# Patient Record
Sex: FEMALE
[De-identification: names, ages, dates, MRNs, and addresses within clinical notes are randomized per-mention and may not be internally consistent; named-entity substitution may affect disease eponyms.]

---

## 2017-11-20 ENCOUNTER — LAB SERVICES (OUTPATIENT)
Dept: OTHER | Age: 37
End: 2017-11-20

## 2017-11-20 ENCOUNTER — CHARTING TRANS (OUTPATIENT)
Dept: OTHER | Age: 37
End: 2017-11-20

## 2017-11-20 LAB
APPEARANCE: YELLOW
BILIRUBIN: NORMAL
GLUCOSE U: 4
KETONES: NORMAL
LEUKOCYTES: 2
NITRITE: NORMAL
OCCULT BLOOD: NORMAL
PH: 6
PROTEIN: NORMAL
URINE SPEC GRAVITY: 1.02
UROBILINOGEN: 0.21

## 2018-11-02 VITALS
TEMPERATURE: 99.2 F | DIASTOLIC BLOOD PRESSURE: 90 MMHG | HEIGHT: 71 IN | SYSTOLIC BLOOD PRESSURE: 140 MMHG | HEART RATE: 72 BPM | WEIGHT: 248 LBS | RESPIRATION RATE: 18 BRPM | BODY MASS INDEX: 34.72 KG/M2

## 2019-02-04 ENCOUNTER — HOSPITAL ENCOUNTER (OUTPATIENT)
Age: 39
Discharge: HOME | End: 2019-02-04
Attending: FAMILY MEDICINE
Payer: COMMERCIAL

## 2019-02-04 VITALS
BODY MASS INDEX: 33.93 KG/M2 | HEIGHT: 71 IN | DIASTOLIC BLOOD PRESSURE: 81 MMHG | TEMPERATURE: 97 F | WEIGHT: 242.38 LBS | SYSTOLIC BLOOD PRESSURE: 112 MMHG | OXYGEN SATURATION: 97 % | HEART RATE: 95 BPM | RESPIRATION RATE: 20 BRPM

## 2019-02-04 DIAGNOSIS — N30.00 ACUTE CYSTITIS WITHOUT HEMATURIA: Primary | ICD-10-CM

## 2019-02-04 DIAGNOSIS — B37.3 VAGINAL CANDIDIASIS: ICD-10-CM

## 2019-02-04 LAB
POCT BILIRUBIN URINE: NEGATIVE
POCT GLUCOSE URINE: >=1000 MG/DL
POCT KETONE URINE: NEGATIVE MG/DL
POCT NITRITE URINE: NEGATIVE
POCT PH URINE: 5.5 (ref 5–8)
POCT PROTEIN URINE: NEGATIVE MG/DL
POCT SPECIFIC GRAVITY URINE: 1.02
POCT URINE COLOR: YELLOW
POCT UROBILINOGEN URINE: 0.2 MG/DL

## 2019-02-04 PROCEDURE — 87186 SC STD MICRODIL/AGAR DIL: CPT | Performed by: FAMILY MEDICINE

## 2019-02-04 PROCEDURE — 87086 URINE CULTURE/COLONY COUNT: CPT | Performed by: FAMILY MEDICINE

## 2019-02-04 PROCEDURE — 99204 OFFICE O/P NEW MOD 45 MIN: CPT

## 2019-02-04 PROCEDURE — 87077 CULTURE AEROBIC IDENTIFY: CPT | Performed by: FAMILY MEDICINE

## 2019-02-04 PROCEDURE — 81002 URINALYSIS NONAUTO W/O SCOPE: CPT | Performed by: FAMILY MEDICINE

## 2019-02-04 RX ORDER — NITROFURANTOIN 25; 75 MG/1; MG/1
100 CAPSULE ORAL 2 TIMES DAILY
Qty: 14 CAPSULE | Refills: 0 | Status: SHIPPED | OUTPATIENT
Start: 2019-02-04 | End: 2019-02-04

## 2019-02-04 RX ORDER — FLUCONAZOLE 150 MG/1
TABLET ORAL
Qty: 3 TABLET | Refills: 0 | Status: SHIPPED | OUTPATIENT
Start: 2019-02-04 | End: 2019-02-04

## 2019-02-04 RX ORDER — CIPROFLOXACIN 500 MG/1
500 TABLET, FILM COATED ORAL 2 TIMES DAILY
Qty: 14 TABLET | Refills: 0 | Status: SHIPPED | OUTPATIENT
Start: 2019-02-04 | End: 2019-02-11

## 2019-02-04 NOTE — ED PROVIDER NOTES
Patient Seen in: [id number] [facility]    History   Patient presents with:  Emery (gynecologic)    Stated Complaint: vaginal discomfort    HPI    **17-year-old female who is a type II diabetic presents to the immediate care with chief co Fundi benign. Ears: normal TM's and external ear canals both ears  Nose: Nares normal. Septum midline. Mucosa normal. No drainage or sinus tenderness.   Throat: lips, mucosa, and tongue normal; teeth and gums normal  Neck: no adenopathy and supple, symmetr

## 2019-07-10 ENCOUNTER — HOSPITAL ENCOUNTER (EMERGENCY)
Facility: HOSPITAL | Age: 39
Discharge: HOME | End: 2019-07-11
Attending: STUDENT IN AN ORGANIZED HEALTH CARE EDUCATION/TRAINING PROGRAM
Payer: COMMERCIAL

## 2019-07-10 ENCOUNTER — APPOINTMENT (OUTPATIENT)
Dept: CT IMAGING | Facility: HOSPITAL | Age: 39
End: 2019-07-10
Attending: STUDENT IN AN ORGANIZED HEALTH CARE EDUCATION/TRAINING PROGRAM
Payer: COMMERCIAL

## 2019-07-10 ENCOUNTER — APPOINTMENT (OUTPATIENT)
Dept: ULTRASOUND IMAGING | Facility: HOSPITAL | Age: 39
End: 2019-07-10
Attending: STUDENT IN AN ORGANIZED HEALTH CARE EDUCATION/TRAINING PROGRAM
Payer: COMMERCIAL

## 2019-07-10 VITALS
HEIGHT: 65 IN | SYSTOLIC BLOOD PRESSURE: 142 MMHG | HEART RATE: 90 BPM | TEMPERATURE: 99 F | WEIGHT: 235 LBS | BODY MASS INDEX: 39.15 KG/M2 | DIASTOLIC BLOOD PRESSURE: 92 MMHG | OXYGEN SATURATION: 100 % | RESPIRATION RATE: 18 BRPM

## 2019-07-10 DIAGNOSIS — R20.2 PARESTHESIAS: ICD-10-CM

## 2019-07-10 DIAGNOSIS — N83.202 CYSTS OF BOTH OVARIES: ICD-10-CM

## 2019-07-10 DIAGNOSIS — E11.9 DM2 (DIABETES MELLITUS, TYPE 2) (HCC): ICD-10-CM

## 2019-07-10 DIAGNOSIS — M25.552 PAIN IN JOINT INVOLVING LEFT PELVIC REGION AND THIGH: ICD-10-CM

## 2019-07-10 DIAGNOSIS — R10.9 ABDOMINAL PAIN, ACUTE: ICD-10-CM

## 2019-07-10 DIAGNOSIS — N39.0 URINARY TRACT INFECTION WITHOUT HEMATURIA, SITE UNSPECIFIED: Primary | ICD-10-CM

## 2019-07-10 DIAGNOSIS — R10.9 ABDOMINAL PAIN, UNSPECIFIED ABDOMINAL LOCATION: ICD-10-CM

## 2019-07-10 DIAGNOSIS — N83.201 CYSTS OF BOTH OVARIES: ICD-10-CM

## 2019-07-10 LAB
ALBUMIN SERPL-MCNC: 2.8 G/DL (ref 3.4–5)
ALBUMIN/GLOB SERPL: 0.6 {RATIO} (ref 1–2)
ALP LIVER SERPL-CCNC: 102 U/L (ref 37–98)
ALT SERPL-CCNC: 16 U/L (ref 13–56)
ANION GAP SERPL CALC-SCNC: 8 MMOL/L (ref 0–18)
AST SERPL-CCNC: 7 U/L (ref 15–37)
BASOPHILS # BLD AUTO: 0.07 X10(3) UL (ref 0–0.2)
BASOPHILS NFR BLD AUTO: 0.7 %
BILIRUB SERPL-MCNC: 0.3 MG/DL (ref 0.1–2)
BILIRUB UR QL STRIP.AUTO: NEGATIVE
BUN BLD-MCNC: 7 MG/DL (ref 7–18)
BUN/CREAT SERPL: 10.3 (ref 10–20)
CALCIUM BLD-MCNC: 8.9 MG/DL (ref 8.5–10.1)
CHLORIDE SERPL-SCNC: 103 MMOL/L (ref 98–112)
CO2 SERPL-SCNC: 23 MMOL/L (ref 21–32)
COLOR UR AUTO: YELLOW
CREAT BLD-MCNC: 0.68 MG/DL (ref 0.55–1.02)
DEPRECATED RDW RBC AUTO: 40.2 FL (ref 35.1–46.3)
EOSINOPHIL # BLD AUTO: 0.29 X10(3) UL (ref 0–0.7)
EOSINOPHIL NFR BLD AUTO: 2.9 %
ERYTHROCYTE [DISTWIDTH] IN BLOOD BY AUTOMATED COUNT: 13 % (ref 11–15)
GLOBULIN PLAS-MCNC: 5 G/DL (ref 2.8–4.4)
GLUCOSE BLD-MCNC: 288 MG/DL (ref 70–99)
GLUCOSE UR STRIP.AUTO-MCNC: >=500 MG/DL
HAV IGM SER QL: 1.6 MG/DL (ref 1.6–2.6)
HCT VFR BLD AUTO: 40.4 % (ref 35–48)
HGB BLD-MCNC: 13 G/DL (ref 12–16)
IMM GRANULOCYTES # BLD AUTO: 0.03 X10(3) UL (ref 0–1)
IMM GRANULOCYTES NFR BLD: 0.3 %
KETONES UR STRIP.AUTO-MCNC: NEGATIVE MG/DL
LIPASE SERPL-CCNC: 125 U/L (ref 73–393)
LYMPHOCYTES # BLD AUTO: 3.17 X10(3) UL (ref 1–4)
LYMPHOCYTES NFR BLD AUTO: 31.6 %
M PROTEIN MFR SERPL ELPH: 7.8 G/DL (ref 6.4–8.2)
MCH RBC QN AUTO: 27.2 PG (ref 26–34)
MCHC RBC AUTO-ENTMCNC: 32.2 G/DL (ref 31–37)
MCV RBC AUTO: 84.5 FL (ref 80–100)
MONOCYTES # BLD AUTO: 0.92 X10(3) UL (ref 0.1–1)
MONOCYTES NFR BLD AUTO: 9.2 %
NEUTROPHILS # BLD AUTO: 5.56 X10 (3) UL (ref 1.5–7.7)
NEUTROPHILS # BLD AUTO: 5.56 X10(3) UL (ref 1.5–7.7)
NEUTROPHILS NFR BLD AUTO: 55.3 %
NITRITE UR QL STRIP.AUTO: NEGATIVE
OSMOLALITY SERPL CALC.SUM OF ELEC: 287 MOSM/KG (ref 275–295)
PH UR STRIP.AUTO: 7 [PH] (ref 4.5–8)
PLATELET # BLD AUTO: 503 10(3)UL (ref 150–450)
POCT LOT NUMBER: NORMAL
POCT URINE PREGNANCY: NEGATIVE
POTASSIUM SERPL-SCNC: 4.2 MMOL/L (ref 3.5–5.1)
PROT UR STRIP.AUTO-MCNC: NEGATIVE MG/DL
RBC # BLD AUTO: 4.78 X10(6)UL (ref 3.8–5.3)
SODIUM SERPL-SCNC: 134 MMOL/L (ref 136–145)
SP GR UR STRIP.AUTO: 1.02 (ref 1–1.03)
UROBILINOGEN UR STRIP.AUTO-MCNC: <2 MG/DL
WBC # BLD AUTO: 10 X10(3) UL (ref 4–11)
WBC #/AREA URNS AUTO: >50 /HPF

## 2019-07-10 PROCEDURE — 83690 ASSAY OF LIPASE: CPT | Performed by: STUDENT IN AN ORGANIZED HEALTH CARE EDUCATION/TRAINING PROGRAM

## 2019-07-10 PROCEDURE — 93971 EXTREMITY STUDY: CPT | Performed by: STUDENT IN AN ORGANIZED HEALTH CARE EDUCATION/TRAINING PROGRAM

## 2019-07-10 PROCEDURE — 99284 EMERGENCY DEPT VISIT MOD MDM: CPT

## 2019-07-10 PROCEDURE — 81025 URINE PREGNANCY TEST: CPT

## 2019-07-10 PROCEDURE — 85025 COMPLETE CBC W/AUTO DIFF WBC: CPT | Performed by: STUDENT IN AN ORGANIZED HEALTH CARE EDUCATION/TRAINING PROGRAM

## 2019-07-10 PROCEDURE — 70450 CT HEAD/BRAIN W/O DYE: CPT | Performed by: STUDENT IN AN ORGANIZED HEALTH CARE EDUCATION/TRAINING PROGRAM

## 2019-07-10 PROCEDURE — 96361 HYDRATE IV INFUSION ADD-ON: CPT

## 2019-07-10 PROCEDURE — 83735 ASSAY OF MAGNESIUM: CPT | Performed by: STUDENT IN AN ORGANIZED HEALTH CARE EDUCATION/TRAINING PROGRAM

## 2019-07-10 PROCEDURE — 80053 COMPREHEN METABOLIC PANEL: CPT | Performed by: STUDENT IN AN ORGANIZED HEALTH CARE EDUCATION/TRAINING PROGRAM

## 2019-07-10 PROCEDURE — 74177 CT ABD & PELVIS W/CONTRAST: CPT | Performed by: STUDENT IN AN ORGANIZED HEALTH CARE EDUCATION/TRAINING PROGRAM

## 2019-07-10 PROCEDURE — 96375 TX/PRO/DX INJ NEW DRUG ADDON: CPT

## 2019-07-10 PROCEDURE — 81001 URINALYSIS AUTO W/SCOPE: CPT | Performed by: STUDENT IN AN ORGANIZED HEALTH CARE EDUCATION/TRAINING PROGRAM

## 2019-07-10 PROCEDURE — 87086 URINE CULTURE/COLONY COUNT: CPT | Performed by: STUDENT IN AN ORGANIZED HEALTH CARE EDUCATION/TRAINING PROGRAM

## 2019-07-10 PROCEDURE — 96365 THER/PROPH/DIAG IV INF INIT: CPT

## 2019-07-10 PROCEDURE — 84702 CHORIONIC GONADOTROPIN TEST: CPT

## 2019-07-10 RX ORDER — IBUPROFEN 600 MG/1
600 TABLET ORAL EVERY 8 HOURS PRN
Qty: 30 TABLET | Refills: 0 | Status: SHIPPED | OUTPATIENT
Start: 2019-07-10 | End: 2019-07-15 | Stop reason: ALTCHOICE

## 2019-07-10 RX ORDER — MORPHINE SULFATE 4 MG/ML
4 INJECTION, SOLUTION INTRAMUSCULAR; INTRAVENOUS EVERY 30 MIN PRN
Status: DISCONTINUED | OUTPATIENT
Start: 2019-07-10 | End: 2019-07-11

## 2019-07-10 RX ORDER — KETOROLAC TROMETHAMINE 30 MG/ML
15 INJECTION, SOLUTION INTRAMUSCULAR; INTRAVENOUS ONCE
Status: COMPLETED | OUTPATIENT
Start: 2019-07-10 | End: 2019-07-10

## 2019-07-10 RX ORDER — ONDANSETRON 2 MG/ML
4 INJECTION INTRAMUSCULAR; INTRAVENOUS ONCE
Status: COMPLETED | OUTPATIENT
Start: 2019-07-10 | End: 2019-07-10

## 2019-07-10 RX ORDER — HYDROCODONE BITARTRATE AND ACETAMINOPHEN 5; 325 MG/1; MG/1
1-2 TABLET ORAL EVERY 6 HOURS PRN
Qty: 10 TABLET | Refills: 0 | Status: SHIPPED | OUTPATIENT
Start: 2019-07-10 | End: 2019-07-17

## 2019-07-10 RX ORDER — CEPHALEXIN 500 MG/1
500 CAPSULE ORAL 4 TIMES DAILY
Qty: 28 CAPSULE | Refills: 0 | Status: SHIPPED | OUTPATIENT
Start: 2019-07-10 | End: 2019-07-17

## 2019-07-11 ENCOUNTER — TELEPHONE (OUTPATIENT)
Dept: NEUROLOGY | Facility: CLINIC | Age: 39
End: 2019-07-11

## 2019-07-11 LAB — B-HCG SERPL-ACNC: <1 MIU/ML

## 2019-07-11 NOTE — ED PROVIDER NOTES
Patient Seen in: [facility] Emergency Department    History   Patient presents with:  Abdomen/Flank Pain (GI/)  Lower Extremity Injury (musculoskeletal)    Stated Complaint: abd pain/left leg pain    HPI    Patient is a 20-year-old female presents O2 Device None (Room air)       Current:BP (!) 142/92   Pulse 90   Temp 98.8 °F (37.1 °C) (Temporal)   Resp 18   Ht 165.1 cm (5' 5\")   Wt 106.6 kg   LMP 06/21/2019   SpO2 100%   BMI 39.11 kg/m²         Physical Exam    Constitutional: The patient is jaden 6-10 (*)     Bacteria Urine 3+ (*)     Squamous Epi.  Cells Large (*)     Mucous Urine 2+ (*)     All other components within normal limits   CBC W/ DIFFERENTIAL - Abnormal; Notable for the following components:    .0 (*)     All other components wit worsen. She demonstrated understanding, she is comfortable with the plan and will follow-up as directed.           Disposition and Plan     Clinical Impression:  Urinary tract infection without hematuria, site unspecified  (primary encounter diagnosis)  Cy

## 2019-07-11 NOTE — TELEPHONE ENCOUNTER
Spoke with pt. She was seen in ED on 07/10/2019 for left leg numbness and tingling. She states she is doing \"ok\" but is still experiencing pain numbness and tingling in left leg. Pt states \"whole leg is completely numb. \" Pt also reports that she is [place]

## 2019-07-11 NOTE — ED INITIAL ASSESSMENT (HPI)
Pt c/o left sided abdominal pain x1 week. Pt states she feels \"something moving through her abdomen\". Pt also c/o left thigh and knee pain x1 week.  Pt c/o numbness in the left leg    Pt denies any nausea, vomiting, pt states she has irregular bowel movem

## 2019-07-15 ENCOUNTER — OFFICE VISIT (OUTPATIENT)
Dept: FAMILY MEDICINE CLINIC | Facility: CLINIC | Age: 39
End: 2019-07-15

## 2019-07-15 VITALS
SYSTOLIC BLOOD PRESSURE: 122 MMHG | BODY MASS INDEX: 39.82 KG/M2 | HEART RATE: 90 BPM | WEIGHT: 239 LBS | OXYGEN SATURATION: 98 % | RESPIRATION RATE: 18 BRPM | TEMPERATURE: 98 F | DIASTOLIC BLOOD PRESSURE: 84 MMHG | HEIGHT: 65 IN

## 2019-07-15 DIAGNOSIS — R22.1 NECK FULLNESS: ICD-10-CM

## 2019-07-15 DIAGNOSIS — M25.562 CHRONIC PAIN OF LEFT KNEE: ICD-10-CM

## 2019-07-15 DIAGNOSIS — Z91.19 MEDICALLY NONCOMPLIANT: ICD-10-CM

## 2019-07-15 DIAGNOSIS — G89.29 CHRONIC PAIN OF LEFT KNEE: ICD-10-CM

## 2019-07-15 DIAGNOSIS — K59.09 CHRONIC CONSTIPATION: ICD-10-CM

## 2019-07-15 DIAGNOSIS — E11.65 TYPE 2 DIABETES MELLITUS WITH HYPERGLYCEMIA, WITHOUT LONG-TERM CURRENT USE OF INSULIN (HCC): ICD-10-CM

## 2019-07-15 DIAGNOSIS — E11.65 UNCONTROLLED TYPE 2 DIABETES MELLITUS WITH HYPERGLYCEMIA (HCC): Primary | ICD-10-CM

## 2019-07-15 DIAGNOSIS — R10.32 LLQ ABDOMINAL PAIN: ICD-10-CM

## 2019-07-15 DIAGNOSIS — R20.2 PARESTHESIAS: ICD-10-CM

## 2019-07-15 DIAGNOSIS — E01.0 THYROMEGALY: ICD-10-CM

## 2019-07-15 DIAGNOSIS — E66.9 OBESITY (BMI 30-39.9): ICD-10-CM

## 2019-07-15 PROCEDURE — 99204 OFFICE O/P NEW MOD 45 MIN: CPT | Performed by: FAMILY MEDICINE

## 2019-07-15 RX ORDER — MELOXICAM 15 MG/1
15 TABLET ORAL DAILY PRN
Qty: 30 TABLET | Refills: 1 | Status: SHIPPED | OUTPATIENT
Start: 2019-07-15 | End: 2019-07-19 | Stop reason: ALTCHOICE

## 2019-07-15 NOTE — PROGRESS NOTES
HPI:    Patient ID: El Resendiz is a 37year old female. HPI   35yr old obese, AAF presents with  as a new patient for ER f/u and to establish care.   Seen in ED on 7/10 for L sided abdominal pain, L thigh pain and L knee pain for the past week Ryan Muniz in [facility]    Review of Systems   Constitutional: Negative for fatigue. Respiratory: Negative for cough and shortness of breath. Cardiovascular: Negative for chest pain and palpitations. Gastrointestinal: Positive for abdominal pain.    Viv insulin glargine (LANTUS SOLOSTAR) 100 UNIT/ML Subcutaneous Solution Pen-injector Inject 50 Units into the skin nightly.  (Patient taking differently: Inject 40 Units into the skin nightly.  ) Disp: 5 pen Rfl: 0   Insulin Pen Needle (BD PEN NEEDLE SHORT U fullness  5. Thyromegaly  - reviewed possible etiologies thyromegaly  - will check TFTs  - check thyroid US  - TSH W REFLEX TO FREE T4; Future  - US THYROID (JAL=[id number]); Future    6. Chronic constipation  7.  LLQ abdominal pain  - instructed to increase fib

## 2019-07-15 NOTE — PATIENT INSTRUCTIONS
Treating Constipation    Constipation is a common and often uncomfortable problem. Constipation means you have bowel movements fewer than 3 times per week, or strain to pass hard, dry stool. It can last a short time.  Or it can be a problem that never see © 4080-7499 [employer]. [address] [facility], [facility] [place]. All rights reserved. This information is not intended as a substitute for professional medical care. Always follow your healthcare professional's instructions.         Ciera Caldwell · Monounsaturated fats are mostly found in vegetable oils, such as olive, canola, and peanut oils. They are also found in avocados and some nuts. Monounsaturated fats are healthy for your heart. That’s because they lower LDL (unhealthy) cholesterol.   · Evgeny

## 2019-07-16 ENCOUNTER — HOSPITAL ENCOUNTER (OUTPATIENT)
Dept: GENERAL RADIOLOGY | Facility: HOSPITAL | Age: 39
Discharge: HOME | End: 2019-07-16
Attending: FAMILY MEDICINE
Payer: COMMERCIAL

## 2019-07-16 ENCOUNTER — HOSPITAL ENCOUNTER (OUTPATIENT)
Dept: ULTRASOUND IMAGING | Facility: HOSPITAL | Age: 39
Discharge: HOME | End: 2019-07-16
Attending: OBSTETRICS & GYNECOLOGY
Payer: COMMERCIAL

## 2019-07-16 DIAGNOSIS — G89.29 CHRONIC PAIN OF LEFT KNEE: ICD-10-CM

## 2019-07-16 DIAGNOSIS — R10.9 ABDOMINAL PAIN: ICD-10-CM

## 2019-07-16 DIAGNOSIS — M25.562 CHRONIC PAIN OF LEFT KNEE: ICD-10-CM

## 2019-07-16 PROCEDURE — 93975 VASCULAR STUDY: CPT | Performed by: OBSTETRICS & GYNECOLOGY

## 2019-07-16 PROCEDURE — 73560 X-RAY EXAM OF KNEE 1 OR 2: CPT | Performed by: FAMILY MEDICINE

## 2019-07-16 PROCEDURE — 76830 TRANSVAGINAL US NON-OB: CPT | Performed by: OBSTETRICS & GYNECOLOGY

## 2019-07-16 PROCEDURE — 76856 US EXAM PELVIC COMPLETE: CPT | Performed by: OBSTETRICS & GYNECOLOGY

## 2019-07-17 ENCOUNTER — APPOINTMENT (OUTPATIENT)
Dept: LAB | Age: 39
End: 2019-07-17
Attending: FAMILY MEDICINE
Payer: COMMERCIAL

## 2019-07-17 DIAGNOSIS — E11.65 TYPE 2 DIABETES MELLITUS WITH HYPERGLYCEMIA, WITHOUT LONG-TERM CURRENT USE OF INSULIN (HCC): ICD-10-CM

## 2019-07-17 DIAGNOSIS — E01.0 THYROMEGALY: ICD-10-CM

## 2019-07-17 DIAGNOSIS — E11.65 UNCONTROLLED TYPE 2 DIABETES MELLITUS WITH HYPERGLYCEMIA (HCC): ICD-10-CM

## 2019-07-17 DIAGNOSIS — R22.1 NECK FULLNESS: ICD-10-CM

## 2019-07-17 LAB
ALBUMIN SERPL-MCNC: 3.1 G/DL (ref 3.4–5)
ALBUMIN/GLOB SERPL: 0.6 {RATIO} (ref 1–2)
ALP LIVER SERPL-CCNC: 104 U/L (ref 37–98)
ALT SERPL-CCNC: 19 U/L (ref 13–56)
ANION GAP SERPL CALC-SCNC: 7 MMOL/L (ref 0–18)
AST SERPL-CCNC: 12 U/L (ref 15–37)
BILIRUB SERPL-MCNC: 0.5 MG/DL (ref 0.1–2)
BUN BLD-MCNC: 8 MG/DL (ref 7–18)
BUN/CREAT SERPL: 11.8 (ref 10–20)
CALCIUM BLD-MCNC: 9.3 MG/DL (ref 8.5–10.1)
CHLORIDE SERPL-SCNC: 102 MMOL/L (ref 98–112)
CHOLEST SMN-MCNC: 149 MG/DL (ref ?–200)
CO2 SERPL-SCNC: 27 MMOL/L (ref 21–32)
CREAT BLD-MCNC: 0.68 MG/DL (ref 0.55–1.02)
CREAT UR-SCNC: 114 MG/DL
EST. AVERAGE GLUCOSE BLD GHB EST-MCNC: 315 MG/DL (ref 68–126)
GLOBULIN PLAS-MCNC: 5.2 G/DL (ref 2.8–4.4)
GLUCOSE BLD-MCNC: 250 MG/DL (ref 70–99)
HBA1C MFR BLD HPLC: 12.6 % (ref ?–5.7)
HDLC SERPL-MCNC: 38 MG/DL (ref 40–59)
LDLC SERPL CALC-MCNC: 89 MG/DL (ref ?–100)
M PROTEIN MFR SERPL ELPH: 8.3 G/DL (ref 6.4–8.2)
MICROALBUMIN UR-MCNC: 2.01 MG/DL
MICROALBUMIN/CREAT 24H UR-RTO: 17.6 UG/MG (ref ?–30)
NONHDLC SERPL-MCNC: 111 MG/DL (ref ?–130)
OSMOLALITY SERPL CALC.SUM OF ELEC: 289 MOSM/KG (ref 275–295)
POTASSIUM SERPL-SCNC: 4.8 MMOL/L (ref 3.5–5.1)
SODIUM SERPL-SCNC: 136 MMOL/L (ref 136–145)
TRIGL SERPL-MCNC: 110 MG/DL (ref 30–149)
TSI SER-ACNC: 0.87 MIU/ML (ref 0.36–3.74)
VLDLC SERPL CALC-MCNC: 22 MG/DL (ref 0–30)

## 2019-07-17 PROCEDURE — 80053 COMPREHEN METABOLIC PANEL: CPT

## 2019-07-17 PROCEDURE — 84443 ASSAY THYROID STIM HORMONE: CPT

## 2019-07-17 PROCEDURE — 80061 LIPID PANEL: CPT

## 2019-07-17 PROCEDURE — 83036 HEMOGLOBIN GLYCOSYLATED A1C: CPT

## 2019-07-17 PROCEDURE — 82570 ASSAY OF URINE CREATININE: CPT

## 2019-07-17 PROCEDURE — 82043 UR ALBUMIN QUANTITATIVE: CPT

## 2019-07-19 ENCOUNTER — TELEPHONE (OUTPATIENT)
Dept: FAMILY MEDICINE CLINIC | Facility: CLINIC | Age: 39
End: 2019-07-19

## 2019-07-19 ENCOUNTER — OFFICE VISIT (OUTPATIENT)
Dept: FAMILY MEDICINE CLINIC | Facility: CLINIC | Age: 39
End: 2019-07-19

## 2019-07-19 VITALS
WEIGHT: 239 LBS | BODY MASS INDEX: 39.82 KG/M2 | HEART RATE: 94 BPM | SYSTOLIC BLOOD PRESSURE: 110 MMHG | TEMPERATURE: 98 F | RESPIRATION RATE: 18 BRPM | DIASTOLIC BLOOD PRESSURE: 82 MMHG | HEIGHT: 65 IN | OXYGEN SATURATION: 98 %

## 2019-07-19 DIAGNOSIS — G89.29 CHRONIC PAIN OF LEFT KNEE: ICD-10-CM

## 2019-07-19 DIAGNOSIS — E66.9 OBESITY (BMI 30-39.9): ICD-10-CM

## 2019-07-19 DIAGNOSIS — E11.65 UNCONTROLLED TYPE 2 DIABETES MELLITUS WITH HYPERGLYCEMIA (HCC): Primary | ICD-10-CM

## 2019-07-19 DIAGNOSIS — F17.200 TOBACCO USE DISORDER: ICD-10-CM

## 2019-07-19 DIAGNOSIS — R93.6 ABNORMAL X-RAY OF KNEE: ICD-10-CM

## 2019-07-19 DIAGNOSIS — E11.42 TYPE 2 DIABETES MELLITUS WITH DIABETIC POLYNEUROPATHY, WITHOUT LONG-TERM CURRENT USE OF INSULIN (HCC): ICD-10-CM

## 2019-07-19 DIAGNOSIS — M25.562 CHRONIC PAIN OF LEFT KNEE: ICD-10-CM

## 2019-07-19 LAB
GLUCOSE BLOOD: 251
TEST STRIP EXPIRATION DATE: NORMAL DATE

## 2019-07-19 PROCEDURE — 82962 GLUCOSE BLOOD TEST: CPT | Performed by: FAMILY MEDICINE

## 2019-07-19 PROCEDURE — 99214 OFFICE O/P EST MOD 30 MIN: CPT | Performed by: FAMILY MEDICINE

## 2019-07-19 RX ORDER — BLOOD-GLUCOSE METER
1 EACH MISCELLANEOUS 2 TIMES DAILY
Qty: 1 KIT | Refills: 0 | Status: SHIPPED | OUTPATIENT
Start: 2019-07-19 | End: 2020-07-18

## 2019-07-19 RX ORDER — GABAPENTIN 100 MG/1
100 CAPSULE ORAL NIGHTLY
Qty: 30 CAPSULE | Refills: 1 | Status: SHIPPED | OUTPATIENT
Start: 2019-07-19 | End: 2019-07-30

## 2019-07-19 NOTE — TELEPHONE ENCOUNTER
Pt called - [facility] that was given to her at today's appointment.

## 2019-07-19 NOTE — TELEPHONE ENCOUNTER
Patient got Lantus vials. She would like the pens and needles. Says this is what she had before. Pended.

## 2019-07-19 NOTE — PROGRESS NOTES
HPI:   Dl Dean is a 37year old female who presents for f/u on her recent labs and imaging. DM2, has been uncontrolled. Pt has discontinued all her meds and has not been checking her glucose levels.  Has had numbness/tingling in her feet for some max Lab Results   Component Value Date    AST 12 (L) 07/17/2019    AST 7 (L) 07/10/2019    AST 15 09/30/2013     Lab Results   Component Value Date    ALT 19 07/17/2019    ALT 16 07/10/2019    ALT 20 09/30/2013     @[facility](    )  Past Medical History: abused: Not on file        Forced sexual activity: Not on file    Other Topics      Concerns:        Caffeine Concern: No        Exercise: No        Seat Belt: Yes        Special Diet: No        Stress Concern: No        Weight Concern: No    Social Histor SEs  - refer to diabetes clinic for more structured tx  - refer to ophthalmology, Dr. Sierra Garber for diabetic eye exam  - check feet daily  - check blood glucose levels daily and keep log to bring at next ov.   - GLUCOSE BLOOD TEST  - OPHTHALMOLOGY - INTERN

## 2019-07-23 ENCOUNTER — TELEPHONE (OUTPATIENT)
Dept: FAMILY MEDICINE CLINIC | Facility: CLINIC | Age: 39
End: 2019-07-23

## 2019-07-23 ENCOUNTER — OFFICE VISIT (OUTPATIENT)
Dept: ENDOCRINOLOGY CLINIC | Facility: CLINIC | Age: 39
End: 2019-07-23

## 2019-07-23 VITALS
HEART RATE: 94 BPM | HEIGHT: 71 IN | WEIGHT: 238 LBS | DIASTOLIC BLOOD PRESSURE: 70 MMHG | SYSTOLIC BLOOD PRESSURE: 108 MMHG | BODY MASS INDEX: 33.32 KG/M2 | RESPIRATION RATE: 20 BRPM

## 2019-07-23 DIAGNOSIS — E11.65 TYPE 2 DIABETES MELLITUS WITH HYPERGLYCEMIA, WITH LONG-TERM CURRENT USE OF INSULIN (HCC): ICD-10-CM

## 2019-07-23 DIAGNOSIS — R20.2 PARESTHESIAS: Primary | ICD-10-CM

## 2019-07-23 DIAGNOSIS — Z79.4 TYPE 2 DIABETES MELLITUS WITH HYPERGLYCEMIA, WITH LONG-TERM CURRENT USE OF INSULIN (HCC): ICD-10-CM

## 2019-07-23 DIAGNOSIS — F32.A DEPRESSION, UNSPECIFIED DEPRESSION TYPE: Primary | ICD-10-CM

## 2019-07-23 PROBLEM — F41.9 ANXIETY: Status: ACTIVE | Noted: 2019-07-23

## 2019-07-23 PROCEDURE — 95249 CONT GLUC MNTR PT PROV EQP: CPT | Performed by: NURSE PRACTITIONER

## 2019-07-23 PROCEDURE — 99215 OFFICE O/P EST HI 40 MIN: CPT | Performed by: NURSE PRACTITIONER

## 2019-07-23 RX ORDER — GLIMEPIRIDE 2 MG/1
2 TABLET ORAL
Qty: 90 TABLET | Refills: 0 | Status: SHIPPED | OUTPATIENT
Start: 2019-07-23 | End: 2019-09-12 | Stop reason: ALTCHOICE

## 2019-07-23 RX ORDER — BLOOD-GLUCOSE SENSOR
1 EACH MISCELLANEOUS
Qty: 3 EACH | Refills: 12 | Status: SHIPPED | OUTPATIENT
Start: 2019-07-23 | End: 2020-07-21 | Stop reason: ALTCHOICE

## 2019-07-23 RX ORDER — BLOOD-GLUCOSE,RECEIVER,CONT
1 EACH MISCELLANEOUS ONCE
Qty: 1 DEVICE | Refills: 0 | Status: SHIPPED | OUTPATIENT
Start: 2019-07-23 | End: 2019-07-23

## 2019-07-23 RX ORDER — BLOOD-GLUCOSE TRANSMITTER
EACH MISCELLANEOUS
Qty: 1 EACH | Refills: 3 | Status: SHIPPED | OUTPATIENT
Start: 2019-07-23 | End: 2019-07-30

## 2019-07-23 RX ORDER — METFORMIN HYDROCHLORIDE 500 MG/1
TABLET, EXTENDED RELEASE ORAL
Qty: 360 TABLET | Refills: 1 | Status: SHIPPED | OUTPATIENT
Start: 2019-07-23 | End: 2019-09-12

## 2019-07-23 NOTE — PATIENT INSTRUCTIONS
We are here to support you with Diabetes but please remember that you still need your primary care doctor for your routine health maintenance.    Your A1C: 12.6%  Your trends are too high and we will work together on improving diabetes control to help your or  under water for longer than 30 minutes. If the sensor falls off, please keep the sensor and bring it back to your appointment since we may still have enough information in the sensor to review your trends.    We will see if your insurance covers t eye and not visible by you. Please let me know if you need provider list for eye doctor.    Thank you   Jacy Moss  [phone]

## 2019-07-23 NOTE — PROGRESS NOTES
Diabetes Education:    Provided education on Personal Freestyle Giulia CGM and gave her sample  / sensor.   Lot [id number]      [id number]      Exp  8/31/2019     target range set at 100-180 mg/dl  Reviewed the following: Sensor pack, applica

## 2019-07-23 NOTE — ASSESSMENT & PLAN NOTE
A1C :   Lab Results   Component Value Date     (H) 07/17/2019    A1C 12.6 (H) 07/17/2019     Weight: 238 lb   Diabetes control is poor   Had lengthy discussion regarding poor glycemic control.  Patient was reminded their health is being jeopardized w Diet, goals for physical activity and weight loss discussed. The patient is asked to return in 2 weeks  but recommended to contact DM clinic sooner if questions or concerns.       The patient indicates understanding of these issues and agrees to the plan

## 2019-07-23 NOTE — TELEPHONE ENCOUNTER
Patient states she is a patient of Dr Fouzia Collins and has an upcoming appointment that needs a referral.      Transferred to triage  for further details.     Future Appointments   Date Time Provider Lissett Monteiro   7/24/2019  1:40 PM Anali Osborn, DO

## 2019-07-23 NOTE — PROGRESS NOTES
Eduin Gonzalez is a 37year old female who presents today to establish for diabetes management.    Primary care physician: Roxane Carmona MD   Most recent A1C was 12.6% which reflects poorly controlled DM over past 3m   Admits she went off DM meds for pas Lowest: 108 mg/dl   Highest: 249 mg/dl     DM associated symptoms:   Polyuria, polyphagia, polydipsia: yes  Paresthesias: yes  Blurred vision: yes  Hypoglycemia: no but reports did have frequent hypoglycemia when she was taking lantus 5522     DM Complic Monitoring Suppl (ONETOUCH ULTRA 2) w/Device Does not apply Kit 1 Device by Other route 2 (two) times daily. Use as directed. Disp: 1 kit Rfl: 0   Glucose Blood (ONETOUCH ULTRA BLUE) In Vitro Strip Use as directed.  Disp: 100 strip Rfl: [address] rhythm. Edema not present. Pulmonary/Chest: Effort normal and breath sounds normal.   Musculoskeletal:   Diabetes foot exam:   Good foot hygiene.    Bilateral barefoot skin diabetic exam: normal.  Visualized feet and the appearance : normal.  Bilateral lifestyle modifications (dietary modifications, exercise and weight loss)   Educated patient on the benefits of Metformin therapy (and discussed risks as well)   Recommended for  patient to follow up in Diabetes center to review carb goals, food label read 1      DM Quality Indicators:  A1C as reported above  Retinopathy Screen: needed   Nephropathy Screen: NEG 7-2019 no ACEi  Depression Screen:  PHQ-2 score 1 - referred to [facility] navigator   Feet exam: Reviewed feet precautions 7-   BP: as above  Lipids:

## 2019-07-24 ENCOUNTER — TELEPHONE (OUTPATIENT)
Dept: FAMILY MEDICINE CLINIC | Facility: CLINIC | Age: 39
End: 2019-07-24

## 2019-07-24 NOTE — TELEPHONE ENCOUNTER
Patient states she had FMLA forms to be completed by Dr Katelynn Casillas. She wants to know if they have been completed? Please advise.

## 2019-07-24 NOTE — TELEPHONE ENCOUNTER
Dr. Camilo Mack,  Please advise    I do not see any mention of FMLA or scans of forms in the chart. Do you have these?

## 2019-07-24 NOTE — H&P (VIEW-ONLY)
[facility] - History and Physical                                                                                         Patient presents with:  Establish Care  Abdominal Pain  Constipation      HISTORY OF PRESENT ILLNESS:   Teresita PAUL Continuous Blood Gluc Transmit (DEXCOM G6 TRANSMITTER) Does not apply Misc Use as directed with Dexcom G 6 sensor Disp: 1 each Rfl: 3   glimepiride 2 MG Oral Tab Take 1 tablet (2 mg total) by mouth every morning before breakfast. Disp: 90 tablet Rfl: 0 07/17/2019, not currently breastfeeding. Constitutional: Obese body habitus, well groomed  Ears, Nose, Mouth, Throat: Normal appearance of nose and ears.  Normal appearance of lips, teeth, gums and oral mucosa  Neck: Normal neck inspection, normal thyroi

## 2019-07-24 NOTE — TELEPHONE ENCOUNTER
Patient notified referral order has been placed for Dr. Tawny Booker. States she has already made an appointment for tomorrow.

## 2019-07-25 RX ORDER — ACETAMINOPHEN 500 MG
1000 TABLET ORAL ONCE
Status: CANCELLED | OUTPATIENT
Start: 2019-07-25 | End: 2019-07-25

## 2019-07-25 RX ORDER — POLYETHYLENE GLYCOL 3350 17 G/17G
17 POWDER, FOR SOLUTION ORAL 2 TIMES DAILY
COMMUNITY
End: 2019-09-05 | Stop reason: ALTCHOICE

## 2019-07-25 RX ORDER — MELOXICAM 7.5 MG/1
7.5 TABLET ORAL AS NEEDED
COMMUNITY
End: 2019-08-06 | Stop reason: ALTCHOICE

## 2019-07-25 RX ORDER — HYDROCODONE BITARTRATE AND ACETAMINOPHEN 5; 325 MG/1; MG/1
1 TABLET ORAL AS NEEDED
COMMUNITY
End: 2019-08-06

## 2019-07-25 RX ORDER — IBUPROFEN 200 MG
600 TABLET ORAL AS NEEDED
COMMUNITY
End: 2019-08-06

## 2019-07-26 ENCOUNTER — HOSPITAL ENCOUNTER (OUTPATIENT)
Dept: ULTRASOUND IMAGING | Age: 39
Discharge: HOME | End: 2019-07-26
Attending: FAMILY MEDICINE
Payer: COMMERCIAL

## 2019-07-26 DIAGNOSIS — E01.0 THYROMEGALY: ICD-10-CM

## 2019-07-26 DIAGNOSIS — R22.1 NECK FULLNESS: ICD-10-CM

## 2019-07-26 PROCEDURE — 76536 US EXAM OF HEAD AND NECK: CPT | Performed by: FAMILY MEDICINE

## 2019-07-29 ENCOUNTER — TELEPHONE (OUTPATIENT)
Dept: FAMILY MEDICINE CLINIC | Facility: CLINIC | Age: 39
End: 2019-07-29

## 2019-07-30 ENCOUNTER — OFFICE VISIT (OUTPATIENT)
Dept: NEUROLOGY | Facility: CLINIC | Age: 39
End: 2019-07-30

## 2019-07-30 VITALS
WEIGHT: 244 LBS | BODY MASS INDEX: 34.16 KG/M2 | RESPIRATION RATE: 15 BRPM | SYSTOLIC BLOOD PRESSURE: 122 MMHG | HEIGHT: 71 IN | HEART RATE: 78 BPM | DIASTOLIC BLOOD PRESSURE: 78 MMHG

## 2019-07-30 DIAGNOSIS — G57.12 MERALGIA PARESTHETICA OF LEFT SIDE: ICD-10-CM

## 2019-07-30 DIAGNOSIS — E11.65 UNCONTROLLED TYPE 2 DIABETES MELLITUS WITH HYPERGLYCEMIA (HCC): ICD-10-CM

## 2019-07-30 DIAGNOSIS — E11.42 TYPE 2 DIABETES MELLITUS WITH DIABETIC POLYNEUROPATHY, WITHOUT LONG-TERM CURRENT USE OF INSULIN (HCC): ICD-10-CM

## 2019-07-30 DIAGNOSIS — G62.9 NEUROPATHY: Primary | ICD-10-CM

## 2019-07-30 PROCEDURE — 99204 OFFICE O/P NEW MOD 45 MIN: CPT | Performed by: OTHER

## 2019-07-30 RX ORDER — GABAPENTIN 100 MG/1
300 CAPSULE ORAL NIGHTLY
Qty: 90 CAPSULE | Refills: 1 | Status: SHIPPED | OUTPATIENT
Start: 2019-07-30 | End: 2019-10-14

## 2019-07-30 NOTE — H&P
Neurology H&P    Melanie Coronado Patient Status:  No patient class for patient encounter    1980 MRN [id number]   Location [address] [place] [address], [address] [place] [address] Drive, [address] [facility] Attending No att. providers found   [facility] Day # 0 PCP Charmaine Hess DO     Subj mg by mouth 2 (two) times daily with meals. 4 tabs daily in AM ) Disp: 360 tablet Rfl: 1   Blood Glucose Monitoring Suppl (ONETOUCH ULTRA 2) w/Device Does not apply Kit 1 Device by Other route 2 (two) times daily. Use as directed.  Disp: 1 kit Rfl: 0   Gluc HC  SECTION LEVEL I      x1   • KIDNEY SURGERY      KIDNEY STONE REMOVAL X2       SocHx:  Social History    Tobacco Use      Smoking status: Current Every Day Smoker        Packs/day: 0.25        Types: Cigarettes        Start date:       Smoke LEs except mild pain limiting weakness in the L HF    SENSORY EXAMINATION:  UE: intact to light touch, pinprick intact  LE: intact to light touch, pinprick reduced in stocking distribution top the upper shin    COORDINATION:  No dysmetria, or intention haider

## 2019-07-30 NOTE — PROGRESS NOTES
Patient states her leg was inflamed in the ER and she had pain in left calf and left thigh. Patient states pain is now predominately in left thigh. She states she has constant pain that she reports is painful to the touch.  Patient states this began a few w

## 2019-08-06 ENCOUNTER — DIABETIC EDUCATION (OUTPATIENT)
Dept: ENDOCRINOLOGY CLINIC | Facility: CLINIC | Age: 39
End: 2019-08-06

## 2019-08-06 ENCOUNTER — MED REC SCAN ONLY (OUTPATIENT)
Dept: INTERNAL MEDICINE CLINIC | Facility: CLINIC | Age: 39
End: 2019-08-06

## 2019-08-06 ENCOUNTER — OFFICE VISIT (OUTPATIENT)
Dept: FAMILY MEDICINE CLINIC | Facility: CLINIC | Age: 39
End: 2019-08-06

## 2019-08-06 ENCOUNTER — OFFICE VISIT (OUTPATIENT)
Dept: ENDOCRINOLOGY CLINIC | Facility: CLINIC | Age: 39
End: 2019-08-06

## 2019-08-06 VITALS — BODY MASS INDEX: 34.02 KG/M2 | WEIGHT: 243 LBS | HEIGHT: 71 IN

## 2019-08-06 VITALS
HEIGHT: 71 IN | DIASTOLIC BLOOD PRESSURE: 60 MMHG | HEART RATE: 103 BPM | SYSTOLIC BLOOD PRESSURE: 98 MMHG | BODY MASS INDEX: 34.02 KG/M2 | WEIGHT: 243 LBS | RESPIRATION RATE: 20 BRPM

## 2019-08-06 VITALS
OXYGEN SATURATION: 98 % | BODY MASS INDEX: 33.88 KG/M2 | RESPIRATION RATE: 18 BRPM | TEMPERATURE: 98 F | HEART RATE: 78 BPM | HEIGHT: 71 IN | WEIGHT: 242 LBS | SYSTOLIC BLOOD PRESSURE: 124 MMHG | DIASTOLIC BLOOD PRESSURE: 78 MMHG

## 2019-08-06 DIAGNOSIS — G57.12 MERALGIA PARESTHETICA OF LEFT SIDE: ICD-10-CM

## 2019-08-06 DIAGNOSIS — E04.2 MULTIPLE THYROID NODULES: Primary | ICD-10-CM

## 2019-08-06 DIAGNOSIS — E11.65 UNCONTROLLED TYPE 2 DIABETES MELLITUS WITH HYPERGLYCEMIA (HCC): Primary | ICD-10-CM

## 2019-08-06 DIAGNOSIS — E11.65 UNCONTROLLED TYPE 2 DIABETES MELLITUS WITH HYPERGLYCEMIA (HCC): ICD-10-CM

## 2019-08-06 DIAGNOSIS — F17.200 TOBACCO USE DISORDER: ICD-10-CM

## 2019-08-06 DIAGNOSIS — F41.8 DEPRESSION WITH ANXIETY: ICD-10-CM

## 2019-08-06 DIAGNOSIS — G89.29 CHRONIC PAIN OF LEFT KNEE: ICD-10-CM

## 2019-08-06 DIAGNOSIS — Z02.89 ENCOUNTER FOR COMPLETION OF FORM WITH PATIENT: ICD-10-CM

## 2019-08-06 DIAGNOSIS — M25.562 CHRONIC PAIN OF LEFT KNEE: ICD-10-CM

## 2019-08-06 DIAGNOSIS — E11.42 TYPE 2 DIABETES MELLITUS WITH DIABETIC POLYNEUROPATHY, WITHOUT LONG-TERM CURRENT USE OF INSULIN (HCC): ICD-10-CM

## 2019-08-06 PROCEDURE — 99214 OFFICE O/P EST MOD 30 MIN: CPT | Performed by: FAMILY MEDICINE

## 2019-08-06 PROCEDURE — 95251 CONT GLUC MNTR ANALYSIS I&R: CPT | Performed by: NURSE PRACTITIONER

## 2019-08-06 PROCEDURE — 97802 MEDICAL NUTRITION INDIV IN: CPT | Performed by: DIETITIAN, REGISTERED

## 2019-08-06 NOTE — PROGRESS NOTES
HPI:   Zuly Rdz is a 37year old female who presents for f/u on recent thyroid US results, recheck of her diabetes, depression and anxiety, LLE pain/numbness. Thyromegaly noted on last ov, had thyroid US done and here to f/u on results.   DM2, follow times daily. Disp: 1 Box Rfl: 5   insulin glargine (LANTUS SOLOSTAR) 100 UNIT/ML Subcutaneous Solution Pen-injector Inject 50 Units into the skin nightly.  (Patient taking differently: Inject 40 Units into the skin nightly.  ) Disp: 5 pen Rfl: 0   Insulin P Laterality Date   • DILATION/CURETTAGE,DIAGNOSTIC      x2   • HC  SECTION LEVEL I      x1   • KIDNEY SURGERY      KIDNEY STONE REMOVAL X2     Family History   Problem Relation Age of Onset   • Diabetes Mother    • Diabetes Maternal Grandmother    • Not on file      Smoking status: Current Every Day Smoker 0.25 Packs/day   Types: Cigarettes   Start date: 2009   Smokeless tobacco: Never Used       Alcohol use No       REVIEW OF SYSTEMS:   GENERAL HEALTH: feels well otherwise  SKIN: denies any unusual s Depression with anxiety  - denies SI/HI  - appt with therapist scheduled for 8/20    4. Meralgia paresthetica of left side  5. Chronic pain of left knee  - cont gabapentin  - cont PT and HEP  - f/u with neurology as scheduled  - work forms completed    6.

## 2019-08-06 NOTE — PROGRESS NOTES
Nelia Patel is a 37year old female who presents today to establish for diabetes management. Primary care physician: Barney Car DO   Most recent A1C was 12.6% on 7-   In the past 2 weeks her BG trends have been improved.    Transferring appt

## 2019-08-06 NOTE — PATIENT INSTRUCTIONS
You are doing really well but having early morning lows so we can decrease the Lantus to 32 units once daily   Continue Metformin rx and Glimepiride     On the day of the prep for colonoscopy:   Decrease Lantus to 25 units   Take 1/2 tablet of the [employer]

## 2019-08-06 NOTE — PATIENT INSTRUCTIONS
Common Thyroid Problems    The thyroid is a gland in the neck. It makes thyroid hormone. A hormone is a chemical messenger for the body. If there is a problem with the thyroid, the level of thyroid hormone may change. This can lead to symptoms.   Hypothyr Nodules are lumps of tissue in the thyroid gland. Most often, the cause of nodules isn’t known. But they may be more common in people who’ve had medical radiation to the head or neck. Sometimes nodules can be felt on the outside of the neck.  Most of the ti

## 2019-08-06 NOTE — PROGRESS NOTES
Radha Moses  [birth year] was seen for Diabetic Medical Nutrition Therapy:    Date: 8/6/2019  Referring Provider: KATALINA Soto  Start time: 2:00 End time: 2:45    Assessment: Ht 71\"   Wt 243 lb   LMP 07/17/2019   BMI 33.89 kg/m²   HGBA1C (%)   Date Value   10

## 2019-08-07 ENCOUNTER — OFFICE VISIT (OUTPATIENT)
Dept: PHYSICAL THERAPY | Age: 39
End: 2019-08-07
Attending: FAMILY MEDICINE
Payer: COMMERCIAL

## 2019-08-07 DIAGNOSIS — R93.6 ABNORMAL X-RAY OF KNEE: ICD-10-CM

## 2019-08-07 PROCEDURE — 97162 PT EVAL MOD COMPLEX 30 MIN: CPT

## 2019-08-07 PROCEDURE — 97110 THERAPEUTIC EXERCISES: CPT

## 2019-08-07 NOTE — PROGRESS NOTES
LOWER EXTREMITY EVALUATION:   Referring Physician: Dr. Mukund Omer  Diagnosis: Abnormal x-ray of knee (R93.6)   Meralgia Paresthetica of left side Date of Service: 8/7/2019     PATIENT SUMMARY   Miladys Lenz is a 37year old female who presents to therapy t started Insulin again and everything is good. LE neuropathy    X-Ray FINDINGS per chart review: \"BONES:  Slight irregularity involves the periphery of the lateral femoral condyle without overlying soft tissue swelling.   Correlate clinically with site of 4/5  ER: R 4-/5; L 3+/5  IR: R 4-/5; L 3+/5 Flexion: R 4+/5; L 4/5  Extension: R 4+/5; L 4/5    DF: R 5/5; L 4/5  PF: R 5/5; L 4/5  Great toe ext: R 5/5; L 5/5     Special tests:   Varus Stress Test: R Neg, L neg  Valgus Stress Test: L Neg  Lachman Test: L achieved in PT     Frequency / Duration: Patient will be seen for 2 x/week or a total of 12 visits over a 90 day period.  Treatment will include: Gait training, Manual Therapy, Neuromuscular Re-education, Therapeutic Activities, Therapeutic Exercise, Home E

## 2019-08-12 ENCOUNTER — OFFICE VISIT (OUTPATIENT)
Dept: PHYSICAL THERAPY | Age: 39
End: 2019-08-12
Attending: FAMILY MEDICINE
Payer: COMMERCIAL

## 2019-08-12 DIAGNOSIS — R93.6 ABNORMAL X-RAY OF KNEE: ICD-10-CM

## 2019-08-12 PROCEDURE — 97110 THERAPEUTIC EXERCISES: CPT

## 2019-08-12 NOTE — PROGRESS NOTES
Dx: Abnormal x-ray of knee (R93.6)   Meralgia Paresthetica of left side         Insurance (Authorized # of Visits):  Yefri Noel 8 auth through 10/17/19        Authorizing Physician: Dr. Kim El  Next MD visit: none scheduled    Fall Risk: standard         Pre SLS to 10s to improve safety with gait on uneven surfaces such as grass and gravel  · Pt will be independent and compliant with comprehensive HEP to maintain progress achieved in PT     Plan: Continue physical therapy 2x/week to progress strength and funct

## 2019-08-14 ENCOUNTER — OFFICE VISIT (OUTPATIENT)
Dept: PHYSICAL THERAPY | Age: 39
End: 2019-08-14
Attending: FAMILY MEDICINE
Payer: COMMERCIAL

## 2019-08-14 DIAGNOSIS — R93.6 ABNORMAL X-RAY OF KNEE: ICD-10-CM

## 2019-08-14 NOTE — PROGRESS NOTES
Dx: Abnormal x-ray of knee (R93.6)   Meralgia Paresthetica of left side         Insurance (Authorized # of Visits):  [place] 8 auth through 10/17/19        Authorizing Physician: Dr. Luis Manuel Myers  Next MD visit: none scheduled    Fall Risk: standard         Pre Plan: Continue physical therapy 2x/week to progress strength and function. Sit to stand? Continue balance assessment, NuStep?     Current HEP: quad sets, heel slides for ROM, may add SLR as tolerated    Charges: No Charge Total Timed Treatment: --  Cb Mckeon

## 2019-08-16 ENCOUNTER — ANESTHESIA (OUTPATIENT)
Dept: ENDOSCOPY | Facility: HOSPITAL | Age: 39
End: 2019-08-16
Payer: COMMERCIAL

## 2019-08-16 ENCOUNTER — ANESTHESIA EVENT (OUTPATIENT)
Dept: ENDOSCOPY | Facility: HOSPITAL | Age: 39
End: 2019-08-16
Payer: COMMERCIAL

## 2019-08-16 ENCOUNTER — HOSPITAL ENCOUNTER (OUTPATIENT)
Facility: HOSPITAL | Age: 39
Setting detail: HOSPITAL OUTPATIENT SURGERY
Discharge: HOME | End: 2019-08-16
Attending: INTERNAL MEDICINE | Admitting: INTERNAL MEDICINE
Payer: COMMERCIAL

## 2019-08-16 VITALS
OXYGEN SATURATION: 100 % | HEIGHT: 71 IN | DIASTOLIC BLOOD PRESSURE: 85 MMHG | SYSTOLIC BLOOD PRESSURE: 118 MMHG | TEMPERATURE: 99 F | BODY MASS INDEX: 33.88 KG/M2 | HEART RATE: 89 BPM | WEIGHT: 242 LBS | RESPIRATION RATE: 20 BRPM

## 2019-08-16 DIAGNOSIS — K59.09 CHRONIC CONSTIPATION: ICD-10-CM

## 2019-08-16 DIAGNOSIS — R10.9 LEFT SIDED ABDOMINAL PAIN: ICD-10-CM

## 2019-08-16 LAB
GLUCOSE BLD-MCNC: 78 MG/DL (ref 70–99)
POCT LOT NUMBER: NORMAL
POCT URINE PREGNANCY: NEGATIVE

## 2019-08-16 PROCEDURE — 0DJD8ZZ INSPECTION OF LOWER INTESTINAL TRACT, VIA NATURAL OR ARTIFICIAL OPENING ENDOSCOPIC: ICD-10-PCS | Performed by: INTERNAL MEDICINE

## 2019-08-16 PROCEDURE — 88342 IMHCHEM/IMCYTCHM 1ST ANTB: CPT | Performed by: INTERNAL MEDICINE

## 2019-08-16 PROCEDURE — 81025 URINE PREGNANCY TEST: CPT | Performed by: INTERNAL MEDICINE

## 2019-08-16 PROCEDURE — 88305 TISSUE EXAM BY PATHOLOGIST: CPT | Performed by: INTERNAL MEDICINE

## 2019-08-16 PROCEDURE — 82962 GLUCOSE BLOOD TEST: CPT

## 2019-08-16 PROCEDURE — 0DD68ZX EXTRACTION OF STOMACH, VIA NATURAL OR ARTIFICIAL OPENING ENDOSCOPIC, DIAGNOSTIC: ICD-10-PCS | Performed by: INTERNAL MEDICINE

## 2019-08-16 PROCEDURE — 0DB98ZX EXCISION OF DUODENUM, VIA NATURAL OR ARTIFICIAL OPENING ENDOSCOPIC, DIAGNOSTIC: ICD-10-PCS | Performed by: INTERNAL MEDICINE

## 2019-08-16 RX ORDER — ONDANSETRON 2 MG/ML
INJECTION INTRAMUSCULAR; INTRAVENOUS
Status: COMPLETED
Start: 2019-08-16 | End: 2019-08-16

## 2019-08-16 RX ORDER — ONDANSETRON 2 MG/ML
4 INJECTION INTRAMUSCULAR; INTRAVENOUS EVERY 6 HOURS PRN
Status: DISCONTINUED | OUTPATIENT
Start: 2019-08-16 | End: 2019-08-16

## 2019-08-16 RX ORDER — DEXTROSE MONOHYDRATE 25 G/50ML
50 INJECTION, SOLUTION INTRAVENOUS
Status: DISCONTINUED | OUTPATIENT
Start: 2019-08-16 | End: 2019-08-16

## 2019-08-16 RX ORDER — NALOXONE HYDROCHLORIDE 0.4 MG/ML
80 INJECTION, SOLUTION INTRAMUSCULAR; INTRAVENOUS; SUBCUTANEOUS AS NEEDED
Status: DISCONTINUED | OUTPATIENT
Start: 2019-08-16 | End: 2019-08-16

## 2019-08-16 RX ORDER — SODIUM CHLORIDE, SODIUM LACTATE, POTASSIUM CHLORIDE, CALCIUM CHLORIDE 600; 310; 30; 20 MG/100ML; MG/100ML; MG/100ML; MG/100ML
INJECTION, SOLUTION INTRAVENOUS CONTINUOUS
Status: DISCONTINUED | OUTPATIENT
Start: 2019-08-16 | End: 2019-08-16

## 2019-08-16 NOTE — INTERVAL H&P NOTE
Pre-op Diagnosis: Left sided abdominal pain [R10.9]  Chronic constipation [K59.09]    The above referenced H&P was reviewed by Valdemar Lama DO on 8/16/2019, the patient was examined and no significant changes have occurred in the patient's condition si

## 2019-08-16 NOTE — OPERATIVE REPORT
Carmencita Palma Patient Status:  Hospital Outpatient Surgery    1980 MRN [id number]   [facility] ENDOSCOPY Attending 3 Byron Ragland,    Hosp Day # 0 PCP Claudia Galicia DO       PREOPERATIVE DIAGNOSIS/INDICATION: Left Sided Abd colon: The mucosa and vascular pattern were normal.  Sigmoid colon: The mucosa and vascular pattern were normal.  Rectum: The mucosa and vascular pattern were normal. Retroflexion revealed small internal hemorrhoids. IMPRESSION:   1.  Internal hemorrhoi

## 2019-08-16 NOTE — ANESTHESIA POSTPROCEDURE EVALUATION
[address] [facility] Patient Status:  Hospital Outpatient Surgery   Age/Gender 37year old female MRN [id number]   [facility] ENDOSCOPY Attending 3 Saravanan Aragon, [address] Day # 0 PCP Yogi Lugo DO       Anesthesia Post-op No

## 2019-08-16 NOTE — ANESTHESIA PREPROCEDURE EVALUATION
PRE-OP EVALUATION    Patient Name: Allyson Dasilva    Pre-op Diagnosis: Left sided abdominal pain [R10.9]  Chronic constipation [K59.09]    Procedure(s):  ESOPHAGOGASTRODUODENOSCOPY (EGD)/colonoscopy      Surgeon(s) and Role:     * Silas Amos, DO - Yen Allergies: Patient has no known allergies. Anesthesia Evaluation    Patient summary reviewed.     Anesthetic Complications           GI/Hepatic/Renal                                 Cardiovascular                (+) obesity

## 2019-08-16 NOTE — OPERATIVE REPORT
Sepideh Rose Patient Status:  Hospital Outpatient Surgery    1980 MRN [id number]   [facility] ENDOSCOPY Attending Jj Rojo DO   Hosp Day # 0 PCP Juan Dunaway DO       PREOPERATIVE DIAGNOSIS/INDICATION: Left Sided A 3 Saravanan Aragon  [facility]

## 2019-08-19 ENCOUNTER — OFFICE VISIT (OUTPATIENT)
Dept: PHYSICAL THERAPY | Age: 39
End: 2019-08-19
Attending: FAMILY MEDICINE
Payer: COMMERCIAL

## 2019-08-19 DIAGNOSIS — R93.6 ABNORMAL X-RAY OF KNEE: ICD-10-CM

## 2019-08-19 PROCEDURE — 97112 NEUROMUSCULAR REEDUCATION: CPT

## 2019-08-19 PROCEDURE — 97110 THERAPEUTIC EXERCISES: CPT

## 2019-08-19 NOTE — PROGRESS NOTES
Dx: Abnormal x-ray of knee (R93.6)   Meralgia Paresthetica of left side         Insurance (Authorized # of Visits):  [place] 8 auth through 10/17/19        Authorizing Physician: Dr. Nessa Bello  Next MD visit: none scheduled    Fall Risk: standard         Pre comprehensive HEP to maintain progress achieved in PT     Plan: Continue physical therapy 2x/week to progress strength and function. Sit to stand?      Current HEP: quad sets, heel slides for ROM, may add SLR as tolerated    Charges: 2 TherEx 1 Neuro Total

## 2019-08-21 ENCOUNTER — OFFICE VISIT (OUTPATIENT)
Dept: PHYSICAL THERAPY | Age: 39
End: 2019-08-21
Attending: FAMILY MEDICINE
Payer: COMMERCIAL

## 2019-08-21 DIAGNOSIS — R93.6 ABNORMAL X-RAY OF KNEE: ICD-10-CM

## 2019-08-21 PROCEDURE — 97110 THERAPEUTIC EXERCISES: CPT

## 2019-08-21 PROCEDURE — 97112 NEUROMUSCULAR REEDUCATION: CPT

## 2019-08-21 NOTE — PROGRESS NOTES
Dx: Abnormal x-ray of knee (R93.6)   Meralgia Paresthetica of left side         Insurance (Authorized # of Visits):  Alric Mask 8 auth through 10/17/19        Authorizing Physician: Dr. Stacie gAuirre  Next MD visit: none scheduled    Fall Risk: standard         Pre 2x/week to progress strength and function. Sit to stand?      Current HEP: SLR, sit to stand, tandem balance    Charges: 2 TherEx 1 Neuro Total Timed Treatment: 40  Total Treatment Time: 40  Date: 8/12/2019  Tx#: 2 Date: 8/19/2019  Tx#: 3 Date: 8/21/2019  T

## 2019-08-22 ENCOUNTER — OFFICE VISIT (OUTPATIENT)
Dept: ENDOCRINOLOGY CLINIC | Facility: CLINIC | Age: 39
End: 2019-08-22

## 2019-08-22 VITALS — HEART RATE: 72 BPM | HEIGHT: 71 IN | BODY MASS INDEX: 33.88 KG/M2 | WEIGHT: 242 LBS | RESPIRATION RATE: 16 BRPM

## 2019-08-22 DIAGNOSIS — E11.65 TYPE 2 DIABETES MELLITUS WITH HYPERGLYCEMIA, WITH LONG-TERM CURRENT USE OF INSULIN (HCC): Primary | ICD-10-CM

## 2019-08-22 DIAGNOSIS — Z79.4 TYPE 2 DIABETES MELLITUS WITH HYPERGLYCEMIA, WITH LONG-TERM CURRENT USE OF INSULIN (HCC): Primary | ICD-10-CM

## 2019-08-22 PROCEDURE — 95251 CONT GLUC MNTR ANALYSIS I&R: CPT | Performed by: NURSE PRACTITIONER

## 2019-08-22 PROCEDURE — 99214 OFFICE O/P EST MOD 30 MIN: CPT | Performed by: NURSE PRACTITIONER

## 2019-08-22 RX ORDER — FLASH GLUCOSE SENSOR
KIT MISCELLANEOUS
Qty: 2 EACH | Refills: 1 | Status: SHIPPED | OUTPATIENT
Start: 2019-08-22 | End: 2020-07-21 | Stop reason: ALTCHOICE

## 2019-08-22 NOTE — PROGRESS NOTES
Delicia Vázquez is a 37year old female who presents today to establish for diabetes management.    Primary care physician: Dedra Romero,      Most recent A1C was 12.6% (7-)   In the past 2 weeks her BG trends have continued to improve: but having h CREATSERUM 0.68 07/17/2019    GFRNAA 109 07/17/2019    GFRAA 126 07/17/2019         SMBG patterns: Ultra one touch meter   Testing BG x  1 daily  Meter or log book today: yes  Downloaded meter today (results scanned in EPIC)  Average: 184mg/dl   Lowest: 10 0.25        Types: Cigarettes        Start date: 2009      Smokeless tobacco: Never Used    Alcohol use: No    Drug use: No      Comment: CBD oil on occasion    Family History   Problem Relation Age of Onset   • Diabetes Mother    • Diabetes Maternal Riley Pen Needle (BD PEN NEEDLE SHORT U/F) 31G X 8 MM Does not apply Misc Inject 1 pen into the skin nightly. Disp: 100 each Rfl: 1     Review of Systems   Constitutional: Positive for fatigue. Negative for unexpected weight change.    HENT: Negative for dental p (vouchers provided)   Continue   Glimepiride 2mg once daily in AM   Metformin XR 500m tabs daily in AM   To consider other agents but will defer to next visit due to her stress level and GI sxs.    In past, not a candidate for GLP due to GI issues or SG discussed with the patient today. questions were also answered to the best of my knowledge. 35 min spent with patient and >50% time spent counseling and coordinating care related to their office visit.

## 2019-08-22 NOTE — PATIENT INSTRUCTIONS
Your trends are really improved but your having lows   MEDICATIONS:     Decrease Lantus solostar pen 20  units once daily  Finish up Lantus then you will start Tresiba(another branded long acting insulin)   Glimepiride 2mg once daily  Metformin 1000mg twic

## 2019-08-26 ENCOUNTER — OFFICE VISIT (OUTPATIENT)
Dept: PHYSICAL THERAPY | Age: 39
End: 2019-08-26
Attending: FAMILY MEDICINE
Payer: COMMERCIAL

## 2019-08-26 DIAGNOSIS — R93.6 ABNORMAL X-RAY OF KNEE: ICD-10-CM

## 2019-08-26 PROCEDURE — 97110 THERAPEUTIC EXERCISES: CPT

## 2019-08-26 PROCEDURE — 97112 NEUROMUSCULAR REEDUCATION: CPT

## 2019-08-26 NOTE — PROGRESS NOTES
Dx: Abnormal x-ray of knee (R93.6)   Meralgia Paresthetica of left side         Insurance (Authorized # of Visits):  [employer] 8 auth through 10/17/19        Authorizing Physician: Dr. Laura Estes  Next MD visit: none scheduled    Fall Risk: standard         Pre safety with gait on uneven surfaces such as grass and gravel  · Pt will be independent and compliant with comprehensive HEP to maintain progress achieved in PT     Plan: Continue physical therapy 2x/week to progress strength and function    Current HEP: SL

## 2019-08-28 ENCOUNTER — OFFICE VISIT (OUTPATIENT)
Dept: PHYSICAL THERAPY | Age: 39
End: 2019-08-28
Attending: FAMILY MEDICINE
Payer: COMMERCIAL

## 2019-08-28 DIAGNOSIS — R93.6 ABNORMAL X-RAY OF KNEE: ICD-10-CM

## 2019-08-28 PROCEDURE — 97530 THERAPEUTIC ACTIVITIES: CPT

## 2019-08-28 PROCEDURE — 97112 NEUROMUSCULAR REEDUCATION: CPT

## 2019-08-28 PROCEDURE — 97110 THERAPEUTIC EXERCISES: CPT

## 2019-08-28 NOTE — PROGRESS NOTES
Dx: Abnormal x-ray of knee (R93.6)   Meralgia Paresthetica of left side         Insurance (Authorized # of Visits):  Luz Robles 8 auth through 10/17/19        Authorizing Physician: Dr. Daniel Gastelum  Next MD visit: none scheduled    Fall Risk: standard         Pre will report ability to walk through tunnel at work to get to the other side in 10 minutes or less  · Pt will improve SLS to 10s to improve safety with gait on uneven surfaces such as grass and gravel  · Pt will be independent and compliant with comprehensi

## 2019-09-04 ENCOUNTER — OFFICE VISIT (OUTPATIENT)
Dept: PHYSICAL THERAPY | Age: 39
End: 2019-09-04
Attending: FAMILY MEDICINE
Payer: COMMERCIAL

## 2019-09-04 DIAGNOSIS — R93.6 ABNORMAL X-RAY OF KNEE: ICD-10-CM

## 2019-09-04 PROCEDURE — 97530 THERAPEUTIC ACTIVITIES: CPT

## 2019-09-04 PROCEDURE — 97110 THERAPEUTIC EXERCISES: CPT

## 2019-09-04 NOTE — PROGRESS NOTES
Dx: Abnormal x-ray of knee (R93.6)   Meralgia Paresthetica of left side         Insurance (Authorized # of Visits):  Jada Winkler 8 auth through 10/17/19        Authorizing Physician: Dr. Rene Chris  Next MD visit: none scheduled    Fall Risk: standard         Pre and pain free ROM of her knee and has been making significant improvements in her functional strength. She is able to walk community distances and perform multiple flights of stairs before requiring a rest break and without increase in pain.  Her FOTO score referral. If you have any questions, please contact me at Dept: [phone].     Sincerely,  Electronically signed by therapist: Amanda Palmer PT     Physician's certification required:  Yes  Please co-sign or sign and return this letter via fax as soon a

## 2019-09-05 ENCOUNTER — OFFICE VISIT (OUTPATIENT)
Dept: FAMILY MEDICINE CLINIC | Facility: CLINIC | Age: 39
End: 2019-09-05

## 2019-09-05 VITALS
HEART RATE: 78 BPM | BODY MASS INDEX: 34.02 KG/M2 | SYSTOLIC BLOOD PRESSURE: 108 MMHG | RESPIRATION RATE: 18 BRPM | DIASTOLIC BLOOD PRESSURE: 80 MMHG | HEIGHT: 71 IN | WEIGHT: 243 LBS | OXYGEN SATURATION: 98 % | TEMPERATURE: 98 F

## 2019-09-05 DIAGNOSIS — E11.42 TYPE 2 DIABETES MELLITUS WITH DIABETIC POLYNEUROPATHY, WITHOUT LONG-TERM CURRENT USE OF INSULIN (HCC): ICD-10-CM

## 2019-09-05 DIAGNOSIS — F17.200 TOBACCO USE DISORDER: ICD-10-CM

## 2019-09-05 DIAGNOSIS — E04.2 MULTIPLE THYROID NODULES: ICD-10-CM

## 2019-09-05 DIAGNOSIS — G89.29 CHRONIC PAIN OF LEFT KNEE: Primary | ICD-10-CM

## 2019-09-05 DIAGNOSIS — E11.65 UNCONTROLLED TYPE 2 DIABETES MELLITUS WITH HYPERGLYCEMIA (HCC): ICD-10-CM

## 2019-09-05 DIAGNOSIS — G57.12 MERALGIA PARESTHETICA OF LEFT SIDE: ICD-10-CM

## 2019-09-05 DIAGNOSIS — K59.09 CHRONIC CONSTIPATION: ICD-10-CM

## 2019-09-05 DIAGNOSIS — M25.562 CHRONIC PAIN OF LEFT KNEE: Primary | ICD-10-CM

## 2019-09-05 DIAGNOSIS — F41.8 DEPRESSION WITH ANXIETY: ICD-10-CM

## 2019-09-05 DIAGNOSIS — G56.01 CARPAL TUNNEL SYNDROME OF RIGHT WRIST: ICD-10-CM

## 2019-09-05 PROCEDURE — 99214 OFFICE O/P EST MOD 30 MIN: CPT | Performed by: FAMILY MEDICINE

## 2019-09-05 NOTE — PROGRESS NOTES
HPI:    Patient ID: Yamileth Francois is a 37year old female. HPI  35yr old female presents with  for f/u on her chronic conditions and release for work. L knee and L sided meralgia paresthetica has improved.  Undergoing physical therapy, which has (LINZESS) 145 MCG Oral Cap Take 145 mcg by mouth daily. Disp: 90 capsule Rfl: 3   gabapentin 100 MG Oral Cap Take 3 capsules (300 mg total) by mouth nightly.  Disp: 90 capsule Rfl: 1   Continuous Blood Gluc Sensor (DEXCOM G6 SENSOR) Does not apply Misc 1 De There is no rebound and no guarding. Musculoskeletal:        Left knee: Normal. She exhibits normal range of motion. No tenderness found. Left upper leg: She exhibits no tenderness and no swelling.    Neurological: She is alert and oriented to pers encounter       Imaging & Referrals:  None       OD#[id number]

## 2019-09-11 ENCOUNTER — OFFICE VISIT (OUTPATIENT)
Dept: PHYSICAL THERAPY | Age: 39
End: 2019-09-11
Attending: FAMILY MEDICINE
Payer: COMMERCIAL

## 2019-09-11 PROCEDURE — 97110 THERAPEUTIC EXERCISES: CPT

## 2019-09-11 NOTE — PROGRESS NOTES
Dx: Abnormal x-ray of knee (R93.6)   Meralgia Paresthetica of left side         Insurance (Authorized # of Visits):  Baldemar Parra 8 auth through 10/17/19, requested additional visits        Authorizing Physician: Dr. Ronal Ayoub Next MD visit: none scheduled    Fall Time: 40  Date: 8/28/2019  Tx#: 6 Date: 9/4/2019  Tx#: 7 Date: 9/11/2019  Tx#: 8   Ther-Ex  -NuStep L4 LEs x 10 min    -Sit to stand 3 sets x 10/to fatigue    -Reviewed step up - add to HEP    - Lateral stepping x 40 ft    -Hip 3 way with yellow tband x 10

## 2019-09-12 ENCOUNTER — TELEPHONE (OUTPATIENT)
Dept: INTERNAL MEDICINE CLINIC | Facility: CLINIC | Age: 39
End: 2019-09-12

## 2019-09-12 ENCOUNTER — LAB ENCOUNTER (OUTPATIENT)
Dept: LAB | Age: 39
End: 2019-09-12
Attending: INTERNAL MEDICINE
Payer: COMMERCIAL

## 2019-09-12 ENCOUNTER — OFFICE VISIT (OUTPATIENT)
Dept: ENDOCRINOLOGY CLINIC | Facility: CLINIC | Age: 39
End: 2019-09-12

## 2019-09-12 ENCOUNTER — PROCEDURE VISIT (OUTPATIENT)
Dept: NEUROLOGY | Facility: CLINIC | Age: 39
End: 2019-09-12

## 2019-09-12 VITALS
HEIGHT: 71 IN | DIASTOLIC BLOOD PRESSURE: 80 MMHG | SYSTOLIC BLOOD PRESSURE: 100 MMHG | WEIGHT: 245 LBS | BODY MASS INDEX: 34.3 KG/M2 | HEART RATE: 96 BPM | RESPIRATION RATE: 16 BRPM

## 2019-09-12 DIAGNOSIS — G62.9 NEUROPATHY: ICD-10-CM

## 2019-09-12 DIAGNOSIS — E11.65 TYPE 2 DIABETES MELLITUS WITH HYPERGLYCEMIA, WITH LONG-TERM CURRENT USE OF INSULIN (HCC): Primary | ICD-10-CM

## 2019-09-12 DIAGNOSIS — Z79.4 TYPE 2 DIABETES MELLITUS WITH HYPERGLYCEMIA, WITH LONG-TERM CURRENT USE OF INSULIN (HCC): Primary | ICD-10-CM

## 2019-09-12 DIAGNOSIS — K90.0 CELIAC DISEASE: ICD-10-CM

## 2019-09-12 LAB
CARTRIDGE EXPIRATION DATE: 2021 DATE
CARTRIDGE LOT#: 541 NUMERIC
HEMOGLOBIN A1C: 8.1 % (ref 4.3–5.6)

## 2019-09-12 PROCEDURE — 83516 IMMUNOASSAY NONANTIBODY: CPT

## 2019-09-12 PROCEDURE — 95886 MUSC TEST DONE W/N TEST COMP: CPT | Performed by: OTHER

## 2019-09-12 PROCEDURE — 83036 HEMOGLOBIN GLYCOSYLATED A1C: CPT | Performed by: NURSE PRACTITIONER

## 2019-09-12 PROCEDURE — 95909 NRV CNDJ TST 5-6 STUDIES: CPT | Performed by: OTHER

## 2019-09-12 PROCEDURE — 82784 ASSAY IGA/IGD/IGG/IGM EACH: CPT

## 2019-09-12 PROCEDURE — 86256 FLUORESCENT ANTIBODY TITER: CPT

## 2019-09-12 PROCEDURE — 95251 CONT GLUC MNTR ANALYSIS I&R: CPT | Performed by: NURSE PRACTITIONER

## 2019-09-12 PROCEDURE — 99214 OFFICE O/P EST MOD 30 MIN: CPT | Performed by: NURSE PRACTITIONER

## 2019-09-12 RX ORDER — METFORMIN HYDROCHLORIDE 500 MG/1
TABLET, EXTENDED RELEASE ORAL
Qty: 360 TABLET | Refills: 1 | COMMUNITY
Start: 2019-09-12 | End: 2019-11-18 | Stop reason: DRUGHIGH

## 2019-09-12 NOTE — TELEPHONE ENCOUNTER
Having issues obtaining Dexcom   Was told Martín lockett was out of network for her despite a prior call that told her it was covered.

## 2019-09-12 NOTE — PATIENT INSTRUCTIONS
A1C 8.1%   This is down from 12.6%     Your average blood sugars: 136 mg/dl which is very good!    Your next a1c in a month will be lower as well   We will continue pursuing Dexcom ~     Since your trends are better and you are eating to avoiding blood suga

## 2019-09-12 NOTE — TELEPHONE ENCOUNTER
Emailed Tiffany Herrera (confidentially) at [employer] asking for assistance. Also sent her demographic sheet, copy of insurance card and last office visit note.

## 2019-09-12 NOTE — TELEPHONE ENCOUNTER
From Corinne at [facility]:    I took a look at the account here with us right now it looks like the patient was referred to [facility]. But it looks like they also claimed they were out of network.  The only option now would be [facility] but

## 2019-09-12 NOTE — PROGRESS NOTES
Yamileth Francois is a 37year old female who presents today to establish for diabetes management.    Primary care physician: Alicia Zaidi DO     In the past 2 m her DM control has improved to 8.1% (down from 12.6% 7-)   Reports having to eat every to 109 07/17/2019    GFRAA 126 07/17/2019         SMBG patterns: Ultra one touch meter   Using personal deanna     DM associated symptoms:   Polyuria, polyphagia, polydipsia: yes  Paresthesias: yes  Blurred vision: yes  Hypoglycemia: no but reports did have fr Problem Relation Age of Onset   • Diabetes Mother    • Diabetes Maternal Grandmother    • Diabetes Maternal Grandfather        Current Outpatient Medications:  metFORMIN HCl  MG Oral Tablet 24 Hr 3tabs daily Disp: 360 tablet Rfl: 1   Insulin Deglud visual disturbance. Respiratory: Negative for cough and shortness of breath. Cardiovascular: Negative for chest pain, palpitations and leg swelling. Gastrointestinal: Positive for constipation. Negative for nausea, abdominal pain and diarrhea.    End Lab Results   Component Value Date    CREATSERUM 0.68 07/17/2019    GFRAA 126 07/17/2019   Jardiance 10mg: #14  samples dispensed and rx was sent to pharmacy ( has copay card)   Patient was instructed to drink at least 6 eight ounce glasses of water abida and >50% time spent counseling and coordinating care related to their office visit.

## 2019-09-13 LAB — IGA SERPL-MCNC: 598 MG/DL (ref 70–312)

## 2019-09-16 LAB
GLIAD (DEAMIDATED) AB, IGA: NEGATIVE
GLIAD (DEAMIDATED) AB, IGG: NEGATIVE
TISSUE TRANSGLUTAMINASE AB,IGA: 11.7 U/ML (ref ?–15)
TISSUE TRANSGLUTAMINASE IGA QUALITATIVE: NEGATIVE

## 2019-09-17 ENCOUNTER — TELEPHONE (OUTPATIENT)
Dept: FAMILY MEDICINE CLINIC | Facility: CLINIC | Age: 39
End: 2019-09-17

## 2019-09-17 DIAGNOSIS — R93.6 ABNORMAL FINDINGS ON DIAGNOSTIC IMAGING OF LIMBS: Primary | ICD-10-CM

## 2019-09-17 DIAGNOSIS — G89.29 CHRONIC PAIN OF LEFT KNEE: ICD-10-CM

## 2019-09-17 DIAGNOSIS — M25.562 CHRONIC PAIN OF LEFT KNEE: ICD-10-CM

## 2019-09-17 NOTE — TELEPHONE ENCOUNTER
Christine CLAY  [facility] Clinical Staff   Cc: Tavo Conteh [place] Referral [place]   Phone Number: [phone]             .Reason for the order/referral:Physical therapy   PCP: Dr. Antonieta Son   Refer to Provider [facility]   Specialty:P

## 2019-09-18 ENCOUNTER — OFFICE VISIT (OUTPATIENT)
Dept: PHYSICAL THERAPY | Age: 39
End: 2019-09-18
Attending: FAMILY MEDICINE
Payer: COMMERCIAL

## 2019-09-18 PROCEDURE — 97110 THERAPEUTIC EXERCISES: CPT

## 2019-09-18 NOTE — PROGRESS NOTES
Dx: Abnormal x-ray of knee (R93.6)   Meralgia Paresthetica of left side         Insurance (Authorized # of Visits):  Jada Winkler 8 auth through 10/17/19, additional visits pending        Authorizing Physician: Dr. Jorge Hannah Next MD visit: none scheduled    Fall R with comprehensive HEP to maintain progress achieved in PT PROGRESSING    Plan: Continue skilled Physical Therapy 1 x/week or a total of 2 more visits to progress strength, stairs    Current HEP: SLR, sit to stand, tandem balance, stepping    Charges: 3 Th

## 2019-09-19 NOTE — PROCEDURES
[facility]  [address]  Phone- [phone]  Nerve Conduction & Electromyography Report            Patient: Yamileth Francois Age: 37 Years 3 Months  Patient ID: [id number] Referring M.D.: Sherie Desouza L. EXT CUM[place] LONG N None None None None N N N N       Summary:  Bilateral sural SNAPs were absent. Bilateral peroneal motor responses were prolonged in distal latencies, low in amplitudes and slowed in conduction velocities.  The bilateral tibial motor re

## 2019-09-23 ENCOUNTER — TELEPHONE (OUTPATIENT)
Dept: FAMILY MEDICINE CLINIC | Facility: CLINIC | Age: 39
End: 2019-09-23

## 2019-09-23 NOTE — TELEPHONE ENCOUNTER
Received call from [address]. Requests verification of which thyroid nodules need to be biopsied. Per US report of 7/26/19:  CONCLUSION:  1.1 x 0.8 x 1.0 cm thyroid nodule.   0.8 x 0.6 x 0.6 cm smaller nodule with slightly irregular margins and microcal

## 2019-09-25 ENCOUNTER — APPOINTMENT (OUTPATIENT)
Dept: PHYSICAL THERAPY | Age: 39
End: 2019-09-25
Payer: COMMERCIAL

## 2019-09-26 ENCOUNTER — TELEPHONE (OUTPATIENT)
Dept: ENDOCRINOLOGY CLINIC | Facility: CLINIC | Age: 39
End: 2019-09-26

## 2019-09-26 ENCOUNTER — APPOINTMENT (OUTPATIENT)
Dept: LAB | Age: 39
End: 2019-09-26
Attending: NURSE PRACTITIONER
Payer: COMMERCIAL

## 2019-09-26 DIAGNOSIS — R74.8 ELEVATED ALKALINE PHOSPHATASE LEVEL: ICD-10-CM

## 2019-09-26 PROCEDURE — 84075 ASSAY ALKALINE PHOSPHATASE: CPT

## 2019-09-26 PROCEDURE — 84080 ASSAY ALKALINE PHOSPHATASES: CPT

## 2019-09-26 NOTE — TELEPHONE ENCOUNTER
Detailed written order completed and signed by CAB. Faxed to [phone] with confirmation. Sending to scan with cover sheet.

## 2019-09-30 ENCOUNTER — HOSPITAL ENCOUNTER (OUTPATIENT)
Dept: ULTRASOUND IMAGING | Facility: HOSPITAL | Age: 39
Discharge: HOME | End: 2019-09-30
Attending: FAMILY MEDICINE
Payer: COMMERCIAL

## 2019-09-30 DIAGNOSIS — E04.2 MULTIPLE THYROID NODULES: ICD-10-CM

## 2019-09-30 PROCEDURE — 88173 CYTOPATH EVAL FNA REPORT: CPT | Performed by: FAMILY MEDICINE

## 2019-09-30 PROCEDURE — 10005 FNA BX W/US GDN 1ST LES: CPT | Performed by: FAMILY MEDICINE

## 2019-09-30 PROCEDURE — 10006 FNA BX W/US GDN EA ADDL: CPT | Performed by: FAMILY MEDICINE

## 2019-09-30 NOTE — PROCEDURES
[facility]  Procedure Note    Runell Cancel Patient Status:  Outpatient    1980 MRN [id number]   Location [address] [place] [address] Attending Jose Craig DO   Hosp Day # 0 PCP Emmanuel Goldsmith DO     Procedure: US thyroid biopsy x 2

## 2019-10-02 ENCOUNTER — TELEPHONE (OUTPATIENT)
Dept: ENDOCRINOLOGY CLINIC | Facility: CLINIC | Age: 39
End: 2019-10-02

## 2019-10-02 ENCOUNTER — OFFICE VISIT (OUTPATIENT)
Dept: PHYSICAL THERAPY | Age: 39
End: 2019-10-02
Attending: FAMILY MEDICINE
Payer: COMMERCIAL

## 2019-10-02 DIAGNOSIS — E10.9 TYPE 1 DIABETES MELLITUS WITHOUT COMPLICATION (HCC): Primary | ICD-10-CM

## 2019-10-02 PROCEDURE — 97110 THERAPEUTIC EXERCISES: CPT

## 2019-10-02 PROCEDURE — 97112 NEUROMUSCULAR REEDUCATION: CPT

## 2019-10-02 NOTE — PROGRESS NOTES
Dx: Abnormal x-ray of knee (R93.6)   Meralgia Paresthetica of left side         Insurance (Authorized # of Visits):  [facility] [address] Physician: Dr. Moulton Has Next MD visit: December 2019  Fall Risk: standard         Precautions: n/a PROGRESSING    Plan: Continue skilled Physical Therapy 1 x/week or a total of 1 more visits to progress strength, stairs    Current HEP: SLR, sit to stand, tandem balance, stepping    Charges:2 TherEx 1 Neuro Total Timed Treatment: 40  Total Treatment Time

## 2019-10-02 NOTE — TELEPHONE ENCOUNTER
Initiated a referral for Dexcom G6 Sensor 3 pack (), Dexcom G6 Transmitter () and Dexcom  (). Pended order for ALINA Garcia referral request paperwork saved in accordion file

## 2019-10-03 ENCOUNTER — TELEPHONE (OUTPATIENT)
Dept: INTERNAL MEDICINE CLINIC | Facility: CLINIC | Age: 39
End: 2019-10-03

## 2019-10-03 NOTE — TELEPHONE ENCOUNTER
James Hanna called to check status on a prior auth faxed on 9/13 - nothing found.  I asked him to refax to my attention - will place in folder once received

## 2019-10-05 ENCOUNTER — TELEPHONE (OUTPATIENT)
Dept: ENDOCRINOLOGY CLINIC | Facility: CLINIC | Age: 39
End: 2019-10-05

## 2019-10-05 DIAGNOSIS — E10.9 TYPE 1 DIABETES MELLITUS WITHOUT COMPLICATION (HCC): Primary | ICD-10-CM

## 2019-10-09 ENCOUNTER — OFFICE VISIT (OUTPATIENT)
Dept: PHYSICAL THERAPY | Age: 39
End: 2019-10-09
Attending: FAMILY MEDICINE
Payer: COMMERCIAL

## 2019-10-09 PROCEDURE — 97110 THERAPEUTIC EXERCISES: CPT

## 2019-10-09 PROCEDURE — 97112 NEUROMUSCULAR REEDUCATION: CPT

## 2019-10-09 NOTE — PROGRESS NOTES
Dx: Abnormal x-ray of knee (R93.6)   Meralgia Paresthetica of left side         Insurance (Authorized # of Visits):  Patrick Gauthier [facility]: Dr. Emmanuel Salazar MD visit: December 2019  Fall Risk: standard         Precautions: n/a Balance:   10/9/2019 9/4/19 8/19/2019   SLS: R LE: 9 sec; L LE: 6  SLS: R LE: 5 sec; L LE: 6  Tandem stance: R lead 7 sec; L lead: 13 sec  Romberg EC: > 30 sec  Static Balance:   Narrow stance EC: > 30 sec with increased R LE WB  Tandem Stance- modifie total of 5 visits over a 90 day period. Treatment will include: Manual therapy Therapeutic exercise, Therapeutic Activity, Neuromuscular re-education, Pt.  Education, Modalities as needed (including heat/cold and e-stim for pain relief), HEP instruction and

## 2019-10-14 ENCOUNTER — OFFICE VISIT (OUTPATIENT)
Dept: FAMILY MEDICINE CLINIC | Facility: CLINIC | Age: 39
End: 2019-10-14

## 2019-10-14 VITALS
HEIGHT: 71 IN | OXYGEN SATURATION: 98 % | RESPIRATION RATE: 16 BRPM | DIASTOLIC BLOOD PRESSURE: 60 MMHG | SYSTOLIC BLOOD PRESSURE: 102 MMHG | BODY MASS INDEX: 34.61 KG/M2 | HEART RATE: 100 BPM | TEMPERATURE: 98 F | WEIGHT: 247.25 LBS

## 2019-10-14 DIAGNOSIS — L85.3 DRY SKIN: ICD-10-CM

## 2019-10-14 DIAGNOSIS — E11.42 TYPE 2 DIABETES MELLITUS WITH DIABETIC POLYNEUROPATHY, WITHOUT LONG-TERM CURRENT USE OF INSULIN (HCC): ICD-10-CM

## 2019-10-14 DIAGNOSIS — Z23 NEED FOR VACCINATION: ICD-10-CM

## 2019-10-14 DIAGNOSIS — M89.8X8 ILIAC BONE PAIN: ICD-10-CM

## 2019-10-14 DIAGNOSIS — R53.82 CHRONIC FATIGUE: ICD-10-CM

## 2019-10-14 DIAGNOSIS — M25.551 RIGHT HIP PAIN: ICD-10-CM

## 2019-10-14 DIAGNOSIS — E11.65 UNCONTROLLED TYPE 2 DIABETES MELLITUS WITH HYPERGLYCEMIA (HCC): Primary | ICD-10-CM

## 2019-10-14 PROCEDURE — 90686 IIV4 VACC NO PRSV 0.5 ML IM: CPT | Performed by: FAMILY MEDICINE

## 2019-10-14 PROCEDURE — 90471 IMMUNIZATION ADMIN: CPT | Performed by: FAMILY MEDICINE

## 2019-10-14 PROCEDURE — 99214 OFFICE O/P EST MOD 30 MIN: CPT | Performed by: FAMILY MEDICINE

## 2019-10-14 RX ORDER — GABAPENTIN 100 MG/1
300 CAPSULE ORAL NIGHTLY
Qty: 90 CAPSULE | Refills: 2 | Status: SHIPPED | OUTPATIENT
Start: 2019-10-14 | End: 2019-12-12

## 2019-10-14 RX ORDER — GABAPENTIN 100 MG/1
300 CAPSULE ORAL NIGHTLY
Qty: 90 CAPSULE | Refills: 0 | Status: SHIPPED | OUTPATIENT
Start: 2019-10-14 | End: 2019-10-14

## 2019-10-14 NOTE — PROGRESS NOTES
HPI:   Zuly Rdz is a 37year old female who presents for recheck of her diabetes, c/o chronic fatigue and joint pain. DM2, has been following with diabetes clinic. Taking Tresiba 16u qam, metformin 1000mg po bid and Jardiance 10mg po daily.   Patient’ Pen-injector, Inject 50 Units into the skin nightly.  (Patient taking differently: Inject 20 Units into the skin nightly.  ), Disp: 5 pen, Rfl: 0  Insulin Pen Needle (BD PEN NEEDLE SHORT U/F) 31G X 8 MM Does not apply Misc, Inject 1 pen into the skin nightl not stated as uncontrolled     on insulin currently/recently   • Weight loss      Past Surgical History:   Procedure Laterality Date   • COLONOSCOPY N/A 8/16/2019    Performed by Therese Kumari DO at [facility] ENDOSCOPY   • DILATION/CURETTAGE,DIAGNOSTIC      x Not on file        Physically abused: Not on file        Forced sexual activity: Not on file    Other Topics      Concerns:        Caffeine Concern: No        Exercise: No        Seat Belt: Yes        Special Diet: No        Stress Concern: No        Weigh pain  5. Right hip pain  - advised symptomatic tx; RICE, tylenol/nsaids prn and HEP  - will check XR of hip/pelvis  - refer to ortho, Dr. Fatimah Mendes     6. Dry skin  - discussed skin hygiene  - refer to derm, Dr. Cher Mtz    7.  Need for vacci

## 2019-10-15 NOTE — TELEPHONE ENCOUNTER
Spoke with Shmuel Mcarthur from [facility] and gave information for authorized referral... also faxed copy of authorization to Myrtle at [phone].

## 2019-10-22 NOTE — TELEPHONE ENCOUNTER
Called Jose to check on the status of Dexcom CGM system order. They stated that CGM system was delivered to pt 4-5 days ago. Called pt and LVM to verify if she received Dexcom and to call for appt for Dexcom education and initiation.   Paperwork sent

## 2019-10-24 ENCOUNTER — ANCILLARY ORDERS (OUTPATIENT)
Dept: FAMILY MEDICINE CLINIC | Facility: CLINIC | Age: 39
End: 2019-10-24

## 2019-10-24 ENCOUNTER — HOSPITAL ENCOUNTER (OUTPATIENT)
Dept: GENERAL RADIOLOGY | Age: 39
Discharge: HOME | End: 2019-10-24
Attending: FAMILY MEDICINE
Payer: COMMERCIAL

## 2019-10-24 ENCOUNTER — APPOINTMENT (OUTPATIENT)
Dept: LAB | Age: 39
End: 2019-10-24
Attending: FAMILY MEDICINE
Payer: COMMERCIAL

## 2019-10-24 DIAGNOSIS — E11.65 UNCONTROLLED TYPE 2 DIABETES MELLITUS WITH HYPERGLYCEMIA (HCC): ICD-10-CM

## 2019-10-24 DIAGNOSIS — R53.82 CHRONIC FATIGUE: ICD-10-CM

## 2019-10-24 DIAGNOSIS — M89.8X8 ILIAC BONE PAIN: ICD-10-CM

## 2019-10-24 DIAGNOSIS — M25.551 RIGHT HIP PAIN: ICD-10-CM

## 2019-10-24 PROCEDURE — 82607 VITAMIN B-12: CPT

## 2019-10-24 PROCEDURE — 82306 VITAMIN D 25 HYDROXY: CPT

## 2019-10-24 PROCEDURE — 80061 LIPID PANEL: CPT

## 2019-10-24 PROCEDURE — 80053 COMPREHEN METABOLIC PANEL: CPT

## 2019-10-24 PROCEDURE — 73523 X-RAY EXAM HIPS BI 5/> VIEWS: CPT | Performed by: FAMILY MEDICINE

## 2019-10-24 PROCEDURE — 83036 HEMOGLOBIN GLYCOSYLATED A1C: CPT

## 2019-11-04 RX ORDER — ERGOCALCIFEROL 1.25 MG/1
50000 CAPSULE ORAL WEEKLY
Qty: 12 CAPSULE | Refills: 0 | Status: SHIPPED | OUTPATIENT
Start: 2019-11-04 | End: 2019-12-04

## 2019-11-13 ENCOUNTER — TELEPHONE (OUTPATIENT)
Dept: ENDOCRINOLOGY CLINIC | Facility: CLINIC | Age: 39
End: 2019-11-13

## 2019-11-14 NOTE — TELEPHONE ENCOUNTER
Patient has Dexcom and started herself on it. Says she does not like it and preferred the [place]. Plans on switching back in 3 months. I offered to schedule training, patient refused stating she did her research. She was using phone and .  Is now usi

## 2019-11-14 NOTE — TELEPHONE ENCOUNTER
Called patient and LM on VM to see if she has the Dexcom. Waiting for call back. If she has Dexcom but has not been trained she needs appt with an educator and we need to move appt with LICO Stock to two weeks after starting Dexcom.

## 2019-11-18 ENCOUNTER — TELEPHONE (OUTPATIENT)
Dept: ORTHOPEDICS CLINIC | Facility: CLINIC | Age: 39
End: 2019-11-18

## 2019-11-18 ENCOUNTER — OFFICE VISIT (OUTPATIENT)
Dept: ENDOCRINOLOGY CLINIC | Facility: CLINIC | Age: 39
End: 2019-11-18

## 2019-11-18 VITALS
HEART RATE: 70 BPM | WEIGHT: 245 LBS | HEIGHT: 71 IN | DIASTOLIC BLOOD PRESSURE: 70 MMHG | SYSTOLIC BLOOD PRESSURE: 110 MMHG | BODY MASS INDEX: 34.3 KG/M2

## 2019-11-18 DIAGNOSIS — E53.8 B12 DEFICIENCY: ICD-10-CM

## 2019-11-18 DIAGNOSIS — E11.65 TYPE 2 DIABETES MELLITUS WITH HYPERGLYCEMIA, WITH LONG-TERM CURRENT USE OF INSULIN (HCC): Primary | ICD-10-CM

## 2019-11-18 DIAGNOSIS — E55.9 VITAMIN D DEFICIENCY: ICD-10-CM

## 2019-11-18 DIAGNOSIS — Z79.4 TYPE 2 DIABETES MELLITUS WITH HYPERGLYCEMIA, WITH LONG-TERM CURRENT USE OF INSULIN (HCC): Primary | ICD-10-CM

## 2019-11-18 PROCEDURE — 99214 OFFICE O/P EST MOD 30 MIN: CPT | Performed by: NURSE PRACTITIONER

## 2019-11-18 PROCEDURE — 95251 CONT GLUC MNTR ANALYSIS I&R: CPT | Performed by: NURSE PRACTITIONER

## 2019-11-18 RX ORDER — METFORMIN HYDROCHLORIDE 500 MG/1
1000 TABLET, EXTENDED RELEASE ORAL
Qty: 180 TABLET | Refills: 0 | OUTPATIENT
Start: 2019-11-18 | End: 2020-07-21

## 2019-11-18 NOTE — TELEPHONE ENCOUNTER
Patient is being referred by Dr. Dara Cramer for Iliac bone pain and right hip pain. Last hip images were 10.24.19.  Please advise if additional images are needed prior to scheduling

## 2019-11-18 NOTE — PATIENT INSTRUCTIONS
Your trends look good.    Great job on improving your health     Continue   Metformin 1000mg daily   jardiance 10mg once daily      Decrease tresiba 10 units once daily     After a week: if  your blood sugars stay under 160 : ok to stop [place]     You shou

## 2019-11-18 NOTE — PROGRESS NOTES
Octavio Narayanan is a 37year old female who presents today for diabetes management.    Primary care physician: Santa Ponce DO     In the past 3m her DM control has improved to 7.6%  ( last A1C 8.1%)    At last visit, basal insulin was decreased and glimepi 10/24/2019    TRIG 105 10/24/2019    HDL 35 (L) 10/24/2019    LDL 89 10/24/2019    MICROALBCREA 17.6 07/17/2019    CREATSERUM 0.66 10/24/2019    GFRNAA 110 10/24/2019    GFRAA 127 10/24/2019         SMBG patterns: Ultra one touch meter   Using personal rissa use: No    Drug use: No      Comment: CBD oil on occasion    Family History   Problem Relation Age of Onset   • Diabetes Mother    • Diabetes Maternal Grandmother    • Diabetes Maternal Grandfather      Current Outpatient Medications   Medication Sig Dispe problem. Eyes: Negative for visual disturbance. Respiratory: Negative for cough and shortness of breath. Cardiovascular: Negative for chest pain, palpitations and leg swelling. Gastrointestinal: Positive for constipation.  Negative for nausea, abd Dexcom G6- but prefers Milus Radha for CGM   Did change alerts : lows 70 mg/dl and highs for 225 mg/dl     Reviewed hypoglycemia signs/symptoms, treatment using the Rule of 15' and when to call DM center .   Reminded pt on A1C/blood sugar targets (Fasting < 130 a

## 2019-12-12 ENCOUNTER — OFFICE VISIT (OUTPATIENT)
Dept: NEUROLOGY | Facility: CLINIC | Age: 39
End: 2019-12-12

## 2019-12-12 VITALS
HEART RATE: 80 BPM | TEMPERATURE: 99 F | DIASTOLIC BLOOD PRESSURE: 60 MMHG | WEIGHT: 246 LBS | SYSTOLIC BLOOD PRESSURE: 126 MMHG | BODY MASS INDEX: 34 KG/M2 | RESPIRATION RATE: 18 BRPM

## 2019-12-12 DIAGNOSIS — E11.65 UNCONTROLLED TYPE 2 DIABETES MELLITUS WITH HYPERGLYCEMIA (HCC): ICD-10-CM

## 2019-12-12 DIAGNOSIS — G62.9 NEUROPATHY: Primary | ICD-10-CM

## 2019-12-12 DIAGNOSIS — E11.42 TYPE 2 DIABETES MELLITUS WITH DIABETIC POLYNEUROPATHY, WITHOUT LONG-TERM CURRENT USE OF INSULIN (HCC): ICD-10-CM

## 2019-12-12 PROCEDURE — 99213 OFFICE O/P EST LOW 20 MIN: CPT | Performed by: OTHER

## 2019-12-12 RX ORDER — GABAPENTIN 100 MG/1
300 CAPSULE ORAL NIGHTLY
Qty: 270 CAPSULE | Refills: 1 | Status: SHIPPED | OUTPATIENT
Start: 2019-12-12 | End: 2020-10-17

## 2019-12-12 NOTE — PROGRESS NOTES
Neurology H&P    Alexus Latham Patient Status:  No patient class for patient encounter    1980 MRN [id number]   Location [address] [place] [address], [address] [place] [address] Drive, [address] [facility] Attending No att. providers found   [facility] Day # 0 PCP Perla Kong DO     Subj daily with breakfast. 180 tablet 0   • gabapentin 100 MG Oral Cap Take 3 capsules (300 mg total) by mouth nightly.  90 capsule 2   • Continuous Blood Gluc Sensor ([employer]YLE SANDRINE 14 DAY SENSOR) Does not apply Misc Apply to skin every 14 days for CGM 2 each History:   Procedure Laterality Date   • COLONOSCOPY N/A 8/16/2019    Performed by Susan Patton DO at [facility] ENDOSCOPY   • DILATION/CURETTAGE,DIAGNOSTIC      x2   • ESOPHAGOGASTRODUODENOSCOPY (EGD) N/A 8/16/2019    Performed by Susan Patton DO at [facility] symmetric, no dysarthria, tongue midline,  no tongue fasciculations or atrophy, strong shoulder shrug.     MOTOR EXAMINATION: normal tone, no fasciculations, normal strength throughout in UEs and LEs     SENSORY EXAMINATION:  UE: intact to light touch, pinp

## 2020-01-02 ENCOUNTER — PATIENT OUTREACH (OUTPATIENT)
Dept: FAMILY MEDICINE CLINIC | Facility: CLINIC | Age: 40
End: 2020-01-02

## 2020-01-06 ENCOUNTER — PATIENT MESSAGE (OUTPATIENT)
Dept: FAMILY MEDICINE CLINIC | Facility: CLINIC | Age: 40
End: 2020-01-06

## 2020-01-21 RX ORDER — EMPAGLIFLOZIN 10 MG/1
TABLET, FILM COATED ORAL
Qty: 90 TABLET | Refills: 0 | Status: SHIPPED | OUTPATIENT
Start: 2020-01-21 | End: 2020-07-21

## 2020-02-17 ENCOUNTER — TELEPHONE (OUTPATIENT)
Dept: INTERNAL MEDICINE CLINIC | Facility: CLINIC | Age: 40
End: 2020-02-17

## 2020-03-12 PROBLEM — IMO0002 UNCONTROLLED TYPE 2 DIABETES MELLITUS: Status: ACTIVE | Noted: 2020-03-12

## 2020-03-12 PROBLEM — E11.65 UNCONTROLLED TYPE 2 DIABETES MELLITUS (HCC): Status: ACTIVE | Noted: 2020-03-12

## 2020-03-12 PROBLEM — E11.65 HYPERGLYCEMIA DUE TO TYPE 2 DIABETES MELLITUS (HCC): Status: ACTIVE | Noted: 2020-03-12

## 2020-03-12 PROBLEM — E11.40 DIABETIC NEUROPATHY (HCC): Status: ACTIVE | Noted: 2020-03-12

## 2020-03-13 ENCOUNTER — HOSPITAL ENCOUNTER (OUTPATIENT)
Age: 40
Discharge: HOME | End: 2020-03-13
Attending: FAMILY MEDICINE
Payer: COMMERCIAL

## 2020-03-13 VITALS
RESPIRATION RATE: 16 BRPM | DIASTOLIC BLOOD PRESSURE: 87 MMHG | HEART RATE: 63 BPM | TEMPERATURE: 98 F | SYSTOLIC BLOOD PRESSURE: 129 MMHG

## 2020-03-13 DIAGNOSIS — S31.819A OPEN WOUND OF RIGHT BUTTOCK, INITIAL ENCOUNTER: Primary | ICD-10-CM

## 2020-03-13 PROCEDURE — 99213 OFFICE O/P EST LOW 20 MIN: CPT

## 2020-03-13 PROCEDURE — 99214 OFFICE O/P EST MOD 30 MIN: CPT

## 2020-03-13 RX ORDER — CEPHALEXIN 500 MG/1
500 CAPSULE ORAL 3 TIMES DAILY
Qty: 30 CAPSULE | Refills: 0 | Status: SHIPPED | OUTPATIENT
Start: 2020-03-13 | End: 2020-03-23

## 2020-03-13 NOTE — ED PROVIDER NOTES
Patient Seen in: [address] [facility]      History   Patient presents with:  Abscess    Stated Complaint: abscess on back    HPI    *58-year-old female presents to the immediate care today with chief complaints of \"abscess on my buttock\".   Lea Mitchell patient/caregiver and is not pertinent to presenting problem.     Social History    Tobacco Use      Smoking status: Current Every Day Smoker        Packs/day: 0.25        Types: Cigarettes        Start date: 2009      Smokeless tobacco: Never Used    Once [employer] days.  Wound care instructions given. Apply Telfa dressing. Monitor wound closely. Recheck with PCP in the next 2 days. Wound culture was not obtained.               Disposition and Plan     Clinical Impression:  Open wound of right buttock, initial enc

## 2020-03-13 NOTE — ED INITIAL ASSESSMENT (HPI)
Pt has had abscess on right buttocks for 3 weeks. States it opened up after 3 days and has been open for a little over 2 weeks. Pt denies drainage. Pt keeping bandage on it with abx ointment. Hx of dm.

## 2020-03-16 ENCOUNTER — TELEPHONE (OUTPATIENT)
Dept: ENDOCRINOLOGY CLINIC | Facility: CLINIC | Age: 40
End: 2020-03-16

## 2020-03-23 ENCOUNTER — TELEPHONE (OUTPATIENT)
Dept: NEUROLOGY | Facility: CLINIC | Age: 40
End: 2020-03-23

## 2020-03-23 NOTE — TELEPHONE ENCOUNTER
LMTCB. Provider would like to convert patient's OV visit on 4/22/2020 to a Telephone/Virtual visit. Please see provider phone schedule to schedule patient.

## 2020-04-22 ENCOUNTER — TELEPHONE (OUTPATIENT)
Dept: FAMILY MEDICINE CLINIC | Facility: CLINIC | Age: 40
End: 2020-04-22

## 2020-04-22 NOTE — TELEPHONE ENCOUNTER
LOV 10/14/2019    Per that visit was due - The patient is asked to return in 3mo, sooner if needed. Please call to and offer to schedule follow up visit, med review and condition management video visit.

## 2020-05-13 NOTE — TELEPHONE ENCOUNTER
Spoke with pt. She changed insurance and isn't sure Bela Anderson is covered. Told her if her pcp is covered then it's likely that CAB is covered as well. Pt stated she has no issues right now and everything is stable with her diabetes.  Explained to her she hasn'

## 2020-06-05 NOTE — TELEPHONE ENCOUNTER
Numerous attempts have been made to sched pt for either in-person, video or phone f/u with CAB. She last saw her in November and f/u plan was 3 months. If we do not hear from her by 6/24, diabetes care will be returned to pcp.

## 2020-06-24 NOTE — TELEPHONE ENCOUNTER
Numerous attempts have been made. Pt has not responded to letter to sched follow-up appt. Diabetes care returned to pcp.  Bernard Alonso removed from care team.

## 2020-07-21 ENCOUNTER — TELEPHONE (OUTPATIENT)
Dept: FAMILY MEDICINE CLINIC | Facility: CLINIC | Age: 40
End: 2020-07-21

## 2020-07-21 ENCOUNTER — OFFICE VISIT (OUTPATIENT)
Dept: FAMILY MEDICINE CLINIC | Facility: CLINIC | Age: 40
End: 2020-07-21
Payer: COMMERCIAL

## 2020-07-21 VITALS
SYSTOLIC BLOOD PRESSURE: 124 MMHG | OXYGEN SATURATION: 99 % | BODY MASS INDEX: 35.21 KG/M2 | HEIGHT: 71 IN | DIASTOLIC BLOOD PRESSURE: 80 MMHG | WEIGHT: 251.5 LBS | HEART RATE: 69 BPM | RESPIRATION RATE: 16 BRPM | TEMPERATURE: 97 F

## 2020-07-21 DIAGNOSIS — E11.42 TYPE 2 DIABETES MELLITUS WITH DIABETIC POLYNEUROPATHY, WITHOUT LONG-TERM CURRENT USE OF INSULIN (HCC): Primary | ICD-10-CM

## 2020-07-21 DIAGNOSIS — E53.8 B12 DEFICIENCY: ICD-10-CM

## 2020-07-21 DIAGNOSIS — Z00.00 LABORATORY EXAM ORDERED AS PART OF ROUTINE GENERAL MEDICAL EXAMINATION: ICD-10-CM

## 2020-07-21 DIAGNOSIS — M25.511 CHRONIC RIGHT SHOULDER PAIN: ICD-10-CM

## 2020-07-21 DIAGNOSIS — E66.9 OBESITY (BMI 30-39.9): ICD-10-CM

## 2020-07-21 DIAGNOSIS — G89.29 CHRONIC RIGHT SHOULDER PAIN: ICD-10-CM

## 2020-07-21 DIAGNOSIS — E55.9 VITAMIN D DEFICIENCY: ICD-10-CM

## 2020-07-21 PROCEDURE — 3079F DIAST BP 80-89 MM HG: CPT | Performed by: FAMILY MEDICINE

## 2020-07-21 PROCEDURE — 3008F BODY MASS INDEX DOCD: CPT | Performed by: FAMILY MEDICINE

## 2020-07-21 PROCEDURE — 3074F SYST BP LT 130 MM HG: CPT | Performed by: FAMILY MEDICINE

## 2020-07-21 PROCEDURE — 99214 OFFICE O/P EST MOD 30 MIN: CPT | Performed by: FAMILY MEDICINE

## 2020-07-21 RX ORDER — METFORMIN HYDROCHLORIDE 500 MG/1
500 TABLET, EXTENDED RELEASE ORAL 2 TIMES DAILY WITH MEALS
Qty: 180 TABLET | Refills: 0 | Status: SHIPPED | OUTPATIENT
Start: 2020-07-21 | End: 2020-10-16

## 2020-07-21 NOTE — PATIENT INSTRUCTIONS
RICE    RICE stands for rest, ice, compression, and elevation. Doing these things helps limit pain and swelling after an injury. RICE also helps injuries heal faster. Use RICE for sprains, strains, and severe bruises or bumps.  Follow the tips on this roberts Frozen shoulder is a condition where the shoulder becomes painful and hard to move. The condition is sometimes called adhesive capsulitis. The shoulder is a joint that is made up of many parts. These parts allow you to raise, rotate, and swing your arm. Most cases of frozen shoulder get better, even with no treatment. Treatment is done to help speed healing and help regain as much joint movement as possible. The best course of treatment will depend on your needs.  It may include one or more of the followin

## 2020-07-21 NOTE — PROGRESS NOTES
HPI:   Yelitza Arguello is a 41year old R handed, female who presents for recheck of her chronic conditions and c/o R shoulder pain. DM2, has been taking Jardiance 10mg po daily and metformin 500mg ER po bid. Has not been checking her blood glucose levels. Results   Component Value Date    LDL 89 10/24/2019    LDL 89 07/17/2019     Lab Results   Component Value Date    TRIG 105 10/24/2019    TRIG 110 07/17/2019     Lab Results   Component Value Date    AST 18 10/24/2019    AST 12 (L) 07/17/2019    AST 7 (L) Financial resource strain: Not on file      Food insecurity:        Worry: Not on file        Inability: Not on file      Transportation needs:        Medical: Not on file        Non-medical: Not on file    Tobacco Use      Smoking status: Current Every D kg)   LMP 07/01/2020   SpO2 99%   BMI 35.08 kg/m²   GENERAL: well developed, well nourished,in no apparent distress, obese  SKIN: no rashes,no suspicious lesions  NECK: supple   LUNGS: clear to auscultation, no w/r/r  CARDIO: RRR without murmur  EXTREMITIE

## 2020-08-11 ENCOUNTER — TELEPHONE (OUTPATIENT)
Dept: FAMILY MEDICINE CLINIC | Facility: CLINIC | Age: 40
End: 2020-08-11

## 2020-10-14 NOTE — TELEPHONE ENCOUNTER
LOV 7/21/2020    LAST LAB 10-24-19    LAST RX both meds were refilled 7-21-20 3 months no refills    Next OV No future appointments.     PROTOCOL    Name from pharmacy: Starleen Fleischer 10 [address] [facility] [place]          Will file in chart as: JARDIANCE 10 MG Oral Tab    Sig:

## 2020-10-16 RX ORDER — EMPAGLIFLOZIN 10 MG/1
1 TABLET, FILM COATED ORAL DAILY
Qty: 30 TABLET | Refills: 0 | Status: SHIPPED | OUTPATIENT
Start: 2020-10-16 | End: 2021-01-21

## 2020-10-16 RX ORDER — METFORMIN HYDROCHLORIDE 500 MG/1
500 TABLET, EXTENDED RELEASE ORAL 2 TIMES DAILY WITH MEALS
Qty: 60 TABLET | Refills: 0 | Status: SHIPPED | OUTPATIENT
Start: 2020-10-16 | End: 2021-01-21

## 2020-10-17 ENCOUNTER — APPOINTMENT (OUTPATIENT)
Dept: ULTRASOUND IMAGING | Age: 40
End: 2020-10-17
Attending: NURSE PRACTITIONER
Payer: COMMERCIAL

## 2020-10-17 ENCOUNTER — HOSPITAL ENCOUNTER (OUTPATIENT)
Age: 40
Discharge: HOME | End: 2020-10-17
Payer: COMMERCIAL

## 2020-10-17 VITALS
RESPIRATION RATE: 16 BRPM | HEART RATE: 90 BPM | TEMPERATURE: 97 F | OXYGEN SATURATION: 100 % | SYSTOLIC BLOOD PRESSURE: 111 MMHG | DIASTOLIC BLOOD PRESSURE: 76 MMHG

## 2020-10-17 DIAGNOSIS — M79.89 LEFT LEG SWELLING: Primary | ICD-10-CM

## 2020-10-17 PROCEDURE — 93971 EXTREMITY STUDY: CPT | Performed by: NURSE PRACTITIONER

## 2020-10-17 PROCEDURE — 99213 OFFICE O/P EST LOW 20 MIN: CPT | Performed by: NURSE PRACTITIONER

## 2020-10-17 NOTE — ED NOTES
Patient 25-year-old female presents immediate care with a history of left ankle and calf swelling pain with walking edema has a history of being a smoker denies history of blood clots will be sent to the emergency room for ultrasound at this time.   Patient

## 2020-10-17 NOTE — ED PROVIDER NOTES
Patient Seen in: [facility] [place]      History   Patient presents with:  Swelling Edema    Stated Complaint: Swelling L leg, arm pain     42-year-old female presents to the immediate care with complaints of left calf swelling and pain.   Also complai Performed by Onesimo Liang DO at [facility] ENDOSCOPY   • DILATION/CURETTAGE,DIAGNOSTIC      x2   • ESOPHAGOGASTRODUODENOSCOPY (EGD) N/A 8/16/2019    Performed by Onesimo Liang DO at [facility] ENDOSCOPY   • [address]      x1   • [facility] Pupils: Pupils are equal, round, and reactive to light. Neck:      Musculoskeletal: Normal range of motion and neck supple. Cardiovascular:      Rate and Rhythm: Normal rate and regular rhythm. Pulses: Normal pulses.       Heart sounds: Normal h Was notified of the ultrasound results via telephone patient was advised to wear compression stockings, elevate the extremity and does have close follow-up with her primary care physician               JOEL     I have discussed with the patient and/or famil

## 2020-10-17 NOTE — ED INITIAL ASSESSMENT (HPI)
X1 wk Pt c/o left calf/ankle nonpitting edema, +pain with walking, decreased pedal pulses,  +smokes, denies:birth control/hx of clots/CP/SOB  Left calf 16  Right 15.5    Left ankle 11  Right ankle 10    +neuropathy

## 2020-10-21 ENCOUNTER — TELEPHONE (OUTPATIENT)
Dept: FAMILY MEDICINE CLINIC | Facility: CLINIC | Age: 40
End: 2020-10-21

## 2020-11-24 ENCOUNTER — OFFICE VISIT (OUTPATIENT)
Dept: FAMILY MEDICINE CLINIC | Facility: CLINIC | Age: 40
End: 2020-11-24
Payer: COMMERCIAL

## 2020-11-24 VITALS
TEMPERATURE: 97 F | HEART RATE: 88 BPM | WEIGHT: 256.5 LBS | BODY MASS INDEX: 35.91 KG/M2 | RESPIRATION RATE: 16 BRPM | DIASTOLIC BLOOD PRESSURE: 82 MMHG | HEIGHT: 71 IN | SYSTOLIC BLOOD PRESSURE: 128 MMHG

## 2020-11-24 DIAGNOSIS — E11.42 TYPE 2 DIABETES MELLITUS WITH DIABETIC POLYNEUROPATHY, WITHOUT LONG-TERM CURRENT USE OF INSULIN (HCC): ICD-10-CM

## 2020-11-24 DIAGNOSIS — E53.8 B12 DEFICIENCY: ICD-10-CM

## 2020-11-24 DIAGNOSIS — E66.9 OBESITY (BMI 30-39.9): ICD-10-CM

## 2020-11-24 DIAGNOSIS — R60.0 EDEMA OF LEFT LOWER EXTREMITY: Primary | ICD-10-CM

## 2020-11-24 DIAGNOSIS — E55.9 VITAMIN D DEFICIENCY: ICD-10-CM

## 2020-11-24 PROCEDURE — 3079F DIAST BP 80-89 MM HG: CPT | Performed by: FAMILY MEDICINE

## 2020-11-24 PROCEDURE — 99214 OFFICE O/P EST MOD 30 MIN: CPT | Performed by: FAMILY MEDICINE

## 2020-11-24 PROCEDURE — 3008F BODY MASS INDEX DOCD: CPT | Performed by: FAMILY MEDICINE

## 2020-11-24 PROCEDURE — 3074F SYST BP LT 130 MM HG: CPT | Performed by: FAMILY MEDICINE

## 2020-11-24 NOTE — PROGRESS NOTES
HPI:    Patient ID: Delicia Vázquez is a 41year old female. HPI   40yr old obese, AAF presents for f/u on L LLE edema and chronic conditions. States her L lower leg and foot began swelling > 1mo ago. Had been tight and tender to the touch.  She went to t dorsum of foot, neg calf tenderness, neg walker's sign bilat, no rash   Pulmonary/Chest: Effort normal and breath sounds normal. No respiratory distress. She has no wheezes. She has no rales.    Neurological: She is alert and oriented to person, place, and t

## 2020-11-27 ENCOUNTER — HOSPITAL ENCOUNTER (OUTPATIENT)
Dept: GENERAL RADIOLOGY | Age: 40
Discharge: HOME | End: 2020-11-27
Attending: FAMILY MEDICINE
Payer: COMMERCIAL

## 2020-11-27 DIAGNOSIS — G89.29 CHRONIC RIGHT SHOULDER PAIN: ICD-10-CM

## 2020-11-27 DIAGNOSIS — M25.511 CHRONIC RIGHT SHOULDER PAIN: ICD-10-CM

## 2020-11-27 PROCEDURE — 73030 X-RAY EXAM OF SHOULDER: CPT | Performed by: FAMILY MEDICINE

## 2020-12-14 ENCOUNTER — LAB ENCOUNTER (OUTPATIENT)
Dept: LAB | Age: 40
End: 2020-12-14
Attending: FAMILY MEDICINE
Payer: COMMERCIAL

## 2020-12-14 DIAGNOSIS — E53.8 B12 DEFICIENCY: ICD-10-CM

## 2020-12-14 DIAGNOSIS — E55.9 VITAMIN D DEFICIENCY: ICD-10-CM

## 2020-12-14 DIAGNOSIS — Z00.00 LABORATORY EXAM ORDERED AS PART OF ROUTINE GENERAL MEDICAL EXAMINATION: ICD-10-CM

## 2020-12-14 DIAGNOSIS — E11.42 TYPE 2 DIABETES MELLITUS WITH DIABETIC POLYNEUROPATHY, WITHOUT LONG-TERM CURRENT USE OF INSULIN (HCC): ICD-10-CM

## 2020-12-14 DIAGNOSIS — E66.9 OBESITY (BMI 30-39.9): ICD-10-CM

## 2020-12-14 PROCEDURE — 83036 HEMOGLOBIN GLYCOSYLATED A1C: CPT

## 2020-12-14 PROCEDURE — 85025 COMPLETE CBC W/AUTO DIFF WBC: CPT

## 2020-12-14 PROCEDURE — 82570 ASSAY OF URINE CREATININE: CPT

## 2020-12-14 PROCEDURE — 82043 UR ALBUMIN QUANTITATIVE: CPT

## 2020-12-14 PROCEDURE — 82306 VITAMIN D 25 HYDROXY: CPT

## 2020-12-14 PROCEDURE — 36415 COLL VENOUS BLD VENIPUNCTURE: CPT

## 2020-12-14 PROCEDURE — 82607 VITAMIN B-12: CPT

## 2020-12-14 PROCEDURE — 80053 COMPREHEN METABOLIC PANEL: CPT

## 2020-12-14 PROCEDURE — 84443 ASSAY THYROID STIM HORMONE: CPT

## 2020-12-14 PROCEDURE — 80061 LIPID PANEL: CPT

## 2021-01-21 ENCOUNTER — TELEPHONE (OUTPATIENT)
Dept: FAMILY MEDICINE CLINIC | Facility: CLINIC | Age: 41
End: 2021-01-21

## 2021-01-21 RX ORDER — METFORMIN HYDROCHLORIDE 500 MG/1
500 TABLET, EXTENDED RELEASE ORAL 2 TIMES DAILY WITH MEALS
Qty: 60 TABLET | Refills: 0 | Status: SHIPPED | OUTPATIENT
Start: 2021-01-21 | End: 2021-01-27

## 2021-01-21 RX ORDER — EMPAGLIFLOZIN 10 MG/1
1 TABLET, FILM COATED ORAL DAILY
Qty: 30 TABLET | Refills: 0 | Status: SHIPPED | OUTPATIENT
Start: 2021-01-21 | End: 2021-01-27

## 2021-01-21 NOTE — TELEPHONE ENCOUNTER
Called patient to schedule her physical.  She stated she will call to schedule as soon as she receives her schedule for work. She asked in the meantime would Dr. Haji Asper her metformin and jardiance?

## 2021-01-21 NOTE — TELEPHONE ENCOUNTER
Diabetic Medication Protocol [id number]/2021 01:10 PM   Last HgBA1C < 7.5 Protocol Details    HgBA1C procedure resulted in past 6 months     Microalbumin procedure in past 12 months or taking ACE/ARB     Appointment in past 6 or next 3 months      LOV   1

## 2021-01-27 RX ORDER — EMPAGLIFLOZIN 10 MG/1
1 TABLET, FILM COATED ORAL DAILY
Qty: 90 TABLET | Refills: 0 | Status: SHIPPED | OUTPATIENT
Start: 2021-01-27 | End: 2021-06-04

## 2021-01-27 RX ORDER — METFORMIN HYDROCHLORIDE 500 MG/1
500 TABLET, EXTENDED RELEASE ORAL 2 TIMES DAILY WITH MEALS
Qty: 180 TABLET | Refills: 0 | Status: SHIPPED | OUTPATIENT
Start: 2021-01-27 | End: 2021-04-26

## 2021-01-27 NOTE — TELEPHONE ENCOUNTER
Dr. Callie Torres,  Please advise     2D Echo not scheduled    Last Labs 12/14/2020  Katherine Rodrigez, DO   12/17/2020  8:46 PM      Pt needs to f/u on labs     No future appointments.     Patient requests 90 day rx due to cost (see note below)

## 2021-01-27 NOTE — TELEPHONE ENCOUNTER
Jardiance and Metformin re-ordered as #90 as requested to [facility] pharmacy listed. Front- please call pt to schedule f/u visit. Thank you.

## 2021-01-27 NOTE — TELEPHONE ENCOUNTER
Pt called stating she was told by the pharmacy the Dr's offcie will not fill the medication until she has an appointment. Pt said she was last seen 11/24/20. She had to cancel the 1-4-21 mona due to a conflict with another appointment.     She would no

## 2021-02-15 ENCOUNTER — OFFICE VISIT (OUTPATIENT)
Dept: FAMILY MEDICINE CLINIC | Facility: CLINIC | Age: 41
End: 2021-02-15
Payer: COMMERCIAL

## 2021-02-15 VITALS
OXYGEN SATURATION: 97 % | SYSTOLIC BLOOD PRESSURE: 110 MMHG | HEART RATE: 120 BPM | HEIGHT: 71 IN | RESPIRATION RATE: 16 BRPM | BODY MASS INDEX: 35.03 KG/M2 | DIASTOLIC BLOOD PRESSURE: 62 MMHG | TEMPERATURE: 98 F | WEIGHT: 250.25 LBS

## 2021-02-15 DIAGNOSIS — E66.9 OBESITY (BMI 30-39.9): ICD-10-CM

## 2021-02-15 DIAGNOSIS — E11.42 TYPE 2 DIABETES MELLITUS WITH DIABETIC POLYNEUROPATHY, WITHOUT LONG-TERM CURRENT USE OF INSULIN (HCC): Primary | ICD-10-CM

## 2021-02-15 DIAGNOSIS — E55.9 VITAMIN D DEFICIENCY: ICD-10-CM

## 2021-02-15 DIAGNOSIS — E11.65 UNCONTROLLED TYPE 2 DIABETES MELLITUS WITH HYPERGLYCEMIA (HCC): ICD-10-CM

## 2021-02-15 DIAGNOSIS — E53.8 B12 DEFICIENCY: ICD-10-CM

## 2021-02-15 PROCEDURE — 3078F DIAST BP <80 MM HG: CPT | Performed by: FAMILY MEDICINE

## 2021-02-15 PROCEDURE — 3008F BODY MASS INDEX DOCD: CPT | Performed by: FAMILY MEDICINE

## 2021-02-15 PROCEDURE — 99214 OFFICE O/P EST MOD 30 MIN: CPT | Performed by: FAMILY MEDICINE

## 2021-02-15 PROCEDURE — 3074F SYST BP LT 130 MM HG: CPT | Performed by: FAMILY MEDICINE

## 2021-02-15 NOTE — PATIENT INSTRUCTIONS
Diabetes: Understanding Carbohydrates, Fats, and Protein  Food is a source of fuel and nourishment for your body. It’s also a source of pleasure. Having diabetes doesn’t mean you have to eat special foods or give up desserts.  Instead, your dietitian can Polyunsaturated fats. These are mostly found in vegetable oils, such as corn, safflower, and soybean oils. They are found in some seeds, nuts, and fish. Polyunsaturated fats lower LDL (unhealthy) cholesterol.  So, choosing them instead of saturated fats is h

## 2021-02-15 NOTE — PROGRESS NOTES
HPI:   Melody Agarwal is a 41year old female who presents for recheck of her chronic conditions. DM2, taking Jardiance 10mg po daily along with metformin 1000mg po daily. Does not check her blood glucose levels.  Due for diabetic eye exam. Paresthesia same • Arthritis    • B12 deficiency 11/18/2019   • Blurred vision    • Calculus of kidney    • Constipation    • Constipation    • Decorative tattoo    • Depression     no meds- will see therapist   • [facility]    • Frequent use of laxatives occasion      Sexual activity: Not on file    Lifestyle      Physical activity        Days per week: Not on file        Minutes per session: Not on file      Stress: Not on file    Relationships      Social connections        Talks on phone: Not on file use of insulin ([facility]  2.  Uncontrolled type 2 diabetes mellitus with hyperglycemia (HCC)  - previous a1c 9.8  - will have pt increase metformin 1000mg po bid and cont jardiance 10mg po daily  - low carb diet, regular exercise and wt loss recommended  - [facility]

## 2021-02-22 ENCOUNTER — HOSPITAL ENCOUNTER (OUTPATIENT)
Age: 41
Discharge: HOME | End: 2021-02-22
Payer: COMMERCIAL

## 2021-02-22 VITALS
SYSTOLIC BLOOD PRESSURE: 128 MMHG | HEART RATE: 94 BPM | RESPIRATION RATE: 16 BRPM | TEMPERATURE: 98 F | OXYGEN SATURATION: 98 % | DIASTOLIC BLOOD PRESSURE: 68 MMHG

## 2021-02-22 DIAGNOSIS — L02.213 CHEST WALL ABSCESS: Primary | ICD-10-CM

## 2021-02-22 LAB — GLUCOSE BLD-MCNC: 329 MG/DL (ref 70–99)

## 2021-02-22 PROCEDURE — 82962 GLUCOSE BLOOD TEST: CPT | Performed by: PHYSICIAN ASSISTANT

## 2021-02-22 PROCEDURE — 10060 I&D ABSCESS SIMPLE/SINGLE: CPT | Performed by: PHYSICIAN ASSISTANT

## 2021-02-22 RX ORDER — CEFADROXIL 500 MG/1
500 CAPSULE ORAL 2 TIMES DAILY
Qty: 20 CAPSULE | Refills: 0 | Status: SHIPPED | OUTPATIENT
Start: 2021-02-22 | End: 2021-03-04

## 2021-02-22 RX ORDER — HYDROCODONE BITARTRATE AND ACETAMINOPHEN 5; 325 MG/1; MG/1
2 TABLET ORAL ONCE
Status: COMPLETED | OUTPATIENT
Start: 2021-02-22 | End: 2021-02-22

## 2021-02-22 NOTE — ED PROVIDER NOTES
Patient Seen in: Immediate [address] [place] [address]      History   Patient presents with:  Abscess    Stated Complaint: BOIL UNDER LEFT BREAST    HPI/Subjective:   HPI    CHIEF COMPLAINT: Abscess under the left breast     HISTORY OF PRESENT ILLNESS: Patient is a plea • Irregular bowel habits    • Itch of skin    • Leg swelling    • Loss of appetite    • Nausea    • Neuropathy    • Night sweats    • Obesity    • Osteoarthritis     knees   • Sleep disturbance    • Type II or unspecified type diabetes mellitus without men Neurological:  A&Ox3,  Gait normal.  Psychiatric: calm and cooperative  Respiratory: CTAB  Cardiovascular: RRR, S1/S2, no m/c/r  Musculoskeletal: Extremities are symmetrical, full range of motion  Skin:  warm and dry, no rashes.      The abscess was anesthe Medications Prescribed:  Discharge Medication List as of 2/22/2021  2:28 PM    START taking these medications    Cefadroxil 500 MG Oral Cap  Take 1 capsule (500 mg total) by mouth 2 (two) times daily for 10 days. , Normal, Disp-20 capsule, R-0

## 2021-02-23 ENCOUNTER — HOSPITAL ENCOUNTER (OUTPATIENT)
Age: 41
Discharge: HOME | End: 2021-02-23
Payer: COMMERCIAL

## 2021-02-23 VITALS — OXYGEN SATURATION: 99 % | HEART RATE: 98 BPM | RESPIRATION RATE: 16 BRPM | TEMPERATURE: 98 F

## 2021-02-23 DIAGNOSIS — Z51.89 WOUND CHECK, ABSCESS: Primary | ICD-10-CM

## 2021-02-23 PROCEDURE — 99024 POSTOP FOLLOW-UP VISIT: CPT | Performed by: NURSE PRACTITIONER

## 2021-02-23 NOTE — ED PROVIDER NOTES
Patient Seen in: Immediate [address] [place] [address]      History   Patient presents with:  Rash Skin Problem    Stated Complaint: F/U abscess    HPI/Subjective:   64-year-old female presents to the [address] [place] [address] for abscess wound recheck and repacking of a abscess that was drai Tobacco Use      Smoking status: Current Every Day Smoker        Packs/day: 0.25        Types: Cigarettes        Start date: 2009      Smokeless tobacco: Never Used    Alcohol use: No    Drug use: No      Comment: CBD oil on occasion             Review o I have discussed with the patient and/or family the results of test, differential diagnosis, treatment plan, warning signs and symptoms which should prompt immediate return.   The patient and/or family expressed clear understanding of these instructions and

## 2021-02-23 NOTE — ED INITIAL ASSESSMENT (HPI)
Here for wound check following IandD yesterday with packing. States it feels better. Continues to have redness and drainage.

## 2021-02-25 ENCOUNTER — HOSPITAL ENCOUNTER (OUTPATIENT)
Age: 41
Discharge: HOME | End: 2021-02-25
Payer: COMMERCIAL

## 2021-02-25 VITALS
RESPIRATION RATE: 18 BRPM | SYSTOLIC BLOOD PRESSURE: 114 MMHG | DIASTOLIC BLOOD PRESSURE: 79 MMHG | TEMPERATURE: 99 F | HEART RATE: 95 BPM | OXYGEN SATURATION: 99 %

## 2021-02-25 DIAGNOSIS — Z51.89 WOUND CHECK, ABSCESS: Primary | ICD-10-CM

## 2021-02-25 PROCEDURE — 99024 POSTOP FOLLOW-UP VISIT: CPT | Performed by: NURSE PRACTITIONER

## 2021-02-25 NOTE — ED PROVIDER NOTES
Patient Seen in: Immediate [address] [place] [address]      History   Patient presents with:  Wound Care    Stated Complaint: wound check    HPI/Subjective:   40-year-old female presents today with need of wound check and packing removal from abscess underneath the left Healing abscess noted under the left breast with packing in place. Mild erythema surrounding the area no gross swelling. Neurological:      Mental Status: She is alert.                ED Course   Labs Reviewed - No data to display                MDM     P

## 2021-03-03 ENCOUNTER — OFFICE VISIT (OUTPATIENT)
Dept: SURGERY | Facility: CLINIC | Age: 41
End: 2021-03-03
Payer: COMMERCIAL

## 2021-03-03 VITALS — TEMPERATURE: 98 F | HEART RATE: 102 BPM | SYSTOLIC BLOOD PRESSURE: 123 MMHG | DIASTOLIC BLOOD PRESSURE: 80 MMHG

## 2021-03-03 DIAGNOSIS — N61.1 BREAST ABSCESS: Primary | ICD-10-CM

## 2021-03-03 PROCEDURE — 99243 OFF/OP CNSLTJ NEW/EST LOW 30: CPT | Performed by: SURGERY

## 2021-03-03 PROCEDURE — 3079F DIAST BP 80-89 MM HG: CPT | Performed by: SURGERY

## 2021-03-03 PROCEDURE — 3074F SYST BP LT 130 MM HG: CPT | Performed by: SURGERY

## 2021-03-03 NOTE — H&P
New Patient Visit Note       Active Problems      No diagnosis found. Chief Complaint   Patient presents with:  Abscess: NP LT BREAST ABSCESS -- I+D last monday and UC was doing dressing changes for her. States little pockets around the incision.  States Medical History:   Diagnosis Date   • Abdominal pain    • Anxiety state    • Arthritis    • B12 deficiency 11/18/2019   • Blurred vision    • Calculus of kidney    • Constipation    • Constipation    • Decorative tattoo    • Depression     no meds- will se medications on file prior to visit. Review of Systems  The Review of Systems has been reviewed by me during today. Review of Systems   Constitutional: Negative for chills, diaphoresis, fatigue, fever and unexpected weight change.    HENT: Negative left medial inframammary fold. Mild induration mild erythema       Assessment   No diagnosis found. Resolving inframammary skin left breast abscess secondary to skin cyst  Plan   Continue resolution of cyst with repeat examination in 2 months time.   If a

## 2021-04-24 NOTE — TELEPHONE ENCOUNTER
LOV 2/15/2021    LAST LAB 12-14-20    LAST RX 1-27-21 180*0     Next OV   Future Appointments   Date Time Provider Lissett Monteiro   5/5/2021  3:30 PM Humza Christy DO [facility]         PROTOCOL   Name from pharmacy: [facility]

## 2021-04-26 RX ORDER — METFORMIN HYDROCHLORIDE 500 MG/1
TABLET, EXTENDED RELEASE ORAL
Qty: 180 TABLET | Refills: 0 | Status: SHIPPED | OUTPATIENT
Start: 2021-04-26 | End: 2021-07-27

## 2021-06-02 NOTE — TELEPHONE ENCOUNTER
Diabetic Medication Protocol Ntvxfy3906/02/2021 09:38 AM   Last HgBA1C < 7.5 Protocol Details    HgBA1C procedure resulted in past 6 months     Microalbumin procedure in past 12 months or taking ACE/ARB     Appointment in past 6 or next 3 months      LOV 2/1

## 2021-07-10 ENCOUNTER — TELEPHONE (OUTPATIENT)
Dept: FAMILY MEDICINE CLINIC | Facility: CLINIC | Age: 41
End: 2021-07-10

## 2021-07-10 NOTE — TELEPHONE ENCOUNTER
Diabetic Medication Protocol Irmfco53/10/2021 09:53 AM   HgBA1C procedure resulted in past 6 months Protocol Details    Last HgBA1C < 7.5     Microalbumin procedure in past 12 months or taking ACE/ARB     Appointment in past 6 or next 3 months      LOV 2/1

## 2021-07-12 NOTE — TELEPHONE ENCOUNTER
Spoke with Pt informed her Dr stated. She said she will get the blood work done but going further she wants 90 day refills because it's much cheaper for her with the insurance.

## 2021-07-26 NOTE — TELEPHONE ENCOUNTER
Diabetic Medication Protocol Pfiyho5907/25/2021 07:36 AM   HgBA1C procedure resulted in past 6 months Protocol Details    Last HgBA1C < 7.5     Microalbumin procedure in past 12 months or taking ACE/ARB     Appointment in past 6 or next 3 months      LOV 2/1

## 2021-07-27 RX ORDER — METFORMIN HYDROCHLORIDE 500 MG/1
500 TABLET, EXTENDED RELEASE ORAL 2 TIMES DAILY WITH MEALS
Qty: 60 TABLET | Refills: 0 | Status: SHIPPED | OUTPATIENT
Start: 2021-07-27

## 2021-08-25 ENCOUNTER — TELEPHONE (OUTPATIENT)
Dept: FAMILY MEDICINE CLINIC | Facility: CLINIC | Age: 41
End: 2021-08-25

## 2021-09-23 ENCOUNTER — TELEPHONE (OUTPATIENT)
Dept: FAMILY MEDICINE CLINIC | Facility: CLINIC | Age: 41
End: 2021-09-23

## 2021-09-27 ENCOUNTER — HOSPITAL ENCOUNTER (OUTPATIENT)
Age: 41
Discharge: HOME | End: 2021-09-27
Payer: COMMERCIAL

## 2021-09-27 VITALS
WEIGHT: 235 LBS | HEART RATE: 103 BPM | RESPIRATION RATE: 16 BRPM | OXYGEN SATURATION: 100 % | DIASTOLIC BLOOD PRESSURE: 96 MMHG | BODY MASS INDEX: 32.9 KG/M2 | HEIGHT: 71 IN | SYSTOLIC BLOOD PRESSURE: 160 MMHG

## 2021-09-27 DIAGNOSIS — M62.838 SPASM OF MUSCLE: ICD-10-CM

## 2021-09-27 DIAGNOSIS — T14.8XXA MUSCLE STRAIN: Primary | ICD-10-CM

## 2021-09-27 PROCEDURE — 99213 OFFICE O/P EST LOW 20 MIN: CPT | Performed by: NURSE PRACTITIONER

## 2021-09-27 RX ORDER — METHYLPREDNISOLONE 4 MG/1
TABLET ORAL
Qty: 1 EACH | Refills: 0 | Status: SHIPPED | OUTPATIENT
Start: 2021-09-27 | End: 2021-10-29

## 2021-09-27 RX ORDER — CYCLOBENZAPRINE HCL 10 MG
10 TABLET ORAL 3 TIMES DAILY PRN
Qty: 20 TABLET | Refills: 0 | Status: SHIPPED | OUTPATIENT
Start: 2021-09-27 | End: 2021-10-04

## 2021-09-28 NOTE — ED INITIAL ASSESSMENT (HPI)
Pt with left shoulder, neck upper arm since last Monday. Pt taking tylenol, thought it was from heavy lifting. Pt in severe pain, tried a muscle relaxer. States the pain eases when she is active but as soon as she sits still the pain worsens.  Hx of arthrit
No significant past surgical history

## 2021-09-28 NOTE — ED PROVIDER NOTES
Patient Seen in: Immediate [address] [place] [address]      History   Patient presents with:  Shoulder Pain    Stated Complaint: neck, shoulder, and arm pain    Subjective:   43-year-old female presents to complaints of pain to the left trapezius area radiating down int COLONOSCOPY N/A 2019    Procedure: COLONOSCOPY;  Surgeon: Carl Martínez DO;  Location:  ENDOSCOPY   • DILATION/CURETTAGE,DIAGNOSTIC      x2   • HC  SECTION LEVEL I      x1   • KIDNEY SURGERY      KIDNEY STONE REMOVAL X2                So pain to the left trapezius. Pain with palpation over the area does appear to be swollen and tight. Findings consistent with muscle spasm of the muscle. Patient had no neuro deficits. Will give prescription for Medrol Dosepak as well as Flexeril.   Encou

## 2021-10-29 ENCOUNTER — HOSPITAL ENCOUNTER (OUTPATIENT)
Age: 41
Discharge: HOME | End: 2021-10-29
Payer: COMMERCIAL

## 2021-10-29 VITALS
OXYGEN SATURATION: 99 % | TEMPERATURE: 98 F | HEART RATE: 98 BPM | SYSTOLIC BLOOD PRESSURE: 149 MMHG | RESPIRATION RATE: 16 BRPM | DIASTOLIC BLOOD PRESSURE: 80 MMHG

## 2021-10-29 DIAGNOSIS — L02.811 ABSCESS OF HEAD: Primary | ICD-10-CM

## 2021-10-29 PROCEDURE — 10060 I&D ABSCESS SIMPLE/SINGLE: CPT | Performed by: PHYSICIAN ASSISTANT

## 2021-10-29 PROCEDURE — 99213 OFFICE O/P EST LOW 20 MIN: CPT | Performed by: PHYSICIAN ASSISTANT

## 2021-10-29 RX ORDER — HYDROCODONE BITARTRATE AND ACETAMINOPHEN 5; 325 MG/1; MG/1
1-2 TABLET ORAL EVERY 6 HOURS PRN
Qty: 2 TABLET | Refills: 0 | Status: SHIPPED | OUTPATIENT
Start: 2021-10-29 | End: 2021-10-29

## 2021-10-29 RX ORDER — CLINDAMYCIN HYDROCHLORIDE 300 MG/1
300 CAPSULE ORAL 3 TIMES DAILY
Qty: 30 CAPSULE | Refills: 0 | Status: SHIPPED | OUTPATIENT
Start: 2021-10-29 | End: 2021-11-08

## 2021-10-29 RX ORDER — HYDROCODONE BITARTRATE AND ACETAMINOPHEN 5; 325 MG/1; MG/1
1-2 TABLET ORAL EVERY 6 HOURS PRN
Qty: 2 TABLET | Refills: 0 | Status: SHIPPED | OUTPATIENT
Start: 2021-10-29 | End: 2021-10-30

## 2021-10-29 RX ORDER — CLINDAMYCIN HYDROCHLORIDE 300 MG/1
300 CAPSULE ORAL 3 TIMES DAILY
Qty: 30 CAPSULE | Refills: 0 | Status: SHIPPED | OUTPATIENT
Start: 2021-10-29 | End: 2021-10-29

## 2021-10-29 RX ORDER — HYDROCODONE BITARTRATE AND ACETAMINOPHEN 5; 325 MG/1; MG/1
1 TABLET ORAL ONCE
Status: COMPLETED | OUTPATIENT
Start: 2021-10-29 | End: 2021-10-29

## 2021-10-29 NOTE — ED INITIAL ASSESSMENT (HPI)
Pt states used a new shampoo on Saturday. Onset Monday of abscesses to scalp. Today worsening pain and swelling.  States has had multiple abscess in the past but these are very painful

## 2021-10-29 NOTE — ED PROVIDER NOTES
Patient Seen in: Immediate [address] [facility]      History   Patient presents with:  Abscess    Stated Complaint: 2 painful cysts on scalp    Subjective:   HPI    52-year-old female presents immediate care for 2 abscesses on her head.   Patient states Saturday s oil on occasion             Review of Systems   Constitutional: Negative for chills and fever. HENT: Negative for sore throat. Eyes: Negative for discharge. Respiratory: Negative for shortness of breath. Cardiovascular: Negative for chest pain. Psychiatric:         Mood and Affect: Mood normal.             ED Course   Labs Reviewed - No data to display                MDM        25-year-old female presents immediate care for 2 abscesses on her head that started on Monday.   Patient has history of mouth every 6 (six) hours as needed for Pain. Qty: 2 tablet Refills: 0    clindamycin 300 MG Oral Cap  Take 1 capsule (300 mg total) by mouth 3 (three) times daily for 10 days.   Qty: 30 capsule Refills: 0

## 2021-10-31 ENCOUNTER — HOSPITAL ENCOUNTER (OUTPATIENT)
Age: 41
Discharge: HOME | End: 2021-10-31
Payer: COMMERCIAL

## 2021-10-31 VITALS
DIASTOLIC BLOOD PRESSURE: 69 MMHG | HEART RATE: 104 BPM | TEMPERATURE: 97 F | WEIGHT: 235 LBS | OXYGEN SATURATION: 100 % | HEIGHT: 71 IN | RESPIRATION RATE: 16 BRPM | BODY MASS INDEX: 32.9 KG/M2 | SYSTOLIC BLOOD PRESSURE: 116 MMHG

## 2021-10-31 DIAGNOSIS — Z51.89 WOUND CHECK, ABSCESS: Primary | ICD-10-CM

## 2021-10-31 PROCEDURE — 99024 POSTOP FOLLOW-UP VISIT: CPT | Performed by: NURSE PRACTITIONER

## 2021-10-31 NOTE — ED INITIAL ASSESSMENT (HPI)
Pt here to have packing removed from 2 abscesses to patient's posterior scalp. Patient has been taking abx. Rates pain 6/10 now. Pt states area is still very tender.

## 2021-10-31 NOTE — ED PROVIDER NOTES
Patient Seen in: Immediate [facility]      History   Patient presents with:  Abscess  Follow - Up    Stated Complaint: PACKING REMOVAL/FOLLOW UP    Subjective:   43-year-old female presents today with need of wound check.   Had I&D done 2 to 3 days ago t Smoker        Packs/day: 0.25        Types: Cigarettes        Start date: 2009      Smokeless tobacco: Never Used    Vaping Use      Vaping Use: Never used    Alcohol use: No    Drug use: No      Comment: CBD oil on occasion             Review of Systems Patrick Lovett 135  [phone]    In 1 week  for recheck          Medications Prescribed:  Current Discharge Medication List

## 2021-12-06 ENCOUNTER — TELEPHONE (OUTPATIENT)
Dept: FAMILY MEDICINE CLINIC | Facility: CLINIC | Age: 41
End: 2021-12-06

## 2022-04-08 ENCOUNTER — APPOINTMENT (OUTPATIENT)
Dept: GENERAL RADIOLOGY | Age: 42
End: 2022-04-08
Attending: EMERGENCY MEDICINE
Payer: COMMERCIAL

## 2022-04-08 ENCOUNTER — APPOINTMENT (OUTPATIENT)
Dept: GENERAL RADIOLOGY | Age: 42
DRG: 629 | End: 2022-04-08
Attending: EMERGENCY MEDICINE
Payer: COMMERCIAL

## 2022-04-08 ENCOUNTER — HOSPITAL ENCOUNTER (OUTPATIENT)
Age: 42
Discharge: ANOTHER HOSPITAL | End: 2022-04-08
Payer: COMMERCIAL

## 2022-04-08 ENCOUNTER — HOSPITAL ENCOUNTER (INPATIENT)
Facility: HOSPITAL | Age: 42
LOS: 5 days | Discharge: HOME | DRG: 629 | End: 2022-04-13
Attending: EMERGENCY MEDICINE | Admitting: HOSPITALIST
Payer: COMMERCIAL

## 2022-04-08 ENCOUNTER — APPOINTMENT (OUTPATIENT)
Dept: ULTRASOUND IMAGING | Age: 42
End: 2022-04-08
Attending: EMERGENCY MEDICINE
Payer: COMMERCIAL

## 2022-04-08 ENCOUNTER — APPOINTMENT (OUTPATIENT)
Dept: ULTRASOUND IMAGING | Age: 42
DRG: 629 | End: 2022-04-08
Attending: EMERGENCY MEDICINE
Payer: COMMERCIAL

## 2022-04-08 ENCOUNTER — HOSPITAL ENCOUNTER (INPATIENT)
Facility: HOSPITAL | Age: 42
LOS: 5 days | Discharge: HOME | End: 2022-04-13
Attending: EMERGENCY MEDICINE | Admitting: HOSPITALIST
Payer: COMMERCIAL

## 2022-04-08 VITALS
HEART RATE: 95 BPM | RESPIRATION RATE: 14 BRPM | SYSTOLIC BLOOD PRESSURE: 118 MMHG | TEMPERATURE: 98 F | OXYGEN SATURATION: 100 % | DIASTOLIC BLOOD PRESSURE: 77 MMHG

## 2022-04-08 DIAGNOSIS — L02.213 CHEST WALL ABSCESS: Primary | ICD-10-CM

## 2022-04-08 DIAGNOSIS — R73.9 HYPERGLYCEMIA: ICD-10-CM

## 2022-04-08 DIAGNOSIS — Z79.4 TYPE 2 DIABETES MELLITUS WITH HYPERGLYCEMIA, WITH LONG-TERM CURRENT USE OF INSULIN (HCC): ICD-10-CM

## 2022-04-08 DIAGNOSIS — L97.511 ULCER OF RIGHT FOOT, LIMITED TO BREAKDOWN OF SKIN (HCC): ICD-10-CM

## 2022-04-08 DIAGNOSIS — Z91.14 NONCOMPLIANCE WITH MEDICATION REGIMEN: ICD-10-CM

## 2022-04-08 DIAGNOSIS — N61.0 CELLULITIS OF LEFT BREAST: ICD-10-CM

## 2022-04-08 DIAGNOSIS — N61.1 ABSCESS OF LEFT BREAST: ICD-10-CM

## 2022-04-08 DIAGNOSIS — E11.65 TYPE 2 DIABETES MELLITUS WITH HYPERGLYCEMIA, WITH LONG-TERM CURRENT USE OF INSULIN (HCC): ICD-10-CM

## 2022-04-08 PROBLEM — Z91.148 NONCOMPLIANCE WITH MEDICATION REGIMEN: Status: ACTIVE | Noted: 2022-04-08

## 2022-04-08 LAB
ALBUMIN SERPL-MCNC: 2.8 G/DL (ref 3.4–5)
ALBUMIN/GLOB SERPL: 0.5 {RATIO} (ref 1–2)
ALP LIVER SERPL-CCNC: 115 U/L
ALT SERPL-CCNC: 19 U/L
ANION GAP SERPL CALC-SCNC: 7 MMOL/L (ref 0–18)
AST SERPL-CCNC: 8 U/L (ref 15–37)
BASOPHILS # BLD AUTO: 0.06 X10(3) UL (ref 0–0.2)
BASOPHILS NFR BLD AUTO: 0.5 %
BILIRUB SERPL-MCNC: 0.4 MG/DL (ref 0.1–2)
BUN BLD-MCNC: 8 MG/DL (ref 7–18)
CALCIUM BLD-MCNC: 9.9 MG/DL (ref 8.5–10.1)
CHLORIDE SERPL-SCNC: 98 MMOL/L (ref 98–112)
CO2 SERPL-SCNC: 27 MMOL/L (ref 21–32)
CREAT BLD-MCNC: 0.66 MG/DL
EOSINOPHIL # BLD AUTO: 0.4 X10(3) UL (ref 0–0.7)
EOSINOPHIL NFR BLD AUTO: 3.4 %
ERYTHROCYTE [DISTWIDTH] IN BLOOD BY AUTOMATED COUNT: 14.5 %
EST. AVERAGE GLUCOSE BLD GHB EST-MCNC: 338 MG/DL (ref 68–126)
GLOBULIN PLAS-MCNC: 5.6 G/DL (ref 2.8–4.4)
GLUCOSE BLD-MCNC: 287 MG/DL (ref 70–99)
GLUCOSE BLD-MCNC: 305 MG/DL (ref 70–99)
GLUCOSE BLD-MCNC: 314 MG/DL (ref 70–99)
GLUCOSE BLD-MCNC: 354 MG/DL (ref 70–99)
GLUCOSE BLD-MCNC: 369 MG/DL (ref 70–99)
HBA1C MFR BLD: 13.4 % (ref ?–5.7)
HCT VFR BLD AUTO: 40.8 %
HGB BLD-MCNC: 13 G/DL
IMM GRANULOCYTES # BLD AUTO: 0.06 X10(3) UL (ref 0–1)
IMM GRANULOCYTES NFR BLD: 0.5 %
LYMPHOCYTES # BLD AUTO: 2.68 X10(3) UL (ref 1–4)
LYMPHOCYTES NFR BLD AUTO: 23 %
MCH RBC QN AUTO: 26.5 PG (ref 26–34)
MCHC RBC AUTO-ENTMCNC: 31.9 G/DL (ref 31–37)
MCV RBC AUTO: 83.1 FL
MONOCYTES # BLD AUTO: 1.2 X10(3) UL (ref 0.1–1)
MONOCYTES NFR BLD AUTO: 10.3 %
NEUTROPHILS # BLD AUTO: 7.23 X10 (3) UL (ref 1.5–7.7)
NEUTROPHILS # BLD AUTO: 7.23 X10(3) UL (ref 1.5–7.7)
NEUTROPHILS NFR BLD AUTO: 62.3 %
OSMOLALITY SERPL CALC.SUM OF ELEC: 284 MOSM/KG (ref 275–295)
PLATELET # BLD AUTO: 515 10(3)UL (ref 150–450)
POTASSIUM SERPL-SCNC: 4.5 MMOL/L (ref 3.5–5.1)
PROT SERPL-MCNC: 8.4 G/DL (ref 6.4–8.2)
RBC # BLD AUTO: 4.91 X10(6)UL
SARS-COV-2 RNA RESP QL NAA+PROBE: NOT DETECTED
SODIUM SERPL-SCNC: 132 MMOL/L (ref 136–145)
WBC # BLD AUTO: 11.6 X10(3) UL (ref 4–11)

## 2022-04-08 PROCEDURE — 99205 OFFICE O/P NEW HI 60 MIN: CPT | Performed by: PHYSICIAN ASSISTANT

## 2022-04-08 PROCEDURE — 82962 GLUCOSE BLOOD TEST: CPT | Performed by: PHYSICIAN ASSISTANT

## 2022-04-08 PROCEDURE — 99223 1ST HOSP IP/OBS HIGH 75: CPT | Performed by: HOSPITALIST

## 2022-04-08 PROCEDURE — 73630 X-RAY EXAM OF FOOT: CPT | Performed by: EMERGENCY MEDICINE

## 2022-04-08 PROCEDURE — 0H9U3ZZ DRAINAGE OF LEFT BREAST, PERCUTANEOUS APPROACH: ICD-10-PCS | Performed by: EMERGENCY MEDICINE

## 2022-04-08 PROCEDURE — 71045 X-RAY EXAM CHEST 1 VIEW: CPT | Performed by: EMERGENCY MEDICINE

## 2022-04-08 PROCEDURE — 0J963ZZ DRAINAGE OF CHEST SUBCUTANEOUS TISSUE AND FASCIA, PERCUTANEOUS APPROACH: ICD-10-PCS | Performed by: EMERGENCY MEDICINE

## 2022-04-08 RX ORDER — ENOXAPARIN SODIUM 100 MG/ML
40 INJECTION SUBCUTANEOUS DAILY
Status: DISCONTINUED | OUTPATIENT
Start: 2022-04-08 | End: 2022-04-13

## 2022-04-08 RX ORDER — SODIUM CHLORIDE 9 MG/ML
INJECTION, SOLUTION INTRAVENOUS CONTINUOUS
Status: ACTIVE | OUTPATIENT
Start: 2022-04-08 | End: 2022-04-09

## 2022-04-08 RX ORDER — INSULIN ASPART 100 [IU]/ML
0.1 INJECTION, SOLUTION INTRAVENOUS; SUBCUTANEOUS ONCE
Status: COMPLETED | OUTPATIENT
Start: 2022-04-08 | End: 2022-04-08

## 2022-04-08 RX ORDER — CLINDAMYCIN PHOSPHATE 600 MG/50ML
600 INJECTION INTRAVENOUS ONCE
Status: COMPLETED | OUTPATIENT
Start: 2022-04-08 | End: 2022-04-08

## 2022-04-08 RX ORDER — HYDROMORPHONE HYDROCHLORIDE 1 MG/ML
0.5 INJECTION, SOLUTION INTRAMUSCULAR; INTRAVENOUS; SUBCUTANEOUS EVERY 30 MIN PRN
Status: DISCONTINUED | OUTPATIENT
Start: 2022-04-08 | End: 2022-04-08

## 2022-04-08 RX ORDER — HYDROMORPHONE HYDROCHLORIDE 1 MG/ML
0.2 INJECTION, SOLUTION INTRAMUSCULAR; INTRAVENOUS; SUBCUTANEOUS EVERY 2 HOUR PRN
Status: DISCONTINUED | OUTPATIENT
Start: 2022-04-08 | End: 2022-04-13

## 2022-04-08 RX ORDER — ONDANSETRON 2 MG/ML
4 INJECTION INTRAMUSCULAR; INTRAVENOUS EVERY 6 HOURS PRN
Status: DISCONTINUED | OUTPATIENT
Start: 2022-04-08 | End: 2022-04-13

## 2022-04-08 RX ORDER — DEXTROSE MONOHYDRATE 25 G/50ML
50 INJECTION, SOLUTION INTRAVENOUS
Status: DISCONTINUED | OUTPATIENT
Start: 2022-04-08 | End: 2022-04-13

## 2022-04-08 RX ORDER — NICOTINE POLACRILEX 4 MG
15 LOZENGE BUCCAL
Status: DISCONTINUED | OUTPATIENT
Start: 2022-04-08 | End: 2022-04-13

## 2022-04-08 RX ORDER — HYDROMORPHONE HYDROCHLORIDE 1 MG/ML
0.8 INJECTION, SOLUTION INTRAMUSCULAR; INTRAVENOUS; SUBCUTANEOUS EVERY 2 HOUR PRN
Status: DISCONTINUED | OUTPATIENT
Start: 2022-04-08 | End: 2022-04-13

## 2022-04-08 RX ORDER — BISACODYL 10 MG
10 SUPPOSITORY, RECTAL RECTAL
Status: DISCONTINUED | OUTPATIENT
Start: 2022-04-08 | End: 2022-04-13

## 2022-04-08 RX ORDER — MELATONIN
3 NIGHTLY PRN
Status: DISCONTINUED | OUTPATIENT
Start: 2022-04-08 | End: 2022-04-13

## 2022-04-08 RX ORDER — POLYETHYLENE GLYCOL 3350 17 G/17G
17 POWDER, FOR SOLUTION ORAL DAILY PRN
Status: DISCONTINUED | OUTPATIENT
Start: 2022-04-08 | End: 2022-04-13

## 2022-04-08 RX ORDER — ACETAMINOPHEN 325 MG/1
650 TABLET ORAL EVERY 6 HOURS PRN
Status: DISCONTINUED | OUTPATIENT
Start: 2022-04-08 | End: 2022-04-13

## 2022-04-08 RX ORDER — HYDROMORPHONE HYDROCHLORIDE 1 MG/ML
0.4 INJECTION, SOLUTION INTRAMUSCULAR; INTRAVENOUS; SUBCUTANEOUS EVERY 2 HOUR PRN
Status: DISCONTINUED | OUTPATIENT
Start: 2022-04-08 | End: 2022-04-13

## 2022-04-08 RX ORDER — CLINDAMYCIN PHOSPHATE 600 MG/50ML
600 INJECTION INTRAVENOUS EVERY 8 HOURS
Status: DISCONTINUED | OUTPATIENT
Start: 2022-04-08 | End: 2022-04-10

## 2022-04-08 RX ORDER — SENNOSIDES 8.6 MG
17.2 TABLET ORAL NIGHTLY PRN
Status: DISCONTINUED | OUTPATIENT
Start: 2022-04-08 | End: 2022-04-13

## 2022-04-08 RX ORDER — PROCHLORPERAZINE EDISYLATE 5 MG/ML
5 INJECTION INTRAMUSCULAR; INTRAVENOUS EVERY 8 HOURS PRN
Status: DISCONTINUED | OUTPATIENT
Start: 2022-04-08 | End: 2022-04-13

## 2022-04-08 RX ORDER — SODIUM PHOSPHATE, DIBASIC AND SODIUM PHOSPHATE, MONOBASIC 7; 19 G/133ML; G/133ML
1 ENEMA RECTAL ONCE AS NEEDED
Status: DISCONTINUED | OUTPATIENT
Start: 2022-04-08 | End: 2022-04-13

## 2022-04-08 RX ORDER — NICOTINE POLACRILEX 4 MG
30 LOZENGE BUCCAL
Status: DISCONTINUED | OUTPATIENT
Start: 2022-04-08 | End: 2022-04-13

## 2022-04-08 NOTE — ED INITIAL ASSESSMENT (HPI)
Pt sts cyst to chest/under left breast began 5 days ago. No relief with heat, Neosporin. Noted drng this morning. Denies fever.

## 2022-04-08 NOTE — ED QUICK NOTES
Orders for admission, patient is aware of plan and ready to go upstairs. Any questions, please call ED MIGUEL ANGEL Dial  at extension [phone]. Vaccinated? yes  Type of COVID test sent:rapid  COVID Suspicion level: Low      Titratable drug(s) infusing:none  Rate:    LOC at time of transport:a/o x 4    Other pertinent information:abscess to midsternal chest, drained and packed.  Diabetic uncontrolled    CIWA score=0  NIH score=0

## 2022-04-08 NOTE — ED INITIAL ASSESSMENT (HPI)
Pt was sent here from [facility] w abscess to middle of chest oozing. No fevers. Bilateral feet pain.

## 2022-04-08 NOTE — ED QUICK NOTES
Pt states that she is a uncontrolled diabetic, has been out of her metformin since February. Pt states that she also has a open wound on the posterior right foot big toe that has not been healing. Pt denies seeing a wound care doctor or podiatrist for her foot.  PA aware

## 2022-04-08 NOTE — ED NOTES
I reviewed that chart and discussed the case. Patient is a 42-year-old female presents to the immediate care with a history of a chest wall abscess/infection which is now spread into the breast tissue ongoing for the last 5 days has a history of poorly controlled diabetes will be sent to the emergency room for further treatment at this time as she will need incision and drainage mainly cultures as well as ultrasound. I agree with the following clinical impression(s):  Chest wall abscess. Acute breast abscess   I agree with the plan as noted.

## 2022-04-09 ENCOUNTER — APPOINTMENT (OUTPATIENT)
Dept: GENERAL RADIOLOGY | Facility: HOSPITAL | Age: 42
DRG: 629 | End: 2022-04-09
Attending: HOSPITALIST
Payer: COMMERCIAL

## 2022-04-09 ENCOUNTER — APPOINTMENT (OUTPATIENT)
Dept: GENERAL RADIOLOGY | Facility: HOSPITAL | Age: 42
End: 2022-04-09
Attending: HOSPITALIST
Payer: COMMERCIAL

## 2022-04-09 ENCOUNTER — APPOINTMENT (OUTPATIENT)
Dept: ULTRASOUND IMAGING | Facility: HOSPITAL | Age: 42
DRG: 629 | End: 2022-04-09
Attending: EMERGENCY MEDICINE
Payer: COMMERCIAL

## 2022-04-09 ENCOUNTER — APPOINTMENT (OUTPATIENT)
Dept: ULTRASOUND IMAGING | Facility: HOSPITAL | Age: 42
End: 2022-04-09
Attending: EMERGENCY MEDICINE
Payer: COMMERCIAL

## 2022-04-09 LAB
GLUCOSE BLD-MCNC: 215 MG/DL (ref 70–99)
GLUCOSE BLD-MCNC: 247 MG/DL (ref 70–99)
GLUCOSE BLD-MCNC: 260 MG/DL (ref 70–99)
GLUCOSE BLD-MCNC: 267 MG/DL (ref 70–99)

## 2022-04-09 PROCEDURE — 73630 X-RAY EXAM OF FOOT: CPT | Performed by: HOSPITALIST

## 2022-04-09 PROCEDURE — 76604 US EXAM CHEST: CPT | Performed by: EMERGENCY MEDICINE

## 2022-04-09 PROCEDURE — 99232 SBSQ HOSP IP/OBS MODERATE 35: CPT | Performed by: HOSPITALIST

## 2022-04-09 PROCEDURE — 99222 1ST HOSP IP/OBS MODERATE 55: CPT | Performed by: SURGERY

## 2022-04-09 NOTE — PLAN OF CARE
Pt received A&Ox4. Afebrile. VSS. C/o 5/10 pain under left breast. PRN dilaudid given per [facility] w/ relief. Dressing changed, c/d/i w/ moderate amount of serosanguineous drainage. US chest completed. Gen surg consulted. Pt to be NPO @ midnight per MD. BLE diabetic foot ulcers. Podiatry consulted. XR foot completed. IV clindamycin. IVF running @ 100ml/hr. Call light in reach.

## 2022-04-09 NOTE — PLAN OF CARE
NURSING ADMISS  Oriented to room. Safety precautions initiated. Bed in low position. Call light in reach. Iv fluids initiated , dressing intact to left chest wall. Small abrasion  On right great toe. Iv dilaudid x 2 for pain.  accucheck qid    Dressing changed this am to left chest wall, moderate bloody drainage

## 2022-04-10 ENCOUNTER — ANESTHESIA EVENT (OUTPATIENT)
Dept: SURGERY | Facility: HOSPITAL | Age: 42
End: 2022-04-10
Payer: COMMERCIAL

## 2022-04-10 ENCOUNTER — ANESTHESIA (OUTPATIENT)
Dept: SURGERY | Facility: HOSPITAL | Age: 42
End: 2022-04-10
Payer: COMMERCIAL

## 2022-04-10 LAB
GLUCOSE BLD-MCNC: 200 MG/DL (ref 70–99)
GLUCOSE BLD-MCNC: 271 MG/DL (ref 70–99)
GLUCOSE BLD-MCNC: 272 MG/DL (ref 70–99)
GLUCOSE BLD-MCNC: 283 MG/DL (ref 70–99)
GLUCOSE BLD-MCNC: 286 MG/DL (ref 70–99)
HCG UR QL: NEGATIVE

## 2022-04-10 PROCEDURE — 0JBQ3ZZ EXCISION OF RIGHT FOOT SUBCUTANEOUS TISSUE AND FASCIA, PERCUTANEOUS APPROACH: ICD-10-PCS | Performed by: STUDENT IN AN ORGANIZED HEALTH CARE EDUCATION/TRAINING PROGRAM

## 2022-04-10 PROCEDURE — 99233 SBSQ HOSP IP/OBS HIGH 50: CPT | Performed by: HOSPITALIST

## 2022-04-10 PROCEDURE — 0H9U0ZZ DRAINAGE OF LEFT BREAST, OPEN APPROACH: ICD-10-PCS | Performed by: SURGERY

## 2022-04-10 PROCEDURE — 3078F DIAST BP <80 MM HG: CPT | Performed by: HOSPITALIST

## 2022-04-10 PROCEDURE — 99221 1ST HOSP IP/OBS SF/LOW 40: CPT | Performed by: STUDENT IN AN ORGANIZED HEALTH CARE EDUCATION/TRAINING PROGRAM

## 2022-04-10 PROCEDURE — 3074F SYST BP LT 130 MM HG: CPT | Performed by: HOSPITALIST

## 2022-04-10 PROCEDURE — 3008F BODY MASS INDEX DOCD: CPT | Performed by: HOSPITALIST

## 2022-04-10 RX ORDER — ONDANSETRON 2 MG/ML
INJECTION INTRAMUSCULAR; INTRAVENOUS AS NEEDED
Status: DISCONTINUED | OUTPATIENT
Start: 2022-04-10 | End: 2022-04-10 | Stop reason: SURG

## 2022-04-10 RX ORDER — INSULIN ASPART 100 [IU]/ML
INJECTION, SOLUTION INTRAVENOUS; SUBCUTANEOUS
Status: COMPLETED
Start: 2022-04-10 | End: 2022-04-10

## 2022-04-10 RX ORDER — NALOXONE HYDROCHLORIDE 0.4 MG/ML
80 INJECTION, SOLUTION INTRAMUSCULAR; INTRAVENOUS; SUBCUTANEOUS AS NEEDED
Status: DISCONTINUED | OUTPATIENT
Start: 2022-04-10 | End: 2022-04-10 | Stop reason: HOSPADM

## 2022-04-10 RX ORDER — MEPERIDINE HYDROCHLORIDE 25 MG/ML
12.5 INJECTION INTRAMUSCULAR; INTRAVENOUS; SUBCUTANEOUS AS NEEDED
Status: DISCONTINUED | OUTPATIENT
Start: 2022-04-10 | End: 2022-04-10 | Stop reason: HOSPADM

## 2022-04-10 RX ORDER — VANCOMYCIN 2 GRAM/500 ML IN 0.9 % SODIUM CHLORIDE INTRAVENOUS
25 ONCE
Status: COMPLETED | OUTPATIENT
Start: 2022-04-10 | End: 2022-04-10

## 2022-04-10 RX ORDER — ONDANSETRON 2 MG/ML
4 INJECTION INTRAMUSCULAR; INTRAVENOUS AS NEEDED
Status: DISCONTINUED | OUTPATIENT
Start: 2022-04-10 | End: 2022-04-10 | Stop reason: HOSPADM

## 2022-04-10 RX ORDER — LIDOCAINE HYDROCHLORIDE 10 MG/ML
INJECTION, SOLUTION EPIDURAL; INFILTRATION; INTRACAUDAL; PERINEURAL AS NEEDED
Status: DISCONTINUED | OUTPATIENT
Start: 2022-04-10 | End: 2022-04-10 | Stop reason: SURG

## 2022-04-10 RX ORDER — MIDAZOLAM HYDROCHLORIDE 1 MG/ML
1 INJECTION INTRAMUSCULAR; INTRAVENOUS EVERY 5 MIN PRN
Status: DISCONTINUED | OUTPATIENT
Start: 2022-04-10 | End: 2022-04-10 | Stop reason: HOSPADM

## 2022-04-10 RX ORDER — METOCLOPRAMIDE HYDROCHLORIDE 5 MG/ML
INJECTION INTRAMUSCULAR; INTRAVENOUS AS NEEDED
Status: DISCONTINUED | OUTPATIENT
Start: 2022-04-10 | End: 2022-04-10 | Stop reason: SURG

## 2022-04-10 RX ORDER — DIPHENHYDRAMINE HYDROCHLORIDE 50 MG/ML
12.5 INJECTION INTRAMUSCULAR; INTRAVENOUS AS NEEDED
Status: DISCONTINUED | OUTPATIENT
Start: 2022-04-10 | End: 2022-04-10 | Stop reason: HOSPADM

## 2022-04-10 RX ORDER — VANCOMYCIN HYDROCHLORIDE
15 EVERY 12 HOURS
Status: DISCONTINUED | OUTPATIENT
Start: 2022-04-10 | End: 2022-04-12

## 2022-04-10 RX ORDER — INSULIN ASPART 100 [IU]/ML
INJECTION, SOLUTION INTRAVENOUS; SUBCUTANEOUS ONCE
Status: COMPLETED | OUTPATIENT
Start: 2022-04-10 | End: 2022-04-10

## 2022-04-10 RX ORDER — HYDROMORPHONE HYDROCHLORIDE 1 MG/ML
INJECTION, SOLUTION INTRAMUSCULAR; INTRAVENOUS; SUBCUTANEOUS
Status: COMPLETED
Start: 2022-04-10 | End: 2022-04-10

## 2022-04-10 RX ORDER — SODIUM CHLORIDE 9 MG/ML
INJECTION, SOLUTION INTRAVENOUS CONTINUOUS
Status: DISCONTINUED | OUTPATIENT
Start: 2022-04-10 | End: 2022-04-10 | Stop reason: HOSPADM

## 2022-04-10 RX ORDER — SODIUM CHLORIDE 9 MG/ML
INJECTION, SOLUTION INTRAVENOUS CONTINUOUS PRN
Status: DISCONTINUED | OUTPATIENT
Start: 2022-04-10 | End: 2022-04-10 | Stop reason: SURG

## 2022-04-10 RX ORDER — BUPIVACAINE HYDROCHLORIDE AND EPINEPHRINE 5; 5 MG/ML; UG/ML
INJECTION, SOLUTION EPIDURAL; INTRACAUDAL; PERINEURAL AS NEEDED
Status: DISCONTINUED | OUTPATIENT
Start: 2022-04-10 | End: 2022-04-10 | Stop reason: HOSPADM

## 2022-04-10 RX ORDER — VANCOMYCIN HYDROCHLORIDE
15 EVERY 12 HOURS
Status: DISCONTINUED | OUTPATIENT
Start: 2022-04-10 | End: 2022-04-10 | Stop reason: SDUPTHER

## 2022-04-10 RX ORDER — HYDROMORPHONE HYDROCHLORIDE 1 MG/ML
0.4 INJECTION, SOLUTION INTRAMUSCULAR; INTRAVENOUS; SUBCUTANEOUS EVERY 5 MIN PRN
Status: DISCONTINUED | OUTPATIENT
Start: 2022-04-10 | End: 2022-04-10 | Stop reason: HOSPADM

## 2022-04-10 RX ADMIN — METOCLOPRAMIDE HYDROCHLORIDE 10 MG: 5 INJECTION INTRAMUSCULAR; INTRAVENOUS at 08:44:00

## 2022-04-10 RX ADMIN — ONDANSETRON 4 MG: 2 INJECTION INTRAMUSCULAR; INTRAVENOUS at 08:32:00

## 2022-04-10 RX ADMIN — VANCOMYCIN 2 GRAM/500 ML IN 0.9 % SODIUM CHLORIDE INTRAVENOUS 2 G: at 08:23:00

## 2022-04-10 RX ADMIN — SODIUM CHLORIDE: 9 INJECTION, SOLUTION INTRAVENOUS at 09:01:00

## 2022-04-10 RX ADMIN — LIDOCAINE HYDROCHLORIDE 50 MG: 10 INJECTION, SOLUTION EPIDURAL; INFILTRATION; INTRACAUDAL; PERINEURAL at 08:19:00

## 2022-04-10 RX ADMIN — SODIUM CHLORIDE: 9 INJECTION, SOLUTION INTRAVENOUS at 08:08:00

## 2022-04-10 NOTE — CONSULTS
[facility] Dosing Service    Initial Pharmacokinetic Consult for Vancomycin AUC Dosing    Rylee May is a 35year old patient who is being treated for cellulitis, left breast abscess Pharmacy has been asked to dose vancomycin by Dr. Mónica Pillai. Weights:  Ideal body weight: 70.8 kg (156 lb 1.4 oz)  Adjusted ideal body weight: 80.9 kg (178 lb 7.2 oz)  Actual weight:  96.2 kg (212 lb)    Labs:      CrCl:  Estimated Creatinine Clearance: 122.8 mL/min (based on SCr of 0.66 mg/dL). Based on the above:    1. This patient has received a loading dose of Vancomycin  2000 mg IVPB (25mg/kg, capped at 2000 mg) x 1 dose. This will be followed by 1500 mg Q 12 hours based upon actual body weight of 96.2 kg and renal function. 2. Vancomycin peak and trough will be obtained at steady state in order to calculate AUC24. Goal AUC24 is 400-600 mg-h/L.    3. Pharmacy will order SCr as clinically indicated while on vancomycin to assess renal function. 4. Pharmacy will follow and monitor renal function, toxicity and efficacy. We appreciate the opportunity to assist in the care of this patient.     Conchis Briones [facility]  4/10/2022  10:45 AM  [address] [place] [address] Extension: [phone]

## 2022-04-10 NOTE — ANESTHESIA PROCEDURE NOTES
Peripheral IV  Date/Time: 4/10/2022 8:18 AM  Inserted by: Kenneth Loredo MD    Placement  Needle size: 20 G  Laterality: right  Location: forearm  Local anesthetic: none  Site prep: alcohol  Technique: anatomical landmarks  Attempts: 2

## 2022-04-10 NOTE — INTERVAL H&P NOTE
Pre-op Diagnosis: Abscess of left breast [N61.1]    The above referenced H&P was reviewed by Magalis Charles MD on 4/10/2022, the patient was examined and no significant changes have occurred in the patient's condition since the H&P was performed. I discussed with the patient and/or legal representative the potential benefits, risks and side effects of this procedure; the likelihood of the patient achieving goals; and potential problems that might occur during recuperation. I discussed reasonable alternatives to the procedure, including risks, benefits and side effects related to the alternatives and risks related to not receiving this procedure. We will proceed with procedure as planned.

## 2022-04-10 NOTE — OPERATIVE REPORT
[facility]  Op Note    Hope Ly Location: OR   CSN [id number] MRN [id number]   Admission Date 4/8/2022 Operation Date 4/10/2022   Attending Physician Vincent Fiore MD Operating Physician Delta Rankin MD   DATE OF OPERATION:  4/10/2022  PREOPERATIVE DIAGNOSIS: Left breast abscess  POSTOPERATIVE DIAGNOSIS: Left breast abscess  PROCEDURE PERFORMED: Incision and drainage of left breast abscess. SURGEON:  Delta Rankin MD  ASSISTANT: Wilma Peterson PA-C  ANESTHESIA: General.   SPECIMEN: Abscess fluid for culture. BLOOD LOSS: 5 mL. COMPLICATIONS: None. INDICATION FOR OPERATION: The patient is a 64-year-old female who presented to the emergency room with uncontrolled diabetes and a left breast abscess. Despite drainage in the operating room, she had persistent induration, erythema, and purulent drainage from the wound. She was offered formal incision and drainage in the operating room. The risks, benefits, and alternatives of this procedure were discussed in detail with the patient. Risks include, but are not limited to, bleeding, persistent wound infection, and poor wound healing. The patient was agreeable to proceed with the operation. OPERATIVE TECHNIQUE: After informed consent was obtained, the patient was taken to the operating room and placed in supine position. General anesthesia was induced. The patient's left breast was prepped and draped in the usual sterile fashion. The previous inferior incision at the medial aspect of the left breast was reentered with a hemostat. Immediately, 10 cc of pus was expressed. This was collected and sent for culture. The wound was probed and found to track up to the superior incision, more on the breast itself. This incision was probed with a hemostat and found to communicate with the inferior incision. The tract between the 2 incisions was then opened using cautery. This revealed an abscess cavity that measured 4 cm x 3 cm.   The overlying skin was removed using cautery. Cautery was used to obtain hemostasis. The wound is irrigated with normal saline. 0.5% Marcaine with epinephrine was infiltrated around the incision. A moistened 4 x 4 gauze was placed into the wound and covered with sterile gauze. The patient tolerated suture well. She was extubated in the OR and transferred to the PACU in good condition.   FINDINGS: Abscess cavity measuring 4 cm x 3 cm  Antonia Varela MD

## 2022-04-10 NOTE — PLAN OF CARE
Pt received A&Ox4. Afebrile. VSS. PRN dilaudid given for left breast pain. Verbalizing relief. I&D completed this AM per gen surg. Dressing c/d/i. Diet resumed. Dr. Juan Carlos Melissa at bedside this afternoon. Duplex ordered, post-op shoe placed on right foot. IV vanco per MAR. Fall precautions in place. Call light in reach.

## 2022-04-10 NOTE — ANESTHESIA PROCEDURE NOTES
Airway  Date/Time: 4/10/2022 8:21 AM  Urgency: elective      General Information and Staff    Patient location during procedure: OR  Anesthesiologist: Ever Mason MD  Performed: anesthesiologist     Indications and Patient Condition  Indications for airway management: anesthesia  Sedation level: deep  Preoxygenated: yes  Patient position: sniffing  Mask difficulty assessment: 0 - not attempted    Final Airway Details  Final airway type: supraglottic airway      Successful airway: classic  Size 3      Number of attempts at approach: 1  Ventilation between attempts: none  Number of other approaches attempted: 0

## 2022-04-11 ENCOUNTER — TELEPHONE (OUTPATIENT)
Dept: PODIATRY CLINIC | Facility: CLINIC | Age: 42
End: 2022-04-11

## 2022-04-11 ENCOUNTER — APPOINTMENT (OUTPATIENT)
Dept: ULTRASOUND IMAGING | Facility: HOSPITAL | Age: 42
End: 2022-04-11
Attending: STUDENT IN AN ORGANIZED HEALTH CARE EDUCATION/TRAINING PROGRAM
Payer: COMMERCIAL

## 2022-04-11 ENCOUNTER — APPOINTMENT (OUTPATIENT)
Dept: ULTRASOUND IMAGING | Facility: HOSPITAL | Age: 42
DRG: 629 | End: 2022-04-11
Attending: STUDENT IN AN ORGANIZED HEALTH CARE EDUCATION/TRAINING PROGRAM
Payer: COMMERCIAL

## 2022-04-11 LAB
ANION GAP SERPL CALC-SCNC: 5 MMOL/L (ref 0–18)
BASOPHILS # BLD AUTO: 0.05 X10(3) UL (ref 0–0.2)
BASOPHILS NFR BLD AUTO: 0.4 %
BUN BLD-MCNC: 18 MG/DL (ref 7–18)
CALCIUM BLD-MCNC: 9.2 MG/DL (ref 8.5–10.1)
CHLORIDE SERPL-SCNC: 104 MMOL/L (ref 98–112)
CO2 SERPL-SCNC: 25 MMOL/L (ref 21–32)
CREAT BLD-MCNC: 1.8 MG/DL
EOSINOPHIL # BLD AUTO: 0.5 X10(3) UL (ref 0–0.7)
EOSINOPHIL NFR BLD AUTO: 4.3 %
ERYTHROCYTE [DISTWIDTH] IN BLOOD BY AUTOMATED COUNT: 14.1 %
GLUCOSE BLD-MCNC: 207 MG/DL (ref 70–99)
GLUCOSE BLD-MCNC: 216 MG/DL (ref 70–99)
GLUCOSE BLD-MCNC: 238 MG/DL (ref 70–99)
GLUCOSE BLD-MCNC: 242 MG/DL (ref 70–99)
GLUCOSE BLD-MCNC: 284 MG/DL (ref 70–99)
HCT VFR BLD AUTO: 36.6 %
HGB BLD-MCNC: 11.5 G/DL
IMM GRANULOCYTES # BLD AUTO: 0.06 X10(3) UL (ref 0–1)
IMM GRANULOCYTES NFR BLD: 0.5 %
LYMPHOCYTES # BLD AUTO: 1.56 X10(3) UL (ref 1–4)
LYMPHOCYTES NFR BLD AUTO: 13.5 %
MAGNESIUM SERPL-MCNC: 1.8 MG/DL (ref 1.6–2.6)
MCH RBC QN AUTO: 26.7 PG (ref 26–34)
MCHC RBC AUTO-ENTMCNC: 31.4 G/DL (ref 31–37)
MCV RBC AUTO: 84.9 FL
MONOCYTES # BLD AUTO: 1.35 X10(3) UL (ref 0.1–1)
MONOCYTES NFR BLD AUTO: 11.7 %
NEUTROPHILS # BLD AUTO: 8.04 X10 (3) UL (ref 1.5–7.7)
NEUTROPHILS # BLD AUTO: 8.04 X10(3) UL (ref 1.5–7.7)
NEUTROPHILS NFR BLD AUTO: 69.6 %
OSMOLALITY SERPL CALC.SUM OF ELEC: 286 MOSM/KG (ref 275–295)
PLATELET # BLD AUTO: 475 10(3)UL (ref 150–450)
POTASSIUM SERPL-SCNC: 4.3 MMOL/L (ref 3.5–5.1)
RBC # BLD AUTO: 4.31 X10(6)UL
SODIUM SERPL-SCNC: 134 MMOL/L (ref 136–145)
VANCOMYCIN PEAK SERPL-MCNC: 43.9 UG/ML (ref 30–50)
WBC # BLD AUTO: 11.6 X10(3) UL (ref 4–11)

## 2022-04-11 PROCEDURE — 99233 SBSQ HOSP IP/OBS HIGH 50: CPT | Performed by: HOSPITALIST

## 2022-04-11 PROCEDURE — 99231 SBSQ HOSP IP/OBS SF/LOW 25: CPT | Performed by: STUDENT IN AN ORGANIZED HEALTH CARE EDUCATION/TRAINING PROGRAM

## 2022-04-11 PROCEDURE — 93925 LOWER EXTREMITY STUDY: CPT | Performed by: STUDENT IN AN ORGANIZED HEALTH CARE EDUCATION/TRAINING PROGRAM

## 2022-04-11 RX ORDER — AMOXICILLIN AND CLAVULANATE POTASSIUM 875; 125 MG/1; MG/1
1 TABLET, FILM COATED ORAL 2 TIMES DAILY
Qty: 20 TABLET | Refills: 0 | Status: SHIPPED | OUTPATIENT
Start: 2022-04-11 | End: 2022-05-03

## 2022-04-11 RX ORDER — HYDROCODONE BITARTRATE AND ACETAMINOPHEN 5; 325 MG/1; MG/1
1 TABLET ORAL EVERY 4 HOURS PRN
Qty: 15 TABLET | Refills: 0 | Status: SHIPPED | OUTPATIENT
Start: 2022-04-11 | End: 2022-10-11

## 2022-04-11 NOTE — PROGRESS NOTES
Had I/D of left chest wound in OR and debridement of right great toe at bedside. On vanco IV. Afebrile. Blood sugars in 200's. Taking dilaudid iv for pain.

## 2022-04-11 NOTE — PLAN OF CARE
Pt received alert and oriented x4. VSS, afebrile. C/o pain to R toe, prn dilaudid with relief. IV vanco and zosyn per MAR. Decreased appetite. Dressing changes completed by podiatry and surgery. Ambulating to bathroom with assist. Possible DC tomorrow pending culture results. Fall precautions in place. Call light within reach.      Problem: Diabetes/Glucose Control  Goal: Glucose maintained within prescribed range  Description: INTERVENTIONS:  - Monitor Blood Glucose as ordered  - Assess for signs and symptoms of hyperglycemia and hypoglycemia  - Administer ordered medications to maintain glucose within target range  - Assess barriers to adequate nutritional intake and initiate nutrition consult as needed  - Instruct patient on self management of diabetes  Outcome: Progressing     Problem: PAIN - ADULT  Goal: Verbalizes/displays adequate comfort level or patient's stated pain goal  Description: INTERVENTIONS:  - Encourage pt to monitor pain and request assistance  - Assess pain using appropriate pain scale  - Administer analgesics based on type and severity of pain and evaluate response  - Implement non-pharmacological measures as appropriate and evaluate response  - Consider cultural and social influences on pain and pain management  - Manage/alleviate anxiety  - Utilize distraction and/or relaxation techniques  - Monitor for opioid side effects  - Notify MD/LIP if interventions unsuccessful or patient reports new pain  - Anticipate increased pain with activity and pre-medicate as appropriate  Outcome: Progressing     Problem: RISK FOR INFECTION - ADULT  Goal: Absence of fever/infection during anticipated neutropenic period  Description: INTERVENTIONS  - Monitor WBC  - Administer growth factors as ordered  - Implement neutropenic guidelines  Outcome: Progressing     Problem: MUSCULOSKELETAL - ADULT  Goal: Return mobility to safest level of function  Description: INTERVENTIONS:  - Assess patient stability and activity tolerance for standing, transferring and ambulating w/ or w/o assistive devices  - Assist with transfers and ambulation using safe patient handling equipment as needed  - Ensure adequate protection for wounds/incisions during mobilization  - Obtain PT/OT consults as needed  - Advance activity as appropriate  - Communicate ordered activity level and limitations with patient/family  Outcome: Progressing

## 2022-04-12 LAB
ANION GAP SERPL CALC-SCNC: 6 MMOL/L (ref 0–18)
BILIRUB UR QL STRIP.AUTO: NEGATIVE
BUN BLD-MCNC: 20 MG/DL (ref 7–18)
CALCIUM BLD-MCNC: 9.3 MG/DL (ref 8.5–10.1)
CHLORIDE SERPL-SCNC: 106 MMOL/L (ref 98–112)
CO2 SERPL-SCNC: 22 MMOL/L (ref 21–32)
CREAT BLD-MCNC: 2.09 MG/DL
CREAT UR-SCNC: 46.4 MG/DL
GLUCOSE BLD-MCNC: 182 MG/DL (ref 70–99)
GLUCOSE BLD-MCNC: 183 MG/DL (ref 70–99)
GLUCOSE BLD-MCNC: 197 MG/DL (ref 70–99)
GLUCOSE BLD-MCNC: 228 MG/DL (ref 70–99)
GLUCOSE BLD-MCNC: 246 MG/DL (ref 70–99)
GLUCOSE UR STRIP.AUTO-MCNC: NEGATIVE MG/DL
KETONES UR STRIP.AUTO-MCNC: NEGATIVE MG/DL
MICROALBUMIN UR-MCNC: 5.62 MG/DL
MICROALBUMIN/CREAT 24H UR-RTO: 121.1 UG/MG (ref ?–30)
NITRITE UR QL STRIP.AUTO: NEGATIVE
OSMOLALITY SERPL CALC.SUM OF ELEC: 286 MOSM/KG (ref 275–295)
PH UR STRIP.AUTO: 6 [PH] (ref 5–8)
POTASSIUM SERPL-SCNC: 4.3 MMOL/L (ref 3.5–5.1)
PROT UR STRIP.AUTO-MCNC: 30 MG/DL
RBC #/AREA URNS AUTO: >10 /HPF
SODIUM SERPL-SCNC: 134 MMOL/L (ref 136–145)
SODIUM SERPL-SCNC: 59 MMOL/L
SP GR UR STRIP.AUTO: 1.01 (ref 1–1.03)
UROBILINOGEN UR STRIP.AUTO-MCNC: <2 MG/DL
VANCOMYCIN TROUGH SERPL-MCNC: 28.8 UG/ML (ref 10–20)
WBC #/AREA URNS AUTO: >50 /HPF

## 2022-04-12 PROCEDURE — 99232 SBSQ HOSP IP/OBS MODERATE 35: CPT | Performed by: HOSPITALIST

## 2022-04-12 PROCEDURE — 99254 IP/OBS CNSLTJ NEW/EST MOD 60: CPT | Performed by: INTERNAL MEDICINE

## 2022-04-12 RX ORDER — MULTIPLE VITAMINS W/ MINERALS TAB 9MG-400MCG
1 TAB ORAL DAILY
Status: DISCONTINUED | OUTPATIENT
Start: 2022-04-12 | End: 2022-04-13

## 2022-04-12 RX ORDER — BLOOD-GLUCOSE METER
EACH MISCELLANEOUS
Qty: 1 KIT | Refills: 0 | Status: SHIPPED | OUTPATIENT
Start: 2022-04-12 | End: 2022-04-14

## 2022-04-12 RX ORDER — VANCOMYCIN HYDROCHLORIDE
15
Status: DISCONTINUED | OUTPATIENT
Start: 2022-04-13 | End: 2022-04-12

## 2022-04-12 RX ORDER — VANCOMYCIN HYDROCHLORIDE
15
Status: DISCONTINUED | OUTPATIENT
Start: 2022-04-12 | End: 2022-04-12

## 2022-04-12 RX ORDER — VANCOMYCIN HYDROCHLORIDE
15
Status: DISCONTINUED | OUTPATIENT
Start: 2022-04-12 | End: 2022-04-13

## 2022-04-12 RX ORDER — SODIUM CHLORIDE 9 MG/ML
INJECTION, SOLUTION INTRAVENOUS CONTINUOUS
Status: DISCONTINUED | OUTPATIENT
Start: 2022-04-12 | End: 2022-04-13

## 2022-04-12 RX ORDER — GLIMEPIRIDE 2 MG/1
2 TABLET ORAL
Status: DISCONTINUED | OUTPATIENT
Start: 2022-04-13 | End: 2022-04-13

## 2022-04-12 RX ORDER — BLOOD SUGAR DIAGNOSTIC
STRIP MISCELLANEOUS
Qty: 90 STRIP | Refills: 6 | Status: SHIPPED | OUTPATIENT
Start: 2022-04-12 | End: 2022-04-14

## 2022-04-12 NOTE — PLAN OF CARE
Resting in bed. Had a shower,chest wound dressing changed. Premedicated w/ 0.4mg dilaudid IV prior to dressing change. R hallux wound dressing changed as well. Patient ambulates as tolerated to the [facility] w/ surgical shoe. Voiding w/ no difficulty,bladder scanned & with 0 PVR. IVF maintained at 125cc/her per renal. Needs to send urine for U/A & studies. Patient was provided w/ diabetic education, able to demonstrate how to do accucheck & self administration of insulin. Diabetic booklet provided & instructional video provided as well for diabetes. All needs attended.

## 2022-04-12 NOTE — PROGRESS NOTES
Patient seen and examined. FLORENCIA; needs renal consult; pharmacy to adjust vanc. Monitor PVRs. Gentle fluids.

## 2022-04-13 VITALS
TEMPERATURE: 98 F | RESPIRATION RATE: 21 BRPM | OXYGEN SATURATION: 97 % | SYSTOLIC BLOOD PRESSURE: 130 MMHG | HEART RATE: 78 BPM | DIASTOLIC BLOOD PRESSURE: 75 MMHG | WEIGHT: 212 LBS | HEIGHT: 71 IN | BODY MASS INDEX: 29.68 KG/M2

## 2022-04-13 LAB
ANION GAP SERPL CALC-SCNC: 2 MMOL/L (ref 0–18)
BUN BLD-MCNC: 18 MG/DL (ref 7–18)
CALCIUM BLD-MCNC: 9.3 MG/DL (ref 8.5–10.1)
CHLORIDE SERPL-SCNC: 108 MMOL/L (ref 98–112)
CO2 SERPL-SCNC: 27 MMOL/L (ref 21–32)
CREAT BLD-MCNC: 2.05 MG/DL
GLUCOSE BLD-MCNC: 124 MG/DL (ref 70–99)
GLUCOSE BLD-MCNC: 155 MG/DL (ref 70–99)
GLUCOSE BLD-MCNC: 162 MG/DL (ref 70–99)
OSMOLALITY SERPL CALC.SUM OF ELEC: 289 MOSM/KG (ref 275–295)
POTASSIUM SERPL-SCNC: 4.3 MMOL/L (ref 3.5–5.1)
SODIUM SERPL-SCNC: 137 MMOL/L (ref 136–145)
VANCOMYCIN SERPL-MCNC: 17 UG/ML (ref ?–40)

## 2022-04-13 PROCEDURE — 99238 HOSP IP/OBS DSCHRG MGMT 30/<: CPT | Performed by: HOSPITALIST

## 2022-04-13 NOTE — PLAN OF CARE
Pt alert and oriented x4. VSS. Room air. Afebrile. Complained of headache, relieved with tylenol given per MAR. Dressings changed and are clean, dry and intact. Pt given discharge instructions, explained and provided diabetes education. Pt given education on wound care for home. Pt had prescriptions sent to [facility], verbalized understanding of medication regimen. Pt provided to return to work note by Dr. Aarti Oates. Discharge ordered. Pt transported via wheelchair to lobby with all belongings and son arrived to take patient home via private vehicle.

## 2022-04-13 NOTE — PLAN OF CARE
Patient alert and oriented X4. Vital signs stable. Afebrile. Room air. No SOB. Complained of chest wound pain, PRN pain medication given with good relief. Dressings clean, dry and intact. Maintained on IV antibiotics tolerated well. Blood glucose monitored, Insulin given per MAR. IVF continuous as ordered. Up to the bathroom with assist and walker. Fall precautions in place. Call light in reach.          Problem: Diabetes/Glucose Control  Goal: Glucose maintained within prescribed range  Description: INTERVENTIONS:  - Monitor Blood Glucose as ordered  - Assess for signs and symptoms of hyperglycemia and hypoglycemia  - Administer ordered medications to maintain glucose within target range  - Assess barriers to adequate nutritional intake and initiate nutrition consult as needed  - Instruct patient on self management of diabetes  Outcome: Progressing     Problem: PAIN - ADULT  Goal: Verbalizes/displays adequate comfort level or patient's stated pain goal  Description: INTERVENTIONS:  - Encourage pt to monitor pain and request assistance  - Assess pain using appropriate pain scale  - Administer analgesics based on type and severity of pain and evaluate response  - Implement non-pharmacological measures as appropriate and evaluate response  - Consider cultural and social influences on pain and pain management  - Manage/alleviate anxiety  - Utilize distraction and/or relaxation techniques  - Monitor for opioid side effects  - Notify MD/LIP if interventions unsuccessful or patient reports new pain  - Anticipate increased pain with activity and pre-medicate as appropriate  Outcome: Progressing     Problem: RISK FOR INFECTION - ADULT  Goal: Absence of fever/infection during anticipated neutropenic period  Description: INTERVENTIONS  - Monitor WBC  - Administer growth factors as ordered  - Implement neutropenic guidelines  Outcome: Progressing

## 2022-04-13 NOTE — PROGRESS NOTES
[facility] [address]  [place], [address]  ?  04/13/22  ? Re: Zay Velasco  ? To Whom It May Concern:    Zay Velasco was admitted to [facility] from 4/8/2022 to 04/13/22. Please excuse Zay Velasco from attending work for these days. The patient may return to work on 4/20/2022 with the following restrictions: no heavy lifting >20 lbs. Patient will follow up with their primary care physician upon discharge. Further restrictions and work excuse will be based on primary care physician's evaluation. ?   Thank you,    Sherri Luevano MD, MD  [facility]

## 2022-04-14 ENCOUNTER — PATIENT OUTREACH (OUTPATIENT)
Dept: CASE MANAGEMENT | Age: 42
End: 2022-04-14

## 2022-04-14 NOTE — TELEPHONE ENCOUNTER
Pt calling stating she was just in ED and they sent over her prescription for refills of her insulin medications. However pharmacy said they need a print out of prescriptions. Since no print out, they need the PCP to send over request electronically. Pt also wanting an appt for diabetes follow up. Requesting Monday.      Please advise.         -insulin detemir 100 UNIT/ML Subcutaneous Solution Pen-injector  -OneTouch Delica Lancets Fine Does not apply Misc  -Glucose Blood (ONETOUCH VERIO) In Vitro Strip  -Blood Glucose Monitoring Suppl (ONETOUCH VERIO FLEX SYSTEM) w/Device Does not apply Kit      Send to:  [facility] #[id number] - Kathy Aponte AT [facility]  [address], [phone], [phone]

## 2022-04-15 ENCOUNTER — TELEPHONE (OUTPATIENT)
Dept: FAMILY MEDICINE CLINIC | Facility: CLINIC | Age: 42
End: 2022-04-15

## 2022-04-15 RX ORDER — LANCETS 33 GAUGE
1 EACH MISCELLANEOUS
Qty: 90 EACH | Refills: 6 | Status: SHIPPED | OUTPATIENT
Start: 2022-04-15 | End: 2023-04-15

## 2022-04-15 RX ORDER — BLOOD SUGAR DIAGNOSTIC
STRIP MISCELLANEOUS
Qty: 90 STRIP | Refills: 6 | Status: SHIPPED | OUTPATIENT
Start: 2022-04-15

## 2022-04-15 RX ORDER — BLOOD-GLUCOSE METER
EACH MISCELLANEOUS
Qty: 1 KIT | Refills: 0 | Status: SHIPPED | OUTPATIENT
Start: 2022-04-15

## 2022-04-15 NOTE — TELEPHONE ENCOUNTER
Dr. Deana Singh,  Please advise. You have no openings. Do you want patient scheduled with another provider? You have one 20 min on 4/27/22 open.

## 2022-04-15 NOTE — TELEPHONE ENCOUNTER
Spoke with patient for TCM today. Patient was not provided the paper prescriptions for her insulin, pen needles, and DM testing supplies at discharge. Patient has not had insulin since discharge. Patient requesting these be sent to the [facility] on file. Also, patient does report nausea. Has been taking antibiotic on an empty stomach. Encourage taking antibiotic with food. Please advise if any additional orders for patient. Lastly, patient states that office was trying to assist in scheduling HFU appointment. TCM/HFU appt recommended by 4/27/22 as pt is a moderate risk for readmission. Please advise. BOOK BY DATE (last date for TCM): 4/27/22    TRIAGE:  Please f/u with pt and try to get them to schedule as pt would greatly benefit from TCM/HFU appt. Thank you!

## 2022-04-19 ENCOUNTER — OFFICE VISIT (OUTPATIENT)
Dept: SURGERY | Facility: CLINIC | Age: 42
End: 2022-04-19

## 2022-04-19 ENCOUNTER — OFFICE VISIT (OUTPATIENT)
Dept: HEMATOLOGY/ONCOLOGY | Facility: HOSPITAL | Age: 42
End: 2022-04-19
Attending: SURGERY
Payer: COMMERCIAL

## 2022-04-19 VITALS
HEIGHT: 70.98 IN | RESPIRATION RATE: 18 BRPM | BODY MASS INDEX: 34.58 KG/M2 | DIASTOLIC BLOOD PRESSURE: 82 MMHG | HEART RATE: 86 BPM | TEMPERATURE: 98 F | SYSTOLIC BLOOD PRESSURE: 139 MMHG | WEIGHT: 247 LBS

## 2022-04-19 DIAGNOSIS — L02.213 CHEST WALL ABSCESS: Primary | ICD-10-CM

## 2022-04-19 LAB
ALBUMIN SERPL ELPH-MCNC: 2.9 G/DL (ref 3.75–5.21)
ALBUMIN/GLOB SERPL: 0.68 {RATIO} (ref 1–2)
ALPHA1 GLOB SERPL ELPH-MCNC: 0.48 G/DL (ref 0.19–0.46)
ALPHA2 GLOB SERPL ELPH-MCNC: 0.94 G/DL (ref 0.48–1.05)
B-GLOBULIN SERPL ELPH-MCNC: 1.2 G/DL (ref 0.68–1.23)
GAMMA GLOB SERPL ELPH-MCNC: 1.68 G/DL (ref 0.62–1.7)
KAPPA LC FREE SER-MCNC: 8.71 MG/DL (ref 0.33–1.94)
KAPPA LC FREE/LAMBDA FREE SER NEPH: 1.2 {RATIO} (ref 0.26–1.65)
LAMBDA LC FREE SERPL-MCNC: 7.24 MG/DL (ref 0.57–2.63)
PROT SERPL-MCNC: 7.2 G/DL (ref 6.4–8.2)

## 2022-04-19 PROCEDURE — 99024 POSTOP FOLLOW-UP VISIT: CPT | Performed by: SURGERY

## 2022-04-19 NOTE — TELEPHONE ENCOUNTER
Daughter came in office to f/u on msg below, said no one called her back, notified nurse, pt informed we will call her to schedule when we hear back from provider

## 2022-04-19 NOTE — PROGRESS NOTES
Patient was seen in the emergency room on 4/8/22 with complaints of a left chest wall abscess. Incision and drainage of left breast abscess x 2 performed by Dr. Jose Alfredo Russell. Here for follow up visit very tearful in office. Patient states she is just having a \"sad day\". Son Ayah Adams brought patient to appointment today. Chest pressure has improved since having abscess drained. Changing dressing as instructed but states incision hole is still large and uncomfortable. Augmentin prescribed x 10 days. Patient reported she has only taken two Norco for pain but the pain medication keeps her up at night. She states she is having trouble sleeping. Neuropathy in both feet. Medication and allergies reviewed and updated.

## 2022-04-22 ENCOUNTER — OFFICE VISIT (OUTPATIENT)
Dept: FAMILY MEDICINE CLINIC | Facility: CLINIC | Age: 42
End: 2022-04-22
Payer: COMMERCIAL

## 2022-04-22 ENCOUNTER — OFFICE VISIT (OUTPATIENT)
Dept: PODIATRY CLINIC | Facility: CLINIC | Age: 42
End: 2022-04-22
Payer: COMMERCIAL

## 2022-04-22 ENCOUNTER — LAB ENCOUNTER (OUTPATIENT)
Dept: LAB | Age: 42
End: 2022-04-22
Attending: FAMILY MEDICINE
Payer: COMMERCIAL

## 2022-04-22 VITALS
HEIGHT: 70.98 IN | DIASTOLIC BLOOD PRESSURE: 82 MMHG | BODY MASS INDEX: 34.58 KG/M2 | RESPIRATION RATE: 16 BRPM | OXYGEN SATURATION: 98 % | WEIGHT: 247 LBS | SYSTOLIC BLOOD PRESSURE: 120 MMHG | TEMPERATURE: 98 F | HEART RATE: 98 BPM

## 2022-04-22 DIAGNOSIS — R77.1 ELEVATED SERUM GLOBULIN LEVEL: ICD-10-CM

## 2022-04-22 DIAGNOSIS — L02.213 CHEST WALL ABSCESS: ICD-10-CM

## 2022-04-22 DIAGNOSIS — Z51.81 ENCOUNTER FOR MEDICATION MONITORING: ICD-10-CM

## 2022-04-22 DIAGNOSIS — N17.9 AKI (ACUTE KIDNEY INJURY) (HCC): ICD-10-CM

## 2022-04-22 DIAGNOSIS — M20.5X1 HALLUX LIMITUS, RIGHT: ICD-10-CM

## 2022-04-22 DIAGNOSIS — F41.9 ANXIETY: ICD-10-CM

## 2022-04-22 DIAGNOSIS — E11.42 DIABETIC POLYNEUROPATHY ASSOCIATED WITH TYPE 2 DIABETES MELLITUS (HCC): ICD-10-CM

## 2022-04-22 DIAGNOSIS — L97.511 ULCER OF RIGHT FOOT, LIMITED TO BREAKDOWN OF SKIN (HCC): ICD-10-CM

## 2022-04-22 DIAGNOSIS — L97.512 FOOT ULCER, RIGHT, WITH FAT LAYER EXPOSED (HCC): Primary | ICD-10-CM

## 2022-04-22 DIAGNOSIS — E11.42 TYPE 2 DIABETES MELLITUS WITH DIABETIC POLYNEUROPATHY, WITHOUT LONG-TERM CURRENT USE OF INSULIN (HCC): ICD-10-CM

## 2022-04-22 DIAGNOSIS — R22.1 NECK FULLNESS: ICD-10-CM

## 2022-04-22 DIAGNOSIS — Z79.4 TYPE 2 DIABETES MELLITUS WITH HYPERGLYCEMIA, WITH LONG-TERM CURRENT USE OF INSULIN (HCC): ICD-10-CM

## 2022-04-22 DIAGNOSIS — E11.65 TYPE 2 DIABETES MELLITUS WITH HYPERGLYCEMIA, WITH LONG-TERM CURRENT USE OF INSULIN (HCC): ICD-10-CM

## 2022-04-22 DIAGNOSIS — L40.9 PSORIASIS: ICD-10-CM

## 2022-04-22 DIAGNOSIS — G47.09 OTHER INSOMNIA: ICD-10-CM

## 2022-04-22 DIAGNOSIS — L02.213 CHEST WALL ABSCESS: Primary | ICD-10-CM

## 2022-04-22 DIAGNOSIS — D75.839 THROMBOCYTOSIS: ICD-10-CM

## 2022-04-22 LAB
ALBUMIN SERPL-MCNC: 3.1 G/DL (ref 3.4–5)
ALBUMIN/GLOB SERPL: 0.6 {RATIO} (ref 1–2)
ALP LIVER SERPL-CCNC: 120 U/L
ALT SERPL-CCNC: 18 U/L
ANION GAP SERPL CALC-SCNC: 4 MMOL/L (ref 0–18)
AST SERPL-CCNC: 8 U/L (ref 15–37)
BASOPHILS # BLD AUTO: 0.13 X10(3) UL (ref 0–0.2)
BASOPHILS NFR BLD AUTO: 1 %
BILIRUB SERPL-MCNC: 0.4 MG/DL (ref 0.1–2)
BUN BLD-MCNC: 17 MG/DL (ref 7–18)
CALCIUM BLD-MCNC: 9.9 MG/DL (ref 8.5–10.1)
CHLORIDE SERPL-SCNC: 103 MMOL/L (ref 98–112)
CHOLEST SERPL-MCNC: 147 MG/DL (ref ?–200)
CO2 SERPL-SCNC: 30 MMOL/L (ref 21–32)
CREAT BLD-MCNC: 1.53 MG/DL
EOSINOPHIL # BLD AUTO: 0.5 X10(3) UL (ref 0–0.7)
EOSINOPHIL NFR BLD AUTO: 4 %
ERYTHROCYTE [DISTWIDTH] IN BLOOD BY AUTOMATED COUNT: 13.7 %
FASTING PATIENT LIPID ANSWER: YES
FASTING STATUS PATIENT QL REPORTED: YES
GLOBULIN PLAS-MCNC: 5.4 G/DL (ref 2.8–4.4)
GLUCOSE BLD-MCNC: 206 MG/DL (ref 70–99)
HCT VFR BLD AUTO: 40.6 %
HDLC SERPL-MCNC: 32 MG/DL (ref 40–59)
HGB BLD-MCNC: 12.5 G/DL
IMM GRANULOCYTES # BLD AUTO: 0.1 X10(3) UL (ref 0–1)
IMM GRANULOCYTES NFR BLD: 0.8 %
LDLC SERPL CALC-MCNC: 94 MG/DL (ref ?–100)
LYMPHOCYTES # BLD AUTO: 2.73 X10(3) UL (ref 1–4)
LYMPHOCYTES NFR BLD AUTO: 21.7 %
MCH RBC QN AUTO: 26.6 PG (ref 26–34)
MCHC RBC AUTO-ENTMCNC: 30.8 G/DL (ref 31–37)
MCV RBC AUTO: 86.4 FL
MONOCYTES # BLD AUTO: 1.17 X10(3) UL (ref 0.1–1)
MONOCYTES NFR BLD AUTO: 9.3 %
NEUTROPHILS # BLD AUTO: 7.94 X10 (3) UL (ref 1.5–7.7)
NEUTROPHILS # BLD AUTO: 7.94 X10(3) UL (ref 1.5–7.7)
NEUTROPHILS NFR BLD AUTO: 63.2 %
NONHDLC SERPL-MCNC: 115 MG/DL (ref ?–130)
OSMOLALITY SERPL CALC.SUM OF ELEC: 292 MOSM/KG (ref 275–295)
PLATELET # BLD AUTO: 622 10(3)UL (ref 150–450)
POTASSIUM SERPL-SCNC: 5.3 MMOL/L (ref 3.5–5.1)
PROT SERPL-MCNC: 8.5 G/DL (ref 6.4–8.2)
RBC # BLD AUTO: 4.7 X10(6)UL
SODIUM SERPL-SCNC: 137 MMOL/L (ref 136–145)
TRIGL SERPL-MCNC: 112 MG/DL (ref 30–149)
TSI SER-ACNC: 0.66 MIU/ML (ref 0.36–3.74)
VLDLC SERPL CALC-MCNC: 18 MG/DL (ref 0–30)
WBC # BLD AUTO: 12.6 X10(3) UL (ref 4–11)

## 2022-04-22 PROCEDURE — 85025 COMPLETE CBC W/AUTO DIFF WBC: CPT

## 2022-04-22 PROCEDURE — 99213 OFFICE O/P EST LOW 20 MIN: CPT | Performed by: STUDENT IN AN ORGANIZED HEALTH CARE EDUCATION/TRAINING PROGRAM

## 2022-04-22 PROCEDURE — 80061 LIPID PANEL: CPT

## 2022-04-22 PROCEDURE — 84443 ASSAY THYROID STIM HORMONE: CPT

## 2022-04-22 PROCEDURE — 80053 COMPREHEN METABOLIC PANEL: CPT

## 2022-04-22 PROCEDURE — 36415 COLL VENOUS BLD VENIPUNCTURE: CPT

## 2022-04-22 RX ORDER — DIAPER,BRIEF,INFANT-TODD,DISP
EACH MISCELLANEOUS
Qty: 20 G | Refills: 0 | Status: SHIPPED | OUTPATIENT
Start: 2022-04-22

## 2022-04-22 RX ORDER — DULOXETIN HYDROCHLORIDE 30 MG/1
30 CAPSULE, DELAYED RELEASE ORAL DAILY
Qty: 30 CAPSULE | Refills: 0 | Status: SHIPPED | OUTPATIENT
Start: 2022-04-22 | End: 2022-05-03

## 2022-04-22 NOTE — PATIENT INSTRUCTIONS
Increase Levemir to 22 units daily. Schedule appointment with diabetes clinic.     Melatonin 5 mg at bedtime    Start Cymbalta 30 mg daily    Follow-up with kidney specialist

## 2022-04-25 NOTE — PATIENT INSTRUCTIONS
- Perform dressing changes once daily with Betadine and dry dressing to right hallux ulcer. Continue ambulating with surgical shoe. Follow-up in 2 weeks or sooner if acute signs of infection or other concerns arise.  -Use hydrocortisone cream as prescribed to psoriatic patches.

## 2022-04-27 ENCOUNTER — TELEPHONE (OUTPATIENT)
Dept: SURGERY | Facility: CLINIC | Age: 42
End: 2022-04-27

## 2022-05-03 ENCOUNTER — OFFICE VISIT (OUTPATIENT)
Dept: HEMATOLOGY/ONCOLOGY | Facility: HOSPITAL | Age: 42
End: 2022-05-03
Attending: SURGERY
Payer: COMMERCIAL

## 2022-05-03 ENCOUNTER — OFFICE VISIT (OUTPATIENT)
Dept: SURGERY | Facility: CLINIC | Age: 42
End: 2022-05-03

## 2022-05-03 VITALS
OXYGEN SATURATION: 100 % | RESPIRATION RATE: 16 BRPM | WEIGHT: 247 LBS | SYSTOLIC BLOOD PRESSURE: 129 MMHG | HEIGHT: 71.01 IN | TEMPERATURE: 98 F | DIASTOLIC BLOOD PRESSURE: 84 MMHG | BODY MASS INDEX: 34.58 KG/M2 | HEART RATE: 91 BPM

## 2022-05-03 DIAGNOSIS — L02.213 CHEST WALL ABSCESS: Primary | ICD-10-CM

## 2022-05-03 NOTE — PROGRESS NOTES
Dx: Chest wall abscess     Patient presents to clinic for breast follow up. Incision and drainage of left breast abscess performed on 4/10/22. Patient reports she is still doing the wet to dry dressing twice a day. Per patient incision open wound getting smaller in size. Left breast is still a little pink and some blood from abscess. Denies any discharge and the area is not hot the touch. Patient reports she has noticed more itching but has just started to wearing bras again and believes its from sweat. Medication and allergies reviewed and updated.

## 2022-05-06 ENCOUNTER — OFFICE VISIT (OUTPATIENT)
Dept: PODIATRY CLINIC | Facility: CLINIC | Age: 42
End: 2022-05-06
Payer: COMMERCIAL

## 2022-05-06 DIAGNOSIS — L40.9 PSORIASIS: Primary | ICD-10-CM

## 2022-05-06 DIAGNOSIS — L97.512 FOOT ULCER, RIGHT, WITH FAT LAYER EXPOSED (HCC): ICD-10-CM

## 2022-05-06 DIAGNOSIS — M20.5X1 HALLUX LIMITUS, RIGHT: ICD-10-CM

## 2022-05-06 DIAGNOSIS — E11.42 DIABETIC POLYNEUROPATHY ASSOCIATED WITH TYPE 2 DIABETES MELLITUS (HCC): ICD-10-CM

## 2022-05-06 PROCEDURE — 99213 OFFICE O/P EST LOW 20 MIN: CPT | Performed by: STUDENT IN AN ORGANIZED HEALTH CARE EDUCATION/TRAINING PROGRAM

## 2022-05-10 NOTE — PATIENT INSTRUCTIONS
- Dress ulcer site with Betadine and dry dressing daily.  -Prefer to continue ambulating with surgical shoe to right foot.  -Continue using hydrocortisone cream to areas of psoriasis until sites are resolved. -Follow-up in 2 to 3 weeks or sooner if other concerns arise.  -Continue medical attention if any acute signs of infection or other concerns arise.

## 2022-05-24 ENCOUNTER — APPOINTMENT (OUTPATIENT)
Dept: HEMATOLOGY/ONCOLOGY | Facility: HOSPITAL | Age: 42
End: 2022-05-24
Attending: SURGERY
Payer: COMMERCIAL

## 2022-06-01 ENCOUNTER — OFFICE VISIT (OUTPATIENT)
Dept: FAMILY MEDICINE CLINIC | Facility: CLINIC | Age: 42
End: 2022-06-01
Payer: COMMERCIAL

## 2022-06-01 ENCOUNTER — LAB ENCOUNTER (OUTPATIENT)
Dept: LAB | Age: 42
End: 2022-06-01
Attending: FAMILY MEDICINE
Payer: COMMERCIAL

## 2022-06-01 VITALS
HEIGHT: 71.01 IN | DIASTOLIC BLOOD PRESSURE: 80 MMHG | RESPIRATION RATE: 16 BRPM | SYSTOLIC BLOOD PRESSURE: 136 MMHG | TEMPERATURE: 98 F | BODY MASS INDEX: 34.93 KG/M2 | HEART RATE: 84 BPM | WEIGHT: 249.5 LBS | OXYGEN SATURATION: 99 %

## 2022-06-01 DIAGNOSIS — L02.213 CHEST WALL ABSCESS: ICD-10-CM

## 2022-06-01 DIAGNOSIS — D75.839 THROMBOCYTOSIS: ICD-10-CM

## 2022-06-01 DIAGNOSIS — E11.42 TYPE 2 DIABETES MELLITUS WITH DIABETIC POLYNEUROPATHY, WITHOUT LONG-TERM CURRENT USE OF INSULIN (HCC): ICD-10-CM

## 2022-06-01 DIAGNOSIS — L97.511 ULCER OF RIGHT FOOT, LIMITED TO BREAKDOWN OF SKIN (HCC): ICD-10-CM

## 2022-06-01 DIAGNOSIS — Z79.4 TYPE 2 DIABETES MELLITUS WITH HYPERGLYCEMIA, WITH LONG-TERM CURRENT USE OF INSULIN (HCC): Primary | ICD-10-CM

## 2022-06-01 DIAGNOSIS — E87.5 HYPERKALEMIA: ICD-10-CM

## 2022-06-01 DIAGNOSIS — E11.65 TYPE 2 DIABETES MELLITUS WITH HYPERGLYCEMIA, WITH LONG-TERM CURRENT USE OF INSULIN (HCC): Primary | ICD-10-CM

## 2022-06-01 LAB
ALBUMIN SERPL-MCNC: 2.9 G/DL (ref 3.4–5)
ALBUMIN/GLOB SERPL: 0.6 {RATIO} (ref 1–2)
ALP LIVER SERPL-CCNC: 110 U/L
ALT SERPL-CCNC: 18 U/L
ANION GAP SERPL CALC-SCNC: 5 MMOL/L (ref 0–18)
AST SERPL-CCNC: 11 U/L (ref 15–37)
BASOPHILS # BLD AUTO: 0.07 X10(3) UL (ref 0–0.2)
BASOPHILS NFR BLD AUTO: 0.7 %
BILIRUB SERPL-MCNC: 0.5 MG/DL (ref 0.1–2)
BUN BLD-MCNC: 7 MG/DL (ref 7–18)
CALCIUM BLD-MCNC: 9.3 MG/DL (ref 8.5–10.1)
CHLORIDE SERPL-SCNC: 107 MMOL/L (ref 98–112)
CO2 SERPL-SCNC: 26 MMOL/L (ref 21–32)
CREAT BLD-MCNC: 0.81 MG/DL
EOSINOPHIL # BLD AUTO: 0.48 X10(3) UL (ref 0–0.7)
EOSINOPHIL NFR BLD AUTO: 4.6 %
ERYTHROCYTE [DISTWIDTH] IN BLOOD BY AUTOMATED COUNT: 15.3 %
FASTING STATUS PATIENT QL REPORTED: YES
GLOBULIN PLAS-MCNC: 4.8 G/DL (ref 2.8–4.4)
GLUCOSE BLD-MCNC: 108 MG/DL (ref 70–99)
HCT VFR BLD AUTO: 36.4 %
HGB BLD-MCNC: 11 G/DL
IMM GRANULOCYTES # BLD AUTO: 0.05 X10(3) UL (ref 0–1)
IMM GRANULOCYTES NFR BLD: 0.5 %
LYMPHOCYTES # BLD AUTO: 2.74 X10(3) UL (ref 1–4)
LYMPHOCYTES NFR BLD AUTO: 26.3 %
MCH RBC QN AUTO: 26.8 PG (ref 26–34)
MCHC RBC AUTO-ENTMCNC: 30.2 G/DL (ref 31–37)
MCV RBC AUTO: 88.6 FL
MONOCYTES # BLD AUTO: 0.88 X10(3) UL (ref 0.1–1)
MONOCYTES NFR BLD AUTO: 8.4 %
NEUTROPHILS # BLD AUTO: 6.2 X10 (3) UL (ref 1.5–7.7)
NEUTROPHILS # BLD AUTO: 6.2 X10(3) UL (ref 1.5–7.7)
NEUTROPHILS NFR BLD AUTO: 59.5 %
OSMOLALITY SERPL CALC.SUM OF ELEC: 285 MOSM/KG (ref 275–295)
PLATELET # BLD AUTO: 527 10(3)UL (ref 150–450)
POTASSIUM SERPL-SCNC: 4.6 MMOL/L (ref 3.5–5.1)
PROT SERPL-MCNC: 7.7 G/DL (ref 6.4–8.2)
RBC # BLD AUTO: 4.11 X10(6)UL
SODIUM SERPL-SCNC: 138 MMOL/L (ref 136–145)
WBC # BLD AUTO: 10.4 X10(3) UL (ref 4–11)

## 2022-06-01 PROCEDURE — 3075F SYST BP GE 130 - 139MM HG: CPT | Performed by: FAMILY MEDICINE

## 2022-06-01 PROCEDURE — 3079F DIAST BP 80-89 MM HG: CPT | Performed by: FAMILY MEDICINE

## 2022-06-01 PROCEDURE — 99214 OFFICE O/P EST MOD 30 MIN: CPT | Performed by: FAMILY MEDICINE

## 2022-06-01 PROCEDURE — 80053 COMPREHEN METABOLIC PANEL: CPT

## 2022-06-01 PROCEDURE — 3008F BODY MASS INDEX DOCD: CPT | Performed by: FAMILY MEDICINE

## 2022-06-01 PROCEDURE — 85025 COMPLETE CBC W/AUTO DIFF WBC: CPT

## 2022-06-01 PROCEDURE — 36415 COLL VENOUS BLD VENIPUNCTURE: CPT

## 2022-06-15 ENCOUNTER — OFFICE VISIT (OUTPATIENT)
Dept: PODIATRY CLINIC | Facility: CLINIC | Age: 42
End: 2022-06-15
Payer: COMMERCIAL

## 2022-06-15 DIAGNOSIS — L97.512 FOOT ULCER, RIGHT, WITH FAT LAYER EXPOSED (HCC): Primary | ICD-10-CM

## 2022-06-15 DIAGNOSIS — M14.672 CHARCOT'S JOINT OF FOOT, LEFT: ICD-10-CM

## 2022-06-15 DIAGNOSIS — M20.5X1 HALLUX LIMITUS, RIGHT: ICD-10-CM

## 2022-06-15 PROCEDURE — 11042 DBRDMT SUBQ TIS 1ST 20SQCM/<: CPT | Performed by: STUDENT IN AN ORGANIZED HEALTH CARE EDUCATION/TRAINING PROGRAM

## 2022-06-15 NOTE — PATIENT INSTRUCTIONS
- Dress site with Betadine and dry dressing daily.    -Continue working on tobacco cessation.  -Acquire diabetic shoes and inserts as prescribed. -Follow-up in wound clinic in 1 week.

## 2022-06-20 ENCOUNTER — OFFICE VISIT (OUTPATIENT)
Dept: WOUND CARE | Facility: HOSPITAL | Age: 42
End: 2022-06-20
Attending: STUDENT IN AN ORGANIZED HEALTH CARE EDUCATION/TRAINING PROGRAM
Payer: COMMERCIAL

## 2022-06-20 VITALS
SYSTOLIC BLOOD PRESSURE: 113 MMHG | HEART RATE: 86 BPM | DIASTOLIC BLOOD PRESSURE: 85 MMHG | RESPIRATION RATE: 18 BRPM | WEIGHT: 249 LBS | BODY MASS INDEX: 34.86 KG/M2 | TEMPERATURE: 99 F | HEIGHT: 71 IN

## 2022-06-20 DIAGNOSIS — E11.42 DIABETIC POLYNEUROPATHY ASSOCIATED WITH TYPE 2 DIABETES MELLITUS (HCC): ICD-10-CM

## 2022-06-20 DIAGNOSIS — M20.5X1 HALLUX LIMITUS OF RIGHT FOOT: ICD-10-CM

## 2022-06-20 DIAGNOSIS — L97.512 FOOT ULCER, RIGHT, WITH FAT LAYER EXPOSED (HCC): Primary | ICD-10-CM

## 2022-06-20 LAB — GLUCOSE BLD-MCNC: 253 MG/DL (ref 70–99)

## 2022-06-20 PROCEDURE — 11042 DBRDMT SUBQ TIS 1ST 20SQCM/<: CPT | Performed by: STUDENT IN AN ORGANIZED HEALTH CARE EDUCATION/TRAINING PROGRAM

## 2022-06-20 NOTE — PROGRESS NOTES
Patient ID: Edwin Rascon is a 38year old female. Debridement   Wound 06/20/22 #1 right great toe Diabetic Ulcer Toe (Comment which one) Right    Consent obtained? verbal  Consent given by: patient  Risks discussed?  procedural risks discussed  Time out called at 6/20/2022 4:17 PM  Immediately prior to the procedure a time out was called  Performed by: provider  Debridement type: surgical  Level of debridement: subcutaneous tissue  Pain control: lidocaine 4%  Pre-debridement measurements  Length (cm): 1.1  Width (cm): 0.6  Depth (cm): 0.4  Surface Area (cm^2): 0.66  Volume (cm^3): 0.26    Post-debridement measurements  Length (cm): 1.1  Width (cm): 0.6  Depth (cm): 0.4  Percent debrided: 100%  Surface Area (cm^2): 0.66  Area debrided (cm^2): 0.66  Volume (cm^3): 0.26  Tissue and other material debrided: subcutaneous tissue  Devitalized tissue debrided: callus, exudate and fibrin  Instrument(s) utilized: blade  Bleeding: small  Hemostasis obtained with: not applicable  Procedural pain (0-10): 0  Post-procedural pain: 0   Response to treatment: procedure was tolerated well

## 2022-06-20 NOTE — PROGRESS NOTES
Weekly Wound Education Note    Teaching Provided To: Patient  Training Topics: Off-loading; Compression;Cleasing and general instructions; Discharge instructions  Training Method: Explain/Verbal;Written  Training Response: Patient responds and understands        Notes: (S) cleanse with saline or wound cleanser, apply billy to wound bed cover with hydrofera ready (writing side away from wound) change every other day Wear Darco offoading shoe when walking

## 2022-06-21 NOTE — PATIENT INSTRUCTIONS
Wound Care Patient Instructions/Details      Wound number: All wounds  Wound description: Right plantar hallux    Wound Cleaning and Dressings:  May shower with protection. Protect wound and dressing. 1. Wash your hands with soap and water. Remove old dressing, discard and place into trash. Do not use tissues or cotton balls. There are no questions and answers to display. Compression Therapy ordered: No    Off-Loading: There are no questions and answers to display. Additional wounds: No    Miscellaneous Instructions:  1. Supplement with a daily multivitamin or other supplement as directed by your provider    2. Increase activity as tolerated or as directed by your provider    3. As directed by your providers perform dressing changes to site with [facility] every other day. Ambulate with Darco offloading shoe. Follow-up in 1 week for reevaluation or sooner if other concerns arise. 4.   Please call the clinic if you notice increased redness, warmth or pus like drainage or         start running a fever greater than 100.4. If it is after hours please call your primary        physician or go to the nearest emergency room. 5.  The treatment plan has been discussed at length with you and your provider. Follow all       instructions carefully, it is very important. If you do not follow all instructions, you are at        risk of your wound not healing, infection, possible loss of limb and even end of life.

## 2022-06-21 NOTE — TELEPHONE ENCOUNTER
Pt called to f/u on this message. She said she cannot afford to get only a 30 day supply. Has to be 90 days.

## 2022-06-22 ENCOUNTER — OFFICE VISIT (OUTPATIENT)
Facility: LOCATION | Age: 42
End: 2022-06-22
Payer: COMMERCIAL

## 2022-06-22 VITALS
HEART RATE: 98 BPM | BODY MASS INDEX: 34.86 KG/M2 | WEIGHT: 249 LBS | SYSTOLIC BLOOD PRESSURE: 133 MMHG | TEMPERATURE: 97 F | HEIGHT: 71 IN | DIASTOLIC BLOOD PRESSURE: 84 MMHG

## 2022-06-22 DIAGNOSIS — L02.213 CHEST WALL ABSCESS: Primary | ICD-10-CM

## 2022-06-22 DIAGNOSIS — Z12.31 SCREENING MAMMOGRAM, ENCOUNTER FOR: ICD-10-CM

## 2022-06-22 PROCEDURE — 3075F SYST BP GE 130 - 139MM HG: CPT | Performed by: SURGERY

## 2022-06-22 PROCEDURE — 99212 OFFICE O/P EST SF 10 MIN: CPT | Performed by: SURGERY

## 2022-06-22 PROCEDURE — 3079F DIAST BP 80-89 MM HG: CPT | Performed by: SURGERY

## 2022-06-22 PROCEDURE — 3008F BODY MASS INDEX DOCD: CPT | Performed by: SURGERY

## 2022-06-24 PROBLEM — L02.213 CHEST WALL ABSCESS: Status: RESOLVED | Noted: 2022-04-08 | Resolved: 2022-06-24

## 2022-06-25 ENCOUNTER — TELEPHONE (OUTPATIENT)
Dept: FAMILY MEDICINE CLINIC | Facility: CLINIC | Age: 42
End: 2022-06-25

## 2022-06-25 DIAGNOSIS — E11.65 TYPE 2 DIABETES MELLITUS WITH HYPERGLYCEMIA, WITH LONG-TERM CURRENT USE OF INSULIN (HCC): Primary | ICD-10-CM

## 2022-06-25 DIAGNOSIS — Z79.4 TYPE 2 DIABETES MELLITUS WITH HYPERGLYCEMIA, WITH LONG-TERM CURRENT USE OF INSULIN (HCC): Primary | ICD-10-CM

## 2022-06-25 NOTE — TELEPHONE ENCOUNTER
Called pt to r/s her 7/14/22 appt. R/s to 7/21/22. Pt asking for  to put in her bloodwork to get done prior to appt. Please enter orders. Thanks!     Future Appointments   Date Time Provider Lissett Monteiro   6/27/2022  3:45 PM Aleida Benites ZACHARY [facility] [address] [facility]   7/21/2022  2:40 PM Kaden Morales DO [facility]   9/27/2022  3:30 PM ROSA Brown [facility] AIDEN no

## 2022-06-27 ENCOUNTER — OFFICE VISIT (OUTPATIENT)
Dept: WOUND CARE | Facility: HOSPITAL | Age: 42
End: 2022-06-27
Attending: STUDENT IN AN ORGANIZED HEALTH CARE EDUCATION/TRAINING PROGRAM
Payer: COMMERCIAL

## 2022-06-27 VITALS
SYSTOLIC BLOOD PRESSURE: 142 MMHG | TEMPERATURE: 98 F | RESPIRATION RATE: 18 BRPM | DIASTOLIC BLOOD PRESSURE: 90 MMHG | HEART RATE: 91 BPM

## 2022-06-27 DIAGNOSIS — M20.5X1 HALLUX LIMITUS OF RIGHT FOOT: ICD-10-CM

## 2022-06-27 DIAGNOSIS — L97.512 FOOT ULCER, RIGHT, WITH FAT LAYER EXPOSED (HCC): Primary | ICD-10-CM

## 2022-06-27 DIAGNOSIS — E11.42 DIABETIC POLYNEUROPATHY ASSOCIATED WITH TYPE 2 DIABETES MELLITUS (HCC): ICD-10-CM

## 2022-06-27 LAB — GLUCOSE BLD-MCNC: 284 MG/DL (ref 70–99)

## 2022-06-27 PROCEDURE — 11042 DBRDMT SUBQ TIS 1ST 20SQCM/<: CPT | Performed by: STUDENT IN AN ORGANIZED HEALTH CARE EDUCATION/TRAINING PROGRAM

## 2022-06-27 NOTE — TELEPHONE ENCOUNTER
Dr. Familia Zaldivar,  Please advise if any labs due prior to DM F/U appt    LOV 6/1/22   DM   +5    The patient indicates understanding of these issues and agrees to the plan. The patient is asked to return in July. Last Labs 6/1/22   CMP, CBC    4/8/22  A1c   13.4    Jeanne Ramos, DO   6/7/2022 10:58 PM CDT         Pls inform pt that labs demonstrate:  1. Improved fasting glucose  2. Improving alk phos  3. Continue low albumin and high globulin, would like her to schedule with nephrology  4. Wbc has improved  5.  Platelets still high, will monitor with next set of labs

## 2022-06-27 NOTE — PROGRESS NOTES
Patient ID: Edwin Rascon is a 38year old female. Debridement   Wound 06/20/22 #1 right great toe Diabetic Ulcer Toe (Comment which one) Right    Consent obtained? verbal  Consent given by: patient  Risks discussed?  procedural risks discussed  Time out called at 6/27/2022 4:11 PM  Immediately prior to the procedure a time out was called  Performed by: provider  Debridement type: surgical  Level of debridement: subcutaneous tissue  Pain control: lidocaine 4%  Pre-debridement measurements  Length (cm): 0.1  Width (cm): 0.1  Depth (cm): 0.1  Surface Area (cm^2): 0.01  Volume (cm^3): 0    Post-debridement measurements  Length (cm): 0.4  Width (cm): 0.3  Depth (cm): 0.2  Percent debrided: 100%  Surface Area (cm^2): 0.12  Area debrided (cm^2): 0.12  Volume (cm^3): 0.02  Tissue and other material debrided: subcutaneous tissue  Devitalized tissue debrided: biofilm, callus and fibrin  Instrument(s) utilized: blade  Bleeding: small  Hemostasis obtained with: pressure  Procedural pain (0-10): 0  Post-procedural pain: 0   Response to treatment: procedure was tolerated well

## 2022-06-28 NOTE — PATIENT INSTRUCTIONS
Wound Care Patient Instructions/Details      Wound number: All wounds  Wound description: Right plantar hallux    Wound Cleaning and Dressings:  May shower with protection. Protect wound and dressing. 1. Wash your hands with soap and water. Remove old dressing, discard and place into trash. Do not use tissues or cotton balls. There are no questions and answers to display. Compression Therapy ordered: No    Off-Loading: There are no questions and answers to display. Additional wounds: No    Miscellaneous Instructions:  1. Supplement with a daily multivitamin or other supplement as directed by your provider    2. Increase activity as tolerated or as directed by your provider    3. As directed by your providers perform daily dressing changes with Betadine and dry dressing. Continue ambulate with surgical offloading shoe. Follow-up in 2 weeks for reevaluation or sooner if other concerns arise. 4.   Please call the clinic if you notice increased redness, warmth or pus like drainage or         start running a fever greater than 100.4. If it is after hours please call your primary        physician or go to the nearest emergency room. 5.  The treatment plan has been discussed at length with you and your provider. Follow all       instructions carefully, it is very important. If you do not follow all instructions, you are at        risk of your wound not healing, infection, possible loss of limb and even end of life.

## 2022-07-11 ENCOUNTER — APPOINTMENT (OUTPATIENT)
Dept: WOUND CARE | Facility: HOSPITAL | Age: 42
End: 2022-07-11
Attending: STUDENT IN AN ORGANIZED HEALTH CARE EDUCATION/TRAINING PROGRAM
Payer: COMMERCIAL

## 2022-07-15 ENCOUNTER — TELEPHONE (OUTPATIENT)
Dept: FAMILY MEDICINE CLINIC | Facility: CLINIC | Age: 42
End: 2022-07-15

## 2022-07-18 ENCOUNTER — OFFICE VISIT (OUTPATIENT)
Dept: WOUND CARE | Facility: HOSPITAL | Age: 42
End: 2022-07-18
Attending: STUDENT IN AN ORGANIZED HEALTH CARE EDUCATION/TRAINING PROGRAM
Payer: COMMERCIAL

## 2022-07-18 VITALS
TEMPERATURE: 98 F | DIASTOLIC BLOOD PRESSURE: 86 MMHG | RESPIRATION RATE: 18 BRPM | SYSTOLIC BLOOD PRESSURE: 119 MMHG | HEART RATE: 99 BPM

## 2022-07-18 DIAGNOSIS — M20.5X1 HALLUX LIMITUS OF RIGHT FOOT: ICD-10-CM

## 2022-07-18 DIAGNOSIS — L97.512 FOOT ULCER, RIGHT, WITH FAT LAYER EXPOSED (HCC): Primary | ICD-10-CM

## 2022-07-18 DIAGNOSIS — E11.65 TYPE 2 DIABETES MELLITUS WITH HYPERGLYCEMIA, WITH LONG-TERM CURRENT USE OF INSULIN (HCC): ICD-10-CM

## 2022-07-18 DIAGNOSIS — E11.42 DIABETIC POLYNEUROPATHY ASSOCIATED WITH TYPE 2 DIABETES MELLITUS (HCC): ICD-10-CM

## 2022-07-18 DIAGNOSIS — Z79.4 TYPE 2 DIABETES MELLITUS WITH HYPERGLYCEMIA, WITH LONG-TERM CURRENT USE OF INSULIN (HCC): ICD-10-CM

## 2022-07-18 LAB — GLUCOSE BLD-MCNC: 171 MG/DL (ref 70–99)

## 2022-07-18 PROCEDURE — 99213 OFFICE O/P EST LOW 20 MIN: CPT | Performed by: STUDENT IN AN ORGANIZED HEALTH CARE EDUCATION/TRAINING PROGRAM

## 2022-07-19 NOTE — PROGRESS NOTES
Weekly Wound Education Note    Teaching Provided To: Patient  Training Topics: Discharge instructions;Cleasing and general instructions; Off-loading  Training Method: Explain/Verbal;Written;Demonstration  Training Response: Patient responds and understands        Notes: Continue to offload rt great toe using thick felted foam held in place with tape keep clean and dry. Gently cleanse wound with saline or wound cleanser, apply honey on small piece of thick foam adhesive side down.  Change daily and prn

## 2022-07-20 ENCOUNTER — TELEPHONE (OUTPATIENT)
Dept: WOUND CARE | Facility: HOSPITAL | Age: 42
End: 2022-07-20

## 2022-07-21 ENCOUNTER — TELEPHONE (OUTPATIENT)
Dept: FAMILY MEDICINE CLINIC | Facility: CLINIC | Age: 42
End: 2022-07-21

## 2022-07-21 NOTE — PATIENT INSTRUCTIONS
Wound Care Patient Instructions/Details      Wound number: All wounds  Wound description: Right plantar hallux    Wound Cleaning and Dressings:  May shower with protection. Protect wound and dressing. 1. Wash your hands with soap and water. Remove old dressing, discard and place into trash. Do not use tissues or cotton balls. There are no questions and answers to display. Compression Therapy ordered: No    Off-Loading: There are no questions and answers to display. Additional wounds: No    Miscellaneous Instructions:  1. Supplement with a daily multivitamin or other supplement as directed by your provider    2. Increase activity as tolerated or as directed by your provider    3. As directed by your providers stop smoking, dress site with Medihoney and dry dressing, offload with felt offloading pad. 4.   Please call the clinic if you notice increased redness, warmth or pus like drainage or         start running a fever greater than 100.4. If it is after hours please call your primary        physician or go to the nearest emergency room. 5.  The treatment plan has been discussed at length with you and your provider. Follow all       instructions carefully, it is very important. If you do not follow all instructions, you are at        risk of your wound not healing, infection, possible loss of limb and even end of life.

## 2022-07-21 NOTE — TELEPHONE ENCOUNTER
Tried to call but phone would not connect for me. Sent [employer] message regarding no show and fee.

## 2022-07-25 ENCOUNTER — APPOINTMENT (OUTPATIENT)
Dept: WOUND CARE | Facility: HOSPITAL | Age: 42
End: 2022-07-25
Attending: STUDENT IN AN ORGANIZED HEALTH CARE EDUCATION/TRAINING PROGRAM
Payer: COMMERCIAL

## 2022-08-05 ENCOUNTER — HOSPITAL ENCOUNTER (OUTPATIENT)
Age: 42
Discharge: HOME | End: 2022-08-05
Payer: COMMERCIAL

## 2022-08-05 VITALS
TEMPERATURE: 98 F | HEIGHT: 71 IN | OXYGEN SATURATION: 100 % | WEIGHT: 235 LBS | HEART RATE: 100 BPM | BODY MASS INDEX: 32.9 KG/M2 | DIASTOLIC BLOOD PRESSURE: 70 MMHG | RESPIRATION RATE: 18 BRPM | SYSTOLIC BLOOD PRESSURE: 133 MMHG

## 2022-08-05 DIAGNOSIS — H60.503 ACUTE OTITIS EXTERNA OF BOTH EARS, UNSPECIFIED TYPE: Primary | ICD-10-CM

## 2022-08-05 PROCEDURE — 99203 OFFICE O/P NEW LOW 30 MIN: CPT | Performed by: NURSE PRACTITIONER

## 2022-08-05 RX ORDER — NEOMYCIN SULFATE, POLYMYXIN B SULFATE AND HYDROCORTISONE 10; 3.5; 1 MG/ML; MG/ML; [USP'U]/ML
3 SUSPENSION/ DROPS AURICULAR (OTIC) 3 TIMES DAILY
Qty: 1 EACH | Refills: 0 | Status: SHIPPED | OUTPATIENT
Start: 2022-08-05 | End: 2022-08-12

## 2022-08-10 ENCOUNTER — OFFICE VISIT (OUTPATIENT)
Dept: PODIATRY CLINIC | Facility: CLINIC | Age: 42
End: 2022-08-10
Payer: COMMERCIAL

## 2022-08-10 DIAGNOSIS — L97.512 FOOT ULCER, RIGHT, WITH FAT LAYER EXPOSED (HCC): Primary | ICD-10-CM

## 2022-08-10 DIAGNOSIS — M20.5X1 HALLUX LIMITUS, RIGHT: ICD-10-CM

## 2022-08-10 DIAGNOSIS — E11.42 DIABETIC POLYNEUROPATHY ASSOCIATED WITH TYPE 2 DIABETES MELLITUS (HCC): ICD-10-CM

## 2022-08-10 DIAGNOSIS — R23.4 FISSURE IN SKIN: ICD-10-CM

## 2022-08-10 DIAGNOSIS — M14.672 CHARCOT'S JOINT OF FOOT, LEFT: ICD-10-CM

## 2022-08-10 PROCEDURE — 99213 OFFICE O/P EST LOW 20 MIN: CPT | Performed by: STUDENT IN AN ORGANIZED HEALTH CARE EDUCATION/TRAINING PROGRAM

## 2022-08-16 NOTE — PATIENT INSTRUCTIONS
-Ambulate with supportive diabetic shoes and inserts.  -Dress fissure with bactricin and dry dressing.  -Ambulate with supportive diabetic shoes and inserts.  -Follow up in 2 weeks for re-evaluation or sooner if other concerns arise.

## 2022-08-21 NOTE — DISCHARGE INSTRUCTIONS
Home Care Instructions  Minor Surgery  Dr. Juan J Montana  For post-operative pain control the mediations are usually over the counter preparations such as Advil and Tylenol. For severe pain the patient may overlap Advil with Tylenol. The patient can do this by taking two Tylenol, then three hours later taking two Advil, then three hours later taking Tylenol again. Please ask your surgeon before resuming blood thinners such as aspirin, Plavix or Coumadin. All other home medications may be resumed as scheduled. Generally aspirin is avoided for ten days. DIET  The patient may resume a general diet immediately. There should be no alcohol consumption in the immediate recover time period. If the patient was sedated for the procedure the first meal should be light. WOUND CARE  The dressing may be changed twice a day. To do this, remove the gauze in the chest wall wound. You may shower at this time. Moisten a 4 x 4 gauze with saline and place the gauze into the wound. Cover with a dry gauze and tape. ACTIVITY  The patient may ride in a car but should not drive the car for at least overnight if sedation was used. If no sedation was used the patient may drive immediately. The patient may return to work the next day. Avoid any activity that could lead to the wound getting elbowed or bumped. Avoid bending, pushing, pulling and lifting anything heavier than 25 pounds for two weeks. Patients should seek further activity limits at the time of their appointment. No extreme sports for two weeks. APPOINTMENT  Please call our office today for an appointment within five to ten days of discharge. Any fever greater than 100.5, chills, nausea, vomiting, or severe diarrhea please call our office. If the wound turns red, hot, swollen, becomes increasingly painful, or drains pus call us immediately at [phone]. For life threatening emergencies call 911.   For non-emergent care please Shift Summary:  0730-1930    Vitals with min/max:      Vital Last Value 24 Hour Range   Temperature 97.7 °F (36.5 °C) (08/20/22 1915) Temp  Min: 97.4 °F (36.3 °C)  Max: 98.2 °F (36.8 °C)   Pulse 63 (08/20/22 1920) Pulse  Min: 49  Max: 67   Respiratory 16 (08/20/22 1915) Resp  Min: 16  Max: 20   Non-Invasive  Blood Pressure 132/60 (08/20/22 1915) BP  Min: 111/66  Max: 165/84   Pulse Oximetry 95 % (08/20/22 1920) SpO2  Min: 95 %  Max: 99 %   Arterial   Blood Pressure   No data recorded     Lines:    Available Intravenous Access     PICC Line / CVC Line / PIV Line / Intraosseous Line / Line / UAC Line  Duration           Peripheral IV 08/20/22 Right Forearm 20 <1 day                Neuro status: A&O x  4, forgetful at times    Cardiac: SB to SR - HR 45-60's    Respiratory: Room Air  IS = 2500 ml    : Voiding:  see flow sheet    Hygiene:  Independent;Perineal care (08/20/22 1925)    GI: Bowel Tones:   Normoactive  Last Bowel Movement: 08/20/2022    Incisions & Skin: Right radial cath site slightly bruised.  Old left PIV site red/swollen    Diet: Cardiac diet     Activity:  Up independent - ambulates in the halls    I/O this shift:  In: 480 [P.O.:480]  Out: 400 [Urine:400]    Neurovascular: Denies N/T.  +2 pulses x 4 extremities.      Pain: Denies pain     Current infusions: capped IV    Patient or family concerns: Family at the bedside and updated.      Plan of care/upcoming events:  CABG on Monday 08/22/2022      Imelda Mabry RN   call our office after 8:30 a.m. Monday through Friday. The number listed above is our office number. Our phone automatically switches to out answering service if we are not there. Please do not use the emergency room for non-urgent care. Thank you for entrusting us with your care.   [facility]--General Surgery      To establish care with a primary care physician or specialist, please call the [facility] Physician Referral line at [phone] or visit [email or web address]

## 2022-09-01 ENCOUNTER — OFFICE VISIT (OUTPATIENT)
Dept: PODIATRY CLINIC | Facility: CLINIC | Age: 42
End: 2022-09-01
Payer: COMMERCIAL

## 2022-09-01 DIAGNOSIS — M14.672 CHARCOT'S JOINT OF FOOT, LEFT: ICD-10-CM

## 2022-09-01 DIAGNOSIS — L97.512 FOOT ULCER, RIGHT, WITH FAT LAYER EXPOSED (HCC): Primary | ICD-10-CM

## 2022-09-01 DIAGNOSIS — M20.5X1 HALLUX LIMITUS, RIGHT: ICD-10-CM

## 2022-09-01 DIAGNOSIS — R23.4 FISSURE IN SKIN: ICD-10-CM

## 2022-09-01 DIAGNOSIS — E11.42 DIABETIC POLYNEUROPATHY ASSOCIATED WITH TYPE 2 DIABETES MELLITUS (HCC): ICD-10-CM

## 2022-09-01 PROCEDURE — 99213 OFFICE O/P EST LOW 20 MIN: CPT | Performed by: STUDENT IN AN ORGANIZED HEALTH CARE EDUCATION/TRAINING PROGRAM

## 2022-09-01 NOTE — PATIENT INSTRUCTIONS
- Site is well-healed at this time. Continue ambulating with supportive shoes and avoid walking barefoot. Apply urea cream to callus sites. Follow-up in 1 month for reevaluation or sooner if other concerns arise.

## 2022-09-12 ENCOUNTER — TELEPHONE (OUTPATIENT)
Dept: FAMILY MEDICINE CLINIC | Facility: CLINIC | Age: 42
End: 2022-09-12

## 2022-09-27 ENCOUNTER — OFFICE VISIT (OUTPATIENT)
Dept: ENDOCRINOLOGY CLINIC | Facility: CLINIC | Age: 42
End: 2022-09-27

## 2022-09-27 DIAGNOSIS — Z79.4 TYPE 2 DIABETES MELLITUS WITH HYPERGLYCEMIA, WITH LONG-TERM CURRENT USE OF INSULIN (HCC): Primary | ICD-10-CM

## 2022-09-27 DIAGNOSIS — E11.65 TYPE 2 DIABETES MELLITUS WITH HYPERGLYCEMIA, WITH LONG-TERM CURRENT USE OF INSULIN (HCC): Primary | ICD-10-CM

## 2022-09-27 NOTE — PROGRESS NOTES
Ena Chaudhary is a 38year old did not call or show today for Diabetes follow up .    Primary care physician: Pedro Meyer DO   Last DM appt liz me    No show 2-2022   Last A1C 13.4% 4/2022     Will notify PCP

## 2022-09-30 ENCOUNTER — OFFICE VISIT (OUTPATIENT)
Dept: PODIATRY CLINIC | Facility: CLINIC | Age: 42
End: 2022-09-30
Payer: COMMERCIAL

## 2022-09-30 DIAGNOSIS — E11.42 DIABETIC POLYNEUROPATHY ASSOCIATED WITH TYPE 2 DIABETES MELLITUS (HCC): ICD-10-CM

## 2022-09-30 DIAGNOSIS — M14.672 CHARCOT'S JOINT OF FOOT, LEFT: ICD-10-CM

## 2022-09-30 DIAGNOSIS — L97.512 FOOT ULCER, RIGHT, WITH FAT LAYER EXPOSED (HCC): Primary | ICD-10-CM

## 2022-09-30 DIAGNOSIS — M20.5X1 HALLUX LIMITUS, RIGHT: ICD-10-CM

## 2022-09-30 PROCEDURE — 99213 OFFICE O/P EST LOW 20 MIN: CPT | Performed by: STUDENT IN AN ORGANIZED HEALTH CARE EDUCATION/TRAINING PROGRAM

## 2022-09-30 RX ORDER — CEPHALEXIN 500 MG/1
500 CAPSULE ORAL 4 TIMES DAILY
Qty: 14 CAPSULE | Refills: 0 | Status: SHIPPED | OUTPATIENT
Start: 2022-09-30

## 2022-10-01 NOTE — PATIENT INSTRUCTIONS
- Dress site with Betadine and dry dressing daily. Remain minimally weightbearing right foot with postop shoe. Take Keflex as prescribed. Follow-up in 1 week for reevaluation or sooner if other concerns arise. Seek immediate medical attention for any acute signs of infection or other concerns arise.

## 2022-10-04 ENCOUNTER — OFFICE VISIT (OUTPATIENT)
Dept: ENDOCRINOLOGY CLINIC | Facility: CLINIC | Age: 42
End: 2022-10-04
Payer: COMMERCIAL

## 2022-10-04 VITALS
SYSTOLIC BLOOD PRESSURE: 120 MMHG | HEART RATE: 97 BPM | DIASTOLIC BLOOD PRESSURE: 76 MMHG | BODY MASS INDEX: 35 KG/M2 | HEIGHT: 71 IN | RESPIRATION RATE: 16 BRPM | OXYGEN SATURATION: 97 % | WEIGHT: 250 LBS

## 2022-10-04 DIAGNOSIS — E11.65 TYPE 2 DIABETES MELLITUS WITH HYPERGLYCEMIA, WITH LONG-TERM CURRENT USE OF INSULIN (HCC): Primary | ICD-10-CM

## 2022-10-04 DIAGNOSIS — Z79.4 TYPE 2 DIABETES MELLITUS WITH HYPERGLYCEMIA, WITH LONG-TERM CURRENT USE OF INSULIN (HCC): Primary | ICD-10-CM

## 2022-10-04 LAB
CARTRIDGE LOT#: 505 NUMERIC
GLUCOSE BLOOD: 342
HEMOGLOBIN A1C: 11.4 % (ref 4.3–5.6)
TEST STRIP LOT #: NORMAL NUMERIC

## 2022-10-04 PROCEDURE — 82947 ASSAY GLUCOSE BLOOD QUANT: CPT | Performed by: NURSE PRACTITIONER

## 2022-10-04 PROCEDURE — 3078F DIAST BP <80 MM HG: CPT | Performed by: NURSE PRACTITIONER

## 2022-10-04 PROCEDURE — 99215 OFFICE O/P EST HI 40 MIN: CPT | Performed by: NURSE PRACTITIONER

## 2022-10-04 PROCEDURE — 83036 HEMOGLOBIN GLYCOSYLATED A1C: CPT | Performed by: NURSE PRACTITIONER

## 2022-10-04 PROCEDURE — 3008F BODY MASS INDEX DOCD: CPT | Performed by: NURSE PRACTITIONER

## 2022-10-04 PROCEDURE — 3074F SYST BP LT 130 MM HG: CPT | Performed by: NURSE PRACTITIONER

## 2022-10-05 RX ORDER — BLOOD-GLUCOSE TRANSMITTER
1 EACH MISCELLANEOUS
Qty: 1 EACH | Refills: 3 | Status: SHIPPED | OUTPATIENT
Start: 2022-10-05

## 2022-10-05 RX ORDER — BLOOD-GLUCOSE SENSOR
1 EACH MISCELLANEOUS
Qty: 9 EACH | Refills: 3 | Status: SHIPPED | OUTPATIENT
Start: 2022-10-05

## 2022-10-11 ENCOUNTER — APPOINTMENT (OUTPATIENT)
Dept: CT IMAGING | Facility: HOSPITAL | Age: 42
End: 2022-10-11
Attending: EMERGENCY MEDICINE
Payer: COMMERCIAL

## 2022-10-11 ENCOUNTER — HOSPITAL ENCOUNTER (INPATIENT)
Facility: HOSPITAL | Age: 42
LOS: 2 days | Discharge: HOME | End: 2022-10-13
Attending: EMERGENCY MEDICINE | Admitting: HOSPITALIST
Payer: COMMERCIAL

## 2022-10-11 ENCOUNTER — HOSPITAL ENCOUNTER (OUTPATIENT)
Age: 42
Discharge: ANOTHER HOSPITAL | End: 2022-10-11
Payer: COMMERCIAL

## 2022-10-11 VITALS
SYSTOLIC BLOOD PRESSURE: 121 MMHG | OXYGEN SATURATION: 99 % | HEART RATE: 112 BPM | DIASTOLIC BLOOD PRESSURE: 82 MMHG | RESPIRATION RATE: 22 BRPM | BODY MASS INDEX: 34.3 KG/M2 | TEMPERATURE: 99 F | HEIGHT: 71 IN | WEIGHT: 245 LBS

## 2022-10-11 DIAGNOSIS — N76.4 LABIAL ABSCESS: Primary | ICD-10-CM

## 2022-10-11 DIAGNOSIS — D72.829 LEUKOCYTOSIS, UNSPECIFIED TYPE: ICD-10-CM

## 2022-10-11 DIAGNOSIS — R73.9 HYPERGLYCEMIA: ICD-10-CM

## 2022-10-11 DIAGNOSIS — N76.2 CELLULITIS OF LABIA: ICD-10-CM

## 2022-10-11 LAB
ANION GAP SERPL CALC-SCNC: 6 MMOL/L (ref 0–18)
BASOPHILS # BLD AUTO: 0.09 X10(3) UL (ref 0–0.2)
BASOPHILS NFR BLD AUTO: 0.4 %
BUN BLD-MCNC: 9 MG/DL (ref 7–18)
CALCIUM BLD-MCNC: 9.8 MG/DL (ref 8.5–10.1)
CHLORIDE SERPL-SCNC: 102 MMOL/L (ref 98–112)
CO2 SERPL-SCNC: 25 MMOL/L (ref 21–32)
CREAT BLD-MCNC: 1.13 MG/DL
EOSINOPHIL # BLD AUTO: 0.23 X10(3) UL (ref 0–0.7)
EOSINOPHIL NFR BLD AUTO: 1 %
ERYTHROCYTE [DISTWIDTH] IN BLOOD BY AUTOMATED COUNT: 13.2 %
GFR SERPLBLD BASED ON 1.73 SQ M-ARVRAT: 62 ML/MIN/1.73M2 (ref 60–?)
GLUCOSE BLD-MCNC: 130 MG/DL (ref 70–99)
GLUCOSE BLD-MCNC: 183 MG/DL (ref 70–99)
HCG SERPL QL: NEGATIVE
HCT VFR BLD AUTO: 38.3 %
HGB BLD-MCNC: 12.1 G/DL
IMM GRANULOCYTES # BLD AUTO: 0.21 X10(3) UL (ref 0–1)
IMM GRANULOCYTES NFR BLD: 0.9 %
LACTATE SERPL-SCNC: 1.8 MMOL/L (ref 0.4–2)
LYMPHOCYTES # BLD AUTO: 3.4 X10(3) UL (ref 1–4)
LYMPHOCYTES NFR BLD AUTO: 14.8 %
MCH RBC QN AUTO: 27.1 PG (ref 26–34)
MCHC RBC AUTO-ENTMCNC: 31.6 G/DL (ref 31–37)
MCV RBC AUTO: 85.9 FL
MONOCYTES # BLD AUTO: 1.75 X10(3) UL (ref 0.1–1)
MONOCYTES NFR BLD AUTO: 7.6 %
NEUTROPHILS # BLD AUTO: 17.24 X10 (3) UL (ref 1.5–7.7)
NEUTROPHILS # BLD AUTO: 17.24 X10(3) UL (ref 1.5–7.7)
NEUTROPHILS NFR BLD AUTO: 75.3 %
OSMOLALITY SERPL CALC.SUM OF ELEC: 279 MOSM/KG (ref 275–295)
PLATELET # BLD AUTO: 499 10(3)UL (ref 150–450)
POTASSIUM SERPL-SCNC: 3.9 MMOL/L (ref 3.5–5.1)
RBC # BLD AUTO: 4.46 X10(6)UL
SARS-COV-2 RNA RESP QL NAA+PROBE: NOT DETECTED
SODIUM SERPL-SCNC: 133 MMOL/L (ref 136–145)
WBC # BLD AUTO: 22.9 X10(3) UL (ref 4–11)

## 2022-10-11 PROCEDURE — 99223 1ST HOSP IP/OBS HIGH 75: CPT | Performed by: HOSPITALIST

## 2022-10-11 PROCEDURE — 72193 CT PELVIS W/DYE: CPT | Performed by: EMERGENCY MEDICINE

## 2022-10-11 PROCEDURE — 99205 OFFICE O/P NEW HI 60 MIN: CPT | Performed by: PHYSICIAN ASSISTANT

## 2022-10-11 PROCEDURE — 0U9MXZX DRAINAGE OF VULVA, EXTERNAL APPROACH, DIAGNOSTIC: ICD-10-PCS | Performed by: EMERGENCY MEDICINE

## 2022-10-11 RX ORDER — SODIUM CHLORIDE 9 MG/ML
INJECTION, SOLUTION INTRAVENOUS CONTINUOUS
Status: DISCONTINUED | OUTPATIENT
Start: 2022-10-11 | End: 2022-10-12

## 2022-10-11 RX ORDER — HYDROMORPHONE HYDROCHLORIDE 1 MG/ML
0.5 INJECTION, SOLUTION INTRAMUSCULAR; INTRAVENOUS; SUBCUTANEOUS EVERY 30 MIN PRN
Status: DISCONTINUED | OUTPATIENT
Start: 2022-10-11 | End: 2022-10-11

## 2022-10-11 RX ORDER — ACETAMINOPHEN 325 MG/1
650 TABLET ORAL EVERY 4 HOURS PRN
Status: DISCONTINUED | OUTPATIENT
Start: 2022-10-11 | End: 2022-10-13

## 2022-10-11 RX ORDER — POLYETHYLENE GLYCOL 3350 17 G/17G
17 POWDER, FOR SOLUTION ORAL DAILY PRN
Status: DISCONTINUED | OUTPATIENT
Start: 2022-10-11 | End: 2022-10-13

## 2022-10-11 RX ORDER — HYDROMORPHONE HYDROCHLORIDE 1 MG/ML
0.5 INJECTION, SOLUTION INTRAMUSCULAR; INTRAVENOUS; SUBCUTANEOUS ONCE
Status: COMPLETED | OUTPATIENT
Start: 2022-10-11 | End: 2022-10-11

## 2022-10-11 RX ORDER — ENOXAPARIN SODIUM 100 MG/ML
40 INJECTION SUBCUTANEOUS DAILY
Status: DISCONTINUED | OUTPATIENT
Start: 2022-10-12 | End: 2022-10-13

## 2022-10-11 RX ORDER — SENNOSIDES 8.6 MG
17.2 TABLET ORAL NIGHTLY PRN
Status: DISCONTINUED | OUTPATIENT
Start: 2022-10-11 | End: 2022-10-13

## 2022-10-11 RX ORDER — IOHEXOL 350 MG/ML
100 INJECTION, SOLUTION INTRAVENOUS
Status: COMPLETED | OUTPATIENT
Start: 2022-10-11 | End: 2022-10-11

## 2022-10-11 RX ORDER — ONDANSETRON 2 MG/ML
4 INJECTION INTRAMUSCULAR; INTRAVENOUS EVERY 4 HOURS PRN
Status: DISCONTINUED | OUTPATIENT
Start: 2022-10-11 | End: 2022-10-11

## 2022-10-11 RX ORDER — SODIUM PHOSPHATE, DIBASIC AND SODIUM PHOSPHATE, MONOBASIC 7; 19 G/133ML; G/133ML
1 ENEMA RECTAL ONCE AS NEEDED
Status: DISCONTINUED | OUTPATIENT
Start: 2022-10-11 | End: 2022-10-13

## 2022-10-11 RX ORDER — NICOTINE POLACRILEX 4 MG
15 LOZENGE BUCCAL
Status: DISCONTINUED | OUTPATIENT
Start: 2022-10-11 | End: 2022-10-13

## 2022-10-11 RX ORDER — VANCOMYCIN 2 GRAM/500 ML IN 0.9 % SODIUM CHLORIDE INTRAVENOUS
25 ONCE
Status: COMPLETED | OUTPATIENT
Start: 2022-10-11 | End: 2022-10-12

## 2022-10-11 RX ORDER — CEFAZOLIN SODIUM/WATER 2 G/20 ML
2 SYRINGE (ML) INTRAVENOUS ONCE
Status: COMPLETED | OUTPATIENT
Start: 2022-10-11 | End: 2022-10-11

## 2022-10-11 RX ORDER — DEXTROSE MONOHYDRATE 25 G/50ML
50 INJECTION, SOLUTION INTRAVENOUS
Status: DISCONTINUED | OUTPATIENT
Start: 2022-10-11 | End: 2022-10-13

## 2022-10-11 RX ORDER — BISACODYL 10 MG
10 SUPPOSITORY, RECTAL RECTAL
Status: DISCONTINUED | OUTPATIENT
Start: 2022-10-11 | End: 2022-10-13

## 2022-10-11 RX ORDER — MELATONIN
3 NIGHTLY PRN
Status: DISCONTINUED | OUTPATIENT
Start: 2022-10-11 | End: 2022-10-13

## 2022-10-11 RX ORDER — IBUPROFEN 200 MG
200 TABLET ORAL EVERY 4 HOURS PRN
Status: DISCONTINUED | OUTPATIENT
Start: 2022-10-11 | End: 2022-10-13

## 2022-10-11 RX ORDER — ONDANSETRON 2 MG/ML
4 INJECTION INTRAMUSCULAR; INTRAVENOUS EVERY 6 HOURS PRN
Status: DISCONTINUED | OUTPATIENT
Start: 2022-10-11 | End: 2022-10-13

## 2022-10-11 RX ORDER — HYDROMORPHONE HYDROCHLORIDE 1 MG/ML
1 INJECTION, SOLUTION INTRAMUSCULAR; INTRAVENOUS; SUBCUTANEOUS EVERY 30 MIN PRN
Status: DISCONTINUED | OUTPATIENT
Start: 2022-10-11 | End: 2022-10-13

## 2022-10-11 RX ORDER — NICOTINE POLACRILEX 4 MG
30 LOZENGE BUCCAL
Status: DISCONTINUED | OUTPATIENT
Start: 2022-10-11 | End: 2022-10-13

## 2022-10-11 RX ORDER — HYDROCODONE BITARTRATE AND ACETAMINOPHEN 5; 325 MG/1; MG/1
2 TABLET ORAL EVERY 4 HOURS PRN
Status: DISCONTINUED | OUTPATIENT
Start: 2022-10-11 | End: 2022-10-13

## 2022-10-11 RX ORDER — CEFAZOLIN SODIUM/WATER 2 G/20 ML
2 SYRINGE (ML) INTRAVENOUS EVERY 8 HOURS
Status: DISCONTINUED | OUTPATIENT
Start: 2022-10-12 | End: 2022-10-12

## 2022-10-11 RX ORDER — HYDROCODONE BITARTRATE AND ACETAMINOPHEN 5; 325 MG/1; MG/1
1 TABLET ORAL EVERY 4 HOURS PRN
Status: DISCONTINUED | OUTPATIENT
Start: 2022-10-11 | End: 2022-10-13

## 2022-10-11 RX ORDER — PROCHLORPERAZINE EDISYLATE 5 MG/ML
5 INJECTION INTRAMUSCULAR; INTRAVENOUS EVERY 8 HOURS PRN
Status: DISCONTINUED | OUTPATIENT
Start: 2022-10-11 | End: 2022-10-13

## 2022-10-11 RX ORDER — HYDROCODONE BITARTRATE AND ACETAMINOPHEN 5; 325 MG/1; MG/1
2 TABLET ORAL ONCE
Status: COMPLETED | OUTPATIENT
Start: 2022-10-11 | End: 2022-10-11

## 2022-10-11 NOTE — ED INITIAL ASSESSMENT (HPI)
Right labial abscess. First noticed it last Friday, has been trying compresses at home. Seen at immediate care for it. Sent for surgery consult. Given norco at immediate car.

## 2022-10-11 NOTE — ED QUICK NOTES
Orders for admission, patient is aware of plan and ready to go upstairs. Any questions, please call ED RN Laura Herrera at extension [phone]. Patient Covid vaccination status: Unvaccinated     COVID Test Ordered in ED: None    COVID Suspicion at Admission: N/ neg    Running Infusions:  None    Mental Status/LOC at time of transport: a/ox4    Other pertinent information:   CIWA score: N/A   NIH score:  N/A      abscess right labia  NPO   abx given in ER-see MAR  Pain managed w.  Prn DILAUDID- see MAR

## 2022-10-11 NOTE — ED INITIAL ASSESSMENT (HPI)
Patient has a boil on her right labia. She is in tears with the pain. It started on Friday and she tried warm compresses over the weekend, but it will not open up and drain. She has a history of boils.

## 2022-10-12 LAB
ANION GAP SERPL CALC-SCNC: 5 MMOL/L (ref 0–18)
BASOPHILS # BLD AUTO: 0.06 X10(3) UL (ref 0–0.2)
BASOPHILS NFR BLD AUTO: 0.3 %
BUN BLD-MCNC: 10 MG/DL (ref 7–18)
CALCIUM BLD-MCNC: 8.5 MG/DL (ref 8.5–10.1)
CHLORIDE SERPL-SCNC: 107 MMOL/L (ref 98–112)
CO2 SERPL-SCNC: 24 MMOL/L (ref 21–32)
CREAT BLD-MCNC: 0.72 MG/DL
EOSINOPHIL # BLD AUTO: 0.26 X10(3) UL (ref 0–0.7)
EOSINOPHIL NFR BLD AUTO: 1.4 %
ERYTHROCYTE [DISTWIDTH] IN BLOOD BY AUTOMATED COUNT: 13.2 %
GFR SERPLBLD BASED ON 1.73 SQ M-ARVRAT: 106 ML/MIN/1.73M2 (ref 60–?)
GLUCOSE BLD-MCNC: 126 MG/DL (ref 70–99)
GLUCOSE BLD-MCNC: 137 MG/DL (ref 70–99)
GLUCOSE BLD-MCNC: 138 MG/DL (ref 70–99)
GLUCOSE BLD-MCNC: 181 MG/DL (ref 70–99)
GLUCOSE BLD-MCNC: 200 MG/DL (ref 70–99)
HCT VFR BLD AUTO: 33.2 %
HGB BLD-MCNC: 10.2 G/DL
IMM GRANULOCYTES # BLD AUTO: 0.1 X10(3) UL (ref 0–1)
IMM GRANULOCYTES NFR BLD: 0.5 %
LYMPHOCYTES # BLD AUTO: 3.07 X10(3) UL (ref 1–4)
LYMPHOCYTES NFR BLD AUTO: 16.7 %
MCH RBC QN AUTO: 27.1 PG (ref 26–34)
MCHC RBC AUTO-ENTMCNC: 30.7 G/DL (ref 31–37)
MCV RBC AUTO: 88.3 FL
MONOCYTES # BLD AUTO: 1.46 X10(3) UL (ref 0.1–1)
MONOCYTES NFR BLD AUTO: 7.9 %
NEUTROPHILS # BLD AUTO: 13.44 X10 (3) UL (ref 1.5–7.7)
NEUTROPHILS # BLD AUTO: 13.44 X10(3) UL (ref 1.5–7.7)
NEUTROPHILS NFR BLD AUTO: 73.2 %
OSMOLALITY SERPL CALC.SUM OF ELEC: 283 MOSM/KG (ref 275–295)
PLATELET # BLD AUTO: 475 10(3)UL (ref 150–450)
POTASSIUM SERPL-SCNC: 3.9 MMOL/L (ref 3.5–5.1)
RBC # BLD AUTO: 3.76 X10(6)UL
SODIUM SERPL-SCNC: 136 MMOL/L (ref 136–145)
WBC # BLD AUTO: 18.4 X10(3) UL (ref 4–11)

## 2022-10-12 PROCEDURE — 99232 SBSQ HOSP IP/OBS MODERATE 35: CPT | Performed by: HOSPITALIST

## 2022-10-12 RX ORDER — DIPHENHYDRAMINE HCL 25 MG
25 CAPSULE ORAL EVERY 6 HOURS PRN
Status: DISCONTINUED | OUTPATIENT
Start: 2022-10-12 | End: 2022-10-13

## 2022-10-12 RX ORDER — VANCOMYCIN 2 GRAM/500 ML IN 0.9 % SODIUM CHLORIDE INTRAVENOUS
25 ONCE
Status: COMPLETED | OUTPATIENT
Start: 2022-10-12 | End: 2022-10-12

## 2022-10-12 RX ORDER — VANCOMYCIN HYDROCHLORIDE
1500 EVERY 12 HOURS
Status: DISCONTINUED | OUTPATIENT
Start: 2022-10-13 | End: 2022-10-13

## 2022-10-12 NOTE — PLAN OF CARE
Patient arrived from PACU. Patient alert x 4. Room air. Up with standby assist. IVF infusing. Patient medicated for pain before arrival to floor. Right vaginal incision oozing with blood. Dressing changed. Incision packed with iodoform and gauze reinforcing. Safety precautions maintained. Problem: PAIN - ADULT  Goal: Verbalizes/displays adequate comfort level or patient's stated pain goal  Description: INTERVENTIONS:  - Encourage pt to monitor pain and request assistance  - Assess pain using appropriate pain scale  - Administer analgesics based on type and severity of pain and evaluate response  - Implement non-pharmacological measures as appropriate and evaluate response  - Consider cultural and social influences on pain and pain management  - Manage/alleviate anxiety  - Utilize distraction and/or relaxation techniques  - Monitor for opioid side effects  - Notify MD/LIP if interventions unsuccessful or patient reports new pain  - Anticipate increased pain with activity and pre-medicate as appropriate  Outcome: Progressing     Problem: RISK FOR INFECTION - ADULT  Goal: Absence of fever/infection during anticipated neutropenic period  Description: INTERVENTIONS  - Monitor WBC  - Administer growth factors as ordered  - Implement neutropenic guidelines  Outcome: Progressing     Problem: SAFETY ADULT - FALL  Goal: Free from fall injury  Description: INTERVENTIONS:  - Assess pt frequently for physical needs  - Identify cognitive and physical deficits and behaviors that affect risk of falls.   - Winner fall precautions as indicated by assessment.  - Educate pt/family on patient safety including physical limitations  - Instruct pt to call for assistance with activity based on assessment  - Modify environment to reduce risk of injury  - Provide assistive devices as appropriate  - Consider OT/PT consult to assist with strengthening/mobility  - Encourage toileting schedule  Outcome: Progressing     Problem: DISCHARGE PLANNING  Goal: Discharge to home or other facility with appropriate resources  Description: INTERVENTIONS:  - Identify barriers to discharge w/pt and caregiver  - Include patient/family/discharge partner in discharge planning  - Arrange for needed discharge resources and transportation as appropriate  - Identify discharge learning needs (meds, wound care, etc)  - Arrange for interpreters to assist at discharge as needed  - Consider post-discharge preferences of patient/family/discharge partner  - Complete POLST form as appropriate  - Assess patient's ability to be responsible for managing their own health  - Refer to Case Management Department for coordinating discharge planning if the patient needs post-hospital services based on physician/LIP order or complex needs related to functional status, cognitive ability or social support system  Outcome: Progressing

## 2022-10-12 NOTE — CONSULTS
[facility] Dosing Service    Initial Pharmacokinetic Consult for Vancomycin AUC Dosing    Yadira Llanes is a 38year old patient who is being treated for cellulitis. Pharmacy has been asked to dose vancomycin by Dr. Sima Agudelo.    Weights:  Ideal body weight: 70.8 kg (156 lb 1.4 oz)  Adjusted ideal body weight: 87.8 kg (193 lb 10.4 oz)  Actual weight:  113.4 kg (250 lb)    Labs:  Lab Results   Component Value Date    CREATSERUM 0.72 10/12/2022    CREATSERUM 1.13 10/11/2022      CrCl:  Estimated Creatinine Clearance: 111.4 mL/min (based on SCr of 0.72 mg/dL). Based on the above:    1. This patient will receive a loading dose of Vancomycin  2000 mg IVPB (25mg/kg, capped at 2000 mg) x 1 dose. This will be followed by 1500 mg Q 12 hours based upon adjusted body weight of 88 kg and renal function. 2. Vancomycin peak and trough will be obtained at steady state in order to calculate AUC24. Goal AUC24 is 400-600 mg-h/L.    3. Pharmacy will order SCr as clinically indicated while on vancomycin to assess renal function. 4. Pharmacy will follow and monitor renal function, toxicity and efficacy. We appreciate the opportunity to assist in the care of this patient.     Herbie Hannah PharmD  10/12/2022  11:38 AM  [address] [place] [address] Extension: [phone]

## 2022-10-12 NOTE — CONSULTS
[facility]    PATIENT'S NAME: Nhi Toledo   ATTENDING PHYSICIAN: RUBEN Turpin: Yanely Morris M.D. PATIENT ACCOUNT#:   [de-identified]    LOCATION:  [address] [place] [address]  MEDICAL RECORD #:   [id number]       YOB: 1980  ADMISSION DATE:       10/11/2022      CONSULT DATE:  10/11/2022    REPORT OF CONSULTATION    REASON FOR CONSULTATION:  Labial abscess. HISTORY OF PRESENT ILLNESS:  The patient is a 78-year-old, para 1, who has a known history of type 2 diabetes mellitus for the last 15 to 16 years, presented to the emergency room with complaints of right labial pain. She noted this pain started approximately 5 days ago with a small boil and today when she woke up, she saw that it was more painful and was more than a boil. She states she had mild temperatures, but no documented fever or chills. Secondary to the pain, she came to the emergency for further evaluation. She was evaluated by the ER physician and noted to have a labial abscess of 3 x 2.2 x 2.1 cm. She had it drained after a CT scan was performed to make sure that it was not spreading underneath. Per ER physician, once the I and D was performed, there noted to be moderate amount of drainage which was sent for culture and also for MRSA infection. The patient does admit to history of MRSA infection in the past and had a breast abscess that was recently operated on. GYNECOLOGIC HISTORY:  Cycles irregular. She has not seen a gynecologist in quite some time. Declines any abnormal Pap smears in the past.    OBSTETRIC HISTORY:  She had 1 . PAST MEDICAL HISTORY:  History of diabetes mellitus, arthritis, B12 deficiency, kidney stones in the past, obesity and osteoarthritis. PAST SURGICAL HISTORY:  History of  x1, kidney stone removal x2, incision and drainage of left breast abscess x2, D and C x2, colonoscopy. MEDICATIONS:  Per MAR.     ALLERGIES:  No known drug allergies. SOCIAL HISTORY:  She admits to smoking cigarettes for approximately 13 years. Denies any recreational drug use. FAMILY HISTORY:  History of diabetes in the mother and maternal grandmother and father. REVIEW OF SYSTEMS:  Negative except as in the HPI. PHYSICAL EXAMINATION:    VITAL SIGNS:  Stable. Blood pressure 117/79, pulse 99, temperature 99.9. HEENT:  Grossly, normal.  NECK:  Supple. LUNGS:  Clear to auscultation bilateral.  HEART:  S1, S2 heard. PELVIC:  On external pelvic exam, right labia adenitis and indurated with redness. I and D site noted with a small amount of blood-stained discharge on the gauze. Internal pelvic exam was deferred. ASSESSMENT:  A 38year-old, para 1, with:     1. Right labial abscess. 2.   Type 2 diabetes mellitus. PLAN:    1. Patient is status post incision and drainage at the bedside. The patient is feeling better. We will await the culture results from the I and D.  2.   Continue the empiric antibiotics per the ER and the hospitalist.  3.   Type 2 diabetes mellitus: The hospitalist managing. We will follow the patient.      Dictated By Arcadio Johnson M.D.  d: 10/11/2022 23:06:57  t: 10/12/2022 01:08:19  Job [id number]  /

## 2022-10-13 VITALS
BODY MASS INDEX: 35 KG/M2 | HEART RATE: 83 BPM | WEIGHT: 250 LBS | TEMPERATURE: 99 F | DIASTOLIC BLOOD PRESSURE: 86 MMHG | OXYGEN SATURATION: 96 % | RESPIRATION RATE: 18 BRPM | SYSTOLIC BLOOD PRESSURE: 109 MMHG

## 2022-10-13 LAB
BASOPHILS # BLD AUTO: 0.1 X10(3) UL (ref 0–0.2)
BASOPHILS NFR BLD AUTO: 0.8 %
EOSINOPHIL # BLD AUTO: 0.56 X10(3) UL (ref 0–0.7)
EOSINOPHIL NFR BLD AUTO: 4.7 %
ERYTHROCYTE [DISTWIDTH] IN BLOOD BY AUTOMATED COUNT: 13.2 %
GLUCOSE BLD-MCNC: 164 MG/DL (ref 70–99)
GLUCOSE BLD-MCNC: 200 MG/DL (ref 70–99)
HCT VFR BLD AUTO: 31.1 %
HGB BLD-MCNC: 9.6 G/DL
IMM GRANULOCYTES # BLD AUTO: 0.09 X10(3) UL (ref 0–1)
IMM GRANULOCYTES NFR BLD: 0.8 %
LYMPHOCYTES # BLD AUTO: 2.57 X10(3) UL (ref 1–4)
LYMPHOCYTES NFR BLD AUTO: 21.7 %
MCH RBC QN AUTO: 26.9 PG (ref 26–34)
MCHC RBC AUTO-ENTMCNC: 30.9 G/DL (ref 31–37)
MCV RBC AUTO: 87.1 FL
MONOCYTES # BLD AUTO: 1.24 X10(3) UL (ref 0.1–1)
MONOCYTES NFR BLD AUTO: 10.5 %
NEUTROPHILS # BLD AUTO: 7.26 X10 (3) UL (ref 1.5–7.7)
NEUTROPHILS # BLD AUTO: 7.26 X10(3) UL (ref 1.5–7.7)
NEUTROPHILS NFR BLD AUTO: 61.5 %
PLATELET # BLD AUTO: 451 10(3)UL (ref 150–450)
RBC # BLD AUTO: 3.57 X10(6)UL
WBC # BLD AUTO: 11.8 X10(3) UL (ref 4–11)

## 2022-10-13 PROCEDURE — 99239 HOSP IP/OBS DSCHRG MGMT >30: CPT | Performed by: HOSPITALIST

## 2022-10-13 RX ORDER — AMOXICILLIN AND CLAVULANATE POTASSIUM 875; 125 MG/1; MG/1
1 TABLET, FILM COATED ORAL 2 TIMES DAILY
Qty: 20 TABLET | Refills: 0 | Status: SHIPPED | OUTPATIENT
Start: 2022-10-13 | End: 2022-10-23

## 2022-10-13 RX ORDER — HYDROCODONE BITARTRATE AND ACETAMINOPHEN 5; 325 MG/1; MG/1
1 TABLET ORAL EVERY 4 HOURS PRN
Qty: 30 TABLET | Refills: 0 | Status: SHIPPED | OUTPATIENT
Start: 2022-10-13

## 2022-10-13 NOTE — PLAN OF CARE
A&Ox4. VSS. RA. . Telemetry: NSR/ST  Resp: Clear bilaterally  GI: Abdomen soft, nondistended. Active bowel sounds, passing gas. : Voids. Pain controlled with PRN pain medications  Up with standby assist.  Incisions: Right labia incision with iodaform packing and gauze on top. Small amount of serous/serosanguinous drainage. Diet: 1800 ADA QID  Denies nausea. IVF running per order. All appropriate safety measures in place. All questions and concerns addressed. Will continue to monitor     Problem: PAIN - ADULT  Goal: Verbalizes/displays adequate comfort level or patient's stated pain goal  Description: INTERVENTIONS:  - Encourage pt to monitor pain and request assistance  - Assess pain using appropriate pain scale  - Administer analgesics based on type and severity of pain and evaluate response  - Implement non-pharmacological measures as appropriate and evaluate response  - Consider cultural and social influences on pain and pain management  - Manage/alleviate anxiety  - Utilize distraction and/or relaxation techniques  - Monitor for opioid side effects  - Notify MD/LIP if interventions unsuccessful or patient reports new pain  - Anticipate increased pain with activity and pre-medicate as appropriate  Outcome: Progressing     Problem: RISK FOR INFECTION - ADULT  Goal: Absence of fever/infection during anticipated neutropenic period  Description: INTERVENTIONS  - Monitor WBC  - Administer growth factors as ordered  - Implement neutropenic guidelines  Outcome: Progressing     Problem: SAFETY ADULT - FALL  Goal: Free from fall injury  Description: INTERVENTIONS:  - Assess pt frequently for physical needs  - Identify cognitive and physical deficits and behaviors that affect risk of falls.   - [place] fall precautions as indicated by assessment.  - Educate pt/family on patient safety including physical limitations  - Instruct pt to call for assistance with activity based on assessment  - Modify environment to reduce risk of injury  - Provide assistive devices as appropriate  - Consider OT/PT consult to assist with strengthening/mobility  - Encourage toileting schedule  Outcome: Progressing     Problem: DISCHARGE PLANNING  Goal: Discharge to home or other facility with appropriate resources  Description: INTERVENTIONS:  - Identify barriers to discharge w/pt and caregiver  - Include patient/family/discharge partner in discharge planning  - Arrange for needed discharge resources and transportation as appropriate  - Identify discharge learning needs (meds, wound care, etc)  - Arrange for interpreters to assist at discharge as needed  - Consider post-discharge preferences of patient/family/discharge partner  - Complete POLST form as appropriate  - Assess patient's ability to be responsible for managing their own health  - Refer to Case Management Department for coordinating discharge planning if the patient needs post-hospital services based on physician/LIP order or complex needs related to functional status, cognitive ability or social support system  Outcome: Progressing

## 2022-10-13 NOTE — PLAN OF CARE
NURSING DISCHARGE NOTE  Discharge instructions given. All questions answered to pt and family satisfaction. IV removed and intact. Prescription of abx given to pt. Discharged Home via Wheelchair. Accompanied by Family member and Support staff  Belongings Taken by patient/family.

## 2022-10-13 NOTE — PLAN OF CARE
Problem: PAIN - ADULT  Goal: Verbalizes/displays adequate comfort level or patient's stated pain goal  Description: INTERVENTIONS:  - Encourage pt to monitor pain and request assistance  - Assess pain using appropriate pain scale  - Administer analgesics based on type and severity of pain and evaluate response  - Implement non-pharmacological measures as appropriate and evaluate response  - Consider cultural and social influences on pain and pain management  - Manage/alleviate anxiety  - Utilize distraction and/or relaxation techniques  - Monitor for opioid side effects  - Notify MD/LIP if interventions unsuccessful or patient reports new pain  - Anticipate increased pain with activity and pre-medicate as appropriate  Outcome: Progressing     Problem: RISK FOR INFECTION - ADULT  Goal: Absence of fever/infection during anticipated neutropenic period  Description: INTERVENTIONS  - Monitor WBC  - Administer growth factors as ordered  - Implement neutropenic guidelines  Outcome: Progressing     Problem: SAFETY ADULT - FALL  Goal: Free from fall injury  Description: INTERVENTIONS:  - Assess pt frequently for physical needs  - Identify cognitive and physical deficits and behaviors that affect risk of falls.   - [place] fall precautions as indicated by assessment.  - Educate pt/family on patient safety including physical limitations  - Instruct pt to call for assistance with activity based on assessment  - Modify environment to reduce risk of injury  - Provide assistive devices as appropriate  - Consider OT/PT consult to assist with strengthening/mobility  - Encourage toileting schedule  Outcome: Progressing     Problem: DISCHARGE PLANNING  Goal: Discharge to home or other facility with appropriate resources  Description: INTERVENTIONS:  - Identify barriers to discharge w/pt and caregiver  - Include patient/family/discharge partner in discharge planning  - Arrange for needed discharge resources and transportation as appropriate  - Identify discharge learning needs (meds, wound care, etc)  - Arrange for interpreters to assist at discharge as needed  - Consider post-discharge preferences of patient/family/discharge partner  - Complete POLST form as appropriate  - Assess patient's ability to be responsible for managing their own health  - Refer to Case Management Department for coordinating discharge planning if the patient needs post-hospital services based on physician/LIP order or complex needs related to functional status, cognitive ability or social support system  Outcome: Progressing

## 2022-10-13 NOTE — PLAN OF CARE
Assumed care of pt @ 7630. Pt is A/Ox 4. On RA, VSS, SR/ST on tele. IVF infusing, IV abx infusing as ordered. Tolerating diet. No need for insulin coverage  Pt states pain is mild to moderate. Hurts most with movement and voiding. Up as tolerated w/ SBA  Intake/outputs WNL. Plan awaiting final cx    Updated POC with patient and family. Will continue to monitor. Problem: PAIN - ADULT  Goal: Verbalizes/displays adequate comfort level or patient's stated pain goal  Description: INTERVENTIONS:  - Encourage pt to monitor pain and request assistance  - Assess pain using appropriate pain scale  - Administer analgesics based on type and severity of pain and evaluate response  - Implement non-pharmacological measures as appropriate and evaluate response  - Consider cultural and social influences on pain and pain management  - Manage/alleviate anxiety  - Utilize distraction and/or relaxation techniques  - Monitor for opioid side effects  - Notify MD/LIP if interventions unsuccessful or patient reports new pain  - Anticipate increased pain with activity and pre-medicate as appropriate  Outcome: Progressing     Problem: RISK FOR INFECTION - ADULT  Goal: Absence of fever/infection during anticipated neutropenic period  Description: INTERVENTIONS  - Monitor WBC  - Administer growth factors as ordered  - Implement neutropenic guidelines  Outcome: Progressing     Problem: SAFETY ADULT - FALL  Goal: Free from fall injury  Description: INTERVENTIONS:  - Assess pt frequently for physical needs  - Identify cognitive and physical deficits and behaviors that affect risk of falls.   - [place] fall precautions as indicated by assessment.  - Educate pt/family on patient safety including physical limitations  - Instruct pt to call for assistance with activity based on assessment  - Modify environment to reduce risk of injury  - Provide assistive devices as appropriate  - Consider OT/PT consult to assist with strengthening/mobility  - Encourage toileting schedule  Outcome: Progressing     Problem: DISCHARGE PLANNING  Goal: Discharge to home or other facility with appropriate resources  Description: INTERVENTIONS:  - Identify barriers to discharge w/pt and caregiver  - Include patient/family/discharge partner in discharge planning  - Arrange for needed discharge resources and transportation as appropriate  - Identify discharge learning needs (meds, wound care, etc)  - Arrange for interpreters to assist at discharge as needed  - Consider post-discharge preferences of patient/family/discharge partner  - Complete POLST form as appropriate  - Assess patient's ability to be responsible for managing their own health  - Refer to Case Management Department for coordinating discharge planning if the patient needs post-hospital services based on physician/LIP order or complex needs related to functional status, cognitive ability or social support system  Outcome: Progressing

## 2022-10-14 ENCOUNTER — TELEPHONE (OUTPATIENT)
Dept: FAMILY MEDICINE CLINIC | Facility: CLINIC | Age: 42
End: 2022-10-14

## 2022-10-14 ENCOUNTER — PATIENT OUTREACH (OUTPATIENT)
Dept: CASE MANAGEMENT | Age: 42
End: 2022-10-14

## 2022-10-14 DIAGNOSIS — Z02.9 ENCOUNTERS FOR ADMINISTRATIVE PURPOSE: ICD-10-CM

## 2022-10-14 NOTE — TELEPHONE ENCOUNTER
S/w patient for TCM. She was dc'd 10/13/22 (Labial abscess). She states that she has been having nausea and this morning she vomited 3 times after taking her antibiotic. She continues to have nausea and is asking for anti nausea medication. She also states that she has not had a BM since 10/9. She states that she is passing gas and denies having any abdominal pain. She denies having any fever. She is making sure to stay hydrated. Her blood sugar during phone call was 183. Please advise    Pt also needs a TCM HFU appt by 10/20/22 as she is high risk for readmission. No available appts. Please discuss with PCP and follow up with patient.  Thank you    TCM BB Date: 10/27/22

## 2022-10-14 NOTE — PROGRESS NOTES
IRAJ s/w patient. She requested a call back stating that she just vomited after taking her antibiotic. She states that she has been having nausea since yesterday and it is finally coming out. IRAJ discussed TCC but she declined appt stating that she would rather see her PCP. IRAJ will call patient back later today.

## 2022-10-17 NOTE — TELEPHONE ENCOUNTER
Dr. Marcela Johnson,  You have no openings for a [facility],  Is it OK to schedule with Dr. Miladys Mack? Discharged 10/13/22.

## 2022-11-08 ENCOUNTER — OFFICE VISIT (OUTPATIENT)
Dept: ENDOCRINOLOGY CLINIC | Facility: CLINIC | Age: 42
End: 2022-11-08
Payer: COMMERCIAL

## 2022-11-08 VITALS
SYSTOLIC BLOOD PRESSURE: 120 MMHG | HEART RATE: 95 BPM | RESPIRATION RATE: 18 BRPM | OXYGEN SATURATION: 99 % | BODY MASS INDEX: 35 KG/M2 | WEIGHT: 249.38 LBS | DIASTOLIC BLOOD PRESSURE: 78 MMHG

## 2022-11-08 DIAGNOSIS — Z79.4 TYPE 2 DIABETES MELLITUS WITH HYPERGLYCEMIA, WITH LONG-TERM CURRENT USE OF INSULIN (HCC): Primary | ICD-10-CM

## 2022-11-08 DIAGNOSIS — E11.65 TYPE 2 DIABETES MELLITUS WITH HYPERGLYCEMIA, WITH LONG-TERM CURRENT USE OF INSULIN (HCC): Primary | ICD-10-CM

## 2022-11-08 LAB
GLUCOSE BLOOD: 238
TEST STRIP LOT #: NORMAL NUMERIC

## 2022-11-08 PROCEDURE — 95251 CONT GLUC MNTR ANALYSIS I&R: CPT | Performed by: NURSE PRACTITIONER

## 2022-11-08 PROCEDURE — 99214 OFFICE O/P EST MOD 30 MIN: CPT | Performed by: NURSE PRACTITIONER

## 2022-11-08 PROCEDURE — 3074F SYST BP LT 130 MM HG: CPT | Performed by: NURSE PRACTITIONER

## 2022-11-08 PROCEDURE — 3078F DIAST BP <80 MM HG: CPT | Performed by: NURSE PRACTITIONER

## 2022-11-08 PROCEDURE — 82947 ASSAY GLUCOSE BLOOD QUANT: CPT | Performed by: NURSE PRACTITIONER

## 2022-11-08 RX ORDER — SEMAGLUTIDE 1.34 MG/ML
0.25 INJECTION, SOLUTION SUBCUTANEOUS
Qty: 1.5 ML | Refills: 0 | COMMUNITY
Start: 2022-11-08 | End: 2022-12-06

## 2022-11-22 ENCOUNTER — PATIENT MESSAGE (OUTPATIENT)
Dept: ENDOCRINOLOGY CLINIC | Facility: CLINIC | Age: 42
End: 2022-11-22

## 2022-11-29 ENCOUNTER — OFFICE VISIT (OUTPATIENT)
Dept: PODIATRY CLINIC | Facility: CLINIC | Age: 42
End: 2022-11-29
Payer: COMMERCIAL

## 2022-11-29 DIAGNOSIS — L84 PRE-ULCERATIVE CALLUSES: Primary | ICD-10-CM

## 2022-11-29 DIAGNOSIS — E11.42 DIABETIC POLYNEUROPATHY ASSOCIATED WITH TYPE 2 DIABETES MELLITUS (HCC): ICD-10-CM

## 2022-11-29 DIAGNOSIS — M20.5X1 HALLUX LIMITUS, RIGHT: ICD-10-CM

## 2022-11-29 DIAGNOSIS — M14.672 CHARCOT'S JOINT OF FOOT, LEFT: ICD-10-CM

## 2022-11-29 PROCEDURE — 99213 OFFICE O/P EST LOW 20 MIN: CPT | Performed by: STUDENT IN AN ORGANIZED HEALTH CARE EDUCATION/TRAINING PROGRAM

## 2022-12-03 NOTE — PATIENT INSTRUCTIONS
-Ambulate with supportive diabetic shoes and inserts and avoid walking barefoot. Use urea cream to callus site between visits. Follow-up if any skin breakdown or other concerns arise. Follow-up in 2 months for reevaluation or sooner if other concerns arise.

## 2022-12-07 ENCOUNTER — OFFICE VISIT (OUTPATIENT)
Dept: ENDOCRINOLOGY CLINIC | Facility: CLINIC | Age: 42
End: 2022-12-07
Payer: COMMERCIAL

## 2022-12-07 VITALS
DIASTOLIC BLOOD PRESSURE: 82 MMHG | BODY MASS INDEX: 35 KG/M2 | WEIGHT: 250 LBS | RESPIRATION RATE: 16 BRPM | SYSTOLIC BLOOD PRESSURE: 122 MMHG | HEIGHT: 71 IN | OXYGEN SATURATION: 98 % | HEART RATE: 96 BPM

## 2022-12-07 DIAGNOSIS — E11.65 TYPE 2 DIABETES MELLITUS WITH HYPERGLYCEMIA, WITH LONG-TERM CURRENT USE OF INSULIN (HCC): Primary | ICD-10-CM

## 2022-12-07 DIAGNOSIS — Z79.4 TYPE 2 DIABETES MELLITUS WITH HYPERGLYCEMIA, WITH LONG-TERM CURRENT USE OF INSULIN (HCC): Primary | ICD-10-CM

## 2022-12-07 PROCEDURE — 3008F BODY MASS INDEX DOCD: CPT | Performed by: NURSE PRACTITIONER

## 2022-12-07 PROCEDURE — 99214 OFFICE O/P EST MOD 30 MIN: CPT | Performed by: NURSE PRACTITIONER

## 2022-12-07 PROCEDURE — 95251 CONT GLUC MNTR ANALYSIS I&R: CPT | Performed by: NURSE PRACTITIONER

## 2022-12-07 PROCEDURE — 3079F DIAST BP 80-89 MM HG: CPT | Performed by: NURSE PRACTITIONER

## 2022-12-07 PROCEDURE — 3074F SYST BP LT 130 MM HG: CPT | Performed by: NURSE PRACTITIONER

## 2022-12-07 RX ORDER — SEMAGLUTIDE 1.34 MG/ML
0.5 INJECTION, SOLUTION SUBCUTANEOUS
Qty: 1.5 ML | Refills: 3 | Status: SHIPPED | OUTPATIENT
Start: 2022-12-07

## 2022-12-07 RX ORDER — BLOOD-GLUCOSE,RECEIVER,CONT
EACH MISCELLANEOUS
COMMUNITY

## 2022-12-23 NOTE — TELEPHONE ENCOUNTER
LOV 7/21/2022    LAST LAB    LAST RX     Next OV   Future Appointments   Date Time Provider Lissett Monteiro   1/10/2023  3:45 PM ROSA Wilburn [facility] [address]   1/24/2023  3:30 PM Keysha Benites DPM [id number]         PROTOCOL NONE . Please approve if appropriate. Thanks.

## 2022-12-28 RX ORDER — INSULIN DETEMIR 100 [IU]/ML
INJECTION, SOLUTION SUBCUTANEOUS
Qty: 24 ML | Refills: 0 | Status: SHIPPED | OUTPATIENT
Start: 2022-12-28

## 2023-01-10 ENCOUNTER — OFFICE VISIT (OUTPATIENT)
Dept: ENDOCRINOLOGY CLINIC | Facility: CLINIC | Age: 43
End: 2023-01-10
Payer: COMMERCIAL

## 2023-01-10 VITALS
WEIGHT: 255.88 LBS | RESPIRATION RATE: 18 BRPM | BODY MASS INDEX: 36 KG/M2 | DIASTOLIC BLOOD PRESSURE: 84 MMHG | HEART RATE: 90 BPM | OXYGEN SATURATION: 97 % | SYSTOLIC BLOOD PRESSURE: 130 MMHG

## 2023-01-10 DIAGNOSIS — Z79.4 TYPE 2 DIABETES MELLITUS WITH HYPERGLYCEMIA, WITH LONG-TERM CURRENT USE OF INSULIN (HCC): Primary | ICD-10-CM

## 2023-01-10 DIAGNOSIS — E11.65 TYPE 2 DIABETES MELLITUS WITH HYPERGLYCEMIA, WITH LONG-TERM CURRENT USE OF INSULIN (HCC): Primary | ICD-10-CM

## 2023-01-10 LAB
CARTRIDGE LOT#: 301 NUMERIC
HEMOGLOBIN A1C: 8.5 % (ref 4.3–5.6)

## 2023-01-10 PROCEDURE — 95251 CONT GLUC MNTR ANALYSIS I&R: CPT | Performed by: NURSE PRACTITIONER

## 2023-01-10 PROCEDURE — 83036 HEMOGLOBIN GLYCOSYLATED A1C: CPT | Performed by: NURSE PRACTITIONER

## 2023-01-10 PROCEDURE — 99214 OFFICE O/P EST MOD 30 MIN: CPT | Performed by: NURSE PRACTITIONER

## 2023-01-10 PROCEDURE — 3075F SYST BP GE 130 - 139MM HG: CPT | Performed by: NURSE PRACTITIONER

## 2023-01-10 PROCEDURE — 3079F DIAST BP 80-89 MM HG: CPT | Performed by: NURSE PRACTITIONER

## 2023-01-10 RX ORDER — INSULIN DETEMIR 100 [IU]/ML
25 INJECTION, SOLUTION SUBCUTANEOUS EVERY MORNING
Qty: 24 ML | Refills: 1 | Status: SHIPPED | OUTPATIENT
Start: 2023-01-10

## 2023-01-10 RX ORDER — SEMAGLUTIDE 1.34 MG/ML
INJECTION, SOLUTION SUBCUTANEOUS
Qty: 1.5 ML | Refills: 0 | COMMUNITY
Start: 2023-01-10 | End: 2023-02-07

## 2023-01-10 RX ORDER — BLOOD SUGAR DIAGNOSTIC
STRIP MISCELLANEOUS
Qty: 100 STRIP | Refills: 3 | Status: SHIPPED | OUTPATIENT
Start: 2023-01-10 | End: 2024-01-10

## 2023-01-27 ENCOUNTER — TELEPHONE (OUTPATIENT)
Dept: INTERNAL MEDICINE CLINIC | Facility: CLINIC | Age: 43
End: 2023-01-27

## 2023-03-06 ENCOUNTER — TELEPHONE (OUTPATIENT)
Dept: FAMILY MEDICINE CLINIC | Facility: CLINIC | Age: 43
End: 2023-03-06

## 2023-04-13 ENCOUNTER — OFFICE VISIT (OUTPATIENT)
Dept: ENDOCRINOLOGY CLINIC | Facility: CLINIC | Age: 43
End: 2023-04-13
Payer: COMMERCIAL

## 2023-04-13 VITALS
RESPIRATION RATE: 18 BRPM | WEIGHT: 258.19 LBS | HEIGHT: 71 IN | SYSTOLIC BLOOD PRESSURE: 124 MMHG | BODY MASS INDEX: 36.15 KG/M2 | DIASTOLIC BLOOD PRESSURE: 76 MMHG | OXYGEN SATURATION: 100 % | HEART RATE: 94 BPM

## 2023-04-13 DIAGNOSIS — Z79.4 TYPE 2 DIABETES MELLITUS WITH HYPERGLYCEMIA, WITH LONG-TERM CURRENT USE OF INSULIN (HCC): Primary | ICD-10-CM

## 2023-04-13 DIAGNOSIS — E11.65 TYPE 2 DIABETES MELLITUS WITH HYPERGLYCEMIA, WITH LONG-TERM CURRENT USE OF INSULIN (HCC): Primary | ICD-10-CM

## 2023-04-13 LAB
CARTRIDGE LOT#: 301 NUMERIC
CREAT UR-SCNC: 90.9 MG/DL
GLUCOSE BLOOD: 223
HEMOGLOBIN A1C: 8.7 % (ref 4.3–5.6)
MICROALBUMIN UR-MCNC: 0.75 MG/DL
MICROALBUMIN/CREAT 24H UR-RTO: 8.3 UG/MG (ref ?–30)
TEST STRIP LOT #: NORMAL NUMERIC

## 2023-04-13 PROCEDURE — 82570 ASSAY OF URINE CREATININE: CPT | Performed by: NURSE PRACTITIONER

## 2023-04-13 PROCEDURE — 83036 HEMOGLOBIN GLYCOSYLATED A1C: CPT | Performed by: NURSE PRACTITIONER

## 2023-04-13 PROCEDURE — 99214 OFFICE O/P EST MOD 30 MIN: CPT | Performed by: NURSE PRACTITIONER

## 2023-04-13 PROCEDURE — 3074F SYST BP LT 130 MM HG: CPT | Performed by: NURSE PRACTITIONER

## 2023-04-13 PROCEDURE — 3008F BODY MASS INDEX DOCD: CPT | Performed by: NURSE PRACTITIONER

## 2023-04-13 PROCEDURE — 3078F DIAST BP <80 MM HG: CPT | Performed by: NURSE PRACTITIONER

## 2023-04-13 PROCEDURE — 82043 UR ALBUMIN QUANTITATIVE: CPT | Performed by: NURSE PRACTITIONER

## 2023-04-13 PROCEDURE — 82947 ASSAY GLUCOSE BLOOD QUANT: CPT | Performed by: NURSE PRACTITIONER

## 2023-04-13 RX ORDER — TIRZEPATIDE 7.5 MG/.5ML
7.5 INJECTION, SOLUTION SUBCUTANEOUS WEEKLY
Qty: 0.5 ML | Refills: 0 | Status: SHIPPED | OUTPATIENT
Start: 2023-04-13 | End: 2023-04-14

## 2023-04-13 RX ORDER — GLUCAGON 3 MG/1
3 POWDER NASAL ONCE AS NEEDED
Qty: 1 EACH | Refills: 1 | Status: SHIPPED | OUTPATIENT
Start: 2023-04-13 | End: 2023-04-13

## 2023-05-04 ENCOUNTER — APPOINTMENT (OUTPATIENT)
Dept: ENDOCRINOLOGY CLINIC | Facility: CLINIC | Age: 43
End: 2023-05-04
Payer: COMMERCIAL

## 2023-05-17 ENCOUNTER — TELEPHONE (OUTPATIENT)
Dept: ENDOCRINOLOGY CLINIC | Facility: CLINIC | Age: 43
End: 2023-05-17

## 2023-05-17 NOTE — TELEPHONE ENCOUNTER
Per my last note since pt is not wearing dexcom she was to have RN visit to place Darke pro prior to visit tomorrow. I do not see an encounter for nurse visit. If she is not using dexcom or on deanna pro that she needs rn visit for pro placement and reschedule with me.

## 2023-05-18 ENCOUNTER — OFFICE VISIT (OUTPATIENT)
Dept: ENDOCRINOLOGY CLINIC | Facility: CLINIC | Age: 43
End: 2023-05-18
Payer: COMMERCIAL

## 2023-05-18 VITALS
WEIGHT: 250 LBS | HEART RATE: 88 BPM | BODY MASS INDEX: 35 KG/M2 | SYSTOLIC BLOOD PRESSURE: 104 MMHG | DIASTOLIC BLOOD PRESSURE: 64 MMHG

## 2023-05-18 DIAGNOSIS — Z79.4 TYPE 2 DIABETES MELLITUS WITH HYPERGLYCEMIA, WITH LONG-TERM CURRENT USE OF INSULIN (HCC): Primary | ICD-10-CM

## 2023-05-18 DIAGNOSIS — E11.65 TYPE 2 DIABETES MELLITUS WITH HYPERGLYCEMIA, WITH LONG-TERM CURRENT USE OF INSULIN (HCC): Primary | ICD-10-CM

## 2023-05-18 PROCEDURE — 95251 CONT GLUC MNTR ANALYSIS I&R: CPT | Performed by: NURSE PRACTITIONER

## 2023-05-18 PROCEDURE — 3078F DIAST BP <80 MM HG: CPT | Performed by: NURSE PRACTITIONER

## 2023-05-18 PROCEDURE — 3074F SYST BP LT 130 MM HG: CPT | Performed by: NURSE PRACTITIONER

## 2023-05-18 PROCEDURE — 99214 OFFICE O/P EST MOD 30 MIN: CPT | Performed by: NURSE PRACTITIONER

## 2023-05-18 RX ORDER — BLOOD-GLUCOSE SENSOR
1 EACH MISCELLANEOUS
Qty: 2 EACH | Refills: 5 | Status: SHIPPED | OUTPATIENT
Start: 2023-05-18

## 2023-05-25 ENCOUNTER — TELEPHONE (OUTPATIENT)
Dept: ENDOCRINOLOGY CLINIC | Facility: CLINIC | Age: 43
End: 2023-05-25

## 2023-05-25 NOTE — TELEPHONE ENCOUNTER
may the free Tushar Grjordan is to much for her to maintain.   she would like the dexcom g7  Would like the 90 day supply

## 2023-05-26 RX ORDER — BLOOD-GLUCOSE SENSOR
1 EACH MISCELLANEOUS
Qty: 9 EACH | Refills: 1 | Status: SHIPPED | OUTPATIENT
Start: 2023-05-26

## 2023-05-26 RX ORDER — BLOOD-GLUCOSE,RECEIVER,CONT
1 EACH MISCELLANEOUS AS DIRECTED
Qty: 1 EACH | Refills: 0 | Status: SHIPPED | OUTPATIENT
Start: 2023-05-26

## 2023-05-26 NOTE — TELEPHONE ENCOUNTER
Call with patient, she was told dexcom is preferred by her insurance and would be cheaper than deanna. Requests to pharmacy. Asked pt to let us know if she can  and we will send her a code to share data with office.

## 2023-07-20 ENCOUNTER — OFFICE VISIT (OUTPATIENT)
Dept: ENDOCRINOLOGY CLINIC | Facility: CLINIC | Age: 43
End: 2023-07-20
Payer: COMMERCIAL

## 2023-07-20 VITALS
SYSTOLIC BLOOD PRESSURE: 123 MMHG | RESPIRATION RATE: 20 BRPM | WEIGHT: 261.38 LBS | DIASTOLIC BLOOD PRESSURE: 78 MMHG | BODY MASS INDEX: 36 KG/M2

## 2023-07-20 DIAGNOSIS — E11.65 TYPE 2 DIABETES MELLITUS WITH HYPERGLYCEMIA, WITH LONG-TERM CURRENT USE OF INSULIN (HCC): Primary | ICD-10-CM

## 2023-07-20 DIAGNOSIS — Z79.4 TYPE 2 DIABETES MELLITUS WITH HYPERGLYCEMIA, WITH LONG-TERM CURRENT USE OF INSULIN (HCC): Primary | ICD-10-CM

## 2023-07-20 LAB
CARTRIDGE LOT#: 587 NUMERIC
HEMOGLOBIN A1C: 8.3 % (ref 4.3–5.6)

## 2023-07-20 PROCEDURE — 3074F SYST BP LT 130 MM HG: CPT | Performed by: NURSE PRACTITIONER

## 2023-07-20 PROCEDURE — 99214 OFFICE O/P EST MOD 30 MIN: CPT | Performed by: NURSE PRACTITIONER

## 2023-07-20 PROCEDURE — 3078F DIAST BP <80 MM HG: CPT | Performed by: NURSE PRACTITIONER

## 2023-07-20 PROCEDURE — 83036 HEMOGLOBIN GLYCOSYLATED A1C: CPT | Performed by: NURSE PRACTITIONER

## 2023-07-20 RX ORDER — SEMAGLUTIDE 2.68 MG/ML
2 INJECTION, SOLUTION SUBCUTANEOUS WEEKLY
Qty: 3 EACH | Refills: 3 | Status: SHIPPED | OUTPATIENT
Start: 2023-07-20

## 2023-11-16 ENCOUNTER — TELEPHONE (OUTPATIENT)
Dept: ENDOCRINOLOGY CLINIC | Facility: CLINIC | Age: 43
End: 2023-11-16

## 2023-11-22 ENCOUNTER — PATIENT MESSAGE (OUTPATIENT)
Dept: ENDOCRINOLOGY CLINIC | Facility: CLINIC | Age: 43
End: 2023-11-22

## 2023-11-27 NOTE — TELEPHONE ENCOUNTER
From: Lisa Roberts  To: Yadira Llanes  Sent: 11/22/2023 8:35 AM CST  Subject: Video visit next week    Hi Yareli Rodriguez ,     You have an upcoming virtual visit with me on 11/29. In order for me to prepare for our visit I will need your glucose log. If you are using a continuous glucose monitor be sure you are streaming with our department. If you are using a  device instead of a smart phone you will need to schedule a nurse visit 1-2 days prior to your visit to have your data uploaded. If you are using a glucometer please send a 2 week log of your glucose readings through [employer] by 11/27 for review. Please have your labs done 2 days prior to our visit. If your labs are not up to date please notify the office as your appointment may need to be rescheduled. Please contact the office [phone] if you have any questions or if you need to reschedule.     Thank you,  Lisa LEE

## 2023-11-28 NOTE — TELEPHONE ENCOUNTER
This is outside my realm of knowledge - pt should likely discuss with neurology. However she is still due for labs and follow up if she still wants to keep her appt.

## 2024-01-09 ENCOUNTER — LAB ENCOUNTER (OUTPATIENT)
Dept: LAB | Age: 44
End: 2024-01-09
Attending: NURSE PRACTITIONER
Payer: COMMERCIAL

## 2024-01-09 ENCOUNTER — OFFICE VISIT (OUTPATIENT)
Dept: ENDOCRINOLOGY CLINIC | Facility: CLINIC | Age: 44
End: 2024-01-09
Payer: COMMERCIAL

## 2024-01-09 VITALS
RESPIRATION RATE: 20 BRPM | BODY MASS INDEX: 36 KG/M2 | WEIGHT: 255.38 LBS | HEART RATE: 107 BPM | SYSTOLIC BLOOD PRESSURE: 126 MMHG | DIASTOLIC BLOOD PRESSURE: 68 MMHG | OXYGEN SATURATION: 100 %

## 2024-01-09 DIAGNOSIS — Z79.4 TYPE 2 DIABETES MELLITUS WITH HYPERGLYCEMIA, WITH LONG-TERM CURRENT USE OF INSULIN (HCC): Primary | ICD-10-CM

## 2024-01-09 DIAGNOSIS — Z79.4 TYPE 2 DIABETES MELLITUS WITH HYPERGLYCEMIA, WITH LONG-TERM CURRENT USE OF INSULIN (HCC): ICD-10-CM

## 2024-01-09 DIAGNOSIS — E11.65 TYPE 2 DIABETES MELLITUS WITH HYPERGLYCEMIA, WITH LONG-TERM CURRENT USE OF INSULIN (HCC): ICD-10-CM

## 2024-01-09 DIAGNOSIS — E11.65 TYPE 2 DIABETES MELLITUS WITH HYPERGLYCEMIA, WITH LONG-TERM CURRENT USE OF INSULIN (HCC): Primary | ICD-10-CM

## 2024-01-09 LAB
ALBUMIN SERPL-MCNC: 3.1 G/DL (ref 3.4–5)
ALBUMIN/GLOB SERPL: 0.6 {RATIO} (ref 1–2)
ALP LIVER SERPL-CCNC: 84 U/L
ALT SERPL-CCNC: 17 U/L
ANION GAP SERPL CALC-SCNC: 1 MMOL/L (ref 0–18)
AST SERPL-CCNC: 7 U/L (ref 15–37)
BILIRUB SERPL-MCNC: 0.8 MG/DL (ref 0.1–2)
BUN BLD-MCNC: 8 MG/DL (ref 9–23)
CALCIUM BLD-MCNC: 9.1 MG/DL (ref 8.5–10.1)
CARTRIDGE LOT#: 114 NUMERIC
CHLORIDE SERPL-SCNC: 107 MMOL/L (ref 98–112)
CHOLEST SERPL-MCNC: 146 MG/DL (ref ?–200)
CO2 SERPL-SCNC: 28 MMOL/L (ref 21–32)
CREAT BLD-MCNC: 0.72 MG/DL
CREAT UR-SCNC: 53.3 MG/DL
EGFRCR SERPLBLD CKD-EPI 2021: 105 ML/MIN/1.73M2 (ref 60–?)
FASTING PATIENT LIPID ANSWER: YES
FASTING STATUS PATIENT QL REPORTED: YES
GLOBULIN PLAS-MCNC: 5 G/DL (ref 2.8–4.4)
GLUCOSE BLD-MCNC: 123 MG/DL (ref 70–99)
GLUCOSE BLOOD: 157
HDLC SERPL-MCNC: 35 MG/DL (ref 40–59)
HEMOGLOBIN A1C: 8.7 % (ref 4.3–5.6)
LDLC SERPL CALC-MCNC: 94 MG/DL (ref ?–100)
MICROALBUMIN UR-MCNC: 0.75 MG/DL
MICROALBUMIN/CREAT 24H UR-RTO: 14.1 UG/MG (ref ?–30)
NONHDLC SERPL-MCNC: 111 MG/DL (ref ?–130)
OSMOLALITY SERPL CALC.SUM OF ELEC: 282 MOSM/KG (ref 275–295)
POTASSIUM SERPL-SCNC: 4.7 MMOL/L (ref 3.5–5.1)
PROT SERPL-MCNC: 8.1 G/DL (ref 6.4–8.2)
SODIUM SERPL-SCNC: 136 MMOL/L (ref 136–145)
TEST STRIP LOT #: 230 NUMERIC
TRIGL SERPL-MCNC: 89 MG/DL (ref 30–149)
TSI SER-ACNC: 0.65 MIU/ML (ref 0.36–3.74)
VLDLC SERPL CALC-MCNC: 15 MG/DL (ref 0–30)

## 2024-01-09 PROCEDURE — 80053 COMPREHEN METABOLIC PANEL: CPT

## 2024-01-09 PROCEDURE — 83036 HEMOGLOBIN GLYCOSYLATED A1C: CPT | Performed by: NURSE PRACTITIONER

## 2024-01-09 PROCEDURE — 80061 LIPID PANEL: CPT

## 2024-01-09 PROCEDURE — 82947 ASSAY GLUCOSE BLOOD QUANT: CPT | Performed by: NURSE PRACTITIONER

## 2024-01-09 PROCEDURE — 3078F DIAST BP <80 MM HG: CPT | Performed by: NURSE PRACTITIONER

## 2024-01-09 PROCEDURE — 82043 UR ALBUMIN QUANTITATIVE: CPT

## 2024-01-09 PROCEDURE — 3074F SYST BP LT 130 MM HG: CPT | Performed by: NURSE PRACTITIONER

## 2024-01-09 PROCEDURE — 36415 COLL VENOUS BLD VENIPUNCTURE: CPT

## 2024-01-09 PROCEDURE — 84443 ASSAY THYROID STIM HORMONE: CPT

## 2024-01-09 PROCEDURE — 99215 OFFICE O/P EST HI 40 MIN: CPT | Performed by: NURSE PRACTITIONER

## 2024-01-09 PROCEDURE — 82570 ASSAY OF URINE CREATININE: CPT

## 2024-01-09 RX ORDER — EMPAGLIFLOZIN 25 MG/1
25 TABLET, FILM COATED ORAL DAILY
Qty: 90 TABLET | Refills: 1 | Status: SHIPPED | OUTPATIENT
Start: 2024-01-09

## 2024-01-09 RX ORDER — ACYCLOVIR 400 MG/1
1 TABLET ORAL
Qty: 9 EACH | Refills: 1 | Status: SHIPPED | OUTPATIENT
Start: 2024-01-09

## 2024-01-09 RX ORDER — SEMAGLUTIDE 2.68 MG/ML
2 INJECTION, SOLUTION SUBCUTANEOUS WEEKLY
Qty: 9 ML | Refills: 1 | Status: SHIPPED | OUTPATIENT
Start: 2024-01-09

## 2024-01-09 NOTE — PATIENT INSTRUCTIONS
Summary from visit:   Last A1c value was 8.7% done 1/9/2024.    Diabetes Medications:   Continue:   Ozempic 2 mg weekly    Change:  Start Jardiance 25 mg daily in am    You have been prescribed a class of medication known as SGLT2 (sodium-glucose cotransporter 2 inhibitor). This medication will remove extra sugar out of blood into your urine. This medication has a positive effect on blood pressure, weight and most importantly blood sugars.     While taking this medication please remember:     It can be dosed anytime of day but morning is probably best time to take it due to increase in urination effect.  2. Please drink at least 4 glasses of water daily to avoid dehydration.   3. It does not cause low blood sugars (hypoglycemia). If you develop any low blood sugars, we will need to adjust other medications.   4. Please call me if you develop any symptoms of urinary tract infection or yeast infection.   5. A potential side effect of euglycemic diabetic ketoacidosis can occur in Type 1 Diabetes patients on this medication or in Type 2 Diabetes patients following a very low carb diet.   When you are eating lower than 45 gm CHO per day, you are placing yourself in ketosis mode; burning fat down for energy. A very-low-carbohydrate or ketogenic diet is not recommended in combination with an SGLT2 inhibitor as this could increase the production of ketones, particularly during times of illness, dehydration, and/or metabolic stress, leading to diabetes ketoacidosis.         In order for me to determine any patterns in your blood sugars, you will need to test your blood sugar 1-2 times daily.   You must complete labs prior to your next visit. Your last labs are from 2022 - over a year overdue.   Return in about 1 month (around 2/9/2024) for follow up.     Remember to bring your glucose meter or blood sugar logbook to every appointment here at the diabetes center. This allows me to safely make adjustments to your diabetes  plan.   It is important to take all of your medications as prescribed. Please call me if you cannot get the prescriptions filled or are having issues with refills.   Also, please call me if you have any issues with medication questions, side effects, dosing questions or problems with your blood sugar trends BEFORE CHANGING OR STOPPING ANY MEDICATIONS.  ---------------------------------------------------------------------------------------------------------------------------------------------------    Reminders:  The A1C:  The A1C test provides us with your average blood sugar for the past 3 months. Keeping an A1C less than 7% helps reduce or delay health problems that are related to diabetes.   The main goal of diabetes treatment is to keep your sugar from going too high to prevent or delay complications from the disease as well as maintain your quality of life.   For most people the target for A1C is less than 7.0% but sometimes we make exceptions based on age, health history and other factors.     Blood sugar targets:  It would be best to change up the times of day that you are testing your sugar.   Recommended times to test: Before breakfast (fasting) and then alternate testing blood sugar 2 hours after your meals.   Before breakfast:   (preferably less than 110)  2 hours After meals: less than 180 (preferably less than 150)   Call for persistent blood sugars less than  75 or more than  200.   Blood sugars greater than 200 are not acceptable to reach your goal of improving diabetes      Hypoglycemia:    Watch for low blood sugars: (less than 70)  Symptoms of low blood sugar:   Shakiness or dizziness  Cold, clammy skin or sweating  Feeling hungry  Headache  Nervousness  A hard, fast heartbeat  Weakness  Confusion or irritability  Blurred eyesight  Having nightmares or waking up confused or sweating  Numbness or tingling in the lips or tongue    Treatment of Low Blood sugar Action Plan  1. Check blood glucose  to be sure that it is low. You can’t always go by symptoms. If in doubt, treat your low blood glucose anyway.  2. Take 15 grams of carbohydrate (carb). Here are some choices:  4 oz. regular fruit juice  3-4 glucose tablets  6 oz. regular soda   7-8 jelly beans  3. Recheck blood glucose after 10-15 minutes. If blood glucose is still low (less than 70 mg/dl) repeat the treatment (step 2).  4. If your next meal is more than one hour away, eat a small snack.  5. If you’re not sure what caused your low blood glucose, call your healthcare provider.  6. Always check your blood glucose before you drive       Diabetes Testin. LABS: It is important to monitor your kidney function (blood and urine protein levels) , liver function tests and cholesterol levels when you have diabetes. If your primary care provider has not ordered these tests, I will order them for you.   2. FOOT exams:  daily foot inspections for foot wounds or skin changes are important for foot care. Any unusual changes should be reported immediately.  3. EYE exams: Checking your eyes for diabetes changes is important and you should have a dilated eye exam done by an eye doctor EVERY year since these changes occur in the BACK of the eye and not visible by you.         Diabetes Center Refill policy:     Allow 2-3 business days for refills  Contact your pharmacy at least 5 days prior to running out of medication and have them send an electronic request or submit request through the “request refill” option in your [employer] account.  Refills are not addressed after hours or on weekends; covering providers  do not authorize routine medications on weekends.  If your prescription is due for a refill, you may be due for a follow up appointment. This may impact the ability for you to get a 90 supply if requested.   To best provide you care, patients receiving routine medications need to be seen at least twice per year however if the A1C is above 8% you will be  need to be seen more frequently.   Yearly blood work may also required for many medications to insure safe prescribing. If you are due for labs, you will have 30 days to complete the  requested labs before future refills are authorized.   In the event that your preferred pharmacy does not have the requested medication in stock (e.g. Backordered), it is your responsibility to find another pharmacy that has the requested medication available.  We will gladly send a new prescription to that pharmacy at your request.       More and more people are living long and healthy lives with diabetes. We are here to help you manage your diabetes. Let’s work together to make a plan that you can balance in your daily life. Please continue with your primary care physician/provider for your routine health care maintenance.   Thank you,   Candy Hanley [facility]
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
## 2024-01-23 ENCOUNTER — OFFICE VISIT (OUTPATIENT)
Dept: NEUROLOGY | Facility: CLINIC | Age: 44
End: 2024-01-23
Payer: COMMERCIAL

## 2024-01-23 ENCOUNTER — TELEPHONE (OUTPATIENT)
Dept: PHYSICAL THERAPY | Facility: HOSPITAL | Age: 44
End: 2024-01-23

## 2024-01-23 VITALS
DIASTOLIC BLOOD PRESSURE: 70 MMHG | BODY MASS INDEX: 35 KG/M2 | RESPIRATION RATE: 16 BRPM | HEART RATE: 99 BPM | SYSTOLIC BLOOD PRESSURE: 132 MMHG | WEIGHT: 252.31 LBS

## 2024-01-23 DIAGNOSIS — G62.9 POLYNEUROPATHY: Primary | ICD-10-CM

## 2024-01-23 PROCEDURE — 99205 OFFICE O/P NEW HI 60 MIN: CPT | Performed by: OTHER

## 2024-01-23 PROCEDURE — 3078F DIAST BP <80 MM HG: CPT | Performed by: OTHER

## 2024-01-23 PROCEDURE — 3075F SYST BP GE 130 - 139MM HG: CPT | Performed by: OTHER

## 2024-01-23 RX ORDER — GABAPENTIN 300 MG/1
CAPSULE ORAL
Qty: 90 CAPSULE | Refills: 11 | Status: CANCELLED | OUTPATIENT
Start: 2024-01-23

## 2024-01-23 NOTE — PROGRESS NOTES
Neurology New History & Physical    CHIEF COMPLAINT / REASON FOR VISIT:    Chief Complaint   Patient presents with    Neuropathy     Pt states having neuropathy in both feet. Pt saw Dr. Stephens in 2019 and states has worsened and not able to drive. Pt accompanied by spouse Emiliano     HISTORY OBTAINED FROM:  Patient and others as above  Data review (see below)    HISTORY OF PRESENT ILLNESS:  Leonie Denney is a 43 year old female who used to see Dr. Stephens here in Neurology for neuropathy (last seen Dec 2019).  She has history of DM-2 and presented to him with bilateral thigh burning.  Also has history of B12 deficiency.  EMG was terminated early but showed severe sensorimotor axonal polyneuropathy.    Main issue is loss of sensation in the feet and inability to drive.  Mostly she just deals with the pain, only takes tylenol rarely.  Strength is not an issue, but endurance and balance are issues.  She may fall forward if she leans to far (last fall was several years).  She is very cautious with her walking, feet, and balance.  Does not use cane or assistive devices.  She cannot close eyes or look forward when standing/walking, has to look at her feet.    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    No cancer or chemo history.  Has not driven in 5 months.    Previous medication trials:  GBP - made her sleepy    DATA REVIEWED:  As documented in the HPI    Jan 2024  CMP, TSH unremarkable  A1c 8.7    July 2023  A1c 8.3    April 2022  SPEP no M spine but had high kappa and lambda with normal ratio (likely due to high creatinine at the time)    Dec 2020  B12 353    NEUROLOGICAL FAMILY HISTORY:  Mother has diabetic neuropathy    PAST MEDICAL HISTORY:  Past Medical History:   Diagnosis Date    Abdominal pain     Anxiety state     Arthritis     B12 deficiency 11/18/2019    Blurred  vision     Calculus of kidney     Constipation     Constipation     Decorative tattoo     Depression     no meds- will see therapist    Diabetes mellitus (HCC)     Frequent use of laxatives     Frequent UTI     Frequent UTI     Irregular bowel habits     Itch of skin     Leg swelling     Loss of appetite     Nausea     Neuropathy     Night sweats     Obesity     Osteoarthritis     knees    Sleep disturbance     Type II or unspecified type diabetes mellitus without mention of complication, not stated as uncontrolled     on insulin currently/recently    Vitamin D deficiency 2019    Weight loss        PAST SURGICAL HISTORY:  Past Surgical History:   Procedure Laterality Date    COLONOSCOPY N/A 2019    Procedure: COLONOSCOPY;  Surgeon: Tyrel Osborn DO;  Location:  ENDOSCOPY    DILATION/CURETTAGE,DIAGNOSTIC      x2    HC  SECTION LEVEL I      x1    KIDNEY SURGERY      KIDNEY STONE REMOVAL X2    OTHER SURGICAL HISTORY  04/10/2022    INCISION AND DRAINAGE LEFT BREAST ABSCESS X 2       SOCIAL HISTORY:  Social History     Socioeconomic History    Marital status:    Tobacco Use    Smoking status: Every Day     Packs/day: .25     Types: Cigarettes     Start date:     Smokeless tobacco: Never    Tobacco comments:     Working towards quitting   Vaping Use    Vaping Use: Never used   Substance and Sexual Activity    Alcohol use: No    Drug use: No     Comment: CBD oil on occasion   Other Topics Concern    Caffeine Concern No    Exercise No    Seat Belt Yes    Special Diet No    Stress Concern No    Weight Concern No       ALLERGIES:  No Known Allergies    CURRENT MEDICATIONS:   Empagliflozin (JARDIANCE) 25 MG Oral Tab Take 25 mg by mouth daily. 90 tablet 1    semaglutide (OZEMPIC, 2 MG/DOSE,) 8 MG/3ML Subcutaneous Solution Pen-injector Inject 2 mg into the skin once a week. 9 mL 1    Continuous Blood Gluc Sensor (DEXCOM G7 SENSOR) Does not apply Misc 1 each Every 10 days. 9 each 1     teraconazole 0.4 % Vaginal Cream Place vaginally every 4 (four) hours. AT BEDTIME         REVIEW OF SYSTEMS:  Patient did not have additional neurological, psychiatric, respiratory, cardiovascular, gastrointestinal, or genitourinary symptoms.    PHYSICAL EXAMINATION:  /70 (BP Location: Right arm, Patient Position: Sitting, Cuff Size: adult)   Pulse 99   Resp 16   Wt 252 lb 4.8 oz (114.4 kg)   BMI 35.19 kg/m²     Gen: WDWN, in NAD  MSE: AAOx3, nl language, nl attn/conc, nl fund of knowledge  CN: II - PERRL, VFF; Ill, IV, VI - EOMI, no nystagmus; V - nl facial sensation bilaterally; VII - nl facial mvmt bilaterally; VIII - nl hearing bilaterally; IX - nl palate elevation bilaterally; X - nl voice; XI - nl shoulder shrug b/I; XII - nl tongue movement  Motor: 5/5 x4 except 4/5 bilateral dorsiflexion; no drift; normal tone; no abnormal movements  Sensory: decreased temp and vibration below right mid shin and left ankle  Coord: nl FTN b/I  Reflex: trace bilaterally in upper and absent bilaterally lower extremities  Gait: normal gait, positive Romberg     ASSESSMENT & PLAN:      ICD-10-CM    1. Polyneuropathy  G62.9 Vitamin B12 with Reflex to MMA     Vitamin B1 (Thiamine), Blood     Vitamin B6     Monoclonal Protein Study     OP REFERRAL TO [facility]     OP REFERRAL TO [facility]        Polyneuropathy likely due to long standing DM, but will check B12, SPEP given past abnormalities.  She is well aware of the guarded prognosis and the need to work on strict glucose control.  Advised she needs to work on DM control as to  improve long term prognosis, of which she is quite aware.  Recommended PT.    I advised that I would not recommend driving with her feet due to numbness at this time.  I advised that we should have OT driving eval after starting PT, and then consider hand controls if she does not pass the driving eval.      Regarding work, I advised that I can recommend  intermittent FMLA for PT and medical appts, but accommodations for duties would need functional assessment through PT, as I do not make those type of recommendations.  She asked for work accommodations for needing medication and food allowed to be kept with her - I advised she would need to d/w PCP, as she is not asking for neuropathy related accommodations.    Return in about 3 months (around 4/23/2024).       To summarize medical decision making:  Problems:  I addressed at least 1 or more chronic problems with exacerbations or side effects  Data:  Moderate (any 1 category).  I reviewed test results, ordered tests, (at least 3 unique of the above), obtained information from independent historian      I spent 59 minutes on the day of the encounter related to this visit, not including separately reported activities or procedures.    This consultation was requested by Cnady Hanley for evaluation of above concerns.  Report is being routed to requesting provider.    Itzel Guthrie MD, FAES  Board-Certified in Neurology, Epilepsy, and Clinical Neurophysiology  [facility]

## 2024-01-23 NOTE — PATIENT INSTRUCTIONS
Refill policies:    Allow 2-3 business days for refills; controlled substances may take longer.  Contact your pharmacy at least 5 days prior to running out of medication and have them send an electronic request or submit request through the “request refill” option in your [employer] account.  Refills are not addressed on weekends; covering physicians do not authorize routine medications on weekends.  No narcotics or controlled substances are refilled after noon on Fridays or by on call physicians.  By law, narcotics must be electronically prescribed.  A 30 day supply with no refills is the maximum allowed.  If your prescription is due for a refill, you may be due for a follow up appointment.  To best provide you care, patients receiving routine medications need to be seen at least once a year.  Patients receiving narcotic/controlled substance medications need to be seen at least once every 3 months.  In the event that your preferred pharmacy does not have the requested medication in stock (e.g. Backordered), it is your responsibility to find another pharmacy that has the requested medication available.  We will gladly send a new prescription to that pharmacy at your request.    Scheduling Tests:    If your physician has ordered radiology tests such as MRI or CT scans, please contact Central Scheduling at [phone] right away to schedule the test.  Once scheduled, the [facility] Centralized Referral Team will work with your insurance carrier to obtain pre-certification or prior authorization.  Depending on your insurance carrier, approval may take 3-10 days.  It is highly recommended patients assure they have received an authorization before having a test performed.  If test is done without insurance authorization, patient may be responsible for the entire amount billed.      Precertification and Prior Authorizations:  If your physician has recommended that you have a procedure or additional testing performed the [facility]  Centralized Referral Team will contact your insurance carrier to obtain pre-certification or prior authorization.    You are strongly encouraged to contact your insurance carrier to verify that your procedure/test has been approved and is a COVERED benefit.  Although the [facility] Centralized Referral Team does its due diligence, the insurance carrier gives the disclaimer that \"Although the procedure is authorized, this does not guarantee payment.\"    Ultimately the patient is responsible for payment.   Thank you for your understanding in this matter.  Paperwork Completion:  If you require FMLA or disability paperwork for your recovery, please make sure to either drop it off or have it faxed to our office at [phone]. Be sure the form has your name and date of birth on it.  The form will be faxed to our Forms Department and they will complete it for you.  There is a 25$ fee for all forms that need to be filled out.  Please be aware there is a 10-14 day turnaround time.  You will need to sign a release of information (KARIN) form if your paperwork does not come with one.  You may call the Forms Department with any questions at [phone].  Their fax number is [phone].

## 2024-01-24 ENCOUNTER — TELEPHONE (OUTPATIENT)
Dept: PHYSICAL THERAPY | Facility: HOSPITAL | Age: 44
End: 2024-01-24

## 2024-01-26 ENCOUNTER — TELEPHONE (OUTPATIENT)
Dept: PHYSICAL THERAPY | Facility: HOSPITAL | Age: 44
End: 2024-01-26

## 2024-01-27 ENCOUNTER — LABORATORY ENCOUNTER (OUTPATIENT)
Dept: LAB | Age: 44
End: 2024-01-27
Attending: Other
Payer: COMMERCIAL

## 2024-01-27 DIAGNOSIS — G62.9 POLYNEUROPATHY: ICD-10-CM

## 2024-01-27 LAB — VIT B12 SERPL-MCNC: 224 PG/ML (ref 193–986)

## 2024-01-27 PROCEDURE — 83521 IG LIGHT CHAINS FREE EACH: CPT

## 2024-01-27 PROCEDURE — 84165 PROTEIN E-PHORESIS SERUM: CPT

## 2024-01-27 PROCEDURE — 83921 ORGANIC ACID SINGLE QUANT: CPT

## 2024-01-27 PROCEDURE — 84207 ASSAY OF VITAMIN B-6: CPT

## 2024-01-27 PROCEDURE — 36415 COLL VENOUS BLD VENIPUNCTURE: CPT

## 2024-01-27 PROCEDURE — 82607 VITAMIN B-12: CPT

## 2024-01-27 PROCEDURE — 84425 ASSAY OF VITAMIN B-1: CPT

## 2024-01-27 PROCEDURE — 86334 IMMUNOFIX E-PHORESIS SERUM: CPT

## 2024-01-29 ENCOUNTER — PATIENT OUTREACH (OUTPATIENT)
Dept: CASE MANAGEMENT | Age: 44
End: 2024-01-29

## 2024-01-29 ENCOUNTER — TELEPHONE (OUTPATIENT)
Dept: FAMILY MEDICINE CLINIC | Facility: CLINIC | Age: 44
End: 2024-01-29

## 2024-01-29 NOTE — PROCEDURES
The office order for PCP removal request is Approved and finalized on January 29, 2024.    Thanks,  [facility] Team

## 2024-01-30 ENCOUNTER — APPOINTMENT (OUTPATIENT)
Dept: PHYSICAL THERAPY | Facility: HOSPITAL | Age: 44
End: 2024-01-30
Attending: Other
Payer: COMMERCIAL

## 2024-01-30 LAB
ALBUMIN SERPL ELPH-MCNC: 3.06 G/DL (ref 3.75–5.21)
ALBUMIN/GLOB SERPL: 0.74 {RATIO} (ref 1–2)
ALPHA1 GLOB SERPL ELPH-MCNC: 0.5 G/DL (ref 0.19–0.46)
ALPHA2 GLOB SERPL ELPH-MCNC: 0.81 G/DL (ref 0.48–1.05)
B-GLOBULIN SERPL ELPH-MCNC: 1.16 G/DL (ref 0.68–1.23)
GAMMA GLOB SERPL ELPH-MCNC: 1.67 G/DL (ref 0.62–1.7)
KAPPA LC FREE SER-MCNC: 5.25 MG/DL (ref 0.33–1.94)
KAPPA LC FREE/LAMBDA FREE SER NEPH: 1.09 {RATIO} (ref 0.26–1.65)
LAMBDA LC FREE SERPL-MCNC: 4.83 MG/DL (ref 0.57–2.63)
PROT SERPL-MCNC: 7.2 G/DL (ref 5.7–8.2)

## 2024-02-02 ENCOUNTER — APPOINTMENT (OUTPATIENT)
Dept: PHYSICAL THERAPY | Facility: HOSPITAL | Age: 44
End: 2024-02-02
Attending: Other
Payer: COMMERCIAL

## 2024-02-03 LAB
METHYLMALONIC ACID: 133 NMOL/L
VITAMIN B6: 9 UG/L

## 2024-02-04 DIAGNOSIS — Z79.4 TYPE 2 DIABETES MELLITUS WITH HYPERGLYCEMIA, WITH LONG-TERM CURRENT USE OF INSULIN (HCC): ICD-10-CM

## 2024-02-04 DIAGNOSIS — E11.65 TYPE 2 DIABETES MELLITUS WITH HYPERGLYCEMIA, WITH LONG-TERM CURRENT USE OF INSULIN (HCC): ICD-10-CM

## 2024-02-05 LAB — VITAMIN B1 WHOLE BLD: 68.1 NMOL/L

## 2024-02-05 RX ORDER — SEMAGLUTIDE 2.68 MG/ML
2 INJECTION, SOLUTION SUBCUTANEOUS WEEKLY
Qty: 3 ML | Refills: 0 | Status: SHIPPED | OUTPATIENT
Start: 2024-02-05

## 2024-02-05 NOTE — TELEPHONE ENCOUNTER
Requested Prescriptions     Pending Prescriptions Disp Refills    OZEMPIC, 2 MG/DOSE, 8 MG/3ML Subcutaneous Solution Pen-injector [Pharmacy Med Name: OZEMPIC 2MG PER DOSE (8MG/3ML) PFP] 3 mL 0     Sig: INJECT 2MG INTO THE SKIN ONCE A WEEK     Your appointments       Date & Time Appointment Department ([place])    Feb 20, 2024 10:00 AM CST Diabetes Pump follow up with Candy Hanley APRN [address] ([facility] [place])        Apr 24, 2024 10:00 AM CDT Follow Up Visit with Itzel Guthrie MD [facility] ([facility])              [address] [place]  [address] [place] [address]  [facility] [id number] [phone] [address]  [facility] [id number] [phone]          Last A1c value was 8.7% done 1/9/2024.    Refill 1/09/24  LOV 1/09/24

## 2024-02-06 ENCOUNTER — APPOINTMENT (OUTPATIENT)
Dept: PHYSICAL THERAPY | Facility: HOSPITAL | Age: 44
End: 2024-02-06
Attending: Other
Payer: COMMERCIAL

## 2024-02-09 ENCOUNTER — APPOINTMENT (OUTPATIENT)
Dept: PHYSICAL THERAPY | Facility: HOSPITAL | Age: 44
End: 2024-02-09
Attending: Other
Payer: COMMERCIAL

## 2024-03-28 ENCOUNTER — TELEPHONE (OUTPATIENT)
Dept: ENDOCRINOLOGY CLINIC | Facility: CLINIC | Age: 44
End: 2024-03-28

## 2024-03-28 NOTE — TELEPHONE ENCOUNTER
Received fax from [employer] with therapeutic alert that pt has diabetes and is not on statin, please advise

## 2024-03-30 ENCOUNTER — APPOINTMENT (OUTPATIENT)
Dept: GENERAL RADIOLOGY | Facility: HOSPITAL | Age: 44
End: 2024-03-30
Attending: EMERGENCY MEDICINE
Payer: COMMERCIAL

## 2024-03-30 ENCOUNTER — HOSPITAL ENCOUNTER (INPATIENT)
Facility: HOSPITAL | Age: 44
LOS: 6 days | Discharge: HOME HEALTH | End: 2024-04-05
Attending: EMERGENCY MEDICINE | Admitting: INTERNAL MEDICINE
Payer: COMMERCIAL

## 2024-03-30 ENCOUNTER — HOSPITAL ENCOUNTER (OUTPATIENT)
Age: 44
Discharge: ANOTHER HOSPITAL | End: 2024-03-30
Payer: COMMERCIAL

## 2024-03-30 VITALS
RESPIRATION RATE: 20 BRPM | TEMPERATURE: 100 F | SYSTOLIC BLOOD PRESSURE: 95 MMHG | HEART RATE: 116 BPM | OXYGEN SATURATION: 100 % | DIASTOLIC BLOOD PRESSURE: 65 MMHG

## 2024-03-30 DIAGNOSIS — L02.619 CELLULITIS AND ABSCESS OF FOOT: ICD-10-CM

## 2024-03-30 DIAGNOSIS — L03.119 CELLULITIS AND ABSCESS OF FOOT: ICD-10-CM

## 2024-03-30 DIAGNOSIS — L03.031 CELLULITIS OF TOE OF RIGHT FOOT: Primary | ICD-10-CM

## 2024-03-30 DIAGNOSIS — A41.9 SEPSIS, DUE TO UNSPECIFIED ORGANISM, UNSPECIFIED WHETHER ACUTE ORGAN DYSFUNCTION PRESENT (HCC): Primary | ICD-10-CM

## 2024-03-30 LAB
ALBUMIN SERPL-MCNC: 2.5 G/DL (ref 3.4–5)
ALBUMIN/GLOB SERPL: 0.4 {RATIO} (ref 1–2)
ALP LIVER SERPL-CCNC: 105 U/L
ALT SERPL-CCNC: 20 U/L
ANION GAP SERPL CALC-SCNC: 4 MMOL/L (ref 0–18)
APTT PPP: 57.6 SECONDS (ref 23.3–35.6)
AST SERPL-CCNC: 7 U/L (ref 15–37)
BASOPHILS # BLD AUTO: 0.06 X10(3) UL (ref 0–0.2)
BASOPHILS NFR BLD AUTO: 0.2 %
BILIRUB SERPL-MCNC: 0.8 MG/DL (ref 0.1–2)
BUN BLD-MCNC: 8 MG/DL (ref 9–23)
CALCIUM BLD-MCNC: 9.5 MG/DL (ref 8.5–10.1)
CHLORIDE SERPL-SCNC: 101 MMOL/L (ref 98–112)
CO2 SERPL-SCNC: 29 MMOL/L (ref 21–32)
CREAT BLD-MCNC: 1.05 MG/DL
EGFRCR SERPLBLD CKD-EPI 2021: 67 ML/MIN/1.73M2 (ref 60–?)
EOSINOPHIL # BLD AUTO: 0.12 X10(3) UL (ref 0–0.7)
EOSINOPHIL NFR BLD AUTO: 0.5 %
ERYTHROCYTE [DISTWIDTH] IN BLOOD BY AUTOMATED COUNT: 14.2 %
GLOBULIN PLAS-MCNC: 6 G/DL (ref 2.8–4.4)
GLUCOSE BLD-MCNC: 182 MG/DL (ref 70–99)
GLUCOSE BLD-MCNC: 216 MG/DL (ref 70–99)
HCT VFR BLD AUTO: 33.5 %
HGB BLD-MCNC: 10.8 G/DL
IMM GRANULOCYTES # BLD AUTO: 0.18 X10(3) UL (ref 0–1)
IMM GRANULOCYTES NFR BLD: 0.7 %
INR BLD: 1.2 (ref 0.8–1.2)
LACTATE SERPL-SCNC: 1.4 MMOL/L (ref 0.4–2)
LYMPHOCYTES # BLD AUTO: 2.48 X10(3) UL (ref 1–4)
LYMPHOCYTES NFR BLD AUTO: 9.6 %
MCH RBC QN AUTO: 26.7 PG (ref 26–34)
MCHC RBC AUTO-ENTMCNC: 32.2 G/DL (ref 31–37)
MCV RBC AUTO: 82.9 FL
MONOCYTES # BLD AUTO: 1.74 X10(3) UL (ref 0.1–1)
MONOCYTES NFR BLD AUTO: 6.7 %
NEUTROPHILS # BLD AUTO: 21.24 X10 (3) UL (ref 1.5–7.7)
NEUTROPHILS # BLD AUTO: 21.24 X10(3) UL (ref 1.5–7.7)
NEUTROPHILS NFR BLD AUTO: 82.3 %
OSMOLALITY SERPL CALC.SUM OF ELEC: 283 MOSM/KG (ref 275–295)
PLATELET # BLD AUTO: 543 10(3)UL (ref 150–450)
POTASSIUM SERPL-SCNC: 3.6 MMOL/L (ref 3.5–5.1)
PROT SERPL-MCNC: 8.5 G/DL (ref 6.4–8.2)
PROTHROMBIN TIME: 15.3 SECONDS (ref 11.6–14.8)
RBC # BLD AUTO: 4.04 X10(6)UL
SODIUM SERPL-SCNC: 134 MMOL/L (ref 136–145)
WBC # BLD AUTO: 25.8 X10(3) UL (ref 4–11)

## 2024-03-30 PROCEDURE — 73630 X-RAY EXAM OF FOOT: CPT | Performed by: EMERGENCY MEDICINE

## 2024-03-30 RX ORDER — MORPHINE SULFATE 2 MG/ML
1 INJECTION, SOLUTION INTRAMUSCULAR; INTRAVENOUS EVERY 2 HOUR PRN
Status: DISCONTINUED | OUTPATIENT
Start: 2024-03-30 | End: 2024-04-05

## 2024-03-30 RX ORDER — CEFAZOLIN SODIUM/WATER 2 G/20 ML
2 SYRINGE (ML) INTRAVENOUS ONCE
Status: COMPLETED | OUTPATIENT
Start: 2024-03-30 | End: 2024-03-30

## 2024-03-30 RX ORDER — VANCOMYCIN 2 GRAM/500 ML IN 0.9 % SODIUM CHLORIDE INTRAVENOUS
2000 ONCE
Status: COMPLETED | OUTPATIENT
Start: 2024-03-30 | End: 2024-03-30

## 2024-03-30 RX ORDER — HEPARIN SODIUM 5000 [USP'U]/ML
5000 INJECTION, SOLUTION INTRAVENOUS; SUBCUTANEOUS EVERY 8 HOURS SCHEDULED
Status: DISCONTINUED | OUTPATIENT
Start: 2024-03-30 | End: 2024-04-05

## 2024-03-30 RX ORDER — ACETAMINOPHEN 500 MG
500 TABLET ORAL EVERY 4 HOURS PRN
Status: DISCONTINUED | OUTPATIENT
Start: 2024-03-30 | End: 2024-04-05

## 2024-03-30 RX ORDER — SODIUM CHLORIDE 9 MG/ML
INJECTION, SOLUTION INTRAVENOUS CONTINUOUS
Status: ACTIVE | OUTPATIENT
Start: 2024-03-30 | End: 2024-03-30

## 2024-03-30 RX ORDER — POTASSIUM CHLORIDE 20 MEQ/1
40 TABLET, EXTENDED RELEASE ORAL EVERY 4 HOURS
Status: COMPLETED | OUTPATIENT
Start: 2024-03-30 | End: 2024-03-31

## 2024-03-30 RX ORDER — ACETAMINOPHEN 325 MG/1
650 TABLET ORAL EVERY 6 HOURS PRN
Status: DISCONTINUED | OUTPATIENT
Start: 2024-03-30 | End: 2024-04-05

## 2024-03-30 RX ORDER — MORPHINE SULFATE 4 MG/ML
4 INJECTION, SOLUTION INTRAMUSCULAR; INTRAVENOUS EVERY 2 HOUR PRN
Status: DISCONTINUED | OUTPATIENT
Start: 2024-03-30 | End: 2024-04-05

## 2024-03-30 RX ORDER — IBUPROFEN 600 MG/1
600 TABLET ORAL ONCE
Status: COMPLETED | OUTPATIENT
Start: 2024-03-30 | End: 2024-03-30

## 2024-03-30 RX ORDER — MORPHINE SULFATE 2 MG/ML
2 INJECTION, SOLUTION INTRAMUSCULAR; INTRAVENOUS EVERY 2 HOUR PRN
Status: DISCONTINUED | OUTPATIENT
Start: 2024-03-30 | End: 2024-04-05

## 2024-03-30 RX ORDER — ACETAMINOPHEN 500 MG
1000 TABLET ORAL ONCE
Status: COMPLETED | OUTPATIENT
Start: 2024-03-30 | End: 2024-03-30

## 2024-03-30 NOTE — ED QUICK NOTES
Orders for admission, patient is aware of plan and ready to go upstairs. Any questions, please call ED RN Zulema at extension [phone].     Patient Covid vaccination status: Unvaccinated     COVID Test Ordered in ED: None    COVID Suspicion at Admission: N/A    Running Infusions:      Mental Status/LOC at time of transport: A&Ox4    Other pertinent information:   CIWA score: N/A   NIH score:  N/A

## 2024-03-30 NOTE — ED PROVIDER NOTES
Patient Seen in: [facility]      History   No chief complaint on file.    Stated Complaint: Infected foot    Subjective:   HPI    43-year-old female with moderate medical history as below.  Poorly controlled diabetic.  History of recurrent poorly healed ulcer to the right great toe.  In the last 72 hours, patient has noted increased swelling and active drainage from the site.  She describes a pain radiating into her right groin.    Objective:   Past Medical History:   Diagnosis Date    Abdominal pain     Anxiety state     Arthritis     B12 deficiency 2019    Blurred vision     Calculus of kidney     Constipation     Constipation     Decorative tattoo     Depression     no meds- will see therapist    Diabetes mellitus (HCC)     Frequent use of laxatives     Frequent UTI     Frequent UTI     Irregular bowel habits     Itch of skin     Leg swelling     Loss of appetite     Nausea     Neuropathy     Night sweats     Obesity     Osteoarthritis     knees    Sleep disturbance     Type II or unspecified type diabetes mellitus without mention of complication, not stated as uncontrolled     on insulin currently/recently    Vitamin D deficiency 2019    Weight loss               Past Surgical History:   Procedure Laterality Date    COLONOSCOPY N/A 2019    Procedure: COLONOSCOPY;  Surgeon: Tyrel Osborn DO;  Location:  ENDOSCOPY    DILATION/CURETTAGE,DIAGNOSTIC      x2    HC  SECTION LEVEL I      x1    KIDNEY SURGERY      KIDNEY STONE REMOVAL X2    OTHER SURGICAL HISTORY  04/10/2022    INCISION AND DRAINAGE LEFT BREAST ABSCESS X 2                Social History     Socioeconomic History    Marital status:    Tobacco Use    Smoking status: Every Day     Packs/day: .25     Types: Cigarettes     Start date:     Smokeless tobacco: Never    Tobacco comments:     Working towards quitting   Vaping Use    Vaping Use: Never used   Substance and Sexual Activity    Alcohol use: No     Drug use: No     Comment: CBD oil on occasion   Other Topics Concern    Caffeine Concern No    Exercise No    Seat Belt Yes    Special Diet No    Stress Concern No    Weight Concern No              Review of Systems    Positive for stated complaint: Infected foot  Other systems are as noted in HPI.  Constitutional and vital signs reviewed.      All other systems reviewed and negative except as noted above.    Physical Exam     ED Triage Vitals [03/30/24 1436]   BP 95/65   Pulse 116   Resp 20   Temp 99.5 °F (37.5 °C)   Temp src Oral   SpO2 100 %   O2 Device None (Room air)       Current:BP 95/65   Pulse 116   Temp 99.5 °F (37.5 °C) (Oral)   Resp 20   SpO2 100%         Physical Exam    Gen: Well appearing, well groomed, alert and aware x 3  Lung: No distress, RR, no retraction  Extremities: Full ROM, no deformity, NVI  Skin: Significant ulcer to the base of the right great toe with surrounding inflammatory changes, possible gas in tissue.  Active drainage.  Ascending lymphangitis into the right groin.  Neuro:  Normal Gait    ED Course   Labs Reviewed - No data to display                   MDM          Significant depth to ulcer.  Significant surrounding cellulitic changes.  Concern for osteomyelitis.  Ascending lymphangitis noted.  Patient is tachycardic.  Low-grade temperature.  We discussed my concern for developing significant infection and risk of sepsis.  She will be driven to the [place] emergency department by family.    Please immediately report to the [place] location.  Failure to receive further care could result in amputation, sepsis and/or death                         Medical Decision Making      Disposition and Plan     Clinical Impression:  1. Cellulitis of toe of right foot         Disposition:  Ic to ed  3/30/2024  2:44 pm    Follow-up:  No follow-up provider specified.        Medications Prescribed:  Current Discharge Medication List

## 2024-03-30 NOTE — DISCHARGE INSTRUCTIONS
Please immediately report to the [place] location.  Failure to receive further care could result in amputation, sepsis and/or death   RUQ/RLQ/palpable fibroids

## 2024-03-30 NOTE — ED INITIAL ASSESSMENT (HPI)
Pt to ER sent from immediate care for admission. Pt with \"Infection to right foot.\"  +Fevers since Wednesday.  Denies SOB, CP, N/V/D.

## 2024-03-31 LAB
ALBUMIN SERPL-MCNC: 2 G/DL (ref 3.4–5)
ALBUMIN/GLOB SERPL: 0.4 {RATIO} (ref 1–2)
ALP LIVER SERPL-CCNC: 77 U/L
ALT SERPL-CCNC: 18 U/L
ANION GAP SERPL CALC-SCNC: 2 MMOL/L (ref 0–18)
AST SERPL-CCNC: 11 U/L (ref 15–37)
BASOPHILS # BLD AUTO: 0.05 X10(3) UL (ref 0–0.2)
BASOPHILS NFR BLD AUTO: 0.4 %
BILIRUB SERPL-MCNC: 0.6 MG/DL (ref 0.1–2)
BUN BLD-MCNC: 9 MG/DL (ref 9–23)
CALCIUM BLD-MCNC: 8.3 MG/DL (ref 8.5–10.1)
CHLORIDE SERPL-SCNC: 112 MMOL/L (ref 98–112)
CO2 SERPL-SCNC: 26 MMOL/L (ref 21–32)
CREAT BLD-MCNC: 0.75 MG/DL
CRP SERPL-MCNC: 16 MG/DL (ref ?–0.3)
EGFRCR SERPLBLD CKD-EPI 2021: 100 ML/MIN/1.73M2 (ref 60–?)
EOSINOPHIL # BLD AUTO: 0.33 X10(3) UL (ref 0–0.7)
EOSINOPHIL NFR BLD AUTO: 2.3 %
ERYTHROCYTE [DISTWIDTH] IN BLOOD BY AUTOMATED COUNT: 14.3 %
ERYTHROCYTE [SEDIMENTATION RATE] IN BLOOD: 109 MM/HR
EST. AVERAGE GLUCOSE BLD GHB EST-MCNC: 212 MG/DL (ref 68–126)
GLOBULIN PLAS-MCNC: 4.6 G/DL (ref 2.8–4.4)
GLUCOSE BLD-MCNC: 142 MG/DL (ref 70–99)
GLUCOSE BLD-MCNC: 145 MG/DL (ref 70–99)
GLUCOSE BLD-MCNC: 158 MG/DL (ref 70–99)
GLUCOSE BLD-MCNC: 161 MG/DL (ref 70–99)
GLUCOSE BLD-MCNC: 168 MG/DL (ref 70–99)
HBA1C MFR BLD: 9 % (ref ?–5.7)
HCT VFR BLD AUTO: 26.4 %
HGB BLD-MCNC: 8.4 G/DL
IMM GRANULOCYTES # BLD AUTO: 0.12 X10(3) UL (ref 0–1)
IMM GRANULOCYTES NFR BLD: 0.8 %
LYMPHOCYTES # BLD AUTO: 1.71 X10(3) UL (ref 1–4)
LYMPHOCYTES NFR BLD AUTO: 12.1 %
MCH RBC QN AUTO: 26.8 PG (ref 26–34)
MCHC RBC AUTO-ENTMCNC: 31.8 G/DL (ref 31–37)
MCV RBC AUTO: 84.1 FL
MONOCYTES # BLD AUTO: 1.53 X10(3) UL (ref 0.1–1)
MONOCYTES NFR BLD AUTO: 10.8 %
NEUTROPHILS # BLD AUTO: 10.4 X10 (3) UL (ref 1.5–7.7)
NEUTROPHILS # BLD AUTO: 10.4 X10(3) UL (ref 1.5–7.7)
NEUTROPHILS NFR BLD AUTO: 73.6 %
OSMOLALITY SERPL CALC.SUM OF ELEC: 292 MOSM/KG (ref 275–295)
PLATELET # BLD AUTO: 417 10(3)UL (ref 150–450)
POTASSIUM SERPL-SCNC: 4.2 MMOL/L (ref 3.5–5.1)
POTASSIUM SERPL-SCNC: 4.2 MMOL/L (ref 3.5–5.1)
PROT SERPL-MCNC: 6.6 G/DL (ref 6.4–8.2)
RBC # BLD AUTO: 3.14 X10(6)UL
SODIUM SERPL-SCNC: 140 MMOL/L (ref 136–145)
WBC # BLD AUTO: 14.1 X10(3) UL (ref 4–11)

## 2024-03-31 PROCEDURE — 99223 1ST HOSP IP/OBS HIGH 75: CPT | Performed by: PODIATRIST

## 2024-03-31 RX ORDER — PANTOPRAZOLE SODIUM 40 MG/1
40 TABLET, DELAYED RELEASE ORAL
Status: DISCONTINUED | OUTPATIENT
Start: 2024-04-01 | End: 2024-04-05

## 2024-03-31 RX ORDER — POLYETHYLENE GLYCOL 3350 17 G/17G
17 POWDER, FOR SOLUTION ORAL DAILY PRN
Status: DISCONTINUED | OUTPATIENT
Start: 2024-03-31 | End: 2024-04-05

## 2024-03-31 RX ORDER — ONDANSETRON 2 MG/ML
4 INJECTION INTRAMUSCULAR; INTRAVENOUS EVERY 6 HOURS PRN
Status: DISCONTINUED | OUTPATIENT
Start: 2024-03-31 | End: 2024-04-05

## 2024-03-31 RX ORDER — METOCLOPRAMIDE HYDROCHLORIDE 5 MG/ML
10 INJECTION INTRAMUSCULAR; INTRAVENOUS EVERY 8 HOURS PRN
Status: DISCONTINUED | OUTPATIENT
Start: 2024-03-31 | End: 2024-04-05

## 2024-03-31 NOTE — PLAN OF CARE
Patient alert and oriented x4. VSS on RA. Pt denies pain at this time. C/o numbness, hx of neuropathy. Right foot wound dressed with betadine, ABD, gauze, and kerlix per podiatry. Refusing SCDs at this time, ankle pumps encouraged. Pt up with standby assist. Tolerating diet, no c/o n/v.     Plan: IV abx, cultures pending, MRI to be done

## 2024-03-31 NOTE — CONSULTS
INFECTIOUS DISEASE CONSULTATION    Leonie Denney Patient Status:  Inpatient    1980 MRN [id number]   Location [facility] 3SW-A Attending Herberth Philip MD   Hosp Day # 1 PCP González Houser MD       Requested by Dr. Hensley    Reason for Consultation:  Right toe infection    History of Present Illness:  Leonie Denney is a a(n) 43 year old female diagnosed with DM, A1C 8.7% iun 2024,  admitted 3/30 due to worsening right toe ulceration swelling, odor, and drainage.       History:  Past Medical History:   Diagnosis Date    Abdominal pain     Anxiety state     Arthritis     B12 deficiency 2019    Blurred vision     Calculus of kidney     Constipation     Constipation     Decorative tattoo     Depression     no meds- will see therapist    Diabetes mellitus (HCC)     Frequent use of laxatives     Frequent UTI     Frequent UTI     Irregular bowel habits     Itch of skin     Leg swelling     Loss of appetite     Nausea     Neuropathy     Night sweats     Obesity     Osteoarthritis     knees    Sleep disturbance     Type II or unspecified type diabetes mellitus without mention of complication, not stated as uncontrolled     on insulin currently/recently    Vitamin D deficiency 2019    Weight loss      Past Surgical History:   Procedure Laterality Date    COLONOSCOPY N/A 2019    Procedure: COLONOSCOPY;  Surgeon: Tyrel Osborn DO;  Location:  ENDOSCOPY    DILATION/CURETTAGE,DIAGNOSTIC      x2    HC  SECTION LEVEL I      x1    KIDNEY SURGERY      KIDNEY STONE REMOVAL X2    OTHER SURGICAL HISTORY  04/10/2022    INCISION AND DRAINAGE LEFT BREAST ABSCESS X 2     Family History   Problem Relation Age of Onset    Diabetes Mother     Diabetes Maternal Grandmother     Diabetes Maternal Grandfather       reports that she has been smoking cigarettes. She started smoking about 15 years ago. She has been smoking an average of 0.3 packs per  day. She has never used smokeless tobacco. She reports that she does not drink alcohol and does not use drugs.      Allergies:  No Known Allergies    Medications:    Current Facility-Administered Medications:     heparin (Porcine) 5000 UNIT/ML injection 5,000 Units, 5,000 Units, Subcutaneous, Q8H JULY    acetaminophen (Tylenol Extra Strength) tab 500 mg, 500 mg, Oral, Q4H PRN    morphINE PF 2 MG/ML injection 1 mg, 1 mg, Intravenous, Q2H PRN **OR** morphINE PF 2 MG/ML injection 2 mg, 2 mg, Intravenous, Q2H PRN **OR** morphINE PF 4 MG/ML injection 4 mg, 4 mg, Intravenous, Q2H PRN    piperacillin-tazobactam (Zosyn) 4.5 g in dextrose 5% 100 mL IVPB-ADDV, 4.5 g, Intravenous, Q8H    insulin aspart (NovoLOG) 100 Units/mL FlexPen 2-10 Units, 2-10 Units, Subcutaneous, TID CC and HS    acetaminophen (Tylenol) tab 650 mg, 650 mg, Oral, Q6H PRN  No current facility-administered medications on file prior to encounter.     Current Outpatient Medications on File Prior to Encounter   Medication Sig Dispense Refill    semaglutide (OZEMPIC, 2 MG/DOSE,) 8 MG/3ML Subcutaneous Solution Pen-injector Inject 2 mg into the skin once a week. 3 mL 0    Empagliflozin (JARDIANCE) 25 MG Oral Tab Take 25 mg by mouth daily. 90 tablet 1    Continuous Blood Gluc Sensor (DEXCOM G7 SENSOR) Does not apply Misc 1 each Every 10 days. 9 each 1    teraconazole 0.4 % Vaginal Cream Place vaginally every 4 (four) hours. AT BEDTIME         Review of Systems:    A comprehensive 10 point review of systems was completed.  Pertinent positives and negatives noted in the the HPI.      Physical Exam:    General: No acute distress. Alert and oriented x 3.  Vital signs: Temp:  [98.2 °F (36.8 °C)-101.3 °F (38.5 °C)] 98.2 °F (36.8 °C)  Pulse:  [] 99  Resp:  [14-29] 14  BP: ()/(58-78) 103/72  SpO2:  [86 %-100 %] 100 %  Body mass index is 37.04 kg/m².  HEENT: Moist mucous membranes. Extraocular muscles are intact.  Neck: No swelling, no masses  Respiratory: Non  labored, symmetric exursion  Cardiovascular: No irregularities in rhythm  Abdomen: Soft, nontender, nondistended.    Musculoskeletal: right toe ulceration, swelling, malodor    Laboratory Data:  Laboratory data reviewed      Recent Labs   Lab 03/31/24  0540   RBC 3.14*   HGB 8.4*   HCT 26.4*   MCV 84.1   MCH 26.8   MCHC 31.8   RDW 14.3   NEPRELIM 10.40*   WBC 14.1*   .0       Recent Labs   Lab 03/30/24  1604 03/31/24  0540   * 158*   BUN 8* 9   CREATSERUM 1.05* 0.75   CA 9.5 8.3*   ALB 2.5* 2.0*   * 140   K 3.6 4.2  4.2    112   CO2 29.0 26.0   ALKPHO 105* 77   AST 7* 11*   ALT 20 18   BILT 0.8 0.6   TP 8.5* 6.6         Lab Results   Component Value Date    PTT 57.6 03/30/2024    INR 1.20 03/30/2024    PTP 15.3 03/30/2024    PGLU 168 03/31/2024         Microbiologic Data:   No results found for this visit on 03/30/24.      Imaging:  Xray gas soft tissue 1st toe  Established Problem list:  Patient Active Problem List   Diagnosis    Type 2 diabetes mellitus with hyperglycemia, with long-term current use of insulin ([facility])    Anxiety    Neuropathy    Meralgia paresthetica of left side    Vitamin D deficiency    B12 deficiency    Diabetic neuropathy ([facility])    Hyperglycemia due to type 2 diabetes mellitus ([facility])    Morbid obesity ([facility])    Other specified fetal and placental problems affecting management of mother, antepartum    Primary hypercoagulable state ([facility])    Uncontrolled type 2 diabetes mellitus    Noncompliance with medication regimen    Ulcer of right foot, limited to breakdown of skin ([facility])    Hyponatremia    Foot ulcer, right, with fat layer exposed ([facility])    Therapeutic drug monitoring    FLORENCIA (acute kidney injury) ([facility])    Labial abscess    Cellulitis of labia    Hyperglycemia    Leukocytosis, unspecified type    Sepsis, due to unspecified organism, unspecified whether acute organ dysfunction present ([facility])    Cellulitis and abscess of foot       ASSESSMENT/PLAN:  1. Right toe infection,  ulceration, malodor ,swelling, xray showing gas soft tissue  -podiatry following, MRI planned  Continue zosyn pending micro updates          Dakota Koenig MD, MD [facility]  [phone]

## 2024-03-31 NOTE — ED PROVIDER NOTES
Patient Seen in: [facility] 3sw-a      History     Chief Complaint   Patient presents with    Cellulitis     Stated Complaint: \"infection\"    Subjective:   HPI    43-year-old female with history of diabetes, presents emergency room for evaluation of swelling and redness to the right foot.  Patient states she has had a \"wound \"to the foot for quite some time, over the last few days it has become more red and swollen, there is been drainage.  Patient been having fevers over the last few days as well.  Denies headache or neck pain denies chest pain or shortness of breath denies abdominal pain.    Objective:   Past Medical History:   Diagnosis Date    Abdominal pain     Anxiety state     Arthritis     B12 deficiency 2019    Blurred vision     Calculus of kidney     Constipation     Constipation     Decorative tattoo     Depression     no meds- will see therapist    Diabetes mellitus (HCC)     Frequent use of laxatives     Frequent UTI     Frequent UTI     Irregular bowel habits     Itch of skin     Leg swelling     Loss of appetite     Nausea     Neuropathy     Night sweats     Obesity     Osteoarthritis     knees    Sleep disturbance     Type II or unspecified type diabetes mellitus without mention of complication, not stated as uncontrolled     on insulin currently/recently    Vitamin D deficiency 2019    Weight loss               Past Surgical History:   Procedure Laterality Date    COLONOSCOPY N/A 2019    Procedure: COLONOSCOPY;  Surgeon: Tyrel Osborn DO;  Location:  ENDOSCOPY    DILATION/CURETTAGE,DIAGNOSTIC      x2    HC  SECTION LEVEL I      x1    KIDNEY SURGERY      KIDNEY STONE REMOVAL X2    OTHER SURGICAL HISTORY  04/10/2022    INCISION AND DRAINAGE LEFT BREAST ABSCESS X 2                Social History     Socioeconomic History    Marital status:    Tobacco Use    Smoking status: Every Day     Packs/day: .25     Types: Cigarettes     Start date:     Smokeless  tobacco: Never    Tobacco comments:     Working towards quitting   Vaping Use    Vaping Use: Never used   Substance and Sexual Activity    Alcohol use: No    Drug use: No     Comment: CBD oil on occasion   Other Topics Concern    Caffeine Concern No    Exercise No    Seat Belt Yes    Special Diet No    Stress Concern No    Weight Concern No     Social Determinants of Health     Food Insecurity: No Food Insecurity (3/30/2024)    Food Insecurity     Food Insecurity: Never true   Transportation Needs: No Transportation Needs (3/30/2024)    Transportation Needs     Lack of Transportation: No   Housing Stability: Low Risk  (3/30/2024)    Housing Stability     Housing Instability: No              Review of Systems    Positive for stated complaint: \"infection\"  Other systems are as noted in HPI.  Constitutional and vital signs reviewed.      All other systems reviewed and negative except as noted above.    Physical Exam     ED Triage Vitals [03/30/24 1556]   /67   Pulse 117   Resp 18   Temp (!) 101.3 °F (38.5 °C)   Temp src Oral   SpO2 95 %   O2 Device None (Room air)       Current:BP 97/66 (BP Location: Left arm)   Pulse 96   Temp 99 °F (37.2 °C) (Oral)   Resp 18   Ht 177.8 cm (5' 10\")   Wt 106.6 kg   LMP  (LMP Unknown)   SpO2 100%   BMI 33.72 kg/m²         Physical Exam    GENERAL: Patient is awake, alert,  HEENT:  no scleral icterus.  Mucous membranes are moderately dry   NECK: Neck is supple, there is no nuchal rigidity.   HEART: Regular rate and rhythm, no murmurs.  LUNGS: Clear to auscultation bilaterally.  No Rales, no rhonchi, no wheezing, no stridor.  ABDOMEN: Soft, nondistended,non tender  EXTREMITIES: There is erythema with swelling and fluctuance to the right great toe with streaking erythema to the dorsum of the foot.  Foul odor is present.  There is drainage.    ED Course     Labs Reviewed   COMP METABOLIC PANEL (14) - Abnormal; Notable for the following components:       Result Value    Glucose  216 (*)     Sodium 134 (*)     BUN 8 (*)     Creatinine 1.05 (*)     AST 7 (*)     Alkaline Phosphatase 105 (*)     Total Protein 8.5 (*)     Albumin 2.5 (*)     Globulin  6.0 (*)     A/G Ratio 0.4 (*)     All other components within normal limits   CBC W/ DIFFERENTIAL - Abnormal; Notable for the following components:    WBC 25.8 (*)     HGB 10.8 (*)     HCT 33.5 (*)     .0 (*)     Neutrophil Absolute Prelim 21.24 (*)     Neutrophil Absolute 21.24 (*)     Monocyte Absolute 1.74 (*)     All other components within normal limits   LACTIC ACID, PLASMA - Normal   CBC WITH DIFFERENTIAL WITH PLATELET    Narrative:     The following orders were created for panel order CBC With Differential With Platelet.  Procedure                               Abnormality         Status                     ---------                               -----------         ------                     CBC W/ DIFFERENTIAL[[id number]]          Abnormal            Final result                 Please view results for these tests on the individual orders.   PROTHROMBIN TIME (PT)   PTT, ACTIVATED   HEMOGLOBIN A1C   RAINBOW DRAW LAVENDER   RAINBOW DRAW LIGHT GREEN   BLOOD CULTURE   BLOOD CULTURE                 MDM        Differential diagnosis before testing includes but not limited to abscess, cellulitis, osteomyelitis, sepsis, DKA, which is a medical condition that poses a threat to life/function    Past Medical History/comorbidities-diabetes mellitus      Radiographic images  I personally reviewed the radiographs and my individual interpretation shows no fracture right foot  I also reviewed the official reports that showed right foot soft tissue edema and gas within the first digit with suspected mild periosteal reaction.  Suggestive of infection with osteomyelitis      Discussion of management (consult/physicians, social work, pharmacy,ect) Dr. Koenig infectious disease Dr. Philip primary care, [place] [facility] Dr. Houser    Medications  Provided: Ancef, vancomycin, Zosyn, IV normal saline boluses    Course of Events during Emergency Room Visit include IV established blood work obtained.  X-ray of the right foot performed.  Lactic acid 1.4.  CBC will count 25.8 hemoglobin 10.8 platelet 543.  Chemistry sodium 134 potassium 3.6 BUN 8 creatinine 1.05 glucose 216.  Blood cultures performed, was given IV antibiotics initially Ancef and vancomycin, discussed with infectious disease and Zosyn was added.  Patient was given IV fluid bolus per sepsis protocol.  Patient also discussed with orthopedics, initially attempted to call podiatry without return phone call, Dr. Houser from [facility] was consulted [place] [facility] was consulted.  Patient was admitted for further evaluation and treatment.  Discussed all results with the patient.    Shared decision making was utilized           Disposition:    Admission  I have discussed with the patient the results of test, differential diagnosis, and treatment plan. They expressed clear understanding of these instructions and agrees to the plan provided.       Note to patient: The 21st Century Cures Act makes medical notes like these available to patients in the interest of transparency. However, this is a medical document intended as peer to peer communication. It is written in medical language and may contain abbreviations or verbiage that are unfamiliar. It may appear blunt or direct. Medical documents are intended to carry relevant information, facts as evident, and the clinical opinion of the practitioner.           Admission disposition: 3/30/2024  6:42 PM                                     [facility]   [facility]      Sepsis Reassessment Note    BP 97/66 (BP Location: Left arm)   Pulse 96   Temp 99 °F (37.2 °C) (Oral)   Resp 18   Ht 177.8 cm (5' 10\")   Wt 106.6 kg   LMP  (LMP Unknown)   SpO2 100%   BMI 33.72 kg/m²        Cardiac:  Regularity: Regular  Rate: Normal  Heart Sounds:  S1,S2    Lungs:   Right: Clear  Left: Clear    Peripheral Pulses:  Radial: Right 2+ or Left 2+      Capillary Refill:  <3 Secs    Skin:  Temp/Moisture: Warm and Dry  Color: Normal      Ruben Hensley,   3/30/2024  9:59 PM            Medical Decision Making      Disposition and Plan     Clinical Impression:  1. Sepsis, due to unspecified organism, unspecified whether acute organ dysfunction present (HCC)    2. Cellulitis and abscess of foot         Disposition:  Admit  3/30/2024  6:42 pm    Follow-up:  No follow-up provider specified.        Medications Prescribed:  Current Discharge Medication List                            Hospital Problems       Present on Admission  Date Reviewed: 3/30/2024            ICD-10-CM Noted POA    * (Principal) Sepsis, due to unspecified organism, unspecified whether acute organ dysfunction present (HCC) A41.9 3/30/2024 Unknown    Cellulitis and abscess of foot L03.119, L02.619 3/30/2024 Unknown

## 2024-03-31 NOTE — H&P
[facility]  History & Physical    Leonie Denney Patient Status:  Inpatient    1980 MRN [id number]   Location [facility] 3SW-A Attending Herberth Philip MD   Hosp Day # 1 PCP González Houser MD     History of Present Illness:  Leonie Denney is a(n) 43 year old female.  With a history of diabetes mellitus and diabetic neuropathy came to the emergency room right jaw swelling and   Drainage.  She denies any fever or chills.  She has disorder for some time.  She was taking care of by local wound care.  The wound got worse last 1 week she noticed increased swelling and discharge.  Her emergency room workup showed cellulitis and possible osteomyelitis of right big toe.  She was hypotensive and tachycardic also.  She was admitted for cellulitis sepsis and post possible osteomyelitis of the right big toe.  She is started on Zosyn from the ER.  She is already seen by infectious disease and podiatry.  Her MRI of the right foot is pending.  Her blood sugar is controlled since admission.  She needed 2 L of nasal cannula at night, she denies any history of obstructive sleep apnea.  She had sleep studies in the past.  She denies any history of coronary heart disease or chest pain.    History:  Past Medical History:   Diagnosis Date    Abdominal pain     Anxiety state     Arthritis     B12 deficiency 2019    Blurred vision     Calculus of kidney     Constipation     Constipation     Decorative tattoo     Depression     no meds- will see therapist    Diabetes mellitus (HCC)     Frequent use of laxatives     Frequent UTI     Frequent UTI     Irregular bowel habits     Itch of skin     Leg swelling     Loss of appetite     Nausea     Neuropathy     Night sweats     Obesity     Osteoarthritis     knees    Sleep disturbance     Type II or unspecified type diabetes mellitus without mention of complication, not stated as uncontrolled     on insulin currently/recently    Vitamin D deficiency 2019    Weight  loss      Past Surgical History:   Procedure Laterality Date    COLONOSCOPY N/A 2019    Procedure: COLONOSCOPY;  Surgeon: Tyrel Osborn DO;  Location:  ENDOSCOPY    DILATION/CURETTAGE,DIAGNOSTIC      x2    HC  SECTION LEVEL I      x1    KIDNEY SURGERY      KIDNEY STONE REMOVAL X2    OTHER SURGICAL HISTORY  04/10/2022    INCISION AND DRAINAGE LEFT BREAST ABSCESS X 2     Family History   Problem Relation Age of Onset    Diabetes Mother     Diabetes Maternal Grandmother     Diabetes Maternal Grandfather       reports that she has been smoking cigarettes. She started smoking about 15 years ago. She has been smoking an average of 0.3 packs per day. She has never used smokeless tobacco. She reports that she does not drink alcohol and does not use drugs.    Allergies:  No Known Allergies    Home Medications:  Medications Prior to Admission   Medication Sig Dispense Refill Last Dose    semaglutide (OZEMPIC, 2 MG/DOSE,) 8 MG/3ML Subcutaneous Solution Pen-injector Inject 2 mg into the skin once a week. 3 mL 0 Taking    Empagliflozin (JARDIANCE) 25 MG Oral Tab Take 25 mg by mouth daily. 90 tablet 1     Continuous Blood Gluc Sensor (DEXCOM G7 SENSOR) Does not apply Misc 1 each Every 10 days. 9 each 1     teraconazole 0.4 % Vaginal Cream Place vaginally every 4 (four) hours. AT BEDTIME          Review of Systems:  A comprehensive 10 point review of systems was completed.  Pertinent positives and negatives noted in the the HPI.    Physical Exam:  Vital signs: Blood pressure 117/76, pulse 86, temperature 98.6 °F (37 °C), temperature source Oral, resp. rate 16, height 5' 10\" (1.778 m), weight 258 lb 2.5 oz (117.1 kg), SpO2 99%, not currently breastfeeding.  General: No acute distress. Alert and oriented x 3.  HEENT: Moist mucous membranes. EOM-I. PERRL  Neck: No lymphadenopathy.  No JVD. No carotid bruits.  Respiratory: Clear to auscultation bilaterally.  No wheezes. No rhonchi.  Cardiovascular: S1, S2.  Regular  rate and rhythm.  No murmurs. Equal pulses   Abdomen: Soft, nontender, nondistended.  Positive bowel sounds. No rebound tenderness  Neurologic: No focal neurological deficits.  Musculoskeletal: Full range of motion of all extremities.  No swelling noted.  Integument: Ulceration noted to dorsal medial aspect of right hallux. Periwound hyperkeratotic tissue is present.  There is drainage with malodor.  Hold toe and the medial foot is swollen, dorsalis pedis palpable bilaterally  Psychiatric: Depressed and anxious  Laboratory Data:   Lab Results   Component Value Date    WBC 14.1 03/31/2024    HGB 8.4 03/31/2024    HCT 26.4 03/31/2024    .0 03/31/2024    CREATSERUM 0.75 03/31/2024    BUN 9 03/31/2024     03/31/2024    K 4.2 03/31/2024    K 4.2 03/31/2024     03/31/2024    CO2 26.0 03/31/2024     03/31/2024    CA 8.3 03/31/2024    ALB 2.0 03/31/2024    ALKPHO 77 03/31/2024    BILT 0.6 03/31/2024    TP 6.6 03/31/2024    AST 11 03/31/2024    ALT 18 03/31/2024    PTT 57.6 03/30/2024    INR 1.20 03/30/2024    PTP 15.3 03/30/2024    ESRML 109 03/31/2024    CRP 16.00 03/31/2024    PGLU 161 03/31/2024       Imaging:  XR FOOT, COMPLETE (MIN 3 VIEWS), RIGHT (CPT=73630)    Result Date: 3/30/2024  PROCEDURE:  XR FOOT, COMPLETE (MIN 3 VIEWS), RIGHT (CPT=73630)  TECHNIQUE:  AP, oblique, and lateral views were obtained.  COMPARISON:  [facility], XR, XR FOOT, COMPLETE (MIN 3 VIEWS), RIGHT (CPT=73630), 4/08/2022, 1:42 PM.  INDICATIONS:  infection  PATIENT STATED HISTORY: (As transcribed by Technologist)  Patient stated having a right foot infection near the 1st digit.    FINDINGS:  There is soft tissue gas within the 1st digit with associated soft tissue swelling.  No definite cortical lysis, however there is suspected mild periosteal reaction along the proximal aspect of the 1st distal digit.  There is soft tissue swelling diffusely within the mid and forefoot.  No radiopaque foreign body.             CONCLUSION:  Soft tissue edema and gas within the 1st digit with suspected mild periosteal reaction.  Findings are suggestive of infection with osteomyelitis not excluded.  Given soft tissue gas index tries in process is of consideration.   LOCATION:  [facility]   Dictated by (CST): Mik Chang MD on 3/30/2024 at 6:28 PM     Finalized by (CST): Mik Chang MD on 3/30/2024 at 6:29 PM          Assessment & Plan:  1.  Sepsis due to diabetic foot ulcer and cellulitis right big toe  2.Leukocytosis and FLORENCIA   3.  Osteomyelitis of the right big toe  4.  Diabetes mellitus type 2 with neuropathy  Await wound culture, blood culture   ID and Podiatry consult Appreciated   Patient Active Problem List   Diagnosis    Type 2 diabetes mellitus with hyperglycemia, with long-term current use of insulin ([facility])    Anxiety    Neuropathy    Meralgia paresthetica of left side    Vitamin D deficiency    B12 deficiency    Diabetic neuropathy ([facility])    Hyperglycemia due to type 2 diabetes mellitus (HCC)    Morbid obesity (HCC)    Other specified fetal and placental problems affecting management of mother, antepartum    Primary hypercoagulable state (HCC)    Uncontrolled type 2 diabetes mellitus    Noncompliance with medication regimen    Ulcer of right foot, limited to breakdown of skin (HCC)    Hyponatremia    Foot ulcer, right, with fat layer exposed ([facility])    Therapeutic drug monitoring    FLORENCIA (acute kidney injury) ([facility])    Labial abscess    Cellulitis of labia    Hyperglycemia    Leukocytosis, unspecified type    Sepsis, due to unspecified organism, unspecified whether acute organ dysfunction present ([facility])    Cellulitis and abscess of foot           Herberth Philip MD  3/31/2024  3:06 PM

## 2024-03-31 NOTE — PLAN OF CARE
NURSING ADMISSION NOTE      Patient admitted via Cart  Oriented to room.  Safety precautions initiated.  Bed in low position.  Call light in reach.    Alert and oriented x4. VSS. On RA. . Telemetry monitoring. Tolerating diet. Pain controlled with PRN medication. Gauze dressing to right foot changed, C/D/I. Ambulating with standby assist. IVF bolus as ordered. IV abx as ordered. ID and ortho consulted. Wound care to see.

## 2024-03-31 NOTE — CONSULTS
[facility]   part of [facility]    Report of Consultation    Leonie Denney Patient Status:  Inpatient    1980 MRN [id number]   Location [facility] 3SW-A Attending Herberth Philip MD   Hosp Day # 1 PCP González Houser MD     Date of Admission:  3/30/2024  Date of Consult: 3/31/2024    Reason for Consultation:  Consulted by Dr. Hensley for infected right hallux ulceration    History of Present Illness:  Leonie Denney is a a(n) 43 year old female with poorly controlled diabetes who was seen this morning for worsening wound to right great toe.  Patient states that she has had a wound for quite some time to this area.  She states that she send has seen my partner, Dr. Benites in the past but it has been a little while.  She states that she normally trims the wound herself.  Recently has noticed that the wound began to drain and the toe began to swell.  She has also noticed a malodor.  This prompted her to come to the hospital.  She also states that she has had recent nausea, fevers, chills.  She states that she feels better overall after starting IV antibiotics.    History:  Past Medical History:   Diagnosis Date    Abdominal pain     Anxiety state     Arthritis     B12 deficiency 2019    Blurred vision     Calculus of kidney     Constipation     Constipation     Decorative tattoo     Depression     no meds- will see therapist    Diabetes mellitus (HCC)     Frequent use of laxatives     Frequent UTI     Frequent UTI     Irregular bowel habits     Itch of skin     Leg swelling     Loss of appetite     Nausea     Neuropathy     Night sweats     Obesity     Osteoarthritis     knees    Sleep disturbance     Type II or unspecified type diabetes mellitus without mention of complication, not stated as uncontrolled     on insulin currently/recently    Vitamin D deficiency 2019    Weight loss      Past Surgical History:   Procedure Laterality Date    COLONOSCOPY N/A 2019    Procedure:  COLONOSCOPY;  Surgeon: Tyrel Osborn DO;  Location:  ENDOSCOPY    DILATION/CURETTAGE,DIAGNOSTIC      x2    HC  SECTION LEVEL I      x1    KIDNEY SURGERY      KIDNEY STONE REMOVAL X2    OTHER SURGICAL HISTORY  04/10/2022    INCISION AND DRAINAGE LEFT BREAST ABSCESS X 2     Family History   Problem Relation Age of Onset    Diabetes Mother     Diabetes Maternal Grandmother     Diabetes Maternal Grandfather       reports that she has been smoking cigarettes. She started smoking about 15 years ago. She has been smoking an average of 0.3 packs per day. She has never used smokeless tobacco. She reports that she does not drink alcohol and does not use drugs.    Allergies:  No Known Allergies    Medications:    Current Facility-Administered Medications:     heparin (Porcine) 5000 UNIT/ML injection 5,000 Units, 5,000 Units, Subcutaneous, Q8H JULY    acetaminophen (Tylenol Extra Strength) tab 500 mg, 500 mg, Oral, Q4H PRN    morphINE PF 2 MG/ML injection 1 mg, 1 mg, Intravenous, Q2H PRN **OR** morphINE PF 2 MG/ML injection 2 mg, 2 mg, Intravenous, Q2H PRN **OR** morphINE PF 4 MG/ML injection 4 mg, 4 mg, Intravenous, Q2H PRN    piperacillin-tazobactam (Zosyn) 4.5 g in dextrose 5% 100 mL IVPB-ADDV, 4.5 g, Intravenous, Q8H    insulin aspart (NovoLOG) 100 Units/mL FlexPen 2-10 Units, 2-10 Units, Subcutaneous, TID CC and HS    acetaminophen (Tylenol) tab 650 mg, 650 mg, Oral, Q6H PRN    Review of Systems:  10 point ROS completed and was negative, except for pertinent positive and negatives stated in subjective.        3/31/2024     7:39 AM 3/31/2024     8:25 AM   Vitals History   /72    BP Location Left arm    Pulse 81 99   Resp 14    Temp 98.2 °F (36.8 °C)    SpO2 100 %    Weight  258 lbs 3 oz   BMI  37.04 kg/m2        Physical Exam:  Right lower extremity focused exam:  GENERAL: well developed, well nourished, in no apparent distress  EXTREMITIES:   1. Integument: Ulceration noted to dorsal medial aspect of  right hallux.  There is also a tunneling ulceration to the plantar aspect of the right hallux with active serous sanguinous/purulent drainage.  Periwound hyperkeratotic tissue is present.  There is a malodor present.  Probing to bone is noted.  2. Vascular: Dorsalis pedis 2/4 bilateral and posterior tibial pulses 2/4 bilateral, capillary refill normal.  Pitting edema noted to right lower extremity with localized larger edema to right hallux   3. Musculoskeletal: All muscle groups are graded 5/5 in the foot and ankle.   4. Neurological: Normal sharp dull sensation; gross sensation intact via light touch bilaterally.                Imaging:  XR FOOT, COMPLETE (MIN 3 VIEWS), RIGHT (CPT=73630)    Result Date: 3/30/2024  CONCLUSION:  Soft tissue edema and gas within the 1st digit with suspected mild periosteal reaction.  Findings are suggestive of infection with osteomyelitis not excluded.  Given soft tissue gas index tries in process is of consideration.   LOCATION:  [facility]   Dictated by (CST): Mik Chang MD on 3/30/2024 at 6:28 PM     Finalized by (CST): Mik Chang MD on 3/30/2024 at 6:29 PM         Laboratory Data:  Recent Labs   Lab 03/31/24  0540   RBC 3.14*   HGB 8.4*   HCT 26.4*   MCV 84.1   MCH 26.8   MCHC 31.8   RDW 14.3   NEPRELIM 10.40*   WBC 14.1*   .0       Impression and Plan:  Patient Active Problem List   Diagnosis    Type 2 diabetes mellitus with hyperglycemia, with long-term current use of insulin ([facility])    Anxiety    Neuropathy    Meralgia paresthetica of left side    Vitamin D deficiency    B12 deficiency    Diabetic neuropathy (HCC)    Hyperglycemia due to type 2 diabetes mellitus (HCC)    Morbid obesity ([facility])    Other specified fetal and placental problems affecting management of mother, antepartum    Primary hypercoagulable state (HCC)    Uncontrolled type 2 diabetes mellitus    Noncompliance with medication regimen    Ulcer of right foot, limited to breakdown of skin ([facility])     Hyponatremia    Foot ulcer, right, with fat layer exposed (HCC)    Therapeutic drug monitoring    FLORENCIA (acute kidney injury) (HCC)    Labial abscess    Cellulitis of labia    Hyperglycemia    Leukocytosis, unspecified type    Sepsis, due to unspecified organism, unspecified whether acute organ dysfunction present (HCC)    Cellulitis and abscess of foot         Plan:  Chart history reviewed.  Recent x-ray shows concerns of soft tissue gas near patient's ulceration site to the right hallux with periosteal reaction to the proximal aspect of distal phalanx.    Patient subjectively feels better on current IV antibiotic regimen.  Hemoglobin A1c 9.0%, WBC: 14.1.  Will order ESR and CRP    Wound itself probes to bone and is actively draining.  Utilized a probe to perform a minor deep debridement to encourage any further drainage.    Deep wound culture swabs were obtained today and sent to micro for further evaluation.  Infectious disease on consult.  Appreciate any recommendations for IV antibiotics.    Due to concerns of osteomyelitis, will order MRI of the right foot today.  Pending these findings, we will discuss further treatment options.  Patient is aware that surgical intervention may be warranted.    Will have nursing staff keep wound covered with Betadine and a dry dressing.  Patient should minimize her weightbearing at this time, but will need a surgical shoe.    Thank you for the opportunity to participate in patient's care.    Isac Kaufman DPM   3/31/2024  10:28 AM

## 2024-04-01 ENCOUNTER — APPOINTMENT (OUTPATIENT)
Dept: MRI IMAGING | Facility: HOSPITAL | Age: 44
End: 2024-04-01
Attending: PODIATRIST
Payer: COMMERCIAL

## 2024-04-01 ENCOUNTER — ANESTHESIA EVENT (OUTPATIENT)
Dept: SURGERY | Facility: HOSPITAL | Age: 44
End: 2024-04-01
Payer: COMMERCIAL

## 2024-04-01 LAB
GLUCOSE BLD-MCNC: 129 MG/DL (ref 70–99)
GLUCOSE BLD-MCNC: 146 MG/DL (ref 70–99)
GLUCOSE BLD-MCNC: 157 MG/DL (ref 70–99)
GLUCOSE BLD-MCNC: 169 MG/DL (ref 70–99)

## 2024-04-01 PROCEDURE — 99233 SBSQ HOSP IP/OBS HIGH 50: CPT | Performed by: PODIATRIST

## 2024-04-01 PROCEDURE — 73718 MRI LOWER EXTREMITY W/O DYE: CPT | Performed by: PODIATRIST

## 2024-04-01 RX ORDER — CLONAZEPAM 0.5 MG/1
0.5 TABLET ORAL NIGHTLY
Status: DISCONTINUED | OUTPATIENT
Start: 2024-04-01 | End: 2024-04-05

## 2024-04-01 NOTE — PROGRESS NOTES
INFECTIOUS DISEASE  PROGRESS NOTE            [facility]    Leonie Denney Patient Status:  Inpatient    1980 MRN [id number]   Location [facility] 3SW-A Attending Herberth Philip MD   Hosp Day # 2 PCP González Houser MD     Antibiotics: zosyn #2    Subjective:  : awaiting MRI  No fever     Objective:  Temp:  [97.9 °F (36.6 °C)-99 °F (37.2 °C)] 98.3 °F (36.8 °C)  Pulse:  [86-91] 88  Resp:  [16-] 18  BP: (116-125)/(66-88) 116/66  SpO2:  [94 %-99 %] 97 %    General: awake resting  Vital signs:Temp:  [97.9 °F (36.6 °C)-99 °F (37.2 °C)] 98.3 °F (36.8 °C)  Pulse:  [86-91] 88  Resp:  [16-] 18  BP: (116-125)/(66-88) 116/66  SpO2:  [94 %-99 %] 97 %  HEENT: Moist mucous membranes. Extraocular muscles are intact.  Neck: supple no masses  Respiratory: Non labored, symmetric excursion, normal respirations  Musculoskeletal: foot dressed  Labs:     COVID-19 Lab Results    COVID-19  Lab Results   Component Value Date    COVID19 Not Detected 10/11/2022    COVID19 Not Detected 2022       Pro-Calcitonin  No results for input(s): \"PCT\" in the last 168 hours.    Cardiac  No results for input(s): \"TROP\", \"PBNP\" in the last 168 hours.    Creatinine Kinase  No results for input(s): \"CK\" in the last 168 hours.    Inflammatory Markers  Recent Labs   Lab 24  0540   CRP 16.00*       Recent Labs   Lab 24  0540   RBC 3.14*   HGB 8.4*   HCT 26.4*   MCV 84.1   MCH 26.8   MCHC 31.8   RDW 14.3   NEPRELIM 10.40*   WBC 14.1*   .0         Recent Labs   Lab 24  1604 24  0540   * 158*   BUN 8* 9   CREATSERUM 1.05* 0.75   CA 9.5 8.3*   ALB 2.5* 2.0*   * 140   K 3.6 4.2  4.2    112   CO2 29.0 26.0   ALKPHO 105* 77   AST 7* 11*   ALT 20 18   BILT 0.8 0.6   TP 8.5* 6.6       Vancomycin Trough (ug/mL)   Date Value   2022 28.8 (H)     Microbiology    Hospital Encounter on 24   1. Tissue Aerobic Culture     Status: Abnormal (Preliminary result)    Collection Time:  03/31/24  9:30 AM    Specimen: Toe; Tissue   Result Value Ref Range    Tissue Smear 2+ WBCs seen (A) N/A    Tissue Smear 2+ Gram positive cocci in pairs and clusters (A) N/A    Tissue Smear 2+ Gram Negative Rods (A) N/A   2. Blood Culture     Status: None (Preliminary result)    Collection Time: 03/30/24  5:53 PM    Specimen: Blood,peripheral   Result Value Ref Range    Blood Culture Result No Growth 1 Day N/A           Problem list reviewed:  Patient Active Problem List   Diagnosis    Type 2 diabetes mellitus with hyperglycemia, with long-term current use of insulin (HCC)    Anxiety    Neuropathy    Meralgia paresthetica of left side    Vitamin D deficiency    B12 deficiency    Diabetic neuropathy (HCC)    Hyperglycemia due to type 2 diabetes mellitus (HCC)    Morbid obesity (HCC)    Other specified fetal and placental problems affecting management of mother, antepartum    Primary hypercoagulable state (HCC)    Uncontrolled type 2 diabetes mellitus    Noncompliance with medication regimen    Ulcer of right foot, limited to breakdown of skin (HCC)    Hyponatremia    Foot ulcer, right, with fat layer exposed (HCC)    Therapeutic drug monitoring    FLORENCIA (acute kidney injury) (HCC)    Labial abscess    Cellulitis of labia    Hyperglycemia    Leukocytosis, unspecified type    Sepsis, due to unspecified organism, unspecified whether acute organ dysfunction present (HCC)    Cellulitis and abscess of foot             ASSESSMENT/PLAN:  1. Right toe ulceration necrotic infection  Podiatry following, MRI pending  Continue zosyn pending micro updates      Dakota Koenig MD, MD [facility]  [phone]

## 2024-04-01 NOTE — PAYOR COMM NOTE
--------------  ADMISSION REVIEW     Payor: COURTNEY TORRES  Subscriber #:  [id number]  Authorization Number: [id number]    Admit date: 3/30/24  Admit time:  8:22 PM       REVIEW DOCUMENTATION:  Patient Seen in: [facility] 3sw-a      History     Chief Complaint   Patient presents with    Cellulitis     Stated Complaint: \"infection\"    Subjective:   HPI    43-year-old female with history of diabetes, presents emergency room for evaluation of swelling and redness to the right foot.  Patient states she has had a \"wound \"to the foot for quite some time, over the last few days it has become more red and swollen, there is been drainage.  Patient been having fevers over the last few days as well.  Denies headache or neck pain denies chest pain or shortness of breath denies abdominal pain.    Social Determinants of Health     Food Insecurity: No Food Insecurity (3/30/2024)    Food Insecurity     Food Insecurity: Never true   Transportation Needs: No Transportation Needs (3/30/2024)    Transportation Needs     Lack of Transportation: No   Housing Stability: Low Risk  (3/30/2024)    Housing Stability     Housing Instability: No       Positive for stated complaint: \"infection\"  Physical Exam     ED Triage Vitals [03/30/24 1556]   /67   Pulse 117   Resp 18   Temp (!) 101.3 °F (38.5 °C)   Temp src Oral   SpO2 95 %   O2 Device None (Room air)       Current:BP 97/66 (BP Location: Left arm)   Pulse 96   Temp 99 °F (37.2 °C) (Oral)   Resp 18   Ht 177.8 cm (5' 10\")   Wt 106.6 kg   LMP  (LMP Unknown)   SpO2 100%   BMI 33.72 kg/m²         Physical Exam    EXTREMITIES: There is erythema with swelling and fluctuance to the right great toe with streaking erythema to the dorsum of the foot.  Foul odor is present.  There is drainage.    ED Course     Labs Reviewed   COMP METABOLIC PANEL (14) - Abnormal; Notable for the following components:       Result Value    Glucose 216 (*)     Sodium 134 (*)     BUN 8 (*)     Creatinine  1.05 (*)     AST 7 (*)     Alkaline Phosphatase 105 (*)     Total Protein 8.5 (*)     Albumin 2.5 (*)     Globulin  6.0 (*)     A/G Ratio 0.4 (*)     All other components within normal limits   CBC W/ DIFFERENTIAL - Abnormal; Notable for the following components:    WBC 25.8 (*)     HGB 10.8 (*)     HCT 33.5 (*)     .0 (*)     Neutrophil Absolute Prelim 21.24 (*)     Neutrophil Absolute 21.24 (*)     Monocyte Absolute 1.74 (*)          MDM        Differential diagnosis before testing includes but not limited to abscess, cellulitis, osteomyelitis, sepsis, DKA, which is a medical condition that poses a threat to life/function    Past Medical History/comorbidities-diabetes mellitus    Disposition:    Admission  I have discussed with the patient the results of test, differential diagnosis, and treatment plan. They expressed clear understanding of these instructions and agrees to the plan provided.       Disposition and Plan     Clinical Impression:  1. Sepsis, due to unspecified organism, unspecified whether acute organ dysfunction present (HCC)    2. Cellulitis and abscess of foot         Disposition:  Admit  3/30/2024  6:42 pm      3/31/ Podiatry     Reason for Consultation:  Consulted by Dr. Hensley for infected right hallux ulceration     History of Present Illness:  Leonie Denney is a a(n) 43 year old female with poorly controlled diabetes who was seen this morning for worsening wound to right great toe.  Patient states that she has had a wound for quite some time to this area.  She states that she send has seen my partner, Dr. Benites in the past but it has been a little while.  She states that she normally trims the wound herself.  Recently has noticed that the wound began to drain and the toe began to swell.  She has also noticed a malodor.  This prompted her to come to the hospital.  She also states that she has had recent nausea, fevers, chills.  She states that she feels better overall after starting IV  antibiotics.      ID 3/31    Reason for Consultation:  Right toe infection     History of Present Illness:  Leonie Denney is a a(n) 43 year old female diagnosed with DM, A1C 8.7% iun 1/2024,  admitted 3/30 due to worsening right toe ulceration swelling, odor, and drainage.      Musculoskeletal: right toe ulceration, swelling, malodor       ASSESSMENT/PLAN:  1. Right toe infection, ulceration, malodor ,swelling, xray showing gas soft tissue  -podiatry following, MRI planned  Continue zosyn pending micro updates      Dakota Koenig MD, MD [facility]        Collected 3/31/2024 09:30       Status: Preliminary result    Specimen Information: Toe; Tissue   0 Result Notes  TISSUE SMEAR  Abnormal   2+ WBCs seen      2+ Gram positive cocci in pairs and clusters      2+ Gram Negative Rods              Resulting Agency: [facility] ([facility])           Specimen Collected: 03/31/24 09:30 Last Resulted: 04/01/24 06:21                  Latest Reference Range & Units 03/30/24 16:04 03/31/24 05:40   Glucose 70 - 99 mg/dL 216 (H) 158 (H)   HEMOGLOBIN A1c <5.7 % 9.0 (H)    ESTIMATED AVERAGE GLUCOSE 68 - 126 mg/dL 212 (H)    Sodium 136 - 145 mmol/L 134 (L) 140   Potassium 3.5 - 5.1 mmol/L  3.5 - 5.1 mmol/L 3.6 4.2  4.2   Chloride 98 - 112 mmol/L 101 112   Carbon Dioxide, Total 21.0 - 32.0 mmol/L 29.0 26.0   BUN 9 - 23 mg/dL 8 (L) 9   CREATININE 0.55 - 1.02 mg/dL 1.05 (H) 0.75   (H): Data is abnormally high  (L): Data is abnormally low     Latest Reference Range & Units 03/31/24 05:40   C-REACTIVE PROTEIN <0.30 mg/dL 16.00 (H)   (H): Data is abnormally high     Latest Reference Range & Units 03/31/24 05:40   SED RATE 0 - 20 mm/Hr 109 (H)   (H): Data is abnormally high     Latest Reference Range & Units 03/30/24 16:04 03/31/24 05:40   WBC 4.0 - 11.0 x10(3) uL 25.8 (H) 14.1 (H)   Hemoglobin 12.0 - 16.0 g/dL 10.8 (L) 8.4 (L)   Hematocrit 35.0 - 48.0 % 33.5 (L) 26.4 (L)   Platelet Count 150.0 - 450.0 10(3)uL  543.0 (H) 417.0   (H): Data is abnormally high  (L): Data is abnormally low      MEDICATIONS ADMINISTERED IN LAST 1 DAY:  acetaminophen (Tylenol) tab 650 mg       Date Action Dose Route User    3/31/2024 1635 Given 650 mg Oral Filomena Vergara RN    3/31/2024 1126 Given 650 mg Oral Filomena Vergara RN          insulin aspart (NovoLOG) 100 Units/mL FlexPen 2-10 Units       Date Action Dose Route User    3/31/2024 2129 Given 2 Units Subcutaneous (Left Upper Arm) Shankar Nicole RN    3/31/2024 1635 Given 2 Units Subcutaneous (Right Upper Arm) Filomena Vergara RN    3/31/2024 1126 Given 2 Units Subcutaneous (Right Upper Arm) Filomena Vergara RN          metoclopramide (Reglan) 5 mg/mL injection 10 mg       Date Action Dose Route User    3/31/2024 2312 Given 10 mg Intravenous Shankar Nicole RN          ondansetron (Zofran) 4 MG/2ML injection 4 mg       Date Action Dose Route User    3/31/2024 2048 Given 4 mg Intravenous Shankar Nicole RN          pantoprazole (Protonix) DR tab 40 mg       Date Action Dose Route User    4/1/2024 0553 Given 40 mg Oral Shankar Nicole RN          piperacillin-tazobactam (Zosyn) 4.5 g in dextrose 5% 100 mL IVPB-ADDV       Date Action Dose Route User    4/1/2024 0554 New Bag 4.5 g Intravenous Shankar Nicole RN    3/31/2024 2114 New Bag 4.5 g Intravenous Shankar Nicole RN    3/31/2024 1317 New Bag 4.5 g Intravenous Filomena Vergara RN            Vitals (last day)       Date/Time Temp Pulse Resp BP SpO2 Weight O2 Device O2 Flow Rate (L/min) [facility]    04/01/24 0815 98.3 °F (36.8 °C) 88 18 116/66 97 % -- None (Room air) -- LW    04/01/24 0437 -- -- -- -- -- 254 lb 12.8 oz -- -- RR    04/01/24 0400 98.9 °F (37.2 °C) 90 20 121/88 95 % -- None (Room air) -- RR    03/31/24 2330 98.1 °F (36.7 °C) 87 18 117/69 94 % -- None (Room air) -- RR    03/31/24 2010 97.9 °F (36.6 °C) 89 20 125/76 97 % -- None (Room air) -- RR    03/31/24 1529 99 °F (37.2 °C) 91 26 116/67 98 % -- None (Room air) --      03/31/24 1140 98.6 °F (37 °C) 86 16 117/76 99 % -- None (Room air) --     03/31/24 0825 -- 99 -- -- -- 258 lb 2.5 oz -- --     03/31/24 0739 98.2 °F (36.8 °C) 81 14 103/72 100 % -- Nasal cannula 2 L/min     03/31/24 0414 98.3 °F (36.8 °C) -- 16 103/71 -- -- Nasal cannula 2 L/min     03/31/24 0139 98.4 °F (36.9 °C) 88 14 92/58 95 % -- Nasal cannula 2 L/min ADELA MARTIN RN

## 2024-04-01 NOTE — CM/SW NOTE
Informed by RN that pt requesting to speak with SW regarding FMLA.  Met with pt at bedside.  Pt stated she will be going to OR tomorrow and will need to apply for FMLA from her job.  Assisted pt with information she needed in order to initiate request on her employer's online portal.  Pt anxious about podiatry recommendation for surgery.  All questions/concerns addressed.  Updated RN.  / to remain available for support and/or discharge planning.     Yudelka Wang, [facility]  Discharge Planner  [phone]

## 2024-04-01 NOTE — DIETARY NOTE
[facility]   part of [facility]    NUTRITION ASSESSMENT    Pt does not meet malnutrition criteria at this time.    NUTRITION INTERVENTION:    Meal and Snacks - Monitor and encourage adequate PO intake. Carb Controlled diet: 60 gm CHO per meal.  Medical Food Supplements -  [place] BID: orange at breakfast and fruit punch at dinner . Rationale/use for oral supplements discussed.  Nutrition Education - Offered DM diet ed; pt refused. Provided contact information for DM Center in pt instructions for discharge.       PATIENT STATUS:     4/1/24- 44 y/o female admitted with necrotic infection gangrene of R great toe. MRI positive for OM of proximal phalanx, right hallux. Pt seen d/t wound and A1c:9.0 (3/30/24). Wound Care on consult. Podiatry following. Plans for amputation tomorrow.   Pt declined DM diet ed and reporting receiving diet ed several times in the past. Pt stated she tries to monitor portions sizes, but she eats what she wants and is not willing to make any other dietary changes.  Encouraged pt to consume protein foods with each meal and discussed benefits of ONS to aid with wound healing. Pt agreeable to try [place] BID. Pt reported decreased appetite and had episode of emesis this AM. Denied any more N/V. Recorded PO intakes vary:%. Was able to consume 100% of lunch today.   PMH:tobacco use, DM.        ANTHROPOMETRICS:  Ht: 177.8 cm (5' 10\")  Wt: 115.6 kg (254 lb 12.8 oz).   BMI: Body mass index is 36.56 kg/m².  IBW: 68.2 kg      WEIGHT HISTORY:   Noted no significant wt changes per EMR hx. Pt stated wt has been stable.     Wt Readings from Last 10 Encounters:   04/01/24 115.6 kg (254 lb 12.8 oz)   01/23/24 114.4 kg (252 lb 4.8 oz)   01/09/24 115.8 kg (255 lb 6.4 oz)   07/20/23 118.6 kg (261 lb 6.4 oz)   05/18/23 113.4 kg (250 lb)   04/13/23 117.1 kg (258 lb 3.2 oz)   01/10/23 116.1 kg (255 lb 14.4 oz)   12/07/22 113.4 kg (250 lb)   11/08/22 113.1 kg (249 lb 6.4 oz)   10/11/22 113.4 kg (250 lb)         NUTRITION:  Diet:       Procedures    Carbohydrate controlled diet 1800 kcal/60 grams; Is Patient on Accuchecks? No    NPO      Food Allergies: No  Cultural/Ethnic/Worship Preferences Addressed: Yes    Percent Meals Eaten (last 3 days)       Date/Time Percent Meals Eaten (%)    03/31/24 0825 50 %    04/01/24 0815 80 %    04/01/24 1259 100 %          GI system review: nausea and episode of emesis this AM per pt report.  Last BM: 3/31  Skin and wounds: necrotic infection gangrene of R great toe    NUTRITION RELATED PHYSICAL FINDINGS:     1. Body Fat/Muscle Mass: no wasting noted     2. Fluid Accumulation: lower extremity edema     NUTRITION PRESCRIPTION: 68.2 kg (IBW)  Calories: 6039-7069 calories/day (25-30 kcal/kg)  Protein: 102-136 grams protein/day (1.5-2.0 grams protein per kg)  Fluid: ~1 ml/kcal or per MD discretion    NUTRITION DIAGNOSIS/PROBLEM:  Altered nutrition-related lab values related to  endocrine dysfunction  as evidenced by  A1c:9.0 (3/30/24).    Increased nutrient needs related to wound healing as evidenced by necrotic infection gangrene of R great toe.    MONITOR AND EVALUATE/NUTRITION GOALS:  PO intake of 75% of meals TID - New  PO intake of 75% of oral nutrition supplement/s - New  Provide nutrition adequate for wound healing - New  Improved blood glucose control  - New      MEDICATIONS:  Insulin, IV abx    LABS:  A1c:9.0 (3/30/24), POC Glu:129-169, (3/31): Glu:158, CRP:16.00    Pt is at Moderate nutrition risk    Debby Sams MS, RD, LDN  Clinical Dietitian  Ext:[phone]

## 2024-04-01 NOTE — PLAN OF CARE
Alert and oriented x4. VSS. On RA. . Telemetry monitoring. Tolerating diet. Voiding freely. Pain controlled with PRN medication. Gauze dressing to right foot, C/D/I. Ambulating with standby assist and postop shoe. IV abx as ordered. Pt reporting abdominal pain and nausea, antiemetic given. Cultures pending. Plan for MRI of right foot to be completed.

## 2024-04-01 NOTE — CONSULTS
[facility]   part of [facility]    Report of Consultation    Leonie Denney Patient Status:  Inpatient    1980 MRN [id number]   Location [facility] 3SW-A Attending Herberth Philip MD   Hosp Day # 2 PCP González Houser MD     Date of Admission:  3/30/2024  Date of Consult: 2024    Reason for Consultation:  Consultation requested by attending MD, Ramonita Philip, for podiatry for necrotic infection gangrene of right great toe.    History of Present Illness:  Leonie Denney is a a(n) 43 year old female.  This patient relates that she has had a wound on her toe for approximately 1 year but this past week it started to get infected and drained and smelled bad.  She came to the ED and was admitted.  Placed on IV antibiotics from infectious disease.  She has had an MRI now positive for osteomyelitis of the proximal phalanx, right hallux    History:  Past Medical History:   Diagnosis Date    Abdominal pain     Anxiety state     Arthritis     B12 deficiency 2019    Blurred vision     Calculus of kidney     Constipation     Constipation     Decorative tattoo     Depression     no meds- will see therapist    Diabetes mellitus (HCC)     Frequent use of laxatives     Frequent UTI     Frequent UTI     Irregular bowel habits     Itch of skin     Leg swelling     Loss of appetite     Nausea     Neuropathy     Night sweats     Obesity     Osteoarthritis     knees    Sleep disturbance     Type II or unspecified type diabetes mellitus without mention of complication, not stated as uncontrolled     on insulin currently/recently    Vitamin D deficiency 2019    Weight loss      Past Surgical History:   Procedure Laterality Date    COLONOSCOPY N/A 2019    Procedure: COLONOSCOPY;  Surgeon: Tyrel Osborn DO;  Location:  ENDOSCOPY    DILATION/CURETTAGE,DIAGNOSTIC      x2    HC  SECTION LEVEL I      x1    KIDNEY SURGERY      KIDNEY STONE REMOVAL X2    OTHER SURGICAL HISTORY  04/10/2022     INCISION AND DRAINAGE LEFT BREAST ABSCESS X 2     Family History   Problem Relation Age of Onset    Diabetes Mother     Diabetes Maternal Grandmother     Diabetes Maternal Grandfather       reports that she has been smoking cigarettes. She started smoking about 15 years ago. She has been smoking an average of 0.3 packs per day. She has never used smokeless tobacco. She reports that she does not drink alcohol and does not use drugs.    Allergies:  No Known Allergies    Medications:    Current Facility-Administered Medications:     clonazePAM (KlonoPIN) tab 0.5 mg, 0.5 mg, Oral, Nightly    pantoprazole (Protonix) DR tab 40 mg, 40 mg, Oral, QAM AC    ondansetron (Zofran) 4 MG/2ML injection 4 mg, 4 mg, Intravenous, Q6H PRN    polyethylene glycol (PEG 3350) (Miralax) 17 g oral packet 17 g, 17 g, Oral, Daily PRN    metoclopramide (Reglan) 5 mg/mL injection 10 mg, 10 mg, Intravenous, Q8H PRN    heparin (Porcine) 5000 UNIT/ML injection 5,000 Units, 5,000 Units, Subcutaneous, Q8H JULY    acetaminophen (Tylenol Extra Strength) tab 500 mg, 500 mg, Oral, Q4H PRN    morphINE PF 2 MG/ML injection 1 mg, 1 mg, Intravenous, Q2H PRN **OR** morphINE PF 2 MG/ML injection 2 mg, 2 mg, Intravenous, Q2H PRN **OR** morphINE PF 4 MG/ML injection 4 mg, 4 mg, Intravenous, Q2H PRN    piperacillin-tazobactam (Zosyn) 4.5 g in dextrose 5% 100 mL IVPB-ADDV, 4.5 g, Intravenous, Q8H    insulin aspart (NovoLOG) 100 Units/mL FlexPen 2-10 Units, 2-10 Units, Subcutaneous, TID CC and HS    acetaminophen (Tylenol) tab 650 mg, 650 mg, Oral, Q6H PRN    Review of Systems:  Musculoskeletal:negative, patient is resting comfortably in bed her  is at bedside    Physical Exam:  Integument: The skin on her feet was evaluated on the left its warm and dry.  On the right is erythematous edematous the right hallux has a plantar ulcer which is progressing and exiting through the dorsal aspect of the foot compression of the area produces purulent gray malodorous  drainage.          Vascular: Has palpable pulses dorsalis pedis posterior tibial on the right  Neurologic: Has diminished pedal sensorium pressing the toe does produce some degree of pain but overall decreased protective sensation  Musculoskeletal: Patient has collapsed arch on the left which might be representative of Charcot.  MRI FOOT, RIGHT (CPT=73718)    Result Date: 4/1/2024  CONCLUSION:   1. There is mild early osteomyelitis involving the proximal phalanx of the 1st digit.  2. Extensive cellulitis involving the 1st digit without drainable abscess.   LOCATION:  [facility]   Dictated by (CST): Jordan Lucero MD on 4/01/2024 at 10:26 AM     Finalized by (CST): Jordan Lucero MD on 4/01/2024 at 11:15 AM       XR FOOT, COMPLETE (MIN 3 VIEWS), RIGHT (CPT=73630)    Result Date: 3/30/2024  CONCLUSION:  Soft tissue edema and gas within the 1st digit with suspected mild periosteal reaction.  Findings are suggestive of infection with osteomyelitis not excluded.  Given soft tissue gas index tries in process is of consideration.   LOCATION:  [facility]   Dictated by (CST): Mik Chang MD on 3/30/2024 at 6:28 PM     Finalized by (CST): Mik Chang MD on 3/30/2024 at 6:29 PM         Laboratory Data:  Recent Labs   Lab 03/31/24  0540   RBC 3.14*   HGB 8.4*   HCT 26.4*   MCV 84.1   MCH 26.8   MCHC 31.8   RDW 14.3   NEPRELIM 10.40*   WBC 14.1*   .0       Impression and Plan:  Patient Active Problem List   Diagnosis    Type 2 diabetes mellitus with hyperglycemia, with long-term current use of insulin (HCC)    Anxiety    Neuropathy    Meralgia paresthetica of left side    Vitamin D deficiency    B12 deficiency    Diabetic neuropathy (HCC)    Hyperglycemia due to type 2 diabetes mellitus (HCC)    Morbid obesity (HCC)    Other specified fetal and placental problems affecting management of mother, antepartum    Primary hypercoagulable state (HCC)    Uncontrolled type 2 diabetes mellitus    Noncompliance with medication regimen     Ulcer of right foot, limited to breakdown of skin (HCC)    Hyponatremia    Foot ulcer, right, with fat layer exposed (HCC)    Therapeutic drug monitoring    FLORENCIA (acute kidney injury) (HCC)    Labial abscess    Cellulitis of labia    Hyperglycemia    Leukocytosis, unspecified type    Sepsis, due to unspecified organism, unspecified whether acute organ dysfunction present (HCC)    Cellulitis and abscess of foot       Plan: Today I discussed with the patient that this is a necrotic gangrenous toe this can become a limb or life-threatening and I recommended amputation as soon as possible.  The nature and extent of the procedure the possible complications and risks including multiple surgeries to clear the infection loss of more of the foot if we can clear it with 1 were all discussed.  The patient became very upset at this was crying in the room I tried to reassure her that this needs to be done we will schedule for 5 PM tomorrow she has to remain n.p.o. after breakfast.    Dilan Sung DPM   4/1/2024  1:49 PM

## 2024-04-01 NOTE — ANESTHESIA PREPROCEDURE EVALUATION
PRE-OP EVALUATION    Patient Name: Leonie Denney    Admit Diagnosis: Cellulitis and abscess of foot [L03.119, L02.619]  Sepsis, due to unspecified organism, unspecified whether acute organ dysfunction present (HCC) [A41.9]    Pre-op Diagnosis: INPATIENT    AMPUTATION INCISION & DRAINAGE RIGHT    Anesthesia Procedure: AMPUTATION INCISION & DRAINAGE RIGHT (Right)    Surgeon(s) and Role:     * Dilan Sung, YANELY - Primary    Pre-op vitals reviewed.  Temp: 98.5 °F (36.9 °C)  Pulse: 93  Resp: 16  BP: 106/67  SpO2: 96 %  Body mass index is 36.56 kg/m².    Current medications reviewed.  Hospital Medications:   clonazePAM (KlonoPIN) tab 0.5 mg  0.5 mg Oral Nightly    pantoprazole (Protonix) DR tab 40 mg  40 mg Oral QAM AC    ondansetron (Zofran) 4 MG/2ML injection 4 mg  4 mg Intravenous Q6H PRN    polyethylene glycol (PEG 3350) (Miralax) 17 g oral packet 17 g  17 g Oral Daily PRN    metoclopramide (Reglan) 5 mg/mL injection 10 mg  10 mg Intravenous Q8H PRN    [COMPLETED] acetaminophen (Tylenol Extra Strength) tab 1,000 mg  1,000 mg Oral Once    [COMPLETED] ceFAZolin (Ancef) 2 g in 20mL IV syringe premix  2 g Intravenous Once    [COMPLETED] sodium chloride 0.9 % IV bolus 3,198 mL  30 mL/kg Intravenous Once    [COMPLETED] vancomycin (Vancocin) 2 g in sodium chloride 0.9% 500mL IVPB premix  2,000 mg Intravenous Once    [COMPLETED] ibuprofen (Motrin) tab 600 mg  600 mg Oral Once    [] sodium chloride 0.9% infusion   Intravenous Continuous    [COMPLETED] piperacillin-tazobactam (Zosyn) 4.5 g in dextrose 5% 100 mL IVPB-ADDV  4.5 g Intravenous Once    [COMPLETED] sodium chloride 0.9 % IV bolus 3,198 mL  30 mL/kg Intravenous Once    heparin (Porcine) 5000 UNIT/ML injection 5,000 Units  5,000 Units Subcutaneous Q8H JULY    acetaminophen (Tylenol Extra Strength) tab 500 mg  500 mg Oral Q4H PRN    morphINE PF 2 MG/ML injection 1 mg  1 mg Intravenous Q2H PRN    Or    morphINE PF 2 MG/ML injection 2 mg  2 mg Intravenous Q2H  PRN    Or    morphINE PF 4 MG/ML injection 4 mg  4 mg Intravenous Q2H PRN    [COMPLETED] potassium chloride (K-Dur) tab 40 mEq  40 mEq Oral Q4H    piperacillin-tazobactam (Zosyn) 4.5 g in dextrose 5% 100 mL IVPB-ADDV  4.5 g Intravenous Q8H    insulin aspart (NovoLOG) 100 Units/mL FlexPen 2-10 Units  2-10 Units Subcutaneous TID CC and HS    acetaminophen (Tylenol) tab 650 mg  650 mg Oral Q6H PRN       Outpatient Medications:     Medications Prior to Admission   Medication Sig Dispense Refill Last Dose    semaglutide (OZEMPIC, 2 MG/DOSE,) 8 MG/3ML Subcutaneous Solution Pen-injector Inject 2 mg into the skin once a week. 3 mL 0 Taking    Empagliflozin (JARDIANCE) 25 MG Oral Tab Take 25 mg by mouth daily. 90 tablet 1     Continuous Blood Gluc Sensor (DEXCOM G7 SENSOR) Does not apply Misc 1 each Every 10 days. 9 each 1     teraconazole 0.4 % Vaginal Cream Place vaginally every 4 (four) hours. AT BEDTIME          Allergies: Patient has no known allergies.      Anesthesia Evaluation        Anesthetic Complications           GI/Hepatic/Renal             (+) chronic renal disease and ARF                   Cardiovascular        Exercise tolerance: poor     MET: <=4    (+) obesity                                       Endo/Other      (+) diabetes                            Pulmonary                           Neuro/Psych      (+) depression  (+) anxiety         (+) neuromuscular disease                     Past Surgical History:   Procedure Laterality Date    COLONOSCOPY N/A 2019    Procedure: COLONOSCOPY;  Surgeon: Tyrel Osborn DO;  Location:  ENDOSCOPY    DILATION/CURETTAGE,DIAGNOSTIC      x2    HC  SECTION LEVEL I      x1    KIDNEY SURGERY      KIDNEY STONE REMOVAL X2    OTHER SURGICAL HISTORY  04/10/2022    INCISION AND DRAINAGE LEFT BREAST ABSCESS X 2     Social History     Socioeconomic History    Marital status:    Tobacco Use    Smoking status: Every Day     Packs/day: .25     Types:  Cigarettes     Start date: 2009    Smokeless tobacco: Never    Tobacco comments:     Working towards quitting   Vaping Use    Vaping Use: Never used   Substance and Sexual Activity    Alcohol use: No    Drug use: No     Comment: CBD oil on occasion   Other Topics Concern    Caffeine Concern No    Exercise No    Seat Belt Yes    Special Diet No    Stress Concern No    Weight Concern No     History   Drug Use No     Comment: CBD oil on occasion     Available pre-op labs reviewed.  Lab Results   Component Value Date    WBC 14.1 (H) 03/31/2024    RBC 3.14 (L) 03/31/2024    HGB 8.4 (L) 03/31/2024    HCT 26.4 (L) 03/31/2024    MCV 84.1 03/31/2024    MCH 26.8 03/31/2024    MCHC 31.8 03/31/2024    RDW 14.3 03/31/2024    .0 03/31/2024     Lab Results   Component Value Date     03/31/2024    K 4.2 03/31/2024    K 4.2 03/31/2024     03/31/2024    CO2 26.0 03/31/2024    BUN 9 03/31/2024    CREATSERUM 0.75 03/31/2024     (H) 03/31/2024    CA 8.3 (L) 03/31/2024     Lab Results   Component Value Date    INR 1.20 03/30/2024         Airway      Mallampati: II  Mouth opening: 3 FB  TM distance: 4 - 6 cm  Neck ROM: full Cardiovascular             Dental      Dental appliance(s): upper dentures, lower dentures and partials       Pulmonary                     Other findings              ASA: 3   Plan: general  NPO status verified and patient meets guidelines.    Post-procedure pain management plan discussed with surgeon and patient.    Comment: Options, risks and benefits of anesthesia as outlined in the anesthesia consent were reviewed with the patient. Risks and benefits of GA including sore throat, allergy, nausea, vomiting, dental trauma, pain management modalities were all discussed. Particularly the risk of dental trauma with weakened teeth or crowns, partials, fillings and any non natural teeth due to instrumentation of oral cavity and airway. Patient understands risks and verbally agreed to proceed. All  questions answered.The consent was signed without further questions.        Plan/risks discussed with: patient  Use of blood product(s) discussed with: patient    Consented to blood products.          Present on Admission:  **None**

## 2024-04-01 NOTE — PLAN OF CARE
PT A&Ox4, VSS. , RA. Tele, NSR. PT denies pain at this time. Up SBA w/ post op shoe. RLE dressing C/D/I. Call light in reach, safety measures in place. Plan to see woundcare, wcx and MRI results pending, further intervention TBD.

## 2024-04-01 NOTE — PROGRESS NOTES
[facility]  Progress Note    Leonie Denney Patient Status:  Inpatient    1980 MRN [id number]   Location [facility] 3SW-A Attending Herberth Philip MD   Hosp Day # 2 PCP González Houser MD       SUBJECTIVE:  She is very upset , planning for surgery  tomorrow   Right big toe look gangrenous  and swollen   She had nausea yesterday , improved with Zofran and Reglan   Had BM yesterday   OBJECTIVE:  Vital signs in last 24 hours:  /67 (BP Location: Left arm)   Pulse 93   Temp 98.5 °F (36.9 °C) (Oral)   Resp 16   Ht 5' 10\" (1.778 m)   Wt 254 lb 12.8 oz (115.6 kg)   LMP  (LMP Unknown)   SpO2 96%   BMI 36.56 kg/m²     Intake/Output:    Intake/Output Summary (Last 24 hours) at 2024 1236  Last data filed at 2024 0815  Gross per 24 hour   Intake 480 ml   Output 1200 ml   Net -720 ml       Wt Readings from Last 3 Encounters:   24 254 lb 12.8 oz (115.6 kg)   24 252 lb 4.8 oz (114.4 kg)   24 255 lb 6.4 oz (115.8 kg)       Medications:  Current Facility-Administered Medications   Medication Dose Route Frequency    clonazePAM (KlonoPIN) tab 0.5 mg  0.5 mg Oral Nightly    pantoprazole (Protonix) DR tab 40 mg  40 mg Oral QAM AC    ondansetron (Zofran) 4 MG/2ML injection 4 mg  4 mg Intravenous Q6H PRN    polyethylene glycol (PEG 3350) (Miralax) 17 g oral packet 17 g  17 g Oral Daily PRN    metoclopramide (Reglan) 5 mg/mL injection 10 mg  10 mg Intravenous Q8H PRN    heparin (Porcine) 5000 UNIT/ML injection 5,000 Units  5,000 Units Subcutaneous Q8H JULY    acetaminophen (Tylenol Extra Strength) tab 500 mg  500 mg Oral Q4H PRN    morphINE PF 2 MG/ML injection 1 mg  1 mg Intravenous Q2H PRN    Or    morphINE PF 2 MG/ML injection 2 mg  2 mg Intravenous Q2H PRN    Or    morphINE PF 4 MG/ML injection 4 mg  4 mg Intravenous Q2H PRN    piperacillin-tazobactam (Zosyn) 4.5 g in dextrose 5% 100 mL IVPB-ADDV  4.5 g Intravenous Q8H    insulin aspart (NovoLOG) 100 Units/mL FlexPen 2-10 Units   2-10 Units Subcutaneous TID CC and HS    acetaminophen (Tylenol) tab 650 mg  650 mg Oral Q6H PRN       Exam:  Gen: No acute distress, alert and oriented x3, no focal neurologic deficits  Pulm: Lungs clear, normal respiratory effort  CV: Heart with regular rate and rhythm, no peripheral edema  Abd: Abdomen soft, nontender, nondistended, no organomegaly, bowel sounds present  MSK: Full range of motion in extremities, no clubbing, no cyanosis  Skin: Right big toe with swelling and black discoloration . Open wound with foul smelling discharge right big toe     Data Review:     Labs:   No components found for: \"BMP\"   Recent Labs   Lab 03/30/24  1604 03/31/24  0540   RBC 4.04 3.14*   HGB 10.8* 8.4*   HCT 33.5* 26.4*   MCV 82.9 84.1   MCH 26.7 26.8   MCHC 32.2 31.8   RDW 14.2 14.3   NEPRELIM 21.24* 10.40*   WBC 25.8* 14.1*   .0* 417.0     Recent Labs   Lab 03/30/24  2137   PTP 15.3*   INR 1.20     HEMOGLOBIN A1C (%)   Date Value   01/09/2024 8.7 (A)   07/20/2023 8.3 (A)   04/13/2023 8.7 (A)     HgbA1C (%)   Date Value   03/30/2024 9.0 (H)     TSH (mIU/mL)   Date Value   01/09/2024 0.652   04/22/2022 0.661   12/14/2020 0.788     No results for input(s): \"TROP\", \"CK\" in the last 168 hours.     Hospital Encounter on 03/30/24   1. Tissue Aerobic Culture     Status: Abnormal (Preliminary result)    Collection Time: 03/31/24  9:30 AM    Specimen: Toe; Tissue   Result Value Ref Range    Tissue Culture Result 4+ growth Pseudomonas aeruginosa (A) N/A    Tissue Smear 2+ WBCs seen (A) N/A    Tissue Smear 2+ Gram positive cocci in pairs and clusters (A) N/A    Tissue Smear 2+ Gram Negative Rods (A) N/A   2. Blood Culture     Status: None (Preliminary result)    Collection Time: 03/30/24  5:53 PM    Specimen: Blood,peripheral   Result Value Ref Range    Blood Culture Result No Growth 1 Day N/A     Imaging:  MRI right foot   1. There is mild early osteomyelitis involving the proximal phalanx of the 1st digit.      2. Extensive  cellulitis involving the 1st digit without drainable abscess.         Assessment & Plan:  Necrotic ulcer with gangrene right big toe with cellulitis   Wound culture - Pseudomonas   MRI positive for Acute osteomyelitis   Planning for Amputation tomorrow   Diabetes Mellitus with neuropathy and Foot ulcer - BS controlled on SS   Anxiety - Start klonopin   DVT prophylaxis   D/w Dr Lazara Sung   Podiatry and ID consult appreciated    Questions/concerns were discussed with patient and/or family by bedside.    Herberth Philip MD  4/1/2024  12:36 PM

## 2024-04-02 ENCOUNTER — ANESTHESIA (OUTPATIENT)
Dept: SURGERY | Facility: HOSPITAL | Age: 44
End: 2024-04-02
Payer: COMMERCIAL

## 2024-04-02 LAB
ALBUMIN SERPL-MCNC: 2.1 G/DL (ref 3.4–5)
ALBUMIN/GLOB SERPL: 0.4 {RATIO} (ref 1–2)
ALP LIVER SERPL-CCNC: 93 U/L
ALT SERPL-CCNC: 21 U/L
ANION GAP SERPL CALC-SCNC: 5 MMOL/L (ref 0–18)
AST SERPL-CCNC: 10 U/L (ref 15–37)
BASOPHILS # BLD AUTO: 0.1 X10(3) UL (ref 0–0.2)
BASOPHILS NFR BLD AUTO: 0.9 %
BILIRUB SERPL-MCNC: 0.5 MG/DL (ref 0.1–2)
BUN BLD-MCNC: 7 MG/DL (ref 9–23)
CALCIUM BLD-MCNC: 9.3 MG/DL (ref 8.5–10.1)
CHLORIDE SERPL-SCNC: 107 MMOL/L (ref 98–112)
CO2 SERPL-SCNC: 27 MMOL/L (ref 21–32)
CREAT BLD-MCNC: 0.73 MG/DL
EGFRCR SERPLBLD CKD-EPI 2021: 103 ML/MIN/1.73M2 (ref 60–?)
EOSINOPHIL # BLD AUTO: 0.5 X10(3) UL (ref 0–0.7)
EOSINOPHIL NFR BLD AUTO: 4.3 %
ERYTHROCYTE [DISTWIDTH] IN BLOOD BY AUTOMATED COUNT: 14.2 %
GLOBULIN PLAS-MCNC: 5.1 G/DL (ref 2.8–4.4)
GLUCOSE BLD-MCNC: 101 MG/DL (ref 70–99)
GLUCOSE BLD-MCNC: 136 MG/DL (ref 70–99)
GLUCOSE BLD-MCNC: 144 MG/DL (ref 70–99)
GLUCOSE BLD-MCNC: 155 MG/DL (ref 70–99)
GLUCOSE BLD-MCNC: 222 MG/DL (ref 70–99)
HCG UR QL: NEGATIVE
HCT VFR BLD AUTO: 26.1 %
HGB BLD-MCNC: 8.5 G/DL
IMM GRANULOCYTES # BLD AUTO: 0.15 X10(3) UL (ref 0–1)
IMM GRANULOCYTES NFR BLD: 1.3 %
LYMPHOCYTES # BLD AUTO: 2.81 X10(3) UL (ref 1–4)
LYMPHOCYTES NFR BLD AUTO: 24.3 %
MCH RBC QN AUTO: 26.6 PG (ref 26–34)
MCHC RBC AUTO-ENTMCNC: 32.6 G/DL (ref 31–37)
MCV RBC AUTO: 81.6 FL
MONOCYTES # BLD AUTO: 1.18 X10(3) UL (ref 0.1–1)
MONOCYTES NFR BLD AUTO: 10.2 %
NEUTROPHILS # BLD AUTO: 6.81 X10 (3) UL (ref 1.5–7.7)
NEUTROPHILS # BLD AUTO: 6.81 X10(3) UL (ref 1.5–7.7)
NEUTROPHILS NFR BLD AUTO: 59 %
OSMOLALITY SERPL CALC.SUM OF ELEC: 289 MOSM/KG (ref 275–295)
PLATELET # BLD AUTO: 514 10(3)UL (ref 150–450)
POTASSIUM SERPL-SCNC: 3.8 MMOL/L (ref 3.5–5.1)
PROT SERPL-MCNC: 7.2 G/DL (ref 6.4–8.2)
RBC # BLD AUTO: 3.2 X10(6)UL
SODIUM SERPL-SCNC: 139 MMOL/L (ref 136–145)
WBC # BLD AUTO: 11.6 X10(3) UL (ref 4–11)

## 2024-04-02 PROCEDURE — 0KDV0ZZ EXTRACTION OF RIGHT FOOT MUSCLE, OPEN APPROACH: ICD-10-PCS | Performed by: PODIATRIST

## 2024-04-02 PROCEDURE — 0Y6M0Z9 DETACHMENT AT RIGHT FOOT, PARTIAL 1ST RAY, OPEN APPROACH: ICD-10-PCS | Performed by: PODIATRIST

## 2024-04-02 RX ORDER — DIPHENHYDRAMINE HYDROCHLORIDE 50 MG/ML
12.5 INJECTION INTRAMUSCULAR; INTRAVENOUS AS NEEDED
Status: DISCONTINUED | OUTPATIENT
Start: 2024-04-02 | End: 2024-04-02 | Stop reason: HOSPADM

## 2024-04-02 RX ORDER — HYDROMORPHONE HYDROCHLORIDE 1 MG/ML
0.2 INJECTION, SOLUTION INTRAMUSCULAR; INTRAVENOUS; SUBCUTANEOUS EVERY 5 MIN PRN
Status: DISCONTINUED | OUTPATIENT
Start: 2024-04-02 | End: 2024-04-02 | Stop reason: HOSPADM

## 2024-04-02 RX ORDER — DEXTROSE MONOHYDRATE 25 G/50ML
50 INJECTION, SOLUTION INTRAVENOUS
Status: DISCONTINUED | OUTPATIENT
Start: 2024-04-02 | End: 2024-04-02 | Stop reason: HOSPADM

## 2024-04-02 RX ORDER — BUPIVACAINE HYDROCHLORIDE 5 MG/ML
INJECTION, SOLUTION EPIDURAL; INTRACAUDAL AS NEEDED
Status: DISCONTINUED | OUTPATIENT
Start: 2024-04-02 | End: 2024-04-02 | Stop reason: HOSPADM

## 2024-04-02 RX ORDER — ONDANSETRON 2 MG/ML
INJECTION INTRAMUSCULAR; INTRAVENOUS AS NEEDED
Status: DISCONTINUED | OUTPATIENT
Start: 2024-04-02 | End: 2024-04-02 | Stop reason: SURG

## 2024-04-02 RX ORDER — LIDOCAINE HYDROCHLORIDE 10 MG/ML
INJECTION, SOLUTION EPIDURAL; INFILTRATION; INTRACAUDAL; PERINEURAL AS NEEDED
Status: DISCONTINUED | OUTPATIENT
Start: 2024-04-02 | End: 2024-04-02 | Stop reason: SURG

## 2024-04-02 RX ORDER — NICOTINE POLACRILEX 4 MG
15 LOZENGE BUCCAL
Status: DISCONTINUED | OUTPATIENT
Start: 2024-04-02 | End: 2024-04-02 | Stop reason: HOSPADM

## 2024-04-02 RX ORDER — MEPERIDINE HYDROCHLORIDE 25 MG/ML
12.5 INJECTION INTRAMUSCULAR; INTRAVENOUS; SUBCUTANEOUS AS NEEDED
Status: DISCONTINUED | OUTPATIENT
Start: 2024-04-02 | End: 2024-04-02 | Stop reason: HOSPADM

## 2024-04-02 RX ORDER — NALOXONE HYDROCHLORIDE 0.4 MG/ML
0.08 INJECTION, SOLUTION INTRAMUSCULAR; INTRAVENOUS; SUBCUTANEOUS AS NEEDED
Status: DISCONTINUED | OUTPATIENT
Start: 2024-04-02 | End: 2024-04-02 | Stop reason: HOSPADM

## 2024-04-02 RX ORDER — MIDAZOLAM HYDROCHLORIDE 1 MG/ML
INJECTION INTRAMUSCULAR; INTRAVENOUS AS NEEDED
Status: DISCONTINUED | OUTPATIENT
Start: 2024-04-02 | End: 2024-04-02 | Stop reason: SURG

## 2024-04-02 RX ORDER — MIDAZOLAM HYDROCHLORIDE 1 MG/ML
1 INJECTION INTRAMUSCULAR; INTRAVENOUS EVERY 5 MIN PRN
Status: DISCONTINUED | OUTPATIENT
Start: 2024-04-02 | End: 2024-04-02 | Stop reason: HOSPADM

## 2024-04-02 RX ORDER — NICOTINE POLACRILEX 4 MG
30 LOZENGE BUCCAL
Status: DISCONTINUED | OUTPATIENT
Start: 2024-04-02 | End: 2024-04-02 | Stop reason: HOSPADM

## 2024-04-02 RX ORDER — HYDROMORPHONE HYDROCHLORIDE 1 MG/ML
0.4 INJECTION, SOLUTION INTRAMUSCULAR; INTRAVENOUS; SUBCUTANEOUS EVERY 5 MIN PRN
Status: DISCONTINUED | OUTPATIENT
Start: 2024-04-02 | End: 2024-04-02 | Stop reason: HOSPADM

## 2024-04-02 RX ORDER — POTASSIUM CHLORIDE 20 MEQ/1
40 TABLET, EXTENDED RELEASE ORAL ONCE
Status: COMPLETED | OUTPATIENT
Start: 2024-04-02 | End: 2024-04-02

## 2024-04-02 RX ORDER — HYDROCODONE BITARTRATE AND ACETAMINOPHEN 5; 325 MG/1; MG/1
2 TABLET ORAL ONCE AS NEEDED
Status: DISCONTINUED | OUTPATIENT
Start: 2024-04-02 | End: 2024-04-02 | Stop reason: HOSPADM

## 2024-04-02 RX ORDER — HYDROCODONE BITARTRATE AND ACETAMINOPHEN 5; 325 MG/1; MG/1
1 TABLET ORAL ONCE AS NEEDED
Status: DISCONTINUED | OUTPATIENT
Start: 2024-04-02 | End: 2024-04-02 | Stop reason: HOSPADM

## 2024-04-02 RX ORDER — SODIUM CHLORIDE, SODIUM LACTATE, POTASSIUM CHLORIDE, CALCIUM CHLORIDE 600; 310; 30; 20 MG/100ML; MG/100ML; MG/100ML; MG/100ML
INJECTION, SOLUTION INTRAVENOUS CONTINUOUS PRN
Status: DISCONTINUED | OUTPATIENT
Start: 2024-04-02 | End: 2024-04-02 | Stop reason: SURG

## 2024-04-02 RX ORDER — HYDROMORPHONE HYDROCHLORIDE 1 MG/ML
0.6 INJECTION, SOLUTION INTRAMUSCULAR; INTRAVENOUS; SUBCUTANEOUS EVERY 5 MIN PRN
Status: DISCONTINUED | OUTPATIENT
Start: 2024-04-02 | End: 2024-04-02 | Stop reason: HOSPADM

## 2024-04-02 RX ORDER — SODIUM CHLORIDE, SODIUM LACTATE, POTASSIUM CHLORIDE, CALCIUM CHLORIDE 600; 310; 30; 20 MG/100ML; MG/100ML; MG/100ML; MG/100ML
INJECTION, SOLUTION INTRAVENOUS CONTINUOUS
Status: DISCONTINUED | OUTPATIENT
Start: 2024-04-02 | End: 2024-04-02 | Stop reason: HOSPADM

## 2024-04-02 RX ORDER — DEXAMETHASONE SODIUM PHOSPHATE 4 MG/ML
VIAL (ML) INJECTION AS NEEDED
Status: DISCONTINUED | OUTPATIENT
Start: 2024-04-02 | End: 2024-04-02 | Stop reason: SURG

## 2024-04-02 RX ORDER — INSULIN ASPART 100 [IU]/ML
INJECTION, SOLUTION INTRAVENOUS; SUBCUTANEOUS ONCE
Status: DISCONTINUED | OUTPATIENT
Start: 2024-04-02 | End: 2024-04-02 | Stop reason: HOSPADM

## 2024-04-02 RX ORDER — ACETAMINOPHEN 500 MG
1000 TABLET ORAL ONCE AS NEEDED
Status: DISCONTINUED | OUTPATIENT
Start: 2024-04-02 | End: 2024-04-02 | Stop reason: HOSPADM

## 2024-04-02 RX ORDER — PHENYLEPHRINE HCL 10 MG/ML
VIAL (ML) INJECTION AS NEEDED
Status: DISCONTINUED | OUTPATIENT
Start: 2024-04-02 | End: 2024-04-02 | Stop reason: SURG

## 2024-04-02 RX ADMIN — PHENYLEPHRINE HCL 100 MCG: 10 MG/ML VIAL (ML) INJECTION at 18:52:00

## 2024-04-02 RX ADMIN — PHENYLEPHRINE HCL 100 MCG: 10 MG/ML VIAL (ML) INJECTION at 19:17:00

## 2024-04-02 RX ADMIN — ONDANSETRON 4 MG: 2 INJECTION INTRAMUSCULAR; INTRAVENOUS at 18:41:00

## 2024-04-02 RX ADMIN — PHENYLEPHRINE HCL 100 MCG: 10 MG/ML VIAL (ML) INJECTION at 19:08:00

## 2024-04-02 RX ADMIN — PHENYLEPHRINE HCL 100 MCG: 10 MG/ML VIAL (ML) INJECTION at 19:06:00

## 2024-04-02 RX ADMIN — DEXAMETHASONE SODIUM PHOSPHATE 4 MG: 4 MG/ML VIAL (ML) INJECTION at 18:41:00

## 2024-04-02 RX ADMIN — PHENYLEPHRINE HCL 100 MCG: 10 MG/ML VIAL (ML) INJECTION at 18:47:00

## 2024-04-02 RX ADMIN — PHENYLEPHRINE HCL 100 MCG: 10 MG/ML VIAL (ML) INJECTION at 18:54:00

## 2024-04-02 RX ADMIN — LIDOCAINE HYDROCHLORIDE 25 MG: 10 INJECTION, SOLUTION EPIDURAL; INFILTRATION; INTRACAUDAL; PERINEURAL at 18:30:00

## 2024-04-02 RX ADMIN — MIDAZOLAM HYDROCHLORIDE 2 MG: 1 INJECTION INTRAMUSCULAR; INTRAVENOUS at 18:24:00

## 2024-04-02 RX ADMIN — SODIUM CHLORIDE, SODIUM LACTATE, POTASSIUM CHLORIDE, CALCIUM CHLORIDE: 600; 310; 30; 20 INJECTION, SOLUTION INTRAVENOUS at 18:22:00

## 2024-04-02 NOTE — PROGRESS NOTES
INFECTIOUS DISEASE  PROGRESS NOTE            [facility]    Leonie Denney Patient Status:  Inpatient    1980 MRN [id number]   [facility] 3SW-A Attending Herberth Philip MD   Hosp Day # 3 PCP González Houser MD     Antibiotics: zosyn #3    Subjective:  Surgery today    Objective:  Temp:  [98.2 °F (36.8 °C)-99.1 °F (37.3 °C)] 98.4 °F (36.9 °C)  Pulse:  [87-93] 87  Resp:  [18] 18  BP: (114-130)/(70-81) 122/75  SpO2:  [90 %-99 %] 99 %    General: awake resting  Vital signs:Temp:  [98.2 °F (36.8 °C)-99.1 °F (37.3 °C)] 98.4 °F (36.9 °C)  Pulse:  [87-93] 87  Resp:  [18] 18  BP: (114-130)/(70-81) 122/75  SpO2:  [90 %-99 %] 99 %  HEENT: Moist mucous membranes. Extraocular muscles are intact.  Neck: supple no masses  Respiratory: Non labored, symmetric excursion, normal respirations  Musculoskeletal: foot dressed  Labs:     COVID-19 Lab Results    COVID-19  Lab Results   Component Value Date    COVID19 Not Detected 10/11/2022    COVID19 Not Detected 2022       Pro-Calcitonin  No results for input(s): \"PCT\" in the last 168 hours.    Cardiac  No results for input(s): \"TROP\", \"PBNP\" in the last 168 hours.    Creatinine Kinase  No results for input(s): \"CK\" in the last 168 hours.    Inflammatory Markers  Recent Labs   Lab 24  0540   CRP 16.00*       Recent Labs   Lab 24  0446   RBC 3.20*   HGB 8.5*   HCT 26.1*   MCV 81.6   MCH 26.6   MCHC 32.6   RDW 14.2   NEPRELIM 6.81   WBC 11.6*   .0*         Recent Labs   Lab 24  1604 24  0540 24  0446   * 158* 144*   BUN 8* 9 7*   CREATSERUM 1.05* 0.75 0.73   CA 9.5 8.3* 9.3   ALB 2.5* 2.0* 2.1*   * 140 139   K 3.6 4.2  4.2 3.8    112 107   CO2 29.0 26.0 27.0   ALKPHO 105* 77 93   AST 7* 11* 10*   ALT 20 18 21   BILT 0.8 0.6 0.5   TP 8.5* 6.6 7.2       Vancomycin Trough (ug/mL)   Date Value   2022 28.8 (H)     Microbiology    Hospital Encounter on 24   1. Tissue Aerobic Culture      Status: Abnormal    Collection Time: 03/31/24  9:30 AM    Specimen: Toe; Tissue   Result Value Ref Range    Tissue Culture Result 4+ growth Pseudomonas aeruginosa (A) N/A    Tissue Culture Result (A) N/A     2+ growth Streptococcus agalactiae (Group B beta strep)    Tissue Culture Result 3+ growth Streptococcus anginosus (A) N/A    Tissue Smear 2+ WBCs seen (A) N/A    Tissue Smear 2+ Gram positive cocci in pairs and clusters (A) N/A    Tissue Smear 2+ Gram Negative Rods (A) N/A       Susceptibility    Pseudomonas aeruginosa -  (no method available)     Cefepime 4 Sensitive      Ceftazidime 2 Sensitive      Ciprofloxacin <=1 Sensitive      Meropenem <=1 Sensitive      Levofloxacin 0.5 Sensitive      Piperacillin + Tazobactam <=4 Sensitive      Tobramycin <=1 Sensitive    2. Anaerobic Culture     Status: None (Preliminary result)    Collection Time: 03/31/24  9:30 AM    Specimen: Toe; Other   Result Value Ref Range    Anaerobic Culture Pending N/A   3. Blood Culture     Status: None (Preliminary result)    Collection Time: 03/30/24  5:53 PM    Specimen: Blood,peripheral   Result Value Ref Range    Blood Culture Result No Growth 2 Days N/A           Problem list reviewed:  Patient Active Problem List   Diagnosis    Type 2 diabetes mellitus with hyperglycemia, with long-term current use of insulin ([facility])    Anxiety    Neuropathy    Meralgia paresthetica of left side    Vitamin D deficiency    B12 deficiency    Diabetic neuropathy ([facility])    Hyperglycemia due to type 2 diabetes mellitus ([facility])    Morbid obesity ([facility])    Other specified fetal and placental problems affecting management of mother, antepartum    Primary hypercoagulable state ([facility])    Uncontrolled type 2 diabetes mellitus    Noncompliance with medication regimen    Ulcer of right foot, limited to breakdown of skin ([facility])    Hyponatremia    Foot ulcer, right, with fat layer exposed ([facility])    Therapeutic drug monitoring    FLORENCIA (acute kidney injury) ([facility])    Labial  abscess    Cellulitis of labia    Hyperglycemia    Leukocytosis, unspecified type    Sepsis, due to unspecified organism, unspecified whether acute organ dysfunction present (HCC)    Cellulitis and abscess of foot             ASSESSMENT/PLAN:  1. Right toe ulceration necrotic infection  Surgery today for I/D, partial amputation  -Pseudomonas, group B strep isolated from wound  -cont ariadnan        Dakota Koenig MD, MD [facility]  [phone]

## 2024-04-02 NOTE — PLAN OF CARE
Alert and Oriented x4. On RA. VSS. Rt foot Moderate Pain controlled by PRN medications. Dressing to Right foot with moderate drainage, changed dressing per PRN order; C/D/I. Some numbness to RLE and decreased sensation in LLE. Voiding freely. Tolerating diet. Denies N/V. Call light within reach at this time.    Plan: IV Abx, NPO after breakfast for Rt Hallux toe amputation, pain control

## 2024-04-02 NOTE — CONSULTS
[facility]  Inpatient Wound Care Contact Note    Leonie Denney Patient Status:  Inpatient    1980 MRN [id number]   Location [facility] 3SW-A Attending Herberth Philip MD   Hosp Day # 3 PCP González Houser MD     Consult received, planned OR today for toe amputation.  Patient was NOT seen as going to OR. Will need a new consult to see post surgical, if needed.     Thank you,    Sheron Murray, PT, MPT  [facility]  [address] [address]  [phone]

## 2024-04-02 NOTE — ANESTHESIA PROCEDURE NOTES
Airway  Date/Time: 4/2/2024 6:31 PM  Urgency: elective      General Information and Staff    Patient location during procedure: OR  Anesthesiologist: Evonne Trimble MD  Performed: anesthesiologist   Performed by: Evonne Trimble MD  Authorized by: Evonne Trimble MD      Indications and Patient Condition  Indications for airway management: anesthesia  Spontaneous Ventilation: absent  Sedation level: deep  Preoxygenated: yes  Patient position: sniffing  Mask difficulty assessment: 0 - not attempted    Final Airway Details  Final airway type: supraglottic airway      Successful airway: ProSeal  Size 4       Number of attempts at approach: 1  Number of other approaches attempted: 0

## 2024-04-02 NOTE — PROGRESS NOTES
[facility]  Progress Note    Leonie Denney Patient Status:  Inpatient    1980 MRN [id number]   Location [facility] 3SW-A Attending Herberth Philip MD   Hosp Day # 3 PCP González Houser MD       SUBJECTIVE:  Feeling better   Await surgery    OBJECTIVE:  Vital signs in last 24 hours:  /79 (BP Location: Left arm)   Pulse 89   Temp 98.2 °F (36.8 °C) (Oral)   Resp 18   Ht 5' 10\" (1.778 m)   Wt 253 lb 1.4 oz (114.8 kg)   LMP  (LMP Unknown)   SpO2 97%   BMI 36.31 kg/m²     Intake/Output:    Intake/Output Summary (Last 24 hours) at 2024 0829  Last data filed at 2024 0118  Gross per 24 hour   Intake 240 ml   Output 2000 ml   Net -1760 ml       Wt Readings from Last 3 Encounters:   24 253 lb 1.4 oz (114.8 kg)   24 252 lb 4.8 oz (114.4 kg)   24 255 lb 6.4 oz (115.8 kg)       Medications:        Exam:  Gen: No acute distress, alert and oriented x3, no focal neurologic deficits  Pulm: Lungs clear, normal respiratory effort  CV: Heart with regular rate and rhythm, no peripheral edema  Abd: Abdomen soft, nontender, nondistended, no organomegaly, bowel sounds present  MSK: Full range of motion in extremities, no clubbing, no cyanosis  Skin: Right big toe with swelling and black discoloration . Open wound with foul smelling discharge right big toe     Data Review:     Labs:   No components found for: \"BMP\"   Recent Labs   Lab 24  1604 24  0540 24  0446   RBC 4.04 3.14* 3.20*   HGB 10.8* 8.4* 8.5*   HCT 33.5* 26.4* 26.1*   MCV 82.9 84.1 81.6   MCH 26.7 26.8 26.6   MCHC 32.2 31.8 32.6   RDW 14.2 14.3 14.2   NEPRELIM 21.24* 10.40* 6.81   WBC 25.8* 14.1* 11.6*   .0* 417.0 514.0*     Recent Labs   Lab 24  2137   PTP 15.3*   INR 1.20     HEMOGLOBIN A1C (%)   Date Value   2024 8.7 (A)   2023 8.3 (A)   2023 8.7 (A)     HgbA1C (%)   Date Value   2024 9.0 (H)     TSH (mIU/mL)   Date Value   2024 0.652   2022 0.661    12/14/2020 0.788     No results for input(s): \"TROP\", \"CK\" in the last 168 hours.     Hospital Encounter on 03/30/24   1. Tissue Aerobic Culture     Status: Abnormal (Preliminary result)    Collection Time: 03/31/24  9:30 AM    Specimen: Toe; Tissue   Result Value Ref Range    Tissue Culture Result 4+ growth Pseudomonas aeruginosa (A) N/A    Tissue Culture Result (A) N/A     2+ growth Streptococcus agalactiae (Group B beta strep)    Tissue Culture Result 3+ growth Streptococcus anginosus (A) N/A    Tissue Smear 2+ WBCs seen (A) N/A    Tissue Smear 2+ Gram positive cocci in pairs and clusters (A) N/A    Tissue Smear 2+ Gram Negative Rods (A) N/A   2. Anaerobic Culture     Status: None (Preliminary result)    Collection Time: 03/31/24  9:30 AM    Specimen: Toe; Other   Result Value Ref Range    Anaerobic Culture Pending N/A   3. Blood Culture     Status: None (Preliminary result)    Collection Time: 03/30/24  5:53 PM    Specimen: Blood,peripheral   Result Value Ref Range    Blood Culture Result No Growth 2 Days N/A     Imaging:  MRI right foot   1. There is mild early osteomyelitis involving the proximal phalanx of the 1st digit.      2. Extensive cellulitis involving the 1st digit without drainable abscess.         Assessment & Plan:  Necrotic ulcer with gangrene right big toe with cellulitis   Wound culture - Pseudomonas   MRI positive for Acute osteomyelitis   Planning for big toe Amputation today   Diabetes Mellitus with neuropathy and Foot ulcer - BS controlled on SS   Anxiety - Start klonopin   DVT prophylaxis     Podiatry and ID consult appreciated    Questions/concerns were discussed with patient and/or family by bedside.    Herberth Philip MD  4/2/2024

## 2024-04-02 NOTE — PLAN OF CARE
Pt A&Ox4, VSS. RA, . Tele, NSR. Dressing to R foot wounds intact. Pt denies pain at this time. NPO as of 0900, pre op EKG completed. Call light in reach, safety measures in place. Plan for OR at 1720 for R big toe amputation, informed consent still needed.

## 2024-04-03 LAB
ALBUMIN SERPL-MCNC: 2.1 G/DL (ref 3.4–5)
ALBUMIN/GLOB SERPL: 0.4 {RATIO} (ref 1–2)
ALP LIVER SERPL-CCNC: 89 U/L
ALT SERPL-CCNC: 20 U/L
ANION GAP SERPL CALC-SCNC: 8 MMOL/L (ref 0–18)
AST SERPL-CCNC: 12 U/L (ref 15–37)
ATRIAL RATE: 84 BPM
BASOPHILS # BLD AUTO: 0.06 X10(3) UL (ref 0–0.2)
BASOPHILS NFR BLD AUTO: 0.4 %
BILIRUB SERPL-MCNC: 0.4 MG/DL (ref 0.1–2)
BUN BLD-MCNC: 8 MG/DL (ref 9–23)
CALCIUM BLD-MCNC: 9.4 MG/DL (ref 8.5–10.1)
CHLORIDE SERPL-SCNC: 104 MMOL/L (ref 98–112)
CO2 SERPL-SCNC: 24 MMOL/L (ref 21–32)
CREAT BLD-MCNC: 0.76 MG/DL
EGFRCR SERPLBLD CKD-EPI 2021: 98 ML/MIN/1.73M2 (ref 60–?)
EOSINOPHIL # BLD AUTO: 0.05 X10(3) UL (ref 0–0.7)
EOSINOPHIL NFR BLD AUTO: 0.3 %
ERYTHROCYTE [DISTWIDTH] IN BLOOD BY AUTOMATED COUNT: 14.4 %
GLOBULIN PLAS-MCNC: 5.2 G/DL (ref 2.8–4.4)
GLUCOSE BLD-MCNC: 140 MG/DL (ref 70–99)
GLUCOSE BLD-MCNC: 173 MG/DL (ref 70–99)
GLUCOSE BLD-MCNC: 184 MG/DL (ref 70–99)
GLUCOSE BLD-MCNC: 198 MG/DL (ref 70–99)
GLUCOSE BLD-MCNC: 202 MG/DL (ref 70–99)
GLUCOSE BLD-MCNC: 227 MG/DL (ref 70–99)
HCT VFR BLD AUTO: 28.2 %
HGB BLD-MCNC: 9 G/DL
IMM GRANULOCYTES # BLD AUTO: 0.23 X10(3) UL (ref 0–1)
IMM GRANULOCYTES NFR BLD: 1.5 %
LYMPHOCYTES # BLD AUTO: 2.32 X10(3) UL (ref 1–4)
LYMPHOCYTES NFR BLD AUTO: 14.9 %
MCH RBC QN AUTO: 26.9 PG (ref 26–34)
MCHC RBC AUTO-ENTMCNC: 31.9 G/DL (ref 31–37)
MCV RBC AUTO: 84.4 FL
MONOCYTES # BLD AUTO: 0.96 X10(3) UL (ref 0.1–1)
MONOCYTES NFR BLD AUTO: 6.2 %
NEUTROPHILS # BLD AUTO: 11.98 X10 (3) UL (ref 1.5–7.7)
NEUTROPHILS # BLD AUTO: 11.98 X10(3) UL (ref 1.5–7.7)
NEUTROPHILS NFR BLD AUTO: 76.7 %
OSMOLALITY SERPL CALC.SUM OF ELEC: 286 MOSM/KG (ref 275–295)
P AXIS: 72 DEGREES
P-R INTERVAL: 158 MS
PLATELET # BLD AUTO: 560 10(3)UL (ref 150–450)
POTASSIUM SERPL-SCNC: 4.3 MMOL/L (ref 3.5–5.1)
POTASSIUM SERPL-SCNC: 4.4 MMOL/L (ref 3.5–5.1)
PROT SERPL-MCNC: 7.3 G/DL (ref 6.4–8.2)
Q-T INTERVAL: 376 MS
QRS DURATION: 90 MS
QTC CALCULATION (BEZET): 444 MS
R AXIS: 0 DEGREES
RBC # BLD AUTO: 3.34 X10(6)UL
SODIUM SERPL-SCNC: 136 MMOL/L (ref 136–145)
T AXIS: 51 DEGREES
VENTRICULAR RATE: 84 BPM
WBC # BLD AUTO: 15.6 X10(3) UL (ref 4–11)

## 2024-04-03 NOTE — PROGRESS NOTES
Vitals:    04/03/24 1535   BP: 125/68   Pulse: 86   Resp: 21   Temp: 98.5 °F (36.9 °C)   Patient was seen resting comfortably in the chair seems to be in better spirits she has noticed that her leg is not as swollen it is not warm she is feeling better.  Her vital signs as reviewed above shows that she is stable at this time and afebrile and alert and oriented no complaints of fever or chills.    Objective: Edema is markedly decreased to the right lower extremity.  She is complaining the dressing is too tight so I loosened the Ace wrap which made her feel immediately.  She can work with physical therapy to be nonweightbearing.  Path report is still pending cultures gram-positive cocci    Assessment: Postop day #1 patient stable afebrile    Plan: Do dressing change tomorrow remove iodoform gauze packing the patient on appropriate wound care might apply compressive dressing.  Will see how the wound looks make sure that nothing else or a wound VAC is not necessary yet at this time.

## 2024-04-03 NOTE — PLAN OF CARE
Alert and oriented x 4. Vitals stable on room air. Pain managed on PRN medications. Dressings clean, dry, intact, RLE elevated on pillows. Voiding without difficulty. Last BM 04/02. Tolerating regular diet. Tolerating IV antibiotic. SCD's on bilaterally. Safety precautions in place.

## 2024-04-03 NOTE — PHYSICAL THERAPY NOTE
PHYSICAL THERAPY EVALUATION - INPATIENT     Room Number: 371/371-A  Evaluation Date: 4/3/2024  Type of Evaluation: Initial  Physician Order: PT Eval and Treat    Presenting Problem: s/p R partial 1st ray amputation and I&D on 4/2/24  Co-Morbidities : anxiety, depression, DM, OA, neuropathy  Reason for Therapy: Mobility Dysfunction and Discharge Planning    PHYSICAL THERAPY ASSESSMENT   Patient is currently functioning below baseline with bed mobility, transfers, gait, and stair negotiation.  Prior to admission, patient's baseline is independent with no AD.  Patient is requiring contact guard assist as a result of the following impairments: decreased functional strength, pain, impaired standing balance, and decreased muscular endurance.  Physical Therapy will continue to follow for duration of hospitalization.    Patient will benefit from continued skilled PT Services at discharge to promote functional independence and safety with additional support and return home with home health PT.    Patient would benefit from a RW, w/c, bedside commode, and tub transfer bench upon DC. It would be helpful but not essential if the w/c has removable arm rests so that the pt has the option to lateral transfer if needed.     PLAN  PT Treatment Plan: Bed mobility;Endurance;Energy conservation;Patient education;Family education;Gait training;Strengthening;Stoop training;Transfer training;Stair training;Balance training  Rehab Potential : Good  Frequency (Obs): 3-5x/week  Number of Visits to Meet Established Goals: 4      CURRENT GOALS    Goal #1 Patient is able to demonstrate supine - sit EOB @ level: supervision     Goal #2 Patient is able to demonstrate transfers Sit to/from Stand at assistance level: supervision     Goal #3 Patient is able to ambulate 15 feet with assist device: walker - rolling at assistance level: supervision     Goal #4 Patient is able to demonstrate transfers on/off knee scooter with supervision,    Goal  #5 Patient is able to demonstrate mobility with knee scooter and supervision.    Goal #6 Pt is able to ascend/descend 1 stoop step with RW and min A   Goal Comments: Goals established on 4/3/2024      PHYSICAL THERAPY MEDICAL/SOCIAL HISTORY  History related to current admission: Patient is a 43 year old female admitted on 3/30/2024 from home for R foot infection.  Pt diagnosed with sepsis, cellulitis, and osteomyelitis. Pt is s/p R partial 1st ray amputation and I&D on 4/2/24.       HOME SITUATION  Type of Home: House   Home Layout: One level  Stairs to Enter : 2 (stoop steps or 2 steps through garage)  Railing: No  Stairs to Bedroom: 0       Lives With: Spouse;Son (son is 17 y.o.)  Drives: Yes  Patient Owned Equipment: None       Prior Level of [place]: Pt is typically independent with ADLs, ambulates with no AD, drives, and works. Pt has a regular bed and a tub shower. Pt's spouse and son both work.     SUBJECTIVE  \"I'm overwhelmed\" - pt crying, emotional support provided       OBJECTIVE  Precautions: Bed/chair alarm  Fall Risk: High fall risk    WEIGHT BEARING RESTRICTION  Weight Bearing Restriction: R lower extremity        R Lower Extremity: Non-Weight Bearing       PAIN ASSESSMENT  Rating: Unable to rate  Location: R foot  Management Techniques: Activity promotion;Relaxation;Repositioning    COGNITION  Overall Cognitive Status:  WFL - within functional limits    RANGE OF MOTION AND STRENGTH ASSESSMENT  Upper extremity ROM and strength are within functional limits     Lower extremity ROM is within functional limits    Lower extremity strength is within functional limits, except R foot s/p partial 1st ray amputation      BALANCE  Static Sitting: Good  Dynamic Sitting: Good  Static Standing: Poor +  Dynamic Standing: Poor +    ADDITIONAL TESTS                                    ACTIVITY TOLERANCE   Vitals stable, denies dizziness.                       O2 WALK       NEUROLOGICAL FINDINGS   Pt reports  neuropathy at baseline in B feet. Reports when bearing weight on R foot she has difficulty feeling it and it feels like she is stepping on 2 balls.                      AM-PAC '6-Clicks' INPATIENT SHORT FORM - BASIC MOBILITY  How much difficulty does the patient currently have...  Patient Difficulty: Turning over in bed (including adjusting bedclothes, sheets and blankets)?: None   Patient Difficulty: Sitting down on and standing up from a chair with arms (e.g., wheelchair, bedside commode, etc.): A Little   Patient Difficulty: Moving from lying on back to sitting on the side of the bed?: A Little   How much help from another person does the patient currently need...   Help from Another: Moving to and from a bed to a chair (including a wheelchair)?: A Little   Help from Another: Need to walk in hospital room?: A Lot   Help from Another: Climbing 3-5 steps with a railing?: A Lot       AM-PAC Score:  Raw Score: 17   Approx Degree of Impairment: 50.57%   Standardized Score (AM-PAC Scale): 42.13   CMS Modifier (G-Code): CK    FUNCTIONAL ABILITY STATUS  Gait Assessment   Functional Mobility/Gait Assessment  Gait Assistance: Contact guard assist  Distance (ft):  (several hops from bed to chair)  Assistive Device: Rolling walker  Pattern:  (maintained RLE NWB well)    Skilled Therapy Provided: Per RN thaddeus to work with pt. Pt received in supine and was agreeable to PT session.     Bed Mobility:  Rolling: NT  Supine to sit: supervision, HOB partially elevated and pt utilized bed rails   Sit to supine: NT     Transfer Mobility:  Sit to stand: CGA    Stand to sit: CGA  Gait = pt took several hops from bed to chair with RW and CGA    Pt maintained RLE NWB throughout session.     Therapist's Comments: Pt educated on role of therapy, goals for session, safety, fall prevention, RLE NWB, and activity recommendations. Began conversations of discharge planning and equipment needs.     Exercise/Education Provided:  Bed  mobility  Energy conservation  Functional activity tolerated  Gait training  Posture  Strengthening  Transfer training    Patient End of Session: Up in chair;Needs met;Call light within reach;RN aware of session/findings;All patient questions and concerns addressed;Discussed recommendations with /      Patient Evaluation Complexity Level:  History Moderate - 1 or 2 personal factors and/or co-morbidities   Examination of body systems Low - addressing 1-2 elements   Clinical Presentation Low - Stable   Clinical Decision Making Low - Stable       PT Session Time: 30 minutes  Therapeutic Activity: 10 minutes         PROCEDURES:  Sphincterotomy, anal, posterior 16-Mar-2021 10:32:03 Chemical sphincterotomy with Botox Raf Monterroso

## 2024-04-03 NOTE — PLAN OF CARE
Alert and Oriented x4. On RA. VSS. Tele-NS. Severe Pain controlled by PRN medications. Some Numbness to. Voiding freely. Tolerating diet. Denies N/V. Call light within reach at this time.    Plan: IV Abx, PT/OT, pain control

## 2024-04-03 NOTE — PROGRESS NOTES
[facility]  Progress Note    Leonie Denney Patient Status:  Inpatient    1980 MRN [id number]   Location [facility] 3SW-A Attending Herberth Philip MD   Hosp Day # 4 PCP González Houser MD       SUBJECTIVE:  S/p surgery yesterday   Feeling better   OBJECTIVE:  Vital signs in last 24 hours:  /73 (BP Location: Left arm)   Pulse 78   Temp 97.9 °F (36.6 °C) (Oral)   Resp 18   Ht 5' 10\" (1.778 m)   Wt 253 lb 11.2 oz (115.1 kg)   LMP  (LMP Unknown)   SpO2 100%   BMI 36.40 kg/m²     Intake/Output:    Intake/Output Summary (Last 24 hours) at 4/3/2024 0827  Last data filed at 4/3/2024 0603  Gross per 24 hour   Intake 740 ml   Output 2490 ml   Net -1750 ml       Wt Readings from Last 3 Encounters:   24 253 lb 11.2 oz (115.1 kg)   24 252 lb 4.8 oz (114.4 kg)   24 255 lb 6.4 oz (115.8 kg)       Medications:        Exam:  Gen: No acute distress, alert and oriented x3, no focal neurologic deficits  Pulm: Lungs clear, normal respiratory effort  CV: Heart with regular rate and rhythm, no peripheral edema  Abd: Abdomen soft, nontender, nondistended, no organomegaly, bowel sounds present  MSK: Full range of motion in extremities, no clubbing, no cyanosis  Right foot covered with dressing   Data Review:     Labs:   No components found for: \"BMP\"   Recent Labs   Lab 24  1604 24  0540 24  0446   RBC 4.04 3.14* 3.20*   HGB 10.8* 8.4* 8.5*   HCT 33.5* 26.4* 26.1*   MCV 82.9 84.1 81.6   MCH 26.7 26.8 26.6   MCHC 32.2 31.8 32.6   RDW 14.2 14.3 14.2   NEPRELIM 21.24* 10.40* 6.81   WBC 25.8* 14.1* 11.6*   .0* 417.0 514.0*     Recent Labs   Lab 24  2137   PTP 15.3*   INR 1.20     HEMOGLOBIN A1C (%)   Date Value   2024 8.7 (A)   2023 8.3 (A)   2023 8.7 (A)     HgbA1C (%)   Date Value   2024 9.0 (H)     TSH (mIU/mL)   Date Value   2024 0.652   2022 0.661   2020 0.788     No results for input(s): \"TROP\", \"CK\" in the last 168  hours.     Hospital Encounter on 03/30/24   1. Tissue Aerobic Culture     Status: Abnormal    Collection Time: 03/31/24  9:30 AM    Specimen: Toe; Tissue   Result Value Ref Range    Tissue Culture Result 4+ growth Pseudomonas aeruginosa (A) N/A    Tissue Culture Result (A) N/A     2+ growth Streptococcus agalactiae (Group B beta strep)    Tissue Culture Result 3+ growth Streptococcus anginosus (A) N/A    Tissue Smear 2+ WBCs seen (A) N/A    Tissue Smear 2+ Gram positive cocci in pairs and clusters (A) N/A    Tissue Smear 2+ Gram Negative Rods (A) N/A       Susceptibility    Pseudomonas aeruginosa -  (no method available)     Cefepime 4 Sensitive      Ceftazidime 2 Sensitive      Ciprofloxacin <=1 Sensitive      Meropenem <=1 Sensitive      Levofloxacin 0.5 Sensitive      Piperacillin + Tazobactam <=4 Sensitive      Tobramycin <=1 Sensitive    2. Anaerobic Culture     Status: None (Preliminary result)    Collection Time: 03/31/24  9:30 AM    Specimen: Toe; Other   Result Value Ref Range    Anaerobic Culture Pending N/A   3. Blood Culture     Status: None (Preliminary result)    Collection Time: 03/30/24  5:53 PM    Specimen: Blood,peripheral   Result Value Ref Range    Blood Culture Result No Growth 3 Days N/A     Imaging:  MRI right foot   1. There is mild early osteomyelitis involving the proximal phalanx of the 1st digit.      2. Extensive cellulitis involving the 1st digit without drainable abscess.         Assessment & Plan:  Necrotic ulcer with gangrene right big toe with cellulitis -S/p Partial first ray amputation right foot with incision and drainage of infection  on 4/2/2024  Wound culture - Pseudomonas   MRI positive for Acute osteomyelitis     Diabetes Mellitus with neuropathy and Foot ulcer - BS controlled on SS   Anxiety -   DVT prophylaxis - on heparin     Podiatry and ID consult appreciated    Questions/concerns were discussed with patient and/or family by bedside.    Herberth Philip MD  4/2/2024

## 2024-04-03 NOTE — BRIEF OP NOTE
Pre-Operative Diagnosis: Gangrene with osteomy hallux a standing on the dorsal aspect of the foot, right lower extremity     Post-Operative Diagnosis: Same     Procedure Performed:   Partial first ray amputation right foot with incision and drainage of infection    Surgeon(s) and Role:     * Dilan Sung DPM - Primary    Assistant(s):        Surgical Findings: There is diffuse purulence and drainage extending up the extensor tendon sheath of the flexor did not seem to be affected.  This was dorsally over the first metatarsal and therefore did necessitate a partial first ray amputation instead of just the hallux.     Specimen: Shared specimen was sent first to microbiology for aerobic anaerobic culture and sensitivity and then to pathology for osteomyelitis and gangrene of the hallux checking the proximal margin of the resected metatarsal head segment for clearance.     Estimated Blood Loss: 3 cc    Dictation Number: Pending in epic    Dilan Sung DPM  4/2/2024  7:25 PM

## 2024-04-03 NOTE — ANESTHESIA POSTPROCEDURE EVALUATION
[facility]    Leonie Denney Patient Status:  Inpatient   Age/Gender 43 year old female MRN [id number]   Location [facility] UN[facility] Attending Herberth Philip MD   Hosp Day # 3 PCP González Houser MD       Anesthesia Post-op Note    PARTIAL FIRST RAY AMPUTATION OF RIGHT FOOT WITH INCISION & DRAINAGE OF INFECTION    Procedure Summary       Date: 04/02/24 Room / Location:  MAIN OR 11 / [facility] MAIN OR    Anesthesia Start: 1822 Anesthesia Stop: 1934    Procedure: PARTIAL FIRST RAY AMPUTATION OF RIGHT FOOT WITH INCISION & DRAINAGE OF INFECTION (Right: Foot) Diagnosis: (INPATIENT)    Surgeons: Dilan Sung DPM Anesthesiologist: Evonne Trimble MD    Anesthesia Type: general ASA Status: 3            Anesthesia Type: general    Vitals Value Taken Time   /74 04/02/24 2005   Temp 97.1 °F (36.2 °C) 04/02/24 1930   Pulse 87 04/02/24 2005   Resp 21 04/02/24 2005   SpO2 100 % 04/02/24 2005   Vitals shown include unfiled device data.    Patient Location: PACU    Anesthesia Type: general    Airway Patency: patent and extubated    Postop Pain Control: adequate    Mental Status: preanesthetic baseline    Nausea/Vomiting: none    Cardiopulmonary/Hydration status: stable euvolemic    Complications: no apparent anesthesia related complications    Postop vital signs: stable    Dental Exam: Unchanged from Preop    Patient to be discharged from PACU when criteria met.

## 2024-04-03 NOTE — CM/SW NOTE
Spoke with therapy who worked with pt today.  Pt is s/p I&D and partial first rap amp R foot.  NWB to RLE.  Therapy indicating pt would be appropriate for discharge to home with [facility] services and DME, including wheelchair and bedside commode.  ID following and recommendations for antibiotic needs are pending.    Referrals sent to HH agencies via [facility].  Will plan to see pt tomorrow to discuss DC options and determine preferred plan.  / to remain available for support and/or discharge planning.     Yudelka Wang, [facility]  Discharge Planner  [phone]

## 2024-04-04 LAB
GLUCOSE BLD-MCNC: 161 MG/DL (ref 70–99)
GLUCOSE BLD-MCNC: 178 MG/DL (ref 70–99)
GLUCOSE BLD-MCNC: 181 MG/DL (ref 70–99)
GLUCOSE BLD-MCNC: 193 MG/DL (ref 70–99)

## 2024-04-04 PROCEDURE — 28810 AMPUTATION TOE & METATARSAL: CPT | Performed by: PODIATRIST

## 2024-04-04 RX ORDER — HYDROCODONE BITARTRATE AND ACETAMINOPHEN 5; 325 MG/1; MG/1
1 TABLET ORAL EVERY 4 HOURS PRN
Status: DISCONTINUED | OUTPATIENT
Start: 2024-04-04 | End: 2024-04-05

## 2024-04-04 RX ORDER — HYDROCODONE BITARTRATE AND ACETAMINOPHEN 5; 325 MG/1; MG/1
2 TABLET ORAL EVERY 4 HOURS PRN
Status: DISCONTINUED | OUTPATIENT
Start: 2024-04-04 | End: 2024-04-05

## 2024-04-04 NOTE — PROGRESS NOTES
INFECTIOUS DISEASE  PROGRESS NOTE            [facility]    Leoine Denney Patient Status:  Inpatient    1980 MRN [id number]   [facility] 3SW-A Attending Herberth Philip MD   Hosp Day # 5 PCP González Houser MD     Antibiotics: zosyn #5    Subjective:  comfortable    Objective:  Temp:  [97.8 °F (36.6 °C)-98.7 °F (37.1 °C)] 97.8 °F (36.6 °C)  Pulse:  [86-97] 90  Resp:  [17-25] 25  BP: (105-125)/(62-69) 109/65  SpO2:  [92 %-95 %] 93 %    General: awake resting  Vital signs:Temp:  [97.8 °F (36.6 °C)-98.7 °F (37.1 °C)] 97.8 °F (36.6 °C)  Pulse:  [86-97] 90  Resp:  [17-25] 25  BP: (105-125)/(62-69) 109/65  SpO2:  [92 %-95 %] 93 %  HEENT: Moist mucous membranes. Extraocular muscles are intact.  Neck: supple no masses  Respiratory: Non labored, symmetric excursion, normal respirations  Musculoskeletal: foot dressed  Labs:     COVID-19 Lab Results    COVID-19  Lab Results   Component Value Date    COVID19 Not Detected 10/11/2022    COVID19 Not Detected 2022       Pro-Calcitonin  No results for input(s): \"PCT\" in the last 168 hours.    Cardiac  No results for input(s): \"TROP\", \"PBNP\" in the last 168 hours.    Creatinine Kinase  No results for input(s): \"CK\" in the last 168 hours.    Inflammatory Markers  Recent Labs   Lab 24  0540   CRP 16.00*       Recent Labs   Lab 24  0839   RBC 3.34*   HGB 9.0*   HCT 28.2*   MCV 84.4   MCH 26.9   MCHC 31.9   RDW 14.4   NEPRELIM 11.98*   WBC 15.6*   .0*         Recent Labs   Lab 24  0540 24  0446 24  0430   * 144* 202*   BUN 9 7* 8*   CREATSERUM 0.75 0.73 0.76   CA 8.3* 9.3 9.4   ALB 2.0* 2.1* 2.1*    139 136   K 4.2  4.2 3.8 4.4  4.3    107 104   CO2 26.0 27.0 24.0   ALKPHO 77 93 89   AST 11* 10* 12*   ALT 18 21 20   BILT 0.6 0.5 0.4   TP 6.6 7.2 7.3       Vancomycin Trough (ug/mL)   Date Value   2022 28.8 (H)     Microbiology    Hospital Encounter on 24   1. Tissue Aerobic Culture      Status: Abnormal (Preliminary result)    Collection Time: 04/02/24  6:55 PM    Specimen: Toe; Tissue   Result Value Ref Range    Tissue Culture Result 2+ growth Pseudomonas aeruginosa (A) N/A    Tissue Culture Result (A) N/A     4+ growth Streptococcus agalactiae (Group B beta strep)    Tissue Culture Result 4+ growth Streptococcus anginosus (A) N/A    Tissue Smear 2+ Gram Positive Cocci (A) N/A    Tissue Smear 2+ WBCs seen (A) N/A   2. Anaerobic Culture     Status: None (Preliminary result)    Collection Time: 04/02/24  6:55 PM    Specimen: Toe; Tissue   Result Value Ref Range    Anaerobic Culture Pending N/A   3. Blood Culture     Status: None (Preliminary result)    Collection Time: 03/30/24  5:53 PM    Specimen: Blood,peripheral   Result Value Ref Range    Blood Culture Result No Growth 4 Days N/A           Problem list reviewed:  Patient Active Problem List   Diagnosis    Type 2 diabetes mellitus with hyperglycemia, with long-term current use of insulin ([facility])    Anxiety    Neuropathy    Meralgia paresthetica of left side    Vitamin D deficiency    B12 deficiency    Diabetic neuropathy (HCC)    Hyperglycemia due to type 2 diabetes mellitus (HCC)    Morbid obesity (HCC)    Other specified fetal and placental problems affecting management of mother, antepartum    Primary hypercoagulable state (HCC)    Uncontrolled type 2 diabetes mellitus    Noncompliance with medication regimen    Ulcer of right foot, limited to breakdown of skin (HCC)    Hyponatremia    Foot ulcer, right, with fat layer exposed ([facility])    Therapeutic drug monitoring    FLORENCIA (acute kidney injury) ([facility])    Labial abscess    Cellulitis of labia    Hyperglycemia    Leukocytosis, unspecified type    Sepsis, due to unspecified organism, unspecified whether acute organ dysfunction present ([facility])    Cellulitis and abscess of foot             ASSESSMENT/PLAN:  1. Gangrene/infection right 1st toe  SP partial 1st ray amputation and I/D on 4/2  -preop  culture Pseudomonas, group b strep    Continue zosyn  Possible transition to extended course of PO Levaquin and flagyl on dc, will dw Dr. Ana Lilia Koenig MD, MD [facility]  [phone]

## 2024-04-04 NOTE — PROGRESS NOTES
[facility]  Progress Note    Leonie Denney Patient Status:  Inpatient    1980 MRN [id number]   Location [facility] 3SW-A Attending Herberth Philip MD   Hosp Day # 5 PCP González Houser MD       SUBJECTIVE:  No new events   Pain well controlled   OBJECTIVE:  Vital signs in last 24 hours:  /69 (BP Location: Left arm)   Pulse 88   Temp 98.2 °F (36.8 °C) (Oral)   Resp 23   Ht 5' 10\" (1.778 m)   Wt 245 lb 9.5 oz (111.4 kg)   LMP  (LMP Unknown)   SpO2 95%   BMI 35.24 kg/m²     Intake/Output:    Intake/Output Summary (Last 24 hours) at 2024 1105  Last data filed at 2024 0700  Gross per 24 hour   Intake 3745 ml   Output 3200 ml   Net 545 ml       Wt Readings from Last 3 Encounters:   24 245 lb 9.5 oz (111.4 kg)   24 252 lb 4.8 oz (114.4 kg)   24 255 lb 6.4 oz (115.8 kg)               Exam:  Gen: No acute distress, alert and oriented x3, no focal neurologic deficits  Pulm: Lungs clear, normal respiratory effort  CV: Heart with regular rate and rhythm, no peripheral edema  Abd: Abdomen soft, nontender, nondistended, no organomegaly, bowel sounds present  MSK: Full range of motion in extremities,   Right foot covered with dressing   Data Review:     Labs:   No components found for: \"BMP\"   Recent Labs   Lab 24  0540 24  0446 24  0839   RBC 3.14* 3.20* 3.34*   HGB 8.4* 8.5* 9.0*   HCT 26.4* 26.1* 28.2*   MCV 84.1 81.6 84.4   MCH 26.8 26.6 26.9   MCHC 31.8 32.6 31.9   RDW 14.3 14.2 14.4   NEPRELIM 10.40* 6.81 11.98*   WBC 14.1* 11.6* 15.6*   .0 514.0* 560.0*     Recent Labs   Lab 24  2137   PTP 15.3*   INR 1.20     HEMOGLOBIN A1C (%)   Date Value   2024 8.7 (A)   2023 8.3 (A)   2023 8.7 (A)     HgbA1C (%)   Date Value   2024 9.0 (H)     TSH (mIU/mL)   Date Value   2024 0.652   2022 0.661   2020 0.788     No results for input(s): \"TROP\", \"CK\" in the last 168 hours.     Hospital Encounter on 24    1. Tissue Aerobic Culture     Status: Abnormal (Preliminary result)    Collection Time: 04/02/24  6:55 PM    Specimen: Toe; Tissue   Result Value Ref Range    Tissue Culture Result 2+ growth Pseudomonas aeruginosa (A) N/A    Tissue Culture Result (A) N/A     4+ growth Streptococcus agalactiae (Group B beta strep)    Tissue Culture Result 4+ growth Streptococcus anginosus (A) N/A    Tissue Smear 2+ Gram Positive Cocci (A) N/A    Tissue Smear 2+ WBCs seen (A) N/A   2. Anaerobic Culture     Status: None (Preliminary result)    Collection Time: 04/02/24  6:55 PM    Specimen: Toe; Tissue   Result Value Ref Range    Anaerobic Culture Pending N/A   3. Blood Culture     Status: None (Preliminary result)    Collection Time: 03/30/24  5:53 PM    Specimen: Blood,peripheral   Result Value Ref Range    Blood Culture Result No Growth 4 Days N/A     Imaging:  MRI right foot   1. There is mild early osteomyelitis involving the proximal phalanx of the 1st digit.      2. Extensive cellulitis involving the 1st digit without drainable abscess.         Assessment & Plan:  Necrotic ulcer with gangrene right big toe with cellulitis -S/p Partial first ray amputation right foot with incision and drainage of infection  on 4/2/2024  Wound culture - Pseudomonas   MRI positive for Acute osteomyelitis   Diabetes Mellitus with neuropathy and Foot ulcer - BS controlled on SS   Anxiety -   DVT prophylaxis - on heparin     Podiatry and ID consult appreciated      Discharge planning - will order DME     Patient requires a wheelchair to complete mobility related ADL's (MRADL) within the home. Patient is s/p partial first ray amputation on her right foot and is non weight bearing to her right lower extremity.  Because of this, patient has a mobility limitation that significantly impairs her ability to complete the majority of  MRADL's and this limitation cannot be sufficiently resolved with a properly fitted cane or walker.  Patient has sufficient  upper extremity function and other physical and mental capabilities to safely self-propel the standard wheelchair that is provided during a typical day. Patient has adequate space within their home to safely use the wheelchair and has not indicated an unwillingness to use the wheelchair provided in the home.     Patient requires a bedside commode for home use.  Due to her non weight bearing status on her right lower extremity, patient is physically incapable of utilizing regular toilet facilities as she is confined to a single room in the home.     Use of a wheelchair and commode will enhance patient's safety and recovery in the home.    Questions/concerns were discussed with patient and/or family by bedside.    Herberth Philip MD  4/4/2024

## 2024-04-04 NOTE — OCCUPATIONAL THERAPY NOTE
OCCUPATIONAL THERAPY EVALUATION - INPATIENT    Room Number: 371/371-A  Evaluation Date: 4/4/2024     Type of Evaluation: Initial  Presenting Problem: s/p R partial 1st ray amputation and I&D on 4/2/24    Physician Order: IP Consult to Occupational Therapy  Reason for Therapy:  ADL/IADL Dysfunction and Discharge Planning      OCCUPATIONAL THERAPY ASSESSMENT   Patient is a 43 year old female admitted on 3/30/2024 with Presenting Problem: s/p R partial 1st ray amputation and I&D on 4/2/24. Co-Morbidities : anxiety, depression, DM, OA, neuropathy  Patient is currently functioning near baseline with toileting, bathing, upper body dressing, lower body dressing, grooming, bed mobility, transfers, stating sitting balance, dynamic sitting balance, static standing balance, dynamic standing balance, maintaining seated position, functional standing tolerance, and energy conservation strategies.  Prior to admission, patient's baseline is Ind with ADls.  Patient met all OT goals at Supervision level.  Patient reports no further questions/concerns at this time.     Patient will benefit from continued skilled OT Services at discharge to promote functional independence in home.  Anticipate patient will return home with home health OT      WEIGHT BEARING RESTRICTION  Weight Bearing Restriction: R lower extremity        R Lower Extremity: Non-Weight Bearing       Recommendations for nursing staff:   Transfers: Supervision with RW bed to chair, bed to commode  Toileting location: Toilet    EVALUATION SESSION:  Patient at start of session: Supine  FUNCTIONAL TRANSFER ASSESSMENT  Sit to Stand: Edge of Bed; Chair  Edge of Bed: Supervision  Chair: Supervision  Commode Transfer: Supervision    BED MOBILITY  Rolling: Supervision  Supine to Sit : Supervision  Sit to Supine (OT): Supervision    BALANCE ASSESSMENT  Static Sitting: Supervision  Sitting Bilateral: Supervision  Static Standing: Supervision  Standing Bilateral:  Supervision    FUNCTIONAL ADL ASSESSMENT  Grooming Seated: Supervision  LB Dressing Seated: Supervision  LB Dressing Standing: Supervision  Toileting Seated: Supervision      ACTIVITY TOLERANCE: Ed regarding pacing and EC  Pulse:  (Asymptomatic vitals throughout session)                      O2 SATURATIONS       COGNITION  Overall Cognitive Status:  WFL - within functional limits  COGNITION ASSESSMENTS       Upper Extremity:   ROM: within functional limits  WFL  Strength: is within functional limits  WFL  Coordination:  Gross motor: WFL  Fine motor: WFL  Sensation: Light touch:  intact    EDUCATION PROVIDED  Patient: Role of Occupational Therapy; Discharge Recommendations; Plan of Care; Adaptive Equipment Recommendations; DME Recommendations; Fall Prevention; Functional Transfer Techniques; Energy Conservation; Proper Body Mechanics; Compensatory ADL Techniques  Patient's Response to Education: Returned Demonstration; Verbalized Understanding  Family/Caregiver: Role of Occupational Therapy; Plan of Care; Discharge Recommendations; Adaptive Equipment Recommendations; Functional Transfer Techniques; DME Recommendations  Family/Caregiver's Response to Education: Verbalized Understanding    Equipment used: RW  Demonstrates functional use    Therapist comments: Pt presents supine in bed. Pt ed regarding role of OT and POC throughout stay. OT facilitated compensatory ADL completion strategies. Pt ed regarding safe functional transfers with proper body mechanics. Pt ed regarding NWB status on RLE, able to maintain throughout session. Pt ed regarding dressing RLE first. Pt ed regarding equipment to assist with transfers and ADL completion, I.e. tub transfer bench, sock aid, reacher.       Patient End of Session: Up in chair;Needs met;Call light within reach;RN aware of session/findings;All patient questions and concerns addressed;Family present    OCCUPATIONAL PROFILE    HOME SITUATION  Type of Home: House  Home Layout: One  level  Lives With: Spouse;Son    Toilet and Equipment: Standard height toilet  Shower/Tub and Equipment: Tub-shower combo             Drives: Yes       Prior Level of Function: Ind with ADLs    SUBJECTIVE  \"I feel pretty good about going home\"    PAIN ASSESSMENT  Ratin          OBJECTIVE  Precautions: Bed/chair alarm  Fall Risk: High fall risk    WEIGHT BEARING RESTRICTION  Weight Bearing Restriction: R lower extremity        R Lower Extremity: Non-Weight Bearing       AM-PAC ‘6-Clicks’ Inpatient Daily Activity Short Form  -   Putting on and taking off regular lower body clothing?: None  -   Bathing (including washing, rinsing, drying)?: A Little  -   Toileting, which includes using toilet, bedpan or urinal? : None  -   Putting on and taking off regular upper body clothing?: None  -   Taking care of personal grooming such as brushing teeth?: None  -   Eating meals?: None    AM-PAC Score:  Score: 23  Approx Degree of Impairment: 15.86%  Standardized Score (AM-PAC Scale): 51.12      ADDITIONAL TESTS     NEUROLOGICAL FINDINGS        PLAN   Patient has been evaluated and presents with no skilled Occupational Therapy needs at this time.  Patient discharged from Occupational Therapy services.  Please re-order if a new functional limitation presents during this admission.      Patient Evaluation Complexity Level:   Occupational Profile/Medical History LOW - Brief history including review of medical or therapy records    Specific performance deficits impacting engagement in ADL/IADL LOW  1 - 3 performance deficits    Client Assessment/Performance Deficits LOW - No comorbidities nor modifications of tasks    Clinical Decision Making LOW - Analysis of occupational profile, problem-focused assessments, limited treatment options    Overall Complexity LOW     OT Session Time: 33 minutes  Self-Care Home Management: 6 minutes  Therapeutic Activity: 16 minutes  Neuromuscular Re-education: 0 minutes  Therapeutic Exercise: 0  minutes  Cognitive Skills: 0 minutes  Sensory Integrative: 0 minutes  Orthotic Management and Trainin minutes  Can add/delete any of these

## 2024-04-04 NOTE — CM/SW NOTE
Met with the patient at bedside to discuss DC planning.  Pt in agreement with plan for home with [facility] and confirmed that she would like to have wheelchair and bedside commode for home.  Pt to obtain transfer tub bench on her own.  No other DC needs identified at this time.    Patient requires a wheelchair to complete mobility related ADL's (MRADL) within the home. Patient is s/p partial first ray amputation on her right foot and is non weight bearing to her right lower extremity.  Because of this, patient has a mobility limitation that significantly impairs her ability to complete the majority of  MRADL's and this limitation cannot be sufficiently resolved with a properly fitted cane or walker.  Patient has sufficient upper extremity function and other physical and mental capabilities to safely self-propel the standard wheelchair that is provided during a typical day. Patient has adequate space within their home to safely use the wheelchair and has not indicated an unwillingness to use the wheelchair provided in the home.    Patient requires a bedside commode for home use.  Due to her non weight bearing status on her right lower extremity, patient is physically incapable of utilizing regular toilet facilities as she is confined to a single room in the home.    Use of a wheelchair and commode will enhance patient's safety and recovery in the home.    Yudelka Wang, [facility]  Discharge Planner  [phone]

## 2024-04-04 NOTE — PLAN OF CARE
A&O x4. VSS on RA. . Pain controlled with IV morphine PRN. Up with SBA, NWB to R foot. Voids freely to bathroom. AW dressing to R foot C/D/I. RLE elevated overnight. IV zosyn q8. Zofran PRN for nausea with good relief. Reviewed POC, pain management, IS use, and fall precautions with pt. Bed alarm on w/bed in lowest position. Pt reminded to use call light. Verbalized understanding. PT rec HH at [place]. Cxs pending. Dr Sung to come change christian Thursday.

## 2024-04-04 NOTE — PLAN OF CARE
Post-op day 2. Dressing change completed by Dr. Sung at bedside. Additional dressing change to be completed on 4/5 by Dr. Sung. Patient is pleasant, oriented, and alert x4. Room air. Continuous pulse oximeter. Incentive spirometer at bedside. Telemetry monitoring. Cardiac electrolyte protocol. Heparin placed on hold. Daily weight. Strict I/O's. Upper dentures in place. 1800 diet with accuchecks. Culture pending. Non-weightbearing to right lower extremity. Zosyn every 8 hours for antibiotic coverage. Tylenol and Norco provided for pain management. Plan is home when medically stable.

## 2024-04-04 NOTE — CM/SW NOTE
04/04/24 1515   Choice of Post-Acute Provider   Informed patient of right to choose their preferred provider Yes   List of appropriate post-acute services provided to patient/family with quality data Yes   Patient/family choice [facility]   Information given to Patient       Met with pt and provided list of accepting  agencies.  Pt confirmed preference for [facility].  Discussed pending referral with HME for wheelchair and commode.  They will be contacting pt to arrange delivery to pt's home tomorrow 4/5.  Patient requesting assistance with [place] paperwork.  She will email forms to me.  / to remain available for support and/or discharge planning.     Yudelka Wang, [facility]  Discharge Planner  [phone]

## 2024-04-04 NOTE — PHYSICAL THERAPY NOTE
IP PT attempted, pt laying in bed in dark room sleeping. Pt states she wants to rest, and had just returned to bed. Pt states she has no concerns for safe return home. Will re-attempt as schedule permits.

## 2024-04-05 ENCOUNTER — TELEPHONE (OUTPATIENT)
Dept: PODIATRY CLINIC | Facility: CLINIC | Age: 44
End: 2024-04-05

## 2024-04-05 VITALS
TEMPERATURE: 98 F | HEIGHT: 70 IN | DIASTOLIC BLOOD PRESSURE: 68 MMHG | OXYGEN SATURATION: 91 % | BODY MASS INDEX: 36.68 KG/M2 | RESPIRATION RATE: 22 BRPM | SYSTOLIC BLOOD PRESSURE: 122 MMHG | WEIGHT: 256.19 LBS | HEART RATE: 83 BPM

## 2024-04-05 PROBLEM — A41.9 SEPSIS, DUE TO UNSPECIFIED ORGANISM, UNSPECIFIED WHETHER ACUTE ORGAN DYSFUNCTION PRESENT (HCC): Status: RESOLVED | Noted: 2024-03-30 | Resolved: 2024-04-05

## 2024-04-05 LAB
GLUCOSE BLD-MCNC: 133 MG/DL (ref 70–99)
GLUCOSE BLD-MCNC: 156 MG/DL (ref 70–99)

## 2024-04-05 RX ORDER — HYDROCODONE BITARTRATE AND ACETAMINOPHEN 5; 325 MG/1; MG/1
1 TABLET ORAL EVERY 6 HOURS PRN
Qty: 40 TABLET | Refills: 0 | Status: SHIPPED | OUTPATIENT
Start: 2024-04-05

## 2024-04-05 RX ORDER — METRONIDAZOLE 500 MG/1
500 TABLET ORAL 2 TIMES DAILY
Qty: 20 TABLET | Refills: 0 | Status: SHIPPED | OUTPATIENT
Start: 2024-04-05 | End: 2024-04-15

## 2024-04-05 RX ORDER — ACETAMINOPHEN 500 MG
500 TABLET ORAL EVERY 4 HOURS PRN
Qty: 50 TABLET | Refills: 0 | Status: SHIPPED | OUTPATIENT
Start: 2024-04-05

## 2024-04-05 RX ORDER — LEVOFLOXACIN 750 MG/1
750 TABLET, FILM COATED ORAL DAILY
Qty: 10 TABLET | Refills: 0 | Status: SHIPPED | OUTPATIENT
Start: 2024-04-05 | End: 2024-04-15

## 2024-04-05 NOTE — TELEPHONE ENCOUNTER
Per pt states she needs post op appt with Dr. Benites next week, no openings available. Please advise thank you.

## 2024-04-05 NOTE — TELEPHONE ENCOUNTER
S/p PARTIAL FIRST RAY AMPUTATION OF RIGHT FOOT WITH INCISION & DRAINAGE OF INFECTION on 4/2/24 by Dr Sung.   Called pt and scheduled 1st PO on 4/10 at 1pm with Dr Benites.

## 2024-04-05 NOTE — PLAN OF CARE
Post-op day 3. Dressing change completed by Dr. Sung at bedside.  Patient is pleasant, oriented, and alert x4. Room air. Continuous pulse oximeter. Incentive spirometer at bedside. Telemetry monitoring. Cardiac electrolyte protocol. Heparin placed on hold. Daily weight. Strict I/O's. Upper dentures in place. 1800 diet with accuchecks. Culture finalized. Non-weightbearing to right lower extremity. Tylenol and Norco provided for pain management. Deemed appropriate for discharge. Medications sent to patients pharmacy in [place], [place]. Plan is home with [facility].

## 2024-04-05 NOTE — DIETARY NOTE
[facility]   part of [facility]    NUTRITION ASSESSMENT    Pt does not meet malnutrition criteria at this time.    NUTRITION INTERVENTION:    Meal and Snacks - Monitor and encourage adequate PO intake. Carb Controlled diet: 60 gm CHO per meal.  Medical Food Supplements -  [place] BID: orange at breakfast and fruit punch at dinner . Rationale/use for oral supplements discussed.  Nutrition Education - Offered DM diet ed; pt refused. Provided contact information for DM Center in pt instructions for discharge.       PATIENT STATUS:     4/5- S/p partial 1st ray amputation of R foot and I&D of infection on 4/2. Pt reported good appetite. Recorded PO intakes:100% most meals. Pt stated she is consuming [place] BID. Encouraged pt to consume protein foods with each meal to aid with wound healing. Possible discharge today.     4/1/24- 44 y/o female admitted with necrotic infection gangrene of R great toe. MRI positive for OM of proximal phalanx, right hallux. Pt seen d/t wound and A1c:9.0 (3/30/24). Wound Care on consult. Podiatry following. Plans for amputation tomorrow.   Pt declined DM diet ed and reporting receiving diet ed several times in the past. Pt stated she tries to monitor portions sizes, but she eats what she wants and is not willing to make any other dietary changes.  Encouraged pt to consume protein foods with each meal and discussed benefits of ONS to aid with wound healing. Pt agreeable to try [place] BID. Pt reported decreased appetite and had episode of emesis this AM. Denied any more N/V. Recorded PO intakes vary:%. Was able to consume 100% of lunch today.   PMH:tobacco use, DM.        ANTHROPOMETRICS:  Ht: 177.8 cm (5' 10\")  Wt: 116.2 kg (256 lb 3.2 oz).   BMI: Body mass index is 36.76 kg/m².  IBW: 68.2 kg      WEIGHT HISTORY:   Noted no significant wt changes per EMR hx. Pt stated wt has been stable.     Wt Readings from Last 10 Encounters:   04/05/24 116.2 kg (256 lb 3.2 oz)   01/23/24 114.4 kg  (252 lb 4.8 oz)   01/09/24 115.8 kg (255 lb 6.4 oz)   07/20/23 118.6 kg (261 lb 6.4 oz)   05/18/23 113.4 kg (250 lb)   04/13/23 117.1 kg (258 lb 3.2 oz)   01/10/23 116.1 kg (255 lb 14.4 oz)   12/07/22 113.4 kg (250 lb)   11/08/22 113.1 kg (249 lb 6.4 oz)   10/11/22 113.4 kg (250 lb)        NUTRITION:  Diet:       Procedures    Regular/General diet Calorie Restriction/Carb Controlled: 1800 kcal/60 grams; Is Patient on Accuchecks? Yes      Food Allergies: No  Cultural/Ethnic/Oriental orthodox Preferences Addressed: Yes    Percent Meals Eaten (last 3 days)       Date/Time Percent Meals Eaten (%)    04/02/24 0849 100 %    04/03/24 0955 100 %    04/03/24 1500 100 %    04/04/24 1100 100 %    04/05/24 0900 100 %    04/05/24 1300 80 %          GI system review:  Last BM:4/2    Skin and wounds: necrotic infection gangrene of R great toe, s/p partial 1st ray amputation of R foot w/ I&D of infection on 4/2.    NUTRITION RELATED PHYSICAL FINDINGS:     1. Body Fat/Muscle Mass: no wasting noted     2. Fluid Accumulation: lower extremity edema     NUTRITION PRESCRIPTION: 68.2 kg (IBW)  Calories: 9545-6045 calories/day (25-30 kcal/kg)  Protein: 102-136 grams protein/day (1.5-2.0 grams protein per kg)  Fluid: ~1 ml/kcal or per MD discretion    NUTRITION DIAGNOSIS/PROBLEM:  Altered nutrition-related lab values related to  endocrine dysfunction  as evidenced by  A1c:9.0 (3/30/24).    Increased nutrient needs related to wound healing as evidenced by necrotic infection gangrene of R great toe, s/p partial 1st ray amputation of R foot.    MONITOR AND EVALUATE/NUTRITION GOALS:  PO intake of 75% of meals TID - Met, continues  PO intake of 75% of oral nutrition supplement/s - Met, continues  Provide nutrition adequate for wound healing - Ongoing  Improved blood glucose control  - Ongoing      MEDICATIONS:  Insulin, IV abx, Norco    LABS:  A1c:9.0 (3/30/24), POC Glu:133-193     Pt is at Moderate nutrition risk    Debby Sams MS, RD,  LETI  Clinical Dietitian  Ext:[phone]

## 2024-04-05 NOTE — PROGRESS NOTES
INFECTIOUS DISEASE  PROGRESS NOTE            [facility]    Leonie Denney Patient Status:  Inpatient    1980 MRN [id number]   Location [facility] 3SW-A Attending Herberth Philip MD   Hosp Day # 6 PCP González Houser MD     Antibiotics: zosyn #6    Subjective:  comfortable    Objective:  Temp:  [97.8 °F (36.6 °C)-98.6 °F (37 °C)] 98.3 °F (36.8 °C)  Pulse:  [83-97] 83  Resp:  [17-25] 22  BP: (105-123)/(59-77) 122/68  SpO2:  [91 %-98 %] 91 %    General: awake resting  Vital signs:Temp:  [97.8 °F (36.6 °C)-98.6 °F (37 °C)] 98.3 °F (36.8 °C)  Pulse:  [83-97] 83  Resp:  [17-25] 22  BP: (105-123)/(59-77) 122/68  SpO2:  [91 %-98 %] 91 %  HEENT: Moist mucous membranes. Extraocular muscles are intact.  Neck: supple no masses  Respiratory: Non labored, symmetric excursion, normal respirations  Musculoskeletal: foot dressed  Labs:     COVID-19 Lab Results    COVID-19  Lab Results   Component Value Date    COVID19 Not Detected 10/11/2022    COVID19 Not Detected 2022       Pro-Calcitonin  No results for input(s): \"PCT\" in the last 168 hours.    Cardiac  No results for input(s): \"TROP\", \"PBNP\" in the last 168 hours.    Creatinine Kinase  No results for input(s): \"CK\" in the last 168 hours.    Inflammatory Markers  Recent Labs   Lab 24  0540   CRP 16.00*       Recent Labs   Lab 24  0839   RBC 3.34*   HGB 9.0*   HCT 28.2*   MCV 84.4   MCH 26.9   MCHC 31.9   RDW 14.4   NEPRELIM 11.98*   WBC 15.6*   .0*         Recent Labs   Lab 24  0540 24  0446 24  0430   * 144* 202*   BUN 9 7* 8*   CREATSERUM 0.75 0.73 0.76   CA 8.3* 9.3 9.4   ALB 2.0* 2.1* 2.1*    139 136   K 4.2  4.2 3.8 4.4  4.3    107 104   CO2 26.0 27.0 24.0   ALKPHO 77 93 89   AST 11* 10* 12*   ALT 18 21 20   BILT 0.6 0.5 0.4   TP 6.6 7.2 7.3       Vancomycin Trough (ug/mL)   Date Value   2022 28.8 (H)     Microbiology    Hospital Encounter on 24   1. Tissue Aerobic Culture      Status: Abnormal    Collection Time: 04/02/24  6:55 PM    Specimen: Toe; Tissue   Result Value Ref Range    Tissue Culture Result  N/A     Mixture of organisms suggestive of normal skin coco    Tissue Culture Result 2+ growth Pseudomonas aeruginosa (A) N/A    Tissue Culture Result (A) N/A     4+ growth Streptococcus agalactiae (Group B beta strep)    Tissue Culture Result 4+ growth Streptococcus anginosus (A) N/A    Tissue Smear 2+ Gram Positive Cocci (A) N/A    Tissue Smear 2+ WBCs seen (A) N/A       Susceptibility    Pseudomonas aeruginosa -  (no method available)     Cefepime <=2 Sensitive      Ceftazidime <=1 Sensitive      Ciprofloxacin <=1 Sensitive      Meropenem <=1 Sensitive      Levofloxacin <=0.25 Sensitive      Piperacillin + Tazobactam <=4 Sensitive      Tobramycin <=1 Sensitive    2. Anaerobic Culture     Status: None (Preliminary result)    Collection Time: 04/02/24  6:55 PM    Specimen: Toe; Tissue   Result Value Ref Range    Anaerobic Culture Pending N/A   3. Blood Culture     Status: None    Collection Time: 03/30/24  5:53 PM    Specimen: Blood,peripheral   Result Value Ref Range    Blood Culture Result No Growth 5 Days N/A           Problem list reviewed:  Patient Active Problem List   Diagnosis    Type 2 diabetes mellitus with hyperglycemia, with long-term current use of insulin ([facility])    Anxiety    Neuropathy    Meralgia paresthetica of left side    Vitamin D deficiency    B12 deficiency    Diabetic neuropathy ([facility])    Hyperglycemia due to type 2 diabetes mellitus ([facility])    Morbid obesity ([facility])    Other specified fetal and placental problems affecting management of mother, antepartum    Primary hypercoagulable state ([facility])    Uncontrolled type 2 diabetes mellitus    Noncompliance with medication regimen    Ulcer of right foot, limited to breakdown of skin ([facility])    Hyponatremia    Foot ulcer, right, with fat layer exposed ([facility])    Therapeutic drug monitoring    FLORENCIA (acute kidney injury) ([facility])     Labial abscess    Cellulitis of labia    Hyperglycemia    Leukocytosis, unspecified type    Cellulitis and abscess of foot             ASSESSMENT/PLAN:  1. Gangrene/infection right 1st toe  SP partial 1st ray amputation and I/D on 4/2  -preop culture Pseudomonas, group b strep    Continue zosyn  transitionPO Levaquin and flagyl on dc, will dw Dr. Ana Lilia Koenig MD, MD  [facility]  [phone]

## 2024-04-05 NOTE — OPERATIVE REPORT
[facility]   part of [facility]     OPERATIVE REPORT    Leonie Denney    [facility] [id number] MRN [id number]    1980 Age 43 year old   Admission Date 3/30/2024 Operation Date    Attending Physician Herberth Philip MD Operating Physician Dilan Sung DPM   PCP González Houser MD             PREOPERATIVE DIAGNOSIS: Osteomyelitis with gangrene right hallux extending into the forefoot right lower extremity  POSTOPERATIVE DIAGNOSIS: Same  PROCEDURE: Partial first ray amputation right foot with incision and drainage of infection     ANESTHESIA: General with a local block utilizing 0.5% Marcaine plain.  30 cc     HEMOSTASIS: Pneumatic ankle tourniquet inflated to 250 mmHg following exsanguination Storm's bandage right lower extremity, electrocautery and 20,000 units of thrombin     ESTIMATED BLOOD LOSS: 30 cc     INDICATIONS:  This 43 year old female presented with this patient developed an infection earlier in the week she was trying to trim and treat a callus with a sore on it herself.  FINDINGS: When I met her in preoperative holding she was originally scheduled for a hallux amputation however the infection had worsened since I saw her and when I pressed on the dorsal aspect of the hallux there is purulent drainage extending from over the first MTP joint following the extensor tendon sheath.  Therefore at this time after discussing with the patient at great length I recommended a partial first ray amputation on the right foot     SPECIMENS: Partial first ray is a shared specimen first to microbiology and then to pathology.     COMPLICATIONS:  None.     DRAINS: 1/2 inch iodoform gauze packing     OPERATIVE TECHNIQUE:      The patient was brought into the operating with vital signs stable and placed in the supine position on the operating table with all personnel present appropriate timeout was taken there were no additions deletions or concerns reported.  The patient was placed under general  anesthesia and the right foot was prepped and draped using usual aseptic technique.  A tennis racquet type incision was made beginning on the dorsal aspect of the first metatarsal neck area carried distally diverging around the hallux.  Deep dissection was carried out sharply and bluntly.  The capsular structures of the first metatarsal phalangeal joint were partially released however purulent drainage was expressed and the periosteal tissues of the first metatarsal neck area were incised dorsally and reflected medially and laterally.  The sagittal saw was then used to cut the first metatarsal from dorsal distal lateral to plantar proximal and medial.  The encompass section of the bone and soft tissue as well as the toe and sesamoid complex was removed in total.  At this point in time the pneumatic ankle tourniquet was released hemostasis was achieved with electrocautery direct pressure as well as 20,000 units of thrombin under pressure.  Once hemostasis was achieved the wound was flushed again packed with iodoform gauze which exited the plantar surface of the foot.  The postoperative site was then closed with 3-0 Prolene suture and a compressive dressing of Xeroform sterile gauze ABD pads Kerlix roll and an Ace wrap was applied.  The patient tolerated the above anesthesia procedure well left the operating with vital signs stable and the vascular status of her left foot intact to recovery room via cart.    Dilan Sung DPM

## 2024-04-05 NOTE — PROGRESS NOTES
[facility]  Progress Note    Leonie Denney Patient Status:  Inpatient    1980 MRN [id number]   Location [facility] 3SW-A Attending Herberth Philip MD   Hosp Day # 6 PCP González Houser MD       SUBJECTIVE:  No new events   Pain well controlled   OBJECTIVE:  Vital signs in last 24 hours:  /68 (BP Location: Left arm)   Pulse 83   Temp 98.3 °F (36.8 °C) (Oral)   Resp 22   Ht 5' 10\" (1.778 m)   Wt 256 lb 3.2 oz (116.2 kg)   LMP  (LMP Unknown)   SpO2 91%   BMI 36.76 kg/m²     Intake/Output:    Intake/Output Summary (Last 24 hours) at 2024 1228  Last data filed at 2024 0900  Gross per 24 hour   Intake 640 ml   Output 2100 ml   Net -1460 ml       Wt Readings from Last 3 Encounters:   24 256 lb 3.2 oz (116.2 kg)   24 252 lb 4.8 oz (114.4 kg)   24 255 lb 6.4 oz (115.8 kg)               Exam:  Gen: No acute distress, alert and oriented x3, no focal neurologic deficits  Pulm: Lungs clear, normal respiratory effort  CV: Heart with regular rate and rhythm, no peripheral edema  Abd: Abdomen soft, nontender, nondistended, no organomegaly, bowel sounds present  MSK: Full range of motion in extremities,   Right foot covered with dressing   Data Review:     Labs:   No components found for: \"BMP\"   Recent Labs   Lab 24  0540 24  0446 24  0839   RBC 3.14* 3.20* 3.34*   HGB 8.4* 8.5* 9.0*   HCT 26.4* 26.1* 28.2*   MCV 84.1 81.6 84.4   MCH 26.8 26.6 26.9   MCHC 31.8 32.6 31.9   RDW 14.3 14.2 14.4   NEPRELIM 10.40* 6.81 11.98*   WBC 14.1* 11.6* 15.6*   .0 514.0* 560.0*     Recent Labs   Lab 24  2137   PTP 15.3*   INR 1.20     HEMOGLOBIN A1C (%)   Date Value   2024 8.7 (A)   2023 8.3 (A)   2023 8.7 (A)     HgbA1C (%)   Date Value   2024 9.0 (H)     TSH (mIU/mL)   Date Value   2024 0.652   2022 0.661   2020 0.788     No results for input(s): \"TROP\", \"CK\" in the last 168 hours.     Hospital Encounter on 24    1. Tissue Aerobic Culture     Status: Abnormal    Collection Time: 04/02/24  6:55 PM    Specimen: Toe; Tissue   Result Value Ref Range    Tissue Culture Result  N/A     Mixture of organisms suggestive of normal skin coco    Tissue Culture Result 2+ growth Pseudomonas aeruginosa (A) N/A    Tissue Culture Result (A) N/A     4+ growth Streptococcus agalactiae (Group B beta strep)    Tissue Culture Result 4+ growth Streptococcus anginosus (A) N/A    Tissue Smear 2+ Gram Positive Cocci (A) N/A    Tissue Smear 2+ WBCs seen (A) N/A       Susceptibility    Pseudomonas aeruginosa -  (no method available)     Cefepime <=2 Sensitive      Ceftazidime <=1 Sensitive      Ciprofloxacin <=1 Sensitive      Meropenem <=1 Sensitive      Levofloxacin <=0.25 Sensitive      Piperacillin + Tazobactam <=4 Sensitive      Tobramycin <=1 Sensitive    2. Anaerobic Culture     Status: None (Preliminary result)    Collection Time: 04/02/24  6:55 PM    Specimen: Toe; Tissue   Result Value Ref Range    Anaerobic Culture Pending N/A   3. Blood Culture     Status: None    Collection Time: 03/30/24  5:53 PM    Specimen: Blood,peripheral   Result Value Ref Range    Blood Culture Result No Growth 5 Days N/A     Imaging:  MRI right foot   1. There is mild early osteomyelitis involving the proximal phalanx of the 1st digit.      2. Extensive cellulitis involving the 1st digit without drainable abscess.         Assessment & Plan:  Necrotic ulcer with gangrene right big toe with cellulitis -S/p Partial first ray amputation right foot with incision and drainage of infection  on 4/2/2024  Wound culture - Pseudomonas   MRI positive for Acute osteomyelitis   Diabetes Mellitus with neuropathy and Foot ulcer - BS controlled on SS   Anxiety -   DVT prophylaxis - on heparin     Podiatry and ID consult appreciated      Discharge planning - will order DME     Patient requires a wheelchair to complete mobility related ADL's (MRADL) within the home. Patient is s/p  partial first ray amputation on her right foot and is non weight bearing to her right lower extremity.  Because of this, patient has a mobility limitation that significantly impairs her ability to complete the majority of  MRADL's and this limitation cannot be sufficiently resolved with a properly fitted cane or walker.  Patient has sufficient upper extremity function and other physical and mental capabilities to safely self-propel the standard wheelchair that is provided during a typical day. Patient has adequate space within their home to safely use the wheelchair and has not indicated an unwillingness to use the wheelchair provided in the home.     Patient requires a bedside commode for home use.  Due to her non weight bearing status on her right lower extremity, patient is physically incapable of utilizing regular toilet facilities as she is confined to a single room in the home.     Use of a wheelchair and commode will enhance patient's safety and recovery in the home.    Questions/concerns were discussed with patient and/or family by bedside.    Herberth Philip MD  4/4/2024

## 2024-04-05 NOTE — PLAN OF CARE
Aox4, reporting baseline numbness and tingling to Right foot, ace wrap to Right foot c/d/I no strikethrough noted, pain controlled on ordered PO medication, on room air , on tele nsr, Heparin subcutaneous on hold from dressing change by Podiatry earlier, scd to LLE, strict I&O, voiding via bsc, elevating Right foot, NWB to Right foot up stand piviot to bsc, daily weights, plan to dc with PO abx and HH.

## 2024-04-05 NOTE — PROGRESS NOTES
Vitals:    04/05/24 0811   BP: 122/68   Pulse: 83   Resp: 22   Temp: 98.3 °F (36.8 °C)   Patient seen resting comfortably in bed her  is present she is very emotional about her loss of the toe.  She is having pain that is well-controlled by Norco.    Objective: Tissue aerobic cultures are seen Pseudomonas Streptococcus agalactiae and Streptococcus anginosus.  Dressing was changed today shows there is still little swelling in the foot.  Incision line is well coapted I compressed the area where I was worried on the possibility of bleeding or hematoma there is no blood or purulence expressed plantarly.    Assessment: Postop day #2 patient stable and afebrile wants to go home    Plan: Patient is appropriate for discharge when all specialties agree antibiotics per infectious disease.  Pain prescriptions already been called in by hospitalist.  Today I reviewed the following with the patient:  Overall stay off the foot and keep it elevated you must remain strictly nonweightbearing on the right lower extremity if you are not you could develop the reinfection and lose your leg  Keep your foot elevated above hip level  Keep the dressing clean and dry do not remove it  Call our office at [phone] and make an appointment for follow-up with Dr. Benites this coming week.  If you have concerns that there is an infection developing please go back to the emergency room at [facility] and have us paged.

## 2024-04-10 ENCOUNTER — OFFICE VISIT (OUTPATIENT)
Dept: PODIATRY CLINIC | Facility: CLINIC | Age: 44
End: 2024-04-10

## 2024-04-10 ENCOUNTER — TELEPHONE (OUTPATIENT)
Dept: PODIATRY CLINIC | Facility: CLINIC | Age: 44
End: 2024-04-10

## 2024-04-10 DIAGNOSIS — Z47.89 ORTHOPEDIC AFTERCARE: ICD-10-CM

## 2024-04-10 DIAGNOSIS — L97.512 FOOT ULCER, RIGHT, WITH FAT LAYER EXPOSED (HCC): Primary | ICD-10-CM

## 2024-04-10 DIAGNOSIS — E11.42 DIABETIC POLYNEUROPATHY ASSOCIATED WITH TYPE 2 DIABETES MELLITUS (HCC): ICD-10-CM

## 2024-04-10 NOTE — TELEPHONE ENCOUNTER
Dr. Sung,     Pt and Spouse are seeking FMLA listed below, following procedure on 04/02/2024,           FOR PT   FOR PT CONT. FMLA   Reason for Leave: Sx on 04/02/2024  Start date of leave: 04/01/2024  How much time needed?: 8 weeks until re-eval pt to sched current at office   FOR SPOUSE    Intermittent for Spouse   Reason for Leave: Care for Spouse/ Appt needs - transportation   Start date of leave: 04/01/2024  How much time needed?: 1-4 Appts a month lasting 1-4 hrs each appt     Do you support?     Thank You,   Monica HUNTER

## 2024-04-10 NOTE — TELEPHONE ENCOUNTER
Pt called today to inquire if forms were received for her and sp, checked inbox nothing as of yet.. pt sending while on phone w/ me,     While on phone pt completed KARIN in office and will have them faxed within office     Per pt. Here is the following needed time..    Type of Leave: FOR PT CONT. FMLA   Reason for Leave: Sx on 04/02/2024  Start date of leave: 04/01/2024  How much time needed?: 8 weeks until re-eval pt to sched current at office   Forms Due Date: ASAP   Was Fee and Turnaround info Given?: YES      Type of Leave:  Intermittent for Spouse   Reason for Leave: Care for Spouse/ Appt needs - transportation   Start date of leave: 04/01/2024  How much time needed?: 1-4 Appts a month lasting 1-4 hrs each appt   Forms Due Date: ASAP   Was Fee and Turnaround info Given?: Yes        was tasked as well to get ok for the above.

## 2024-04-10 NOTE — PROGRESS NOTES
[facility] Podiatry  Progress Note    Leonie Denney is a 43 year old female.   Chief Complaint   Patient presents with    Post-Op     1st- right foot- patient denies pain          HPI:     Patient is a pleasant 43-year-old female presents to clinic for postop visit status post partial first amputation right foot.  She has dressings intact without pain at this time.  She has been taking Levaquin as prescribed.  No other complaints are mentioned.      Allergies: Patient has no known allergies.   Current Outpatient Medications   Medication Sig Dispense Refill    acetaminophen 500 MG Oral Tab Take 1 tablet (500 mg total) by mouth every 4 (four) hours as needed for Fever (equal to or greater than 100.4). 50 tablet 0    HYDROcodone-acetaminophen 5-325 MG Oral Tab Take 1 tablet by mouth every 6 (six) hours as needed. 40 tablet 0    levoFLOXacin 750 MG Oral Tab Take 1 tablet (750 mg total) by mouth daily for 10 days. 10 tablet 0    metRONIDAZOLE 500 MG Oral Tab Take 1 tablet (500 mg total) by mouth in the morning and 1 tablet (500 mg total) before bedtime. Do all this for 10 days. 20 tablet 0    semaglutide (OZEMPIC, 2 MG/DOSE,) 8 MG/3ML Subcutaneous Solution Pen-injector Inject 2 mg into the skin once a week. 3 mL 0    Empagliflozin (JARDIANCE) 25 MG Oral Tab Take 25 mg by mouth daily. 90 tablet 1    Continuous Blood Gluc Sensor (DEXCOM G7 SENSOR) Does not apply Misc 1 each Every 10 days. 9 each 1    teraconazole 0.4 % Vaginal Cream Place vaginally every 4 (four) hours. AT BEDTIME        Past Medical History:    Abdominal pain    Anxiety state    Arthritis    B12 deficiency    Blurred vision    Calculus of kidney    Constipation    Constipation    Decorative tattoo    Depression    no meds- will see therapist    Diabetes mellitus (HCC)    Frequent use of laxatives    Frequent UTI    Frequent UTI    Irregular bowel habits    Itch of skin    Leg swelling    Loss of appetite    Nausea    Neuropathy    Night sweats     Obesity    Osteoarthritis    knees    Sleep disturbance    Type II or unspecified type diabetes mellitus without mention of complication, not stated as uncontrolled    on insulin currently/recently    Vitamin D deficiency    Weight loss      Past Surgical History:   Procedure Laterality Date    Colonoscopy N/A 2019    Procedure: COLONOSCOPY;  Surgeon: Tyrel Osborn DO;  Location:  ENDOSCOPY    Dilation/curettage,diagnostic      x2    Hc  section level i      x1    Kidney surgery      KIDNEY STONE REMOVAL X2    Other surgical history  04/10/2022    INCISION AND DRAINAGE LEFT BREAST ABSCESS X 2      Family History   Problem Relation Age of Onset    Diabetes Mother     Diabetes Maternal Grandmother     Diabetes Maternal Grandfather       Social History     Socioeconomic History    Marital status:    Tobacco Use    Smoking status: Every Day     Current packs/day: 0.25     Average packs/day: 0.3 packs/day for 15.3 years (3.8 ttl pk-yrs)     Types: Cigarettes     Start date:     Smokeless tobacco: Never    Tobacco comments:     Working towards quitting   Vaping Use    Vaping status: Never Used   Substance and Sexual Activity    Alcohol use: No    Drug use: No     Comment: CBD oil on occasion   Other Topics Concern    Caffeine Concern No    Exercise No    Seat Belt Yes    Special Diet No    Stress Concern No    Weight Concern No           REVIEW OF SYSTEMS:     Denies nausea, vomiting, fever, chills.      EXAM:   LMP  (LMP Unknown)   GENERAL: well developed, well nourished, in no apparent distress  EXTREMITIES:        Incisions well coapted sutures intact.  There is little necrosis along incision.  Mild sanguinous drainage.  No acute signs infection or other concerns.    ASSESSMENT AND PLAN:   Diagnoses and all orders for this visit:    Foot ulcer, right, with fat layer exposed (HCC)    Orthopedic aftercare    Diabetic polyneuropathy associated with type 2 diabetes mellitus  ([facility])        Plan:    -Status post partial first ray amputation with Dr. Sung.  -Site inspected.  Noted to be healing well without concerns.  Site pain with Betadine and dressed with dry dressing and mildly compressive Ace wrap.  Can leave intact to visit next week with Dr. Sung.  -Should stay off foot and elevate is much as possible.  -Finish Levaquin and metronidazole as prescribed.  -Seek immediate medical attention any acute signs infection or other concerns arise.    RTC 1 week with Dr. Sung.    The patient indicates understanding of these issues and agrees to the plan.        YANELY [employer] speech recognition software was used to prepare this note.  Errors in word recognition may occur.  Please contact me with any questions/concerns with this note.

## 2024-04-12 NOTE — TELEPHONE ENCOUNTER
Please try to avoid signing forms in the corner as it is not visible when printing and forms are not accepted this way. Thank you!     Dr. Sung,     *THERE ARE THREE FORMS/ FMLA & DISB for pt & FMLA Sp. Thank You so much*       *The ACKNOWLEDGE button has been moved to the top right ribbon*    Please sign off on form if you agree to: FMLA & DISAB - PT & FMLA SP  (place your signature on the first page only)    -From your Inbasket, Highlight the patient and click Chart   -Double click the 04/10/2024 Forms Completion telephone encounter  -Scroll down to the Media section   -Click the blue Hyperlink: FMLA;DISAB; & FMLA-SP Dr. Dilan Mcknight 04/12/2024   -Click Acknowledge located in the top right ribbon/menu   -Drag the mouse into the blank space of the document and a + sign will appear. Left click to   electronically sign the document.     Thank you,  Monica HUNTER

## 2024-04-17 ENCOUNTER — OFFICE VISIT (OUTPATIENT)
Dept: PODIATRY CLINIC | Facility: CLINIC | Age: 44
End: 2024-04-17
Payer: COMMERCIAL

## 2024-04-17 DIAGNOSIS — Z98.890 STATUS POST FOOT SURGERY: Primary | ICD-10-CM

## 2024-04-17 PROCEDURE — 99024 POSTOP FOLLOW-UP VISIT: CPT | Performed by: PODIATRIST

## 2024-04-17 NOTE — TELEPHONE ENCOUNTER
All forms faxed succesfully to both Pt and Pt spouse HR dept... sending MCM to adv archiving forms.

## 2024-04-21 NOTE — PROGRESS NOTES
Leonie Denney is a 43 year old female.   Chief Complaint   Patient presents with    Post-Op     Right foot- 2nd POV- Denies pain at this time. Patient is still taking norco. Patient has been NWB on surgical foot.          HPI:   Patient is here for follow-up on her right foot she had seen Dr. Benites I did review his notes saw pictures.  She has no complaints of fever or chills denies any significant pain but still takes Norco from time to time.  At today's visit reviewed nurse's history as taken above, allergies medications and medical history as documented below.  All changes duly noted  Allergies: Patient has no known allergies.   Current Outpatient Medications   Medication Sig Dispense Refill    acetaminophen 500 MG Oral Tab Take 1 tablet (500 mg total) by mouth every 4 (four) hours as needed for Fever (equal to or greater than 100.4). 50 tablet 0    HYDROcodone-acetaminophen 5-325 MG Oral Tab Take 1 tablet by mouth every 6 (six) hours as needed. 40 tablet 0    semaglutide (OZEMPIC, 2 MG/DOSE,) 8 MG/3ML Subcutaneous Solution Pen-injector Inject 2 mg into the skin once a week. 3 mL 0    Empagliflozin (JARDIANCE) 25 MG Oral Tab Take 25 mg by mouth daily. 90 tablet 1    Continuous Blood Gluc Sensor (DEXCOM G7 SENSOR) Does not apply Misc 1 each Every 10 days. 9 each 1    teraconazole 0.4 % Vaginal Cream Place vaginally every 4 (four) hours. AT BEDTIME        Past Medical History:    Abdominal pain    Anxiety state    Arthritis    B12 deficiency    Blurred vision    Calculus of kidney    Constipation    Constipation    Decorative tattoo    Depression    no meds- will see therapist    Diabetes mellitus (HCC)    Frequent use of laxatives    Frequent UTI    Frequent UTI    Irregular bowel habits    Itch of skin    Leg swelling    Loss of appetite    Nausea    Neuropathy    Night sweats    Obesity    Osteoarthritis    knees    Sleep disturbance    Type II or unspecified type diabetes mellitus without mention of  complication, not stated as uncontrolled    on insulin currently/recently    Vitamin D deficiency    Weight loss      Past Surgical History:   Procedure Laterality Date    Colonoscopy N/A 2019    Procedure: COLONOSCOPY;  Surgeon: Tyrel Osborn DO;  Location:  ENDOSCOPY    Dilation/curettage,diagnostic      x2    Hc  section level i      x1    Kidney surgery      KIDNEY STONE REMOVAL X2    Other surgical history  04/10/2022    INCISION AND DRAINAGE LEFT BREAST ABSCESS X 2      Family History   Problem Relation Age of Onset    Diabetes Mother     Diabetes Maternal Grandmother     Diabetes Maternal Grandfather       Social History     Socioeconomic History    Marital status:    Tobacco Use    Smoking status: Every Day     Current packs/day: 0.25     Average packs/day: 0.3 packs/day for 15.3 years (3.8 ttl pk-yrs)     Types: Cigarettes     Start date:     Smokeless tobacco: Never    Tobacco comments:     Working towards quitting   Vaping Use    Vaping status: Never Used   Substance and Sexual Activity    Alcohol use: No    Drug use: No     Comment: CBD oil on occasion   Other Topics Concern    Caffeine Concern No    Exercise No    Seat Belt Yes    Special Diet No    Stress Concern No    Weight Concern No           REVIEW OF SYSTEMS:   Today reviewed systens as documented below  GENERAL HEALTH: feels well otherwise  SKIN: Refer to exam below  RESPIRATORY: denies shortness of breath with exertion  CARDIOVASCULAR: denies chest pain on exertion  GI: denies abdominal pain and denies heartburn  NEURO: denies headaches    EXAM:   LMP  (LMP Unknown)   GENERAL: well developed, well nourished, in no apparent distress  EXTREMITIES:   Compared to her first postoperative visit the skin incision is fairly well-healed at this time even the plantar 1 where the drain came out.  There is no sign of any ascending infection palpation of the area produces no purulence.  ASSESSMENT AND PLAN:   Diagnoses and all  orders for this visit:    Status post foot surgery        Plan: Aseptic dressing reapplied patient will continue with nonweightbearing, elevation call us if there is a problem continue with all antibiotics per infectious disease.  Follow-up in 1 week.    The patient indicates understanding of these issues and agrees to the plan.    Dilan Sung DPM

## 2024-04-23 ENCOUNTER — OFFICE VISIT (OUTPATIENT)
Dept: PODIATRY CLINIC | Facility: CLINIC | Age: 44
End: 2024-04-23

## 2024-04-23 DIAGNOSIS — Z98.890 STATUS POST FOOT SURGERY: Primary | ICD-10-CM

## 2024-04-23 PROCEDURE — 99024 POSTOP FOLLOW-UP VISIT: CPT | Performed by: PODIATRIST

## 2024-04-28 NOTE — PROGRESS NOTES
Leonie Denney is a 43 year old female.   Chief Complaint   Patient presents with    Post-Op     3rd- right foot- rates pain 8/10- patient is requesting medication for anxiety          HPI:   Returns the clinic about 3-1/2 weeks status post surgery overall doing pretty well.  She has no complaints of pain but does complain of anxiety regarding this issue she is requesting something for anxiety.  At today's visit reviewed nurse's history as taken above, allergies medications and medical history as documented below.  All changes duly noted  Allergies: Patient has no known allergies.   Current Outpatient Medications   Medication Sig Dispense Refill    acetaminophen 500 MG Oral Tab Take 1 tablet (500 mg total) by mouth every 4 (four) hours as needed for Fever (equal to or greater than 100.4). 50 tablet 0    HYDROcodone-acetaminophen 5-325 MG Oral Tab Take 1 tablet by mouth every 6 (six) hours as needed. 40 tablet 0    semaglutide (OZEMPIC, 2 MG/DOSE,) 8 MG/3ML Subcutaneous Solution Pen-injector Inject 2 mg into the skin once a week. 3 mL 0    Empagliflozin (JARDIANCE) 25 MG Oral Tab Take 25 mg by mouth daily. 90 tablet 1    Continuous Blood Gluc Sensor (DEXCOM G7 SENSOR) Does not apply Misc 1 each Every 10 days. 9 each 1    teraconazole 0.4 % Vaginal Cream Place vaginally every 4 (four) hours. AT BEDTIME        Past Medical History:    Abdominal pain    Anxiety state    Arthritis    B12 deficiency    Blurred vision    Calculus of kidney    Constipation    Constipation    Decorative tattoo    Depression    no meds- will see therapist    Diabetes mellitus (HCC)    Frequent use of laxatives    Frequent UTI    Frequent UTI    Irregular bowel habits    Itch of skin    Leg swelling    Loss of appetite    Nausea    Neuropathy    Night sweats    Obesity    Osteoarthritis    knees    Sleep disturbance    Type II or unspecified type diabetes mellitus without mention of complication, not stated as uncontrolled    on insulin  currently/recently    Vitamin D deficiency    Weight loss      Past Surgical History:   Procedure Laterality Date    Colonoscopy N/A 2019    Procedure: COLONOSCOPY;  Surgeon: Tyrel Osborn DO;  Location:  ENDOSCOPY    Dilation/curettage,diagnostic      x2    Hc  section level i      x1    Kidney surgery      KIDNEY STONE REMOVAL X2    Other surgical history  04/10/2022    INCISION AND DRAINAGE LEFT BREAST ABSCESS X 2      Family History   Problem Relation Age of Onset    Diabetes Mother     Diabetes Maternal Grandmother     Diabetes Maternal Grandfather       Social History     Socioeconomic History    Marital status:    Tobacco Use    Smoking status: Every Day     Current packs/day: 0.25     Average packs/day: 0.3 packs/day for 15.3 years (3.8 ttl pk-yrs)     Types: Cigarettes     Start date:     Smokeless tobacco: Never    Tobacco comments:     Working towards quitting   Vaping Use    Vaping status: Never Used   Substance and Sexual Activity    Alcohol use: No    Drug use: No     Comment: CBD oil on occasion   Other Topics Concern    Caffeine Concern No    Exercise No    Seat Belt Yes    Special Diet No    Stress Concern No    Weight Concern No           REVIEW OF SYSTEMS:   Today reviewed systens as documented below  GENERAL HEALTH: feels well otherwise  SKIN: Refer to exam below  RESPIRATORY: denies shortness of breath with exertion  CARDIOVASCULAR: denies chest pain on exertion  GI: denies abdominal pain and denies heartburn  NEURO: denies headaches    EXAM:   LMP  (LMP Unknown)   GENERAL: well developed, well nourished, in no apparent distress  EXTREMITIES:   Dorsal and plantar skin incisions are well-healed there is no sign of infection noted.  Sutures are still little weak dorsally we will plan to remove them in 1 week.  ASSESSMENT AND PLAN:   Diagnoses and all orders for this visit:    Status post foot surgery        Plan: Patient will continue with nonweightbearing follow-up  in 1 week we will remove sutures and reassess may be taken x-ray regarding anxiety medications for acute anxiety I do not regularly prescribe those I told her to call her medical doctor regarding that.  Follow-up in 1 week.    The patient indicates understanding of these issues and agrees to the plan.    Dilan Sung DPM

## 2024-04-29 NOTE — TELEPHONE ENCOUNTER
Patient called states there will be a clarification forms being sent for her only, she will put it to my attention for review and processing...

## 2024-05-01 ENCOUNTER — OFFICE VISIT (OUTPATIENT)
Dept: PODIATRY CLINIC | Facility: CLINIC | Age: 44
End: 2024-05-01

## 2024-05-01 DIAGNOSIS — G89.18 POSTOPERATIVE PAIN: ICD-10-CM

## 2024-05-01 DIAGNOSIS — Z98.890 STATUS POST FOOT SURGERY: Primary | ICD-10-CM

## 2024-05-01 PROCEDURE — 99024 POSTOP FOLLOW-UP VISIT: CPT | Performed by: PODIATRIST

## 2024-05-01 RX ORDER — HYDROCODONE BITARTRATE AND ACETAMINOPHEN 5; 325 MG/1; MG/1
1 TABLET ORAL EVERY 6 HOURS PRN
Qty: 30 TABLET | Refills: 0 | Status: SHIPPED | OUTPATIENT
Start: 2024-05-01 | End: 2024-05-01 | Stop reason: CLARIF

## 2024-05-01 NOTE — TELEPHONE ENCOUNTER
Clarification form was a request for same forms, Re-Faxed to [employer], pending confirmation

## 2024-05-03 ENCOUNTER — APPOINTMENT (OUTPATIENT)
Dept: WOUND CARE | Facility: HOSPITAL | Age: 44
End: 2024-05-03
Attending: INTERNAL MEDICINE
Payer: COMMERCIAL

## 2024-05-05 NOTE — PROGRESS NOTES
Leonie Denney is a 43 year old female.   Chief Complaint   Patient presents with    Post-Op     R foot sx on 4/02/2024- rates pain 8/10 most of the time- pt is NWB using walker and post op shoe- FBS this am was not taken- pt requesting refill for Rx pain meds         HPI:   Returns to the clinic 1 month status post surgery overall doing fairly well she still gets pain from time to time about 8 out of 10.  At today's visit reviewed nurse's history as taken above, allergies medications and medical history as documented below.  All changes duly noted  Allergies: Patient has no known allergies.   Current Outpatient Medications   Medication Sig Dispense Refill    acetaminophen 500 MG Oral Tab Take 1 tablet (500 mg total) by mouth every 4 (four) hours as needed for Fever (equal to or greater than 100.4). 50 tablet 0    HYDROcodone-acetaminophen 5-325 MG Oral Tab Take 1 tablet by mouth every 6 (six) hours as needed. 40 tablet 0    semaglutide (OZEMPIC, 2 MG/DOSE,) 8 MG/3ML Subcutaneous Solution Pen-injector Inject 2 mg into the skin once a week. 3 mL 0    Empagliflozin (JARDIANCE) 25 MG Oral Tab Take 25 mg by mouth daily. 90 tablet 1    Continuous Blood Gluc Sensor (DEXCOM G7 SENSOR) Does not apply Misc 1 each Every 10 days. 9 each 1    teraconazole 0.4 % Vaginal Cream Place vaginally every 4 (four) hours. AT BEDTIME        Past Medical History:    Abdominal pain    Anxiety state    Arthritis    B12 deficiency    Blurred vision    Calculus of kidney    Constipation    Constipation    Decorative tattoo    Depression    no meds- will see therapist    Diabetes mellitus (HCC)    Frequent use of laxatives    Frequent UTI    Frequent UTI    Irregular bowel habits    Itch of skin    Leg swelling    Loss of appetite    Nausea    Neuropathy    Night sweats    Obesity    Osteoarthritis    knees    Sleep disturbance    Type II or unspecified type diabetes mellitus without mention of complication, not stated as uncontrolled    on  insulin currently/recently    Vitamin D deficiency    Weight loss      Past Surgical History:   Procedure Laterality Date    Colonoscopy N/A 2019    Procedure: COLONOSCOPY;  Surgeon: Tyrel Osborn DO;  Location:  ENDOSCOPY    Dilation/curettage,diagnostic      x2    Hc  section level i      x1    Kidney surgery      KIDNEY STONE REMOVAL X2    Other surgical history  04/10/2022    INCISION AND DRAINAGE LEFT BREAST ABSCESS X 2      Family History   Problem Relation Age of Onset    Diabetes Mother     Diabetes Maternal Grandmother     Diabetes Maternal Grandfather       Social History     Socioeconomic History    Marital status:    Tobacco Use    Smoking status: Every Day     Current packs/day: 0.25     Average packs/day: 0.3 packs/day for 15.3 years (3.8 ttl pk-yrs)     Types: Cigarettes     Start date:     Smokeless tobacco: Never    Tobacco comments:     Working towards quitting   Vaping Use    Vaping status: Never Used   Substance and Sexual Activity    Alcohol use: No    Drug use: No     Comment: CBD oil on occasion   Other Topics Concern    Caffeine Concern No    Exercise No    Seat Belt Yes    Special Diet No    Stress Concern No    Weight Concern No           REVIEW OF SYSTEMS:   Today reviewed systens as documented below  GENERAL HEALTH: feels well otherwise  SKIN: Refer to exam below  RESPIRATORY: denies shortness of breath with exertion  CARDIOVASCULAR: denies chest pain on exertion  GI: denies abdominal pain and denies heartburn  NEURO: denies headaches    EXAM:   LMP  (LMP Unknown)   GENERAL: well developed, well nourished, in no apparent distress  EXTREMITIES:   Skin incision is healing well however I can still press on it and get serosanguineous drainage there is no purulence no erythema no edema there is really no sign of infection.  Sutures removed  ASSESSMENT AND PLAN:   Diagnoses and all orders for this visit:    Status post foot surgery  -     Discontinue:  HYDROcodone-acetaminophen 5-325 MG Oral Tab; Take 1 tablet by mouth every 6 (six) hours as needed for Pain.    Postoperative pain  -     Discontinue: HYDROcodone-acetaminophen 5-325 MG Oral Tab; Take 1 tablet by mouth every 6 (six) hours as needed for Pain.        Plan: At today's office visit I was going to prescribe her Norco however I see that her medical doctor has been prescribing it but just keep it in the hands of Dr. Philip.  For right now I referred her to the wound care center because she still got a opening in the wound and I think they may be able to help her there more than I can do in the office.  She can follow-up with me in 2 weeks if she can get in but if not then she can just go to the wound care center she does not need to go to 2 doctors for the same thing.  I will follow-up with her periodically as needed.    The patient indicates understanding of these issues and agrees to the plan.    Dilan Sung DPM

## 2024-05-08 ENCOUNTER — OFFICE VISIT (OUTPATIENT)
Dept: WOUND CARE | Facility: HOSPITAL | Age: 44
End: 2024-05-08
Attending: INTERNAL MEDICINE
Payer: COMMERCIAL

## 2024-05-08 VITALS
HEIGHT: 71 IN | TEMPERATURE: 98 F | BODY MASS INDEX: 35 KG/M2 | WEIGHT: 250 LBS | SYSTOLIC BLOOD PRESSURE: 131 MMHG | DIASTOLIC BLOOD PRESSURE: 63 MMHG | HEART RATE: 104 BPM

## 2024-05-08 DIAGNOSIS — L97.509 DIABETIC FOOT ULCER WITH OSTEOMYELITIS (HCC): ICD-10-CM

## 2024-05-08 DIAGNOSIS — L97.512 FOOT ULCER, RIGHT, WITH FAT LAYER EXPOSED (HCC): ICD-10-CM

## 2024-05-08 DIAGNOSIS — L97.512 RIGHT FOOT ULCER, WITH FAT LAYER EXPOSED (HCC): Primary | ICD-10-CM

## 2024-05-08 DIAGNOSIS — E11.69 DIABETIC FOOT ULCER WITH OSTEOMYELITIS (HCC): ICD-10-CM

## 2024-05-08 DIAGNOSIS — M86.9 DIABETIC FOOT ULCER WITH OSTEOMYELITIS (HCC): ICD-10-CM

## 2024-05-08 DIAGNOSIS — Z79.4 TYPE 2 DIABETES MELLITUS WITH HYPERGLYCEMIA, WITH LONG-TERM CURRENT USE OF INSULIN (HCC): ICD-10-CM

## 2024-05-08 DIAGNOSIS — E11.65 TYPE 2 DIABETES MELLITUS WITH HYPERGLYCEMIA, WITH LONG-TERM CURRENT USE OF INSULIN (HCC): ICD-10-CM

## 2024-05-08 DIAGNOSIS — M20.5X1 HALLUX LIMITUS OF RIGHT FOOT: ICD-10-CM

## 2024-05-08 DIAGNOSIS — E11.621 DIABETIC FOOT ULCER WITH OSTEOMYELITIS (HCC): ICD-10-CM

## 2024-05-08 PROCEDURE — 87205 SMEAR GRAM STAIN: CPT | Performed by: INTERNAL MEDICINE

## 2024-05-08 PROCEDURE — 99214 OFFICE O/P EST MOD 30 MIN: CPT

## 2024-05-08 PROCEDURE — 87070 CULTURE OTHR SPECIMN AEROBIC: CPT | Performed by: INTERNAL MEDICINE

## 2024-05-08 PROCEDURE — 82962 GLUCOSE BLOOD TEST: CPT | Performed by: INTERNAL MEDICINE

## 2024-05-08 PROCEDURE — 11042 DBRDMT SUBQ TIS 1ST 20SQCM/<: CPT | Performed by: INTERNAL MEDICINE

## 2024-05-08 RX ORDER — GENTAMICIN SULFATE 1 MG/G
1 OINTMENT TOPICAL 3 TIMES DAILY
Qty: 30 G | Refills: 3 | Status: SHIPPED | OUTPATIENT
Start: 2024-05-08

## 2024-05-08 NOTE — PROGRESS NOTES
Patient ID: Leonie Denney is a 43 year old female.    Debridement Old surgical Right Toe (Comment which one)   Wound 05/08/24 #1 Right great toe amp site Toe (Comment which one) Right    Performed by: Renee Foreman MD  Authorized by: Renee Foreman MD      Consent   Consent obtained? verbal  Consent given by: patient  Risks discussed? procedural risks discussed    Debridement Details  Performed by: physician  Debridement type: surgical  Level of debridement: subcutaneous tissue  Pain control: lidocaine 4%  Pain control administration type: topical    Pre-debridement measurements  Length (cm): 7.5  Width (cm): 1.2  Wound depth (cm): not probed.  Surface Area (cm^2): 9    Post-debridement measurements  Length (cm): 7.5  Width (cm): 1.2  Depth (cm): 1  Percent debrided: 70%  Surface Area (cm^2): 9  Area Debrided (cm^2): 6.3  Volume (cm^3): 9    Tissue and other material debrided: subcutaneous tissue  Devitalized tissue debrided: biofilm, necrotic debris, slough and eschar  Instrument(s) utilized: blade, forceps and scissors  Bleeding: medium  Hemostasis obtained with: pressure  Procedural pain (0-10): 2  Post-procedural pain: 0   Response to treatment: procedure was tolerated well

## 2024-05-08 NOTE — PROGRESS NOTES
Weekly Wound Education Note    Teaching Provided To: Patient;Family  Training Topics: Cleasing and general instructions;Compression;Discharge instructions;Dressing;Edema control;Nutrition;Off-loading  Training Method: Explain/Verbal  Training Response: Patient responds and understands;Reinforcement needed        Notes: Initial visit: for wounds to right foot, had surgery 4/2/24 with Dr. Sung. Wound to cultured today. Ordered gentamycin. Honey gel, gauze, bordered gauze applied today. Spandagrip E applied, continue to wear surgical boot podiatry provided.

## 2024-05-08 NOTE — PATIENT INSTRUCTIONS
Wound Cleaning and Dressings:    Wash your hands with soap and water. Always wear gloves while changing dressings. Donot touch wound / jani-wound skin with un-gloved hands. Remove old dressing, discard and place into trash.      DRESSINGS: topical gentamicin / gauze  Change dressing twice daily.    Compression Therapy : spandagrip  Compression Therapy Instructions:  1.  Put on first thing in the morning and may remove at bedside.  Okay to wear overnight      if comfortable.  Do not let stockings roll up/down and kink.  Hand wash stockings and      hang dry as needed.    2.  Avoid prolonged standing in one place.  It is better to have your calf muscles moving       to pump fluid out of the legs.    3.  Elevate leg(s) above the level of the heart when sitting or as much as possible.    4.  Take your diuretics as directed by your provider.  Do not skip doses or change doses      unless instructed to do so by your provider.    5. Do not get leg(s) with compression wrap wet. If wraps are too tight as indicated        By pain, numbness/tingling or discoloration of toes remove wrap completely       and call the   wound center.     There are no questions and answers to display.        Off-Loading:    Miscellaneous Instructions:  Supplement with a daily multivitamin   Low salt diet  Intense blood sugar control - Goal Blood sugar below 180 at all times recommended.  Increase protein intake / consider protein supplements - see below  Elevate extremities at all times when sitting / laying down.    DIETARY MODIFICATIONS TO HELP WITH WOUND HEALING:    Protein: Meats, beans, eggs, milk and yogurt particularly Greek yogurt), tofu, soy nuts, soy protein products    Vitamin C: Citrus fruits and juices, strawberries, tomatoes, tomato juice, peppers, baked potatoes, spinach, broccoli, cauliflower, Mccloud sprouts, cabbage    Vitamin A: Dark green, leafy vegetables, orange or yellow vegetables, cantaloupe, fortified dairy products,  liver, fortified cereals    Zinc: Fortified cereals, red meats, seafood    Consider [place] [employer] (These are essential branch chain amino acids that help with tissue building and wound healing) and take 2 packets/day. you can order online at [employer] or [employer]    ADDITIONAL REMINDERS:    The treatment plan has been discussed at length with you and your provider. Follow all instructions carefully, it is very important. If you do not follow all instructions, you are at  risk of your wound not healing, infection, possible loss of limb and even end of life.  Please call the clinic during regular business hours ( 7:30 AM - 5:30 PM) if you notice increased bleeding, redness, warmth, pain or pus like drainage or start running a fever greater than 100.3.    For after hour emergencies, please call your primary physician or go to the nearest emergency room.

## 2024-05-08 NOTE — PROGRESS NOTES
[facility] CONSULTATION NOTE  DA SHAH MD  5/8/2024    Subjective   Leonie Denney is a 43 year old female.    Chief Complaint   Patient presents with    Wound Care     Patient is here for an initial consult. She presents with a wound from an amputation on 4/2/24. She complains of burning pain that is 5/10. She has completed her course of antibiotics for the wound.      HPI    44 yo AAF Diabetic - here for eval and management of open wound right foot - surgical wound April 2024 - Dr Lazara Sung - not healing as expected- referred to us for further management - she was diagnose with cellulitis foot with osteomyelitis and underwent partial ray amputation of right big toe      BS log reviewed - BS today 196.       Diabetes status: yes  Last A1c value was 9% done 3/30/2024.      Smoker status: no    Past Medical history, Surgical history, Social history, Family history reviewed with patient.   Medications reviewed.   Epic chart notes including provider notes, labs, imaging etc. Reviewed.   [facility] care everywhere queried and results reviewed.     Past Medical Hx:  Past Medical History:    Abdominal pain    Anxiety state    Arthritis    B12 deficiency    Blurred vision    Calculus of kidney    Constipation    Constipation    Decorative tattoo    Depression    no meds- will see therapist    Diabetes mellitus (HCC)    Frequent use of laxatives    Frequent UTI    Frequent UTI    Irregular bowel habits    Itch of skin    Leg swelling    Loss of appetite    Nausea    Neuropathy    Night sweats    Obesity    Osteoarthritis    knees    Sleep disturbance    Type II or unspecified type diabetes mellitus without mention of complication, not stated as uncontrolled    on insulin currently/recently    Vitamin D deficiency    Weight loss     Past Surgical Hx:  Past Surgical History:   Procedure Laterality Date    Colonoscopy N/A 8/16/2019    Procedure: COLONOSCOPY;  Surgeon: Tyrel Osborn DO;  Location:  ENDOSCOPY     Dilation/curettage,diagnostic      x2    Hc  section level i      x1    Kidney surgery      KIDNEY STONE REMOVAL X2    Other surgical history  04/10/2022    INCISION AND DRAINAGE LEFT BREAST ABSCESS X 2     Problem List:  Patient Active Problem List   Diagnosis    Type 2 diabetes mellitus with hyperglycemia, with long-term current use of insulin ([facility])    Anxiety    Neuropathy    Meralgia paresthetica of left side    Vitamin D deficiency    B12 deficiency    Diabetic neuropathy (HCC)    Hyperglycemia due to type 2 diabetes mellitus ([facility])    Morbid obesity ([facility])    Other specified fetal and placental problems affecting management of mother, antepartum    Primary hypercoagulable state ([facility])    Uncontrolled type 2 diabetes mellitus    Noncompliance with medication regimen    Ulcer of right foot, limited to breakdown of skin ([facility])    Hyponatremia    Foot ulcer, right, with fat layer exposed ([facility])    Therapeutic drug monitoring    FLORENCIA (acute kidney injury) ([facility])    Labial abscess    Cellulitis of labia    Hyperglycemia    Leukocytosis, unspecified type    Cellulitis and abscess of foot     Social History:  Social History     Socioeconomic History    Marital status:    Tobacco Use    Smoking status: Every Day     Current packs/day: 0.25     Average packs/day: 0.3 packs/day for 15.3 years (3.8 ttl pk-yrs)     Types: Cigarettes     Start date:     Smokeless tobacco: Never    Tobacco comments:     Working towards quitting   Vaping Use    Vaping status: Never Used   Substance and Sexual Activity    Alcohol use: No    Drug use: No     Comment: CBD oil on occasion   Other Topics Concern    Caffeine Concern No    Exercise No    Seat Belt Yes    Special Diet No    Stress Concern No    Weight Concern No     Social Determinants of Health     Food Insecurity: No Food Insecurity (3/30/2024)    Food Insecurity     Food Insecurity: Never true   Transportation Needs: No Transportation Needs (3/30/2024)     Transportation Needs     Lack of Transportation: No   Housing Stability: Low Risk  (3/30/2024)    Housing Stability     Housing Instability: No     Family History:  Family History   Problem Relation Age of Onset    Diabetes Mother     Diabetes Maternal Grandmother     Diabetes Maternal Grandfather      Allergies:  No Known Allergies  Current Meds:  Current Outpatient Medications   Medication Sig Dispense Refill    gentamicin 0.1 % External Ointment Apply 1 Application topically 3 (three) times daily. 30 g 3    acetaminophen 500 MG Oral Tab Take 1 tablet (500 mg total) by mouth every 4 (four) hours as needed for Fever (equal to or greater than 100.4). 50 tablet 0    HYDROcodone-acetaminophen 5-325 MG Oral Tab Take 1 tablet by mouth every 6 (six) hours as needed. 40 tablet 0    semaglutide (OZEMPIC, 2 MG/DOSE,) 8 MG/3ML Subcutaneous Solution Pen-injector Inject 2 mg into the skin once a week. 3 mL 0    Empagliflozin (JARDIANCE) 25 MG Oral Tab Take 25 mg by mouth daily. 90 tablet 1    Continuous Blood Gluc Sensor (DEXCOM G7 SENSOR) Does not apply Misc 1 each Every 10 days. 9 each 1    teraconazole 0.4 % Vaginal Cream Place vaginally every 4 (four) hours. AT BEDTIME       Tobacco Counseling:  Ready to quit: Not Answered  Counseling given: Not Answered  Tobacco comments: Working towards quitting       REVIEW OF SYSTEMS:   CONSTITUTIONAL:  Denies unusual weight gain/loss, fever, chills, or fatigue.  EENT:  Eyes:  Denies eye pain, visual loss, blurred vision, double vision or yellow sclerae.   CARDIOVASCULAR:  Denies chest pain, chest pressure, chest discomfort, palpitations, dyspnea on exertion or at rest.  RESPIRATORY:  Denies shortness of breath, wheezing, cough or sputum.  GASTROINTESTINAL:  Denies abdominal pain, nausea, vomiting, constipation, diarrhea, or blood in stool.  MUSCULOSKELETAL:  Denies weakness  NEUROLOGICAL:  Denies headache, seizures, dizziness, syncope      Objective   Objective  Physical Exam    Wound  Assessment  Wound 10/12/22 Genitalia Right;Other (Comment) (Active)       Wound 05/08/24 #1 Right great toe amp site Toe (Comment which one) Right (Active)   Wound Image   05/08/24 0940   Drainage Amount Large 05/08/24 0911   Drainage Description Serosanguineous 05/08/24 0911   Wound Length (cm) 7.5 cm 05/08/24 0911   Wound Width (cm) 1.2 cm 05/08/24 0911   Wound Surface Area (cm^2) 9 cm^2 05/08/24 0911   Margins Undefined edges 05/08/24 0911   Non-staged Wound Description Full thickness 05/08/24 0911   Wendy-wound Assessment Dry;Edema;Ecchymosis;Moist;Maceration 05/08/24 0911   Wound Bed Slough (%) 5 % 05/08/24 0911   Wound Bed Eschar (%) 95 % 05/08/24 0911   Wound Odor None 05/08/24 0911   Tunneling? No 05/08/24 0911   Undermining? No 05/08/24 0911   Sinus Tracts? No 05/08/24 0911       Wound 05/08/24 #2 Right plantar foot Foot Right;Plantar (Active)   Wound Image   05/08/24 0913   Drainage Amount Small 05/08/24 0913   Drainage Description Serosanguineous 05/08/24 0913   Wound Length (cm) 2 cm 05/08/24 0913   Wound Width (cm) 0.9 cm 05/08/24 0913   Wound Surface Area (cm^2) 1.8 cm^2 05/08/24 0913   Margins Undefined edges 05/08/24 0913   Non-staged Wound Description Full thickness 05/08/24 0913   Wendy-wound Assessment Dry;Edema;Callous 05/08/24 0913   Wound Bed Epithelium (%) 50 % 05/08/24 0913   Wound Bed Eschar (%) 50 % 05/08/24 0913   Wound Odor None 05/08/24 0913   Tunneling? No 05/08/24 0913   Undermining? No 05/08/24 0913   Sinus Tracts? No 05/08/24 0913          PHYSICAL EXAM:   /63   Pulse 104   Temp 98 °F (36.7 °C)   Ht 71\"   Wt 250 lb (113.4 kg)   LMP  (LMP Unknown)   BMI 34.87 kg/m²  Estimated body mass index is 34.87 kg/m² as calculated from the following:    Height as of this encounter: 71\".    Weight as of this encounter: 250 lb (113.4 kg).   Vital signs reviewed.Appears stated age, well groomed.  Physical Exam:  GEN:  Patient is alert, awake and oriented, well developed, well nourished,  no apparent distress.  HEENT:  Head:  Normocephalic, atraumatic   Eyes: EOMI, PERRLA, no scleral icterus, conjunctivae clear bilaterally, no eye discharge  NECK: Supple, no CLAD, no JVD, no thyromegaly.  HEART:  Regular rate and rhythm, no murmurs, rubs or gallops.  LUNGS: Clear to auscultation bilterally, no rales/rhonchi/wheezing.  ABDOMEN:  Soft, nondistended, nontender,   EXTREMITIES:  Strong triphasic pulse right DP - doppler.   NEURO:  No deficit, normal gait, strength grossly intact.     Assessment   Assessment    Encounter Diagnosis  1. Right foot ulcer, with fat layer exposed ([facility])    2. Foot ulcer, right, with fat layer exposed ([facility])    3. Type 2 diabetes mellitus with hyperglycemia, with long-term current use of insulin ([facility])    4. Hallux limitus of right foot    5. Diabetic foot ulcer with osteomyelitis ([facility])        Problem List  Patient Active Problem List   Diagnosis    Type 2 diabetes mellitus with hyperglycemia, with long-term current use of insulin ([facility])    Anxiety    Neuropathy    Meralgia paresthetica of left side    Vitamin D deficiency    B12 deficiency    Diabetic neuropathy ([facility])    Hyperglycemia due to type 2 diabetes mellitus (HCC)    Morbid obesity ([facility])    Other specified fetal and placental problems affecting management of mother, antepartum    Primary hypercoagulable state ([facility])    Uncontrolled type 2 diabetes mellitus    Noncompliance with medication regimen    Ulcer of right foot, limited to breakdown of skin ([facility])    Hyponatremia    Foot ulcer, right, with fat layer exposed ([facility])    Therapeutic drug monitoring    FLORENCIA (acute kidney injury) ([facility])    Labial abscess    Cellulitis of labia    Hyperglycemia    Leukocytosis, unspecified type    Cellulitis and abscess of foot       Plan    Serial debridements to hasten healing   Sutures removed.   Wound culture  Start topical gentamicin  Intense BS control.   Offload wounded area as much as possible.   Return one week    PROCEDURES:      Debridement Old surgical Right Toe (Comment which one)   Wound 05/08/24 #1 Right great toe amp site Toe (Comment which one) Right     Performed by: Renee Foreman MD  Authorized by: Renee Foreman MD       Consent   Consent obtained? verbal  Consent given by: patient  Risks discussed? procedural risks discussed     Debridement Details  Performed by: physician  Debridement type: surgical  Level of debridement: subcutaneous tissue  Pain control: lidocaine 4%  Pain control administration type: topical     Pre-debridement measurements  Length (cm): 7.5  Width (cm): 1.2  Wound depth (cm): not probed.  Surface Area (cm^2): 9     Post-debridement measurements  Length (cm): 7.5  Width (cm): 1.2  Depth (cm): 1  Percent debrided: 70%  Surface Area (cm^2): 9  Area Debrided (cm^2): 6.3  Volume (cm^3): 9     Tissue and other material debrided: subcutaneous tissue  Devitalized tissue debrided: biofilm, necrotic debris, slough and eschar  Instrument(s) utilized: blade, forceps and scissors  Bleeding: medium  Hemostasis obtained with: pressure  Procedural pain (0-10): 2  Post-procedural pain: 0   Response to treatment: procedure was tolerated well           MEDICAL NECESSSITY FOR SURGICAL DEBRIDEMENT:    Surgical debridement is necessary in this patient to stimulate and hasten the rate of wound healing by  removing biofilm and devitalized tissue, down to subcutaneous level.   Research has consistently proven that traditional bedside sharp debridement provides excellent results in reducing the square surface area of wounds. The use of surgical sharp debridement can effectively remove devitalized tissue, hence aids in achieving good wound healing and advancing the rate of wound closure,  and is a proven significant component to advancing wound closure.  Patient Instructions     Wound Cleaning and Dressings:    Wash your hands with soap and water. Always wear gloves while changing dressings. Donot touch wound /  jani-wound skin with un-gloved hands. Remove old dressing, discard and place into trash.      DRESSINGS: topical gentamicin / gauze  Change dressing twice daily.    Compression Therapy : spandagrip  Compression Therapy Instructions:  1.  Put on first thing in the morning and may remove at bedside.  Okay to wear overnight      if comfortable.  Do not let stockings roll up/down and kink.  Hand wash stockings and      hang dry as needed.    2.  Avoid prolonged standing in one place.  It is better to have your calf muscles moving       to pump fluid out of the legs.    3.  Elevate leg(s) above the level of the heart when sitting or as much as possible.    4.  Take your diuretics as directed by your provider.  Do not skip doses or change doses      unless instructed to do so by your provider.    5. Do not get leg(s) with compression wrap wet. If wraps are too tight as indicated        By pain, numbness/tingling or discoloration of toes remove wrap completely       and call the   wound center.     There are no questions and answers to display.        Off-Loading:    Miscellaneous Instructions:  Supplement with a daily multivitamin   Low salt diet  Intense blood sugar control - Goal Blood sugar below 180 at all times recommended.  Increase protein intake / consider protein supplements - see below  Elevate extremities at all times when sitting / laying down.    DIETARY MODIFICATIONS TO HELP WITH WOUND HEALING:    Protein: Meats, beans, eggs, milk and yogurt particularly Greek yogurt), tofu, soy nuts, soy protein products    Vitamin C: Citrus fruits and juices, strawberries, tomatoes, tomato juice, peppers, baked potatoes, spinach, broccoli, cauliflower, [place] sprouts, cabbage    Vitamin A: Dark green, leafy vegetables, orange or yellow vegetables, cantaloupe, fortified dairy products, liver, fortified cereals    Zinc: Fortified cereals, red meats, seafood    Consider [place] [employer] (These are essential branch chain  amino acids that help with tissue building and wound healing) and take 2 packets/day. you can order online at [employer] or [employer]    ADDITIONAL REMINDERS:    The treatment plan has been discussed at length with you and your provider. Follow all instructions carefully, it is very important. If you do not follow all instructions, you are at  risk of your wound not healing, infection, possible loss of limb and even end of life.  Please call the clinic during regular business hours ( 7:30 AM - 5:30 PM) if you notice increased bleeding, redness, warmth, pain or pus like drainage or start running a fever greater than 100.3.    For after hour emergencies, please call your primary physician or go to the nearest emergency room.          Orders  Orders Placed This Encounter   Procedures    Debridement Old surgical Right Toe (Comment which one)    Aerobic Bacterial Culture       Meds & Refills for this Visit:  Requested Prescriptions     Signed Prescriptions Disp Refills    gentamicin 0.1 % External Ointment 30 g 3     Sig: Apply 1 Application topically 3 (three) times daily.         Patient/Caregiver Education: There are no barriers to learning. Medical education for above diagnosis given.   Answered all questions.    Outcome: Patient verbalizes understanding. Patient is notified to call with any questions, complications, allergies, or worsening or changing symptoms.  Patient is to call with any side effects or complications as a result of the treatments today.      DOCUMENTATION OF TIME SPENT: Code selection for this visit was based on time spent : 60 min on date of service in preparing to see the patient, obtaining and/or reviewing separately obtained history, performing a medically appropriate examination, counseling and educating the patient/family/caregiver, ordering medications or testing, referring and communicating with other healthcare providers, documenting clinical information in the E HR, independently interpreting  results and communicating results to the patient/family/caregiver and care coordination with the patient's other providers.    Followup: Return in about 1 week (around 5/15/2024) for Wound followup.      Note to Patient:  The 21st Century Cures Act makes medical notes like these available to patients in the interest of transparency. However, be advised this is a medical document and is intended as [id number] communication; it is written in medical language and may appear blunt, direct, or contain abbreviations or verbiage that are unfamiliar. Medical documents are intended to carry relevant information, facts as evident, and the clinical opinion of the practitioner.    Also, please note that this report has been produced using speech recognition software and may contain errors related to that system including, but not limited to, errors in grammar, punctuation, and spelling, as well as words and phrases that possibly may have been recognized inappropriately.  If there are any questions or concerns, contact the dictating provider for clarification.      Renee Belcher MD  5/8/2024  9:40 AM

## 2024-05-09 LAB — GLUCOSE BLD-MCNC: 195 MG/DL (ref 70–99)

## 2024-05-13 NOTE — TELEPHONE ENCOUNTER
Please try to avoid signing forms in the corner as it is not visible when printing and forms are not accepted this way. Thank you!     Dr. Sung      *The ACKNOWLEDGE button has been moved to the top right ribbon*    Please sign off on form if you agree to: Family Medical Leave Act Questionnaire  (place your signature on the first page only)    -From your Inbasket, Highlight the patient and click Chart   -Double click the 04/10/2024 Forms Completion telephone encounter  -Scroll down to the Media section   -Click the blue Hyperlink: Family Medical Leave Act Cert Dr. Dilan Sung 05/13/2024  -Click Acknowledge located in the top right ribbon/menu   -Drag the mouse into the blank space of the document and a + sign will appear. Left click to   electronically sign the document.     Thank you,  Monica HUNTER

## 2024-05-14 NOTE — TELEPHONE ENCOUNTER
Family Medical Leave Act Certification forms faxed to [employer] Leave [employer] @ [phone] successfully, sending MyChart to adv patient. Archiving forms.

## 2024-05-15 ENCOUNTER — OFFICE VISIT (OUTPATIENT)
Dept: WOUND CARE | Facility: HOSPITAL | Age: 44
End: 2024-05-15
Attending: INTERNAL MEDICINE
Payer: COMMERCIAL

## 2024-05-15 VITALS
DIASTOLIC BLOOD PRESSURE: 83 MMHG | RESPIRATION RATE: 16 BRPM | SYSTOLIC BLOOD PRESSURE: 124 MMHG | TEMPERATURE: 98 F | HEART RATE: 99 BPM

## 2024-05-15 DIAGNOSIS — L97.512 RIGHT FOOT ULCER, WITH FAT LAYER EXPOSED (HCC): Primary | ICD-10-CM

## 2024-05-15 DIAGNOSIS — E11.65 TYPE 2 DIABETES MELLITUS WITH HYPERGLYCEMIA, WITH LONG-TERM CURRENT USE OF INSULIN (HCC): ICD-10-CM

## 2024-05-15 DIAGNOSIS — E11.69 DIABETIC FOOT ULCER WITH OSTEOMYELITIS (HCC): ICD-10-CM

## 2024-05-15 DIAGNOSIS — M86.9 DIABETIC FOOT ULCER WITH OSTEOMYELITIS (HCC): ICD-10-CM

## 2024-05-15 DIAGNOSIS — E11.621 DIABETIC FOOT ULCER WITH OSTEOMYELITIS (HCC): ICD-10-CM

## 2024-05-15 DIAGNOSIS — Z79.4 TYPE 2 DIABETES MELLITUS WITH HYPERGLYCEMIA, WITH LONG-TERM CURRENT USE OF INSULIN (HCC): ICD-10-CM

## 2024-05-15 DIAGNOSIS — E11.42 DIABETIC POLYNEUROPATHY ASSOCIATED WITH TYPE 2 DIABETES MELLITUS (HCC): ICD-10-CM

## 2024-05-15 DIAGNOSIS — M20.5X1 HALLUX LIMITUS OF RIGHT FOOT: ICD-10-CM

## 2024-05-15 DIAGNOSIS — L97.509 DIABETIC FOOT ULCER WITH OSTEOMYELITIS (HCC): ICD-10-CM

## 2024-05-15 LAB — GLUCOSE BLD-MCNC: 223 MG/DL (ref 70–99)

## 2024-05-15 PROCEDURE — 11042 DBRDMT SUBQ TIS 1ST 20SQCM/<: CPT | Performed by: INTERNAL MEDICINE

## 2024-05-15 PROCEDURE — 82962 GLUCOSE BLOOD TEST: CPT | Performed by: INTERNAL MEDICINE

## 2024-05-15 NOTE — PROGRESS NOTES
Weekly Wound Education Note    Teaching Provided To: Patient;Family  Training Topics: Dressing;Off-loading;Discharge instructions;Compression;Cleasing and general instructions  Training Method: Demonstration;Explain/Verbal;Written  Training Response: Patient responds and understands        Notes: Right great toe amp site and right plantar foot wound: cleanse with saline or wound cleanser, apply honey gel and gauze filling the depth of the wound, cover with abd, kerlix. Change daily. supplies ordered today

## 2024-05-15 NOTE — PATIENT INSTRUCTIONS
Wound Cleaning and Dressings:    Wash your hands with soap and water. Always wear gloves while changing dressings. Donot touch wound / jani-wound skin with un-gloved hands. Remove old dressing, discard and place into trash.      DRESSINGS: topical honey gel  / pack with gauze  Change dressing daily.    Compression Therapy : spandagrip  Compression Therapy Instructions:  1.  Put on first thing in the morning and may remove at bedside.  Okay to wear overnight      if comfortable.  Do not let stockings roll up/down and kink.  Hand wash stockings and      hang dry as needed.    2.  Avoid prolonged standing in one place.  It is better to have your calf muscles moving       to pump fluid out of the legs.    3.  Elevate leg(s) above the level of the heart when sitting or as much as possible.    4.  Take your diuretics as directed by your provider.  Do not skip doses or change doses      unless instructed to do so by your provider.    5. Do not get leg(s) with compression wrap wet. If wraps are too tight as indicated        By pain, numbness/tingling or discoloration of toes remove wrap completely       and call the   wound center.     There are no questions and answers to display.        Off-Loading:  Intense offloading of wounded area    Miscellaneous Instructions:  Supplement with a daily multivitamin   Low salt diet  Intense blood sugar control - Goal Blood sugar below 180 at all times recommended.  Increase protein intake / consider protein supplements - see below  Elevate extremities at all times when sitting / laying down.    DIETARY MODIFICATIONS TO HELP WITH WOUND HEALING:    Protein: Meats, beans, eggs, milk and yogurt particularly Greek yogurt), tofu, soy nuts, soy protein products    Vitamin C: Citrus fruits and juices, strawberries, tomatoes, tomato juice, peppers, baked potatoes, spinach, broccoli, cauliflower, [place] sprouts, cabbage    Vitamin A: Dark green, leafy vegetables, orange or yellow vegetables,  cantaloupe, fortified dairy products, liver, fortified cereals    Zinc: Fortified cereals, red meats, seafood    Consider [place] [employer] (These are essential branch chain amino acids that help with tissue building and wound healing) and take 2 packets/day. you can order online at [employer] or [employer]    ADDITIONAL REMINDERS:    The treatment plan has been discussed at length with you and your provider. Follow all instructions carefully, it is very important. If you do not follow all instructions, you are at  risk of your wound not healing, infection, possible loss of limb and even end of life.  Please call the clinic during regular business hours ( 7:30 AM - 5:30 PM) if you notice increased bleeding, redness, warmth, pain or pus like drainage or start running a fever greater than 100.3.    For after hour emergencies, please call your primary physician or go to the nearest emergency room.

## 2024-05-15 NOTE — PROGRESS NOTES
[facility] PROGRESS NOTE  AD SHAH MD  5/15/2024    Chief Complaint:   Chief Complaint   Patient presents with    Wound Care     Patients is here for a follow up. Patients denies any pain        HPI:   Subjective   Leonie Denney is a 43 year old female coming in for a follow-up visit.    HPI    Overall wounds are improving.  Dimensions of slightly improved.  The amount of nonviable tissue has come down however the entire open wound base is still of with nonviable tissue which was all debrided down to healthy bleeding tissue today.  She has 2 wounds and I have a concern that they may be connecting at some level even though I could not probe through.    Patient has been using alternate topical gentamicin and honey gel and has completed 1 week of that.    There are no signs of active infection and hence we can discontinue gentamicin and resume daily honey gel for ongoing enzymatic debridement.    She will benefit from skin substitutes she has had this wound for more than 4 weeks and has not been healing adequately.  She is at high risk of nonhealing due to elevated blood sugars.    She will also benefit from negative pressure wound therapy- wound VAC versus rolanda whichever is more appropriate.    We will apply for prior authorizations for both.    Review of Systems  Negative except HPI   Denies chest pain / SOB / palpitations  Denies fever.     Allergies  No Known Allergies    Current Meds:  Current Outpatient Medications   Medication Sig Dispense Refill    gentamicin 0.1 % External Ointment Apply 1 Application topically 3 (three) times daily. 30 g 3    acetaminophen 500 MG Oral Tab Take 1 tablet (500 mg total) by mouth every 4 (four) hours as needed for Fever (equal to or greater than 100.4). 50 tablet 0    HYDROcodone-acetaminophen 5-325 MG Oral Tab Take 1 tablet by mouth every 6 (six) hours as needed. 40 tablet 0    semaglutide (OZEMPIC, 2 MG/DOSE,) 8 MG/3ML Subcutaneous Solution Pen-injector Inject  2 mg into the skin once a week. 3 mL 0    Empagliflozin (JARDIANCE) 25 MG Oral Tab Take 25 mg by mouth daily. 90 tablet 1    Continuous Blood Gluc Sensor (DEXCOM G7 SENSOR) Does not apply Misc 1 each Every 10 days. 9 each 1    teraconazole 0.4 % Vaginal Cream Place vaginally every 4 (four) hours. AT BEDTIME           EXAM:   Objective   Objective    Physical Exam    Vital Signs  Vitals:    05/15/24 1047   BP: 124/83   Pulse: 99   Resp: 16   Temp: 98 °F (36.7 °C)       Wound Assessment  Wound 05/08/24 #1 Right great toe amp site Toe (Comment which one) Right (Active)   Wound Image   05/15/24 1041   Drainage Amount Moderate 05/15/24 1041   Drainage Description Yellow;Serous 05/15/24 1041   Treatments Compression 05/08/24 0911   Wound Length (cm) 3.6 cm 05/15/24 1041   Wound Width (cm) 1.1 cm 05/15/24 1041   Wound Surface Area (cm^2) 3.96 cm^2 05/15/24 1041   Wound Depth (cm) 0.6 cm 05/15/24 1041   Wound Volume (cm^3) 2.376 cm^3 05/15/24 1041   Margins Well-defined edges 05/15/24 1041   Non-staged Wound Description Full thickness 05/15/24 1041   Wendy-wound Assessment Dry;Edema 05/15/24 1041   Wound Bed Slough (%) 100 % 05/15/24 1041   Wound Bed Eschar (%) 95 % 05/08/24 0911   Wound Odor None 05/15/24 1041   Tunneling? No 05/08/24 0911   Undermining? No 05/08/24 0911   Sinus Tracts? No 05/08/24 0911       Wound 05/08/24 #2 Right plantar foot Foot Right;Plantar (Active)   Wound Image   05/15/24 1043   Drainage Amount Moderate 05/15/24 1043   Drainage Description Serous;Yellow 05/15/24 1043   Treatments Compression 05/08/24 0913   Wound Length (cm) 1.6 cm 05/15/24 1043   Wound Width (cm) 0.6 cm 05/15/24 1043   Wound Surface Area (cm^2) 0.96 cm^2 05/15/24 1043   Wound Depth (cm) 1.1 cm 05/15/24 1043   Wound Volume (cm^3) 1.056 cm^3 05/15/24 1043   Margins Well-defined edges 05/15/24 1043   Non-staged Wound Description Full thickness 05/15/24 1043   Wendy-wound Assessment Dry;Edema 05/15/24 1043   Wound Bed Epithelium (%) 50  % 05/08/24 0913   Wound Bed Slough (%) 100 % 05/15/24 1043   Wound Bed Eschar (%) 50 % 05/08/24 0913   Wound Odor None 05/15/24 1043   Tunneling? No 05/08/24 0913   Undermining? No 05/08/24 0913   Sinus Tracts? No 05/08/24 0913           ASSESSMENT AND PLAN:     Assessment   Assessment    Encounter Diagnosis  1. Right foot ulcer, with fat layer exposed (HCC)    2. Diabetic foot ulcer with osteomyelitis (HCC)    3. Type 2 diabetes mellitus with hyperglycemia, with long-term current use of insulin (HCC)    4. Hallux limitus of right foot    5. Diabetic polyneuropathy associated with type 2 diabetes mellitus (HCC)        Problem List  Patient Active Problem List   Diagnosis    Type 2 diabetes mellitus with hyperglycemia, with long-term current use of insulin (HCC)    Anxiety    Neuropathy    Meralgia paresthetica of left side    Vitamin D deficiency    B12 deficiency    Diabetic neuropathy (HCC)    Hyperglycemia due to type 2 diabetes mellitus (HCC)    Morbid obesity (HCC)    Other specified fetal and placental problems affecting management of mother, antepartum    Primary hypercoagulable state (HCC)    Uncontrolled type 2 diabetes mellitus    Noncompliance with medication regimen    Ulcer of right foot, limited to breakdown of skin (HCC)    Hyponatremia    Foot ulcer, right, with fat layer exposed (HCC)    Therapeutic drug monitoring    FLORENCIA (acute kidney injury) (HCC)    Labial abscess    Cellulitis of labia    Hyperglycemia    Leukocytosis, unspecified type    Cellulitis and abscess of foot         MANAGEMENT    PROCEDURES:    Debridement Old surgical Right Toe (Comment which one)   Wound 05/08/24 #1 Right great toe amp site Toe (Comment which one) Right     Performed by: Renee Foreman MD  Authorized by: Renee Foreman MD       Consent   Consent obtained? verbal  Consent given by: patient  Risks discussed? procedural risks discussed  Time out called at 5/15/2024 10:59 AM  Immediately prior to the  procedure a time out was called and the performing provider verified the correct patient, procedure, equipment, support staff, and site/side marked as required.     Debridement Details  Performed by: physician  Debridement type: surgical  Level of debridement: subcutaneous tissue  Pain control: lidocaine 4%  Pain control administration type: topical     Pre-debridement measurements  Length (cm): 3.6  Width (cm): 1.1  Depth (cm): 0.6  Surface Area (cm^2): 3.96     Post-debridement measurements  Length (cm): 3.7  Width (cm): 1.2  Depth (cm): 2.6  Percent debrided: 100%  Surface Area (cm^2): 4.44  Area Debrided (cm^2): 4.44  Volume (cm^3): 11.54     Tissue and other material debrided: subcutaneous tissue  Devitalized tissue debrided: biofilm, fibrin, necrotic debris and slough  Instrument(s) utilized: blade, curette and forceps  Bleeding: small  Hemostasis obtained with: not applicable  Procedural pain (0-10): 0  Post-procedural pain: 0   Response to treatment: procedure was tolerated well         Debridement Old surgical Right;Plantar Foot   Wound 05/08/24 #2 Right plantar foot Foot Right;Plantar     Performed by: Renee Foreman MD  Authorized by: Renee Foreman MD       Consent   Consent obtained? verbal  Consent given by: patient  Risks discussed? procedural risks discussed  Time out called at 5/15/2024 11:01 AM  Immediately prior to the procedure a time out was called and the performing provider verified the correct patient, procedure, equipment, support staff, and site/side marked as required.     Debridement Details  Performed by: physician  Debridement type: surgical  Level of debridement: subcutaneous tissue  Pain control: lidocaine 4%  Pain control administration type: topical     Pre-debridement measurements  Length (cm): 1.6  Width (cm): 0.6  Depth (cm): 1.1  Surface Area (cm^2): 0.96     Post-debridement measurements  Length (cm): 1.6  Width (cm): 0.8  Depth (cm): 1.1  Percent debrided:  100%  Surface Area (cm^2): 1.28  Area Debrided (cm^2): 1.28  Volume (cm^3): 1.41     Tissue and other material debrided: subcutaneous tissue  Devitalized tissue debrided: biofilm, fibrin and necrotic debris  Instrument(s) utilized: curette  Bleeding: medium  Hemostasis obtained with: pressure  Procedural pain (0-10): 0  Post-procedural pain: 0   Response to treatment: procedure was tolerated well         MEDICAL NECESSSITY FOR SURGICAL DEBRIDEMENT:    Surgical debridement is necessary in this patient to stimulate and hasten the rate of wound healing by  removing biofilm and devitalized tissue, down to subcutaneous level.   Research has consistently proven that traditional bedside sharp debridement provides excellent results in reducing the square surface area of wounds. The use of surgical sharp debridement can effectively remove devitalized tissue, hence aids in achieving good wound healing and advancing the rate of wound closure,  and is a proven significant component to advancing wound closure.    Patient Instructions     Wound Cleaning and Dressings:    Wash your hands with soap and water. Always wear gloves while changing dressings. Donot touch wound / jani-wound skin with un-gloved hands. Remove old dressing, discard and place into trash.      DRESSINGS: topical honey gel  / pack with gauze  Change dressing daily.    Compression Therapy : spandagrip  Compression Therapy Instructions:  1.  Put on first thing in the morning and may remove at bedside.  Okay to wear overnight      if comfortable.  Do not let stockings roll up/down and kink.  Hand wash stockings and      hang dry as needed.    2.  Avoid prolonged standing in one place.  It is better to have your calf muscles moving       to pump fluid out of the legs.    3.  Elevate leg(s) above the level of the heart when sitting or as much as possible.    4.  Take your diuretics as directed by your provider.  Do not skip doses or change doses      unless  instructed to do so by your provider.    5. Do not get leg(s) with compression wrap wet. If wraps are too tight as indicated        By pain, numbness/tingling or discoloration of toes remove wrap completely       and call the   wound center.     There are no questions and answers to display.        Off-Loading:  Intense offloading of wounded area    Miscellaneous Instructions:  Supplement with a daily multivitamin   Low salt diet  Intense blood sugar control - Goal Blood sugar below 180 at all times recommended.  Increase protein intake / consider protein supplements - see below  Elevate extremities at all times when sitting / laying down.    DIETARY MODIFICATIONS TO HELP WITH WOUND HEALING:    Protein: Meats, beans, eggs, milk and yogurt particularly Greek yogurt), tofu, soy nuts, soy protein products    Vitamin C: Citrus fruits and juices, strawberries, tomatoes, tomato juice, peppers, baked potatoes, spinach, broccoli, cauliflower, [place] sprouts, cabbage    Vitamin A: Dark green, leafy vegetables, orange or yellow vegetables, cantaloupe, fortified dairy products, liver, fortified cereals    Zinc: Fortified cereals, red meats, seafood    Consider [place] [employer] (These are essential branch chain amino acids that help with tissue building and wound healing) and take 2 packets/day. you can order online at [employer] or [employer]    ADDITIONAL REMINDERS:    The treatment plan has been discussed at length with you and your provider. Follow all instructions carefully, it is very important. If you do not follow all instructions, you are at  risk of your wound not healing, infection, possible loss of limb and even end of life.  Please call the clinic during regular business hours ( 7:30 AM - 5:30 PM) if you notice increased bleeding, redness, warmth, pain or pus like drainage or start running a fever greater than 100.3.    For after hour emergencies, please call your primary physician or go to the nearest emergency  room.      Orders  Orders Placed This Encounter   Procedures    Debridement Old surgical Right Toe (Comment which one)    Debridement Old surgical Right;Plantar Foot     Patient/Caregiver Education: There are no barriers to learning. Medical education for above diagnosis given.   Answered all questions.    Outcome: Patient verbalizes understanding. Patient is notified to call with any questions, complications, allergies, or worsening or changing symptoms.  Patient is to call with any side effects or complications as a result of the treatments today.      DOCUMENTATION OF TIME SPENT: Code selection for this visit was based on time spent : 45 min on date of service in preparing to see the patient, obtaining and/or reviewing separately obtained history, performing a medically appropriate examination, counseling and educating the patient/family/caregiver, ordering medications or testing, referring and communicating with other healthcare providers, documenting clinical information in the E HR, independently interpreting results and communicating results to the patient/family/caregiver and care coordination with the patient's other providers.    Followup: Return in about 1 week (around 5/22/2024) for Wound followup.      Note to Patient:  The 21st Century Cures Act makes medical notes like these available to patients in the interest of transparency. However, be advised this is a medical document and is intended as chyn-ok-lmur communication; it is written in medical language and may appear blunt, direct, or contain abbreviations or verbiage that are unfamiliar. Medical documents are intended to carry relevant information, facts as evident, and the clinical opinion of the practitioner.    Also, please note that this report has been produced using speech recognition software and may contain errors related to that system including, but not limited to, errors in grammar, punctuation, and spelling, as well as words and phrases  that possibly may have been recognized inappropriately.  If there are any questions or concerns, contact the dictating provider for clarification.      Renee Belcher MD  5/15/2024  10:54 AM

## 2024-05-15 NOTE — PROGRESS NOTES
Patient ID: Leonie Denney is a 43 year old female.          Debridement Old surgical Right Toe (Comment which one)   Wound 05/08/24 #1 Right great toe amp site Toe (Comment which one) Right    Performed by: Renee Foreman MD  Authorized by: Renee Foreman MD      Consent   Consent obtained? verbal  Consent given by: patient  Risks discussed? procedural risks discussed  Time out called at 5/15/2024 10:59 AM  Immediately prior to the procedure a time out was called and the performing provider verified the correct patient, procedure, equipment, support staff, and site/side marked as required.    Debridement Details  Performed by: physician  Debridement type: surgical  Level of debridement: subcutaneous tissue  Pain control: lidocaine 4%  Pain control administration type: topical    Pre-debridement measurements  Length (cm): 3.6  Width (cm): 1.1  Depth (cm): 0.6  Surface Area (cm^2): 3.96    Post-debridement measurements  Length (cm): 3.7  Width (cm): 1.2  Depth (cm): 2.6  Percent debrided: 100%  Surface Area (cm^2): 4.44  Area Debrided (cm^2): 4.44  Volume (cm^3): 11.54    Tissue and other material debrided: subcutaneous tissue  Devitalized tissue debrided: biofilm, fibrin, necrotic debris and slough  Instrument(s) utilized: blade, curette and forceps  Bleeding: small  Hemostasis obtained with: not applicable  Procedural pain (0-10): 0  Post-procedural pain: 0   Response to treatment: procedure was tolerated well    Debridement Old surgical Right;Plantar Foot   Wound 05/08/24 #2 Right plantar foot Foot Right;Plantar    Performed by: Renee Foreman MD  Authorized by: Renee Foreman MD      Consent   Consent obtained? verbal  Consent given by: patient  Risks discussed? procedural risks discussed  Time out called at 5/15/2024 11:01 AM  Immediately prior to the procedure a time out was called and the performing provider verified the correct patient, procedure, equipment, support staff, and site/side  marked as required.    Debridement Details  Performed by: physician  Debridement type: surgical  Level of debridement: subcutaneous tissue  Pain control: lidocaine 4%  Pain control administration type: topical    Pre-debridement measurements  Length (cm): 1.6  Width (cm): 0.6  Depth (cm): 1.1  Surface Area (cm^2): 0.96    Post-debridement measurements  Length (cm): 1.6  Width (cm): 0.8  Depth (cm): 1.1  Percent debrided: 100%  Surface Area (cm^2): 1.28  Area Debrided (cm^2): 1.28  Volume (cm^3): 1.41    Tissue and other material debrided: subcutaneous tissue  Devitalized tissue debrided: biofilm, fibrin and necrotic debris  Instrument(s) utilized: curette  Bleeding: medium  Hemostasis obtained with: pressure  Procedural pain (0-10): 0  Post-procedural pain: 0   Response to treatment: procedure was tolerated well

## 2024-05-21 ENCOUNTER — OFFICE VISIT (OUTPATIENT)
Dept: ENDOCRINOLOGY CLINIC | Facility: CLINIC | Age: 44
End: 2024-05-21

## 2024-05-21 VITALS
WEIGHT: 250 LBS | SYSTOLIC BLOOD PRESSURE: 124 MMHG | DIASTOLIC BLOOD PRESSURE: 80 MMHG | BODY MASS INDEX: 35 KG/M2 | HEIGHT: 71 IN | RESPIRATION RATE: 18 BRPM

## 2024-05-21 DIAGNOSIS — E66.01 MORBID OBESITY (HCC): ICD-10-CM

## 2024-05-21 DIAGNOSIS — Z79.4 TYPE 2 DIABETES MELLITUS WITH HYPERGLYCEMIA, WITH LONG-TERM CURRENT USE OF INSULIN (HCC): Primary | ICD-10-CM

## 2024-05-21 DIAGNOSIS — E11.65 TYPE 2 DIABETES MELLITUS WITH HYPERGLYCEMIA, WITH LONG-TERM CURRENT USE OF INSULIN (HCC): Primary | ICD-10-CM

## 2024-05-21 DIAGNOSIS — Z53.20 REFUSAL OF STATIN MEDICATION BY PATIENT: ICD-10-CM

## 2024-05-21 DIAGNOSIS — E11.42 DIABETIC POLYNEUROPATHY ASSOCIATED WITH TYPE 2 DIABETES MELLITUS (HCC): ICD-10-CM

## 2024-05-21 LAB
GLUCOSE BLOOD: 285
HEMOGLOBIN A1C: 8.7 % (ref 4.3–5.6)
TEST STRIP LOT #: NORMAL NUMERIC

## 2024-05-21 PROCEDURE — 82947 ASSAY GLUCOSE BLOOD QUANT: CPT | Performed by: NURSE PRACTITIONER

## 2024-05-21 PROCEDURE — 99215 OFFICE O/P EST HI 40 MIN: CPT | Performed by: NURSE PRACTITIONER

## 2024-05-21 PROCEDURE — 83036 HEMOGLOBIN GLYCOSYLATED A1C: CPT | Performed by: NURSE PRACTITIONER

## 2024-05-21 RX ORDER — EMPAGLIFLOZIN 25 MG/1
25 TABLET, FILM COATED ORAL DAILY
Qty: 90 TABLET | Refills: 1 | Status: SHIPPED | OUTPATIENT
Start: 2024-05-21

## 2024-05-21 NOTE — PATIENT INSTRUCTIONS
Summary from visit:   Last A1c value was 8.7% done 5/21/2024.  Call us once you apply dexcom tomorrow so we can connect you to the clinic.     Diabetes Medications:   Continue:   Ozempic 2 mg weekly    Change:  Resume Jardiance 25 mg daily in am      It is important to take all of your medications as prescribed. Please call me if you cannot get the prescriptions filled or are having issues with refills.   Also, please call me if you have any issues with medication questions, side effects, dosing questions or problems with your blood sugar trends BEFORE CHANGING OR STOPPING ANY MEDICATIONS.    Remember to bring your glucose meter or blood sugar logbook to every appointment here at the diabetes center.   In order for me to determine any patterns in your blood sugars, you will need to test your blood sugar 3 times daily.   Please call with any concerns and Schedule your annual diabetic eye exam prior to your next visit.   Return in 17 days (on 6/7/2024) for follow up.  ---------------------------------------------------------------------------------------------------------------------------------------------------    Reminders:  The A1C:  The A1C test provides us with your average blood sugar for the past 3 months. Keeping an A1C less than 7% for most people helps reduce or delay health problems that are related to diabetes. We sometimes make exceptions based on age, health history and other factors.     Blood sugar targets:  Before breakfast:   (preferably less than 110)  2 hours After meals: less than 180 (preferably less than 150)   Call for persistent blood sugars less than  75 or more than  200.   Blood sugars greater than 200 are not acceptable to reach your goal of improving diabetes      Hypoglycemia:    Watch for low blood sugars: (less than 70)  Symptoms of low blood sugar:   Shakiness or dizziness  Cold, clammy skin or sweating  Feeling hungry  Headache  Nervousness  A hard, fast  heartbeat  Weakness  Confusion or irritability  Blurred eyesight  Having nightmares or waking up confused or sweating  Numbness or tingling in the lips or tongue    Treatment of Low Blood sugar Action Plan  1. Check blood glucose to be sure that it is low. You can’t always go by symptoms. If in doubt, treat your low blood glucose anyway.  2. Take 15 grams of carbohydrate (carb). Here are some choices:  4 oz. regular fruit juice  3-4 glucose tablets  6 oz. regular soda   7-8 jelly beans  3. Recheck blood glucose after 10-15 minutes. If blood glucose is still low (less than 70 mg/dl) repeat the treatment (step 2).  4. If your next meal is more than one hour away, eat a small snack.  5. If you’re not sure what caused your low blood glucose, call your healthcare provider.  6. Always check your blood glucose before you drive           Diabetes Center Refill policy:     Allow 2-3 business days for refills  Contact your pharmacy at least 5 days prior to running out of medication and have them send an electronic request or submit request through the “request refill” option in your [employer] account.  Refills are not addressed after hours or on weekends; covering providers  do not authorize routine medications on weekends.  If your prescription is due for a refill, you may be due for a follow up appointment. This may impact the ability for you to get a 90 supply if requested.   To best provide you care, patients receiving routine medications need to be seen at least twice per year however if the A1C is above 8% you will be need to be seen more frequently.   Yearly blood work may also required for many medications to insure safe prescribing. If you are due for labs, you will have 30 days to complete the  requested labs before future refills are authorized.   In the event that your preferred pharmacy does not have the requested medication in stock (e.g. Backordered), it is your responsibility to find another pharmacy that has  the requested medication available.  We will gladly send a new prescription to that pharmacy at your request.       More and more people are living long and healthy lives with diabetes. We are here to help you manage your diabetes. Let’s work together to make a plan that you can balance in your daily life. Please continue with your primary care physician/provider for your routine health care maintenance.   Thank you,   Candy Hanley [facility] [place]

## 2024-05-21 NOTE — PROGRESS NOTES
Chief Complaint   Patient presents with    Diabetes     Pt here for f/u      HPI:   Leonie Denney is a 43 year old female who presents for a follow up visit for management of diabetes. Last A1c value was 8.7% done 5/21/2024. Since last visit diabetes management has been mildly improved but still needs improvement. Pt has not resumed her dexcom. Pt did not bring glucometer or glucose readings to appointment for review. POC glucose in office 285 mg/dl.  Pt underwent partial first ray amputation of the right foot in April and is now following with wound clinic for open ulcer. Pt has been on [place] protein shake BID and wondering if that is raising her glucose as she states she is following a low carb diet and has been avoiding sweets and pepsi.       Diabetes history:  Type: 2  Onset: ~2006  Pt has been admitted to the hospital for blood sugars.   Pt does not have a hx of pancreatitis.     Current DM regimen:  Ozempic 2 mg weekly  Jardiance 25 mg daily in am (did not start)    Hypoglycemia regimen:  Glucose tabs, rule of 15  Glucagon rx: Baqsimi  for hypoglycemia emergency    Previous DM therapies:  Glimepiride - unsure  Metformin - GI upset  Novolog - pt discontinued  Levemir - improved glucose control     Gabapentin - very sleepy    Complications/Co-morbidities:   Neuropathy  History of proteinuria  Hyperlipidemia  Diabetic Ulcers  Obesity  Partial amputation right foot      Modifying factors:  Medication adherence:  hx of non adherence and self adjusting medications as well as not monitoring glucose  Recent illness/steroids: no  Barriers: Adherence  Patient has poor insight into his/her health and diabetes. This patient's poorly controlled diabetes/glucose is not related to the clinical staff not providing the resources, clinical decisions/recommendations as well as educational tools necessary to control or improve glucose levels but may be more related to the patient NOT following the prescribed mediation regimen,  missed or mis timing of insulin doses, not testing, and /or poor dietary habits.   This patient has been seen numerous times and has been counseled on  importance of prescription adherence, carbohydrate counting, food label reading, importance of testing, and the overall importance of controlling their blood sugars to minimize or avoid progression of complications.        Overall glucose control:   HGBA1C:    Lab Results   Component Value Date    A1C 8.7 (A) 05/21/2024    A1C 9.0 (H) 03/30/2024    A1C 8.7 (A) 01/09/2024     (H) 03/30/2024            Past History:   She  has a past medical history of Abdominal pain, Anxiety state, Arthritis, B12 deficiency (11/18/2019), Blurred vision, Calculus of kidney, Constipation, Constipation, Decorative tattoo, Depression, Diabetes mellitus (HCC), Frequent use of laxatives, Frequent UTI, Frequent UTI, Irregular bowel habits, Itch of skin, Leg swelling, Loss of appetite, Nausea, Neuropathy, Night sweats, Obesity, Osteoarthritis, Sleep disturbance, Type II or unspecified type diabetes mellitus without mention of complication, not stated as uncontrolled, Vitamin D deficiency (11/18/2019), and Weight loss.   Her family history includes Diabetes in her maternal grandfather, maternal grandmother, and mother.   She  reports that she has been smoking cigarettes. She started smoking about 15 years ago. She has a 3.8 pack-year smoking history. She has never used smokeless tobacco. She reports that she does not drink alcohol and does not use drugs.     She has No Known Allergies.     Current Outpatient Medications on File Prior to Visit   Medication Sig    gentamicin 0.1 % External Ointment Apply 1 Application topically 3 (three) times daily.    acetaminophen 500 MG Oral Tab Take 1 tablet (500 mg total) by mouth every 4 (four) hours as needed for Fever (equal to or greater than 100.4).    HYDROcodone-acetaminophen 5-325 MG Oral Tab Take 1 tablet by mouth every 6 (six) hours as  needed.    semaglutide (OZEMPIC, 2 MG/DOSE,) 8 MG/3ML Subcutaneous Solution Pen-injector Inject 2 mg into the skin once a week.    Continuous Blood Gluc Sensor (DEXCOM G7 SENSOR) Does not apply Misc 1 each Every 10 days.    teraconazole 0.4 % Vaginal Cream Place vaginally every 4 (four) hours. AT BEDTIME     No current facility-administered medications on file prior to visit.       BP Readings from Last 3 Encounters:   05/21/24 124/80   05/15/24 124/83   05/08/24 131/63     Wt Readings from Last 3 Encounters:   05/21/24 250 lb (113.4 kg)   05/08/24 250 lb (113.4 kg)   04/05/24 256 lb 3.2 oz (116.2 kg)     CMP  (most recent labs)   Lab Results   Component Value Date/Time     (H) 04/03/2024 04:30 AM    BUN 8 (L) 04/03/2024 04:30 AM    CREATSERUM 0.76 04/03/2024 04:30 AM    GFRNAA 89 06/01/2022 11:18 AM    GFRAA 103 06/01/2022 11:18 AM    EGFRCR 98 04/03/2024 04:30 AM    CA 9.4 04/03/2024 04:30 AM    ALKPHO 89 04/03/2024 04:30 AM    AST 12 (L) 04/03/2024 04:30 AM    ALT 20 04/03/2024 04:30 AM    BILT 0.4 04/03/2024 04:30 AM    TP 7.3 04/03/2024 04:30 AM    ALB 2.1 (L) 04/03/2024 04:30 AM     04/03/2024 04:30 AM    K 4.3 04/03/2024 04:30 AM    K 4.4 04/03/2024 04:30 AM     04/03/2024 04:30 AM    CO2 24.0 04/03/2024 04:30 AM            Lipids  (most recent labs)   Lab Results   Component Value Date/Time    CHOLEST 146 01/09/2024 09:34 AM    TRIG 89 01/09/2024 09:34 AM    HDL 35 (L) 01/09/2024 09:34 AM    LDL 94 01/09/2024 09:34 AM    NONHDLC 111 01/09/2024 09:34 AM         Thyroid  (most recent labs)   Lab Results   Component Value Date/Time    TSH 0.652 01/09/2024 09:34 AM        Micro Albumen/Creatinine:    Lab Results   Component Value Date    MICROALBCREA 14.1 01/09/2024    MICROALBCREA 8.3 04/13/2023    MICROALBCREA 121.1 (H) 04/12/2022          Lab results reviewed with patient.    REVIEW OF SYSTEMS:   Review of Systems  GENERAL HEALTH: feels well otherwise  EYES: reports vision changes  Skin: No  current ulcers, no new concerns  RESPIRATORY: denies shortness of breath with exertion  CARDIOVASCULAR: denies chest pain on exertion  GI: denies abdominal pain, denies N/V/D  NEURO: denies headaches and reports severe numbness and tingling to extremities    EXAM:   /80   Resp 18   Ht 5' 11\" (1.803 m)   Wt 250 lb (113.4 kg)   LMP  (LMP Unknown)   BMI 34.87 kg/m²  Estimated body mass index is 34.87 kg/m² as calculated from the following:    Height as of this encounter: 5' 11\" (1.803 m).    Weight as of this encounter: 250 lb (113.4 kg).   Physical Exam  Vitals reviewed  Constitutional: Normal appearance, no acute distress  Pulmonary: Pulmonary effort is normal  Neurologic: Alert and oriented  Psychiatric: Pt is tearful, normal affect      ASSESSMENT AND PLAN:   As for her Diabetes, it is poorly controlled, needs improvement. Discussed with pt resume jardiance and place dexcom when she gets home today. Pt can use dexcom on her phone so will call and confirm connection to clinic. I will then review CGM in 1 week and discussed if no improvement we may need to resume insulin temporarily. Reviewed importance of glucose management on wound healing.   Medications:  Continue:   Ozempic 2 mg weekly    Change:  Resume Jardiance 25 mg daily in am    Recommendations:  SMBG 1-2x daily if not using CGM  lose weight by increased dietary compliance and exercise   Reminded to get annual retinal exam  check feet daily, followed by podiatry     Cardiovascular:  The 10-year ASCVD risk score (Yola WRIGHT, et al., 2019) is: 7.5%    Values used to calculate the score:      Age: 43 years      Sex: Female      Is Non- : Yes      Diabetic: Yes      Tobacco smoker: Yes      Systolic Blood Pressure: 124 mmHg      Is BP treated: No      HDL Cholesterol: 35 mg/dL      Total Cholesterol: 146 mg/dL     As for her hypertension, Blood Pressure is well controlled. Pt is not on ace/arb.   PLAN: reviewed diet, exercise  and weight control     As for her cholesterol, Lipids are needs improvement. Pt is not on statin.   PLAN: reviewed diet, exercise and weight control. Pt refused statin start.     Patient is not on aspirin therapy.     DM Health Maintenance  Nephropathy screening: no nephropathy, continue to monitor   Last dilated eye exam: No data recorded Exam shows retinopathy? No data recorded  Last diabetic foot exam: Last Foot Exam: 01/09/24      Date of last PHQ-2 depression screen: PHQ-2 - Date of last depression screening: 10/11/2022      Patient  reports that she has been smoking cigarettes. She started smoking about 15 years ago. She has a 3.8 pack-year smoking history. She has never used smokeless tobacco.  When is flu vaccine due? No recommendations at this time  When is pneumonia vaccine due? Pneumococcal Vaccination(2 of 2 - PCV) due on 10/01/2014  Dentist : recommend every 6m     The patient indicates understanding of these issues and agrees to the plan.  Refills sent at time of office visit.    Diagnoses and all orders for this visit:    Type 2 diabetes mellitus with hyperglycemia, with long-term current use of insulin (HCC)  -     POC Hgb A1C  -     HemoCue Glucose 201 (Glucose blood test)  -     Empagliflozin (JARDIANCE) 25 MG Oral Tab; Take 25 mg by mouth daily.    Morbid obesity (HCC)    Diabetic polyneuropathy associated with type 2 diabetes mellitus (HCC)    Refusal of statin medication by patient           Do the chronicity and potential for complications of disease pt will need ongoing monitoring.   Return in 17 days (on 6/7/2024) for follow up.    Spent 40 min obtaining patient history, evaluating patient, reviewing blood glucose trends, discussing treatment options, lifestyle modifications and completing documentation -this time does not including sensor interpretation time if applicable.   The risks and benefits of my recommendations, as well as other treatment options were discussed with the patient  today. Questions were also answered to the best of my knowledge.    Candy LEE

## 2024-05-22 ENCOUNTER — OFFICE VISIT (OUTPATIENT)
Dept: WOUND CARE | Facility: HOSPITAL | Age: 44
End: 2024-05-22
Attending: INTERNAL MEDICINE
Payer: COMMERCIAL

## 2024-05-22 VITALS
RESPIRATION RATE: 16 BRPM | HEART RATE: 96 BPM | TEMPERATURE: 98 F | DIASTOLIC BLOOD PRESSURE: 77 MMHG | SYSTOLIC BLOOD PRESSURE: 108 MMHG

## 2024-05-22 DIAGNOSIS — L97.512 RIGHT FOOT ULCER, WITH FAT LAYER EXPOSED (HCC): Primary | ICD-10-CM

## 2024-05-22 DIAGNOSIS — L97.509 DIABETIC FOOT ULCER WITH OSTEOMYELITIS (HCC): ICD-10-CM

## 2024-05-22 DIAGNOSIS — E11.42 DIABETIC POLYNEUROPATHY ASSOCIATED WITH TYPE 2 DIABETES MELLITUS (HCC): ICD-10-CM

## 2024-05-22 DIAGNOSIS — E11.65 TYPE 2 DIABETES MELLITUS WITH HYPERGLYCEMIA, WITH LONG-TERM CURRENT USE OF INSULIN (HCC): ICD-10-CM

## 2024-05-22 DIAGNOSIS — M86.9 DIABETIC FOOT ULCER WITH OSTEOMYELITIS (HCC): ICD-10-CM

## 2024-05-22 DIAGNOSIS — E11.621 DIABETIC FOOT ULCER WITH OSTEOMYELITIS (HCC): ICD-10-CM

## 2024-05-22 DIAGNOSIS — Z79.4 TYPE 2 DIABETES MELLITUS WITH HYPERGLYCEMIA, WITH LONG-TERM CURRENT USE OF INSULIN (HCC): ICD-10-CM

## 2024-05-22 DIAGNOSIS — M20.5X1 HALLUX LIMITUS OF RIGHT FOOT: ICD-10-CM

## 2024-05-22 DIAGNOSIS — E11.69 DIABETIC FOOT ULCER WITH OSTEOMYELITIS (HCC): ICD-10-CM

## 2024-05-22 LAB — GLUCOSE BLD-MCNC: 223 MG/DL (ref 70–99)

## 2024-05-22 PROCEDURE — 97605 NEG PRS WND THER DME<=50SQCM: CPT

## 2024-05-22 PROCEDURE — 82962 GLUCOSE BLOOD TEST: CPT | Performed by: INTERNAL MEDICINE

## 2024-05-22 PROCEDURE — 11042 DBRDMT SUBQ TIS 1ST 20SQCM/<: CPT | Performed by: INTERNAL MEDICINE

## 2024-05-22 NOTE — PROGRESS NOTES
[facility] PROGRESS NOTE  AD SHAH MD  5/22/2024    Chief Complaint:   Chief Complaint   Patient presents with    Wound Care     Follow up for right foot wounds. No complaints at this time. Pt brought NPWT with today.        HPI:   Subjective   Leonie Denney is a 43 year old female coming in for a follow-up visit.    HPI    Wound improving slowly.   No s/o infection.   Depth persists.   Nonviable tissue persists in wound base.   Planning to start wound vac today.   BS high - under care of diabetic clinic.     Review of Systems  Negative except HPI   Denies chest pain / SOB / palpitations  Denies fever.     Allergies  No Known Allergies    Current Meds:  Current Outpatient Medications   Medication Sig Dispense Refill    Empagliflozin (JARDIANCE) 25 MG Oral Tab Take 25 mg by mouth daily. 90 tablet 1    gentamicin 0.1 % External Ointment Apply 1 Application topically 3 (three) times daily. 30 g 3    acetaminophen 500 MG Oral Tab Take 1 tablet (500 mg total) by mouth every 4 (four) hours as needed for Fever (equal to or greater than 100.4). 50 tablet 0    HYDROcodone-acetaminophen 5-325 MG Oral Tab Take 1 tablet by mouth every 6 (six) hours as needed. 40 tablet 0    semaglutide (OZEMPIC, 2 MG/DOSE,) 8 MG/3ML Subcutaneous Solution Pen-injector Inject 2 mg into the skin once a week. 3 mL 0    Continuous Blood Gluc Sensor (DEXCOM G7 SENSOR) Does not apply Misc 1 each Every 10 days. 9 each 1    teraconazole 0.4 % Vaginal Cream Place vaginally every 4 (four) hours. AT BEDTIME           EXAM:   Objective   Objective    Physical Exam    Vital Signs  Vitals:    05/22/24 1100   BP: 108/77   Pulse: 96   Resp: 16   Temp: 97.7 °F (36.5 °C)       Wound Assessment  Wound 05/08/24 #1 Right great toe amp site Toe (Comment which one) Right (Active)   Wound Image    05/22/24 1123   Drainage Amount Scant 05/22/24 1123   Drainage Description Yellow;Serous 05/22/24 1123   Treatments Compression 05/08/24 0911   Wound Length  (cm) 2.4 cm 05/22/24 1123   Wound Width (cm) 0.8 cm 05/22/24 1123   Wound Surface Area (cm^2) 1.92 cm^2 05/22/24 1123   Wound Depth (cm) 1.6 cm 05/22/24 1123   Wound Volume (cm^3) 3.072 cm^3 05/22/24 1123   Wound Healing % -29 05/22/24 1123   Margins Well-defined edges 05/22/24 1123   Non-staged Wound Description Full thickness 05/22/24 1123   Wendy-wound Assessment Edema;Dry;Maceration;Moist 05/22/24 1123   Wound Granulation Tissue Pink;Firm 05/22/24 1123   Wound Bed Granulation (%) 40 % 05/22/24 1123   Wound Bed Slough (%) 60 % 05/22/24 1123   Wound Bed Eschar (%) 95 % 05/08/24 0911   Wound Odor None 05/22/24 1123   Tunneling? No 05/08/24 0911   Undermining? No 05/08/24 0911   Sinus Tracts? No 05/08/24 0911       Wound 05/08/24 #2 Right plantar foot Foot Right;Plantar (Active)   Wound Image   05/22/24 1125   Drainage Amount None 05/22/24 1125   Drainage Description Serous;Yellow 05/15/24 1043   Treatments Compression 05/08/24 0913   Wound Length (cm) 1.6 cm 05/22/24 1125   Wound Width (cm) 0.1 cm 05/22/24 1125   Wound Surface Area (cm^2) 0.16 cm^2 05/22/24 1125   Wound Depth (cm) 0.1 cm 05/22/24 1125   Wound Volume (cm^3) 0.016 cm^3 05/22/24 1125   Wound Healing % 98 05/22/24 1125   Margins Well-defined edges 05/22/24 1125   Non-staged Wound Description Full thickness 05/22/24 1125   Wendy-wound Assessment Dry;Edema 05/22/24 1125   Wound Granulation Tissue Pink;Firm 05/22/24 1125   Wound Bed Granulation (%) 100 % 05/22/24 1125   Wound Bed Epithelium (%) 50 % 05/08/24 0913   Wound Bed Slough (%) 100 % 05/15/24 1043   Wound Bed Eschar (%) 50 % 05/08/24 0913   Wound Odor None 05/22/24 1125   Tunneling? No 05/08/24 0913   Undermining? No 05/08/24 0913   Sinus Tracts? No 05/08/24 0913           ASSESSMENT AND PLAN:     Assessment   Assessment    Encounter Diagnosis  1. Right foot ulcer, with fat layer exposed (HCC)    2. Diabetic foot ulcer with osteomyelitis (HCC)    3. Type 2 diabetes mellitus with hyperglycemia, with  long-term current use of insulin ([facility])    4. Hallux limitus of right foot    5. Diabetic polyneuropathy associated with type 2 diabetes mellitus ([facility])        Problem List  Patient Active Problem List   Diagnosis    Type 2 diabetes mellitus with hyperglycemia, with long-term current use of insulin ([facility])    Anxiety    Neuropathy    Meralgia paresthetica of left side    Vitamin D deficiency    B12 deficiency    Diabetic neuropathy (HCC)    Hyperglycemia due to type 2 diabetes mellitus ([facility])    Morbid obesity (HCC)    Other specified fetal and placental problems affecting management of mother, antepartum    Primary hypercoagulable state ([facility])    Uncontrolled type 2 diabetes mellitus    Noncompliance with medication regimen    Ulcer of right foot, limited to breakdown of skin ([facility])    Hyponatremia    Foot ulcer, right, with fat layer exposed ([facility])    Therapeutic drug monitoring    FLORENCIA (acute kidney injury) ([facility])    Labial abscess    Cellulitis of labia    Hyperglycemia    Leukocytosis, unspecified type    Cellulitis and abscess of foot    Refusal of statin medication by patient         MANAGEMENT    Start wound vac 125 mm hg.   Vashe soak before vac placement  Intense BS control.   Leg elevation  Maximize offloading.   W/o s/o infection    PROCEDURES:    Debridement Old surgical Right Toe (Comment which one)   Wound 05/08/24 #1 Right great toe amp site Toe (Comment which one) Right     Performed by: Renee Foreman MD  Authorized by: Renee Foreman MD       Consent   Consent obtained? verbal  Consent given by: patient     Debridement Details  Performed by: physician  Debridement type: surgical  Level of debridement: subcutaneous tissue     Pre-debridement measurements  Length (cm): 2.4  Width (cm): 0.8  Depth (cm): 1.6  Surface Area (cm^2): 1.92     Post-debridement measurements  Length (cm): 2.4  Width (cm): 0.8  Depth (cm): 2  Percent debrided: 100%  Surface Area (cm^2): 1.92  Area Debrided (cm^2):  1.92  Volume (cm^3): 3.84     Tissue and other material debrided: subcutaneous tissue  Devitalized tissue debrided: biofilm and slough  Instrument(s) utilized: curette  Bleeding: medium  Hemostasis obtained with: pressure  Procedural pain (0-10): 1  Post-procedural pain: 3   Response to treatment: procedure was tolerated well            MEDICAL NECESSSITY FOR SURGICAL DEBRIDEMENT:    Surgical debridement is necessary in this patient to stimulate and hasten the rate of wound healing by  removing biofilm and devitalized tissue, down to subcutaneous level.   Research has consistently proven that traditional bedside sharp debridement provides excellent results in reducing the square surface area of wounds. The use of surgical sharp debridement can effectively remove devitalized tissue, hence aids in achieving good wound healing and advancing the rate of wound closure,  and is a proven significant component to advancing wound closure.    Wound vac placement after debridement and VASHE soak.     Patient Instructions     Wound Cleaning and Dressings:    Wash your hands with soap and water. Always wear gloves while changing dressings. Donot touch wound / jani-wound skin with un-gloved hands. Remove old dressing, discard and place into trash.      DRESSINGS: wound vac 125 mm hg  Vashe soak x 15 min before dressing change.   Change dressing MWF     Compression Therapy : spandagrip  Compression Therapy Instructions:  1.  Put on first thing in the morning and may remove at bedside.  Okay to wear overnight      if comfortable.  Do not let stockings roll up/down and kink.  Hand wash stockings and      hang dry as needed.    2.  Avoid prolonged standing in one place.  It is better to have your calf muscles moving       to pump fluid out of the legs.    3.  Elevate leg(s) above the level of the heart when sitting or as much as possible.    4.  Take your diuretics as directed by your provider.  Do not skip doses or change doses       unless instructed to do so by your provider.    5. Do not get leg(s) with compression wrap wet. If wraps are too tight as indicated        By pain, numbness/tingling or discoloration of toes remove wrap completely       and call the   wound center.     Off-Loading:  Intense offloading of wounded area    Miscellaneous Instructions:  Supplement with a daily multivitamin   Low salt diet  Intense blood sugar control - Goal Blood sugar below 180 at all times recommended.  Increase protein intake / consider protein supplements - see below  Elevate extremities at all times when sitting / laying down.    DIETARY MODIFICATIONS TO HELP WITH WOUND HEALING:    Protein: Meats, beans, eggs, milk and yogurt particularly Greek yogurt), tofu, soy nuts, soy protein products    Vitamin C: Citrus fruits and juices, strawberries, tomatoes, tomato juice, peppers, baked potatoes, spinach, broccoli, cauliflower, [place] sprouts, cabbage    Vitamin A: Dark green, leafy vegetables, orange or yellow vegetables, cantaloupe, fortified dairy products, liver, fortified cereals    Zinc: Fortified cereals, red meats, seafood    Consider [place] by [employer] (These are essential branch chain amino acids that help with tissue building and wound healing) and take 2 packets/day. you can order online at [employer] or [employer]    ADDITIONAL REMINDERS:    The treatment plan has been discussed at length with you and your provider. Follow all instructions carefully, it is very important. If you do not follow all instructions, you are at  risk of your wound not healing, infection, possible loss of limb and even end of life.  Please call the clinic during regular business hours ( 7:30 AM - 5:30 PM) if you notice increased bleeding, redness, warmth, pain or pus like drainage or start running a fever greater than 100.3.    For after hour emergencies, please call your primary physician or go to the nearest emergency room.    Orders  Orders Placed This Encounter    Procedures    Debridement Old surgical Right Toe (Comment which one)     Patient/Caregiver Education: There are no barriers to learning. Medical education for above diagnosis given.   Answered all questions.    Outcome: Patient verbalizes understanding. Patient is notified to call with any questions, complications, allergies, or worsening or changing symptoms.  Patient is to call with any side effects or complications as a result of the treatments today.      DOCUMENTATION OF TIME SPENT: Code selection for this visit was based on time spent : 45 min on date of service in preparing to see the patient, obtaining and/or reviewing separately obtained history, performing a medically appropriate examination, counseling and educating the patient/family/caregiver, ordering medications or testing, referring and communicating with other healthcare providers, documenting clinical information in the E HR, independently interpreting results and communicating results to the patient/family/caregiver and care coordination with the patient's other providers.    Followup: Return in about 1 week (around 5/29/2024) for Wound followup.      Note to Patient:  The 21st Century Cures Act makes medical notes like these available to patients in the interest of transparency. However, be advised this is a medical document and is intended as [id number] communication; it is written in medical language and may appear blunt, direct, or contain abbreviations or verbiage that are unfamiliar. Medical documents are intended to carry relevant information, facts as evident, and the clinical opinion of the practitioner.    Also, please note that this report has been produced using speech recognition software and may contain errors related to that system including, but not limited to, errors in grammar, punctuation, and spelling, as well as words and phrases that possibly may have been recognized inappropriately.  If there are any questions or  concerns, contact the dictating provider for clarification.      Renee Belcher MD  5/22/2024  11:45 AM

## 2024-05-22 NOTE — PATIENT INSTRUCTIONS
Wound Cleaning and Dressings:    Wash your hands with soap and water. Always wear gloves while changing dressings. Donot touch wound / jani-wound skin with un-gloved hands. Remove old dressing, discard and place into trash.      DRESSINGS: wound vac 125 mm hg  Vashe soak x 15 min before dressing change.   Change dressing MWF     Compression Therapy : spandagrip  Compression Therapy Instructions:  1.  Put on first thing in the morning and may remove at bedside.  Okay to wear overnight      if comfortable.  Do not let stockings roll up/down and kink.  Hand wash stockings and      hang dry as needed.    2.  Avoid prolonged standing in one place.  It is better to have your calf muscles moving       to pump fluid out of the legs.    3.  Elevate leg(s) above the level of the heart when sitting or as much as possible.    4.  Take your diuretics as directed by your provider.  Do not skip doses or change doses      unless instructed to do so by your provider.    5. Do not get leg(s) with compression wrap wet. If wraps are too tight as indicated        By pain, numbness/tingling or discoloration of toes remove wrap completely       and call the   wound center.     Off-Loading:  Intense offloading of wounded area    Miscellaneous Instructions:  Supplement with a daily multivitamin   Low salt diet  Intense blood sugar control - Goal Blood sugar below 180 at all times recommended.  Increase protein intake / consider protein supplements - see below  Elevate extremities at all times when sitting / laying down.    DIETARY MODIFICATIONS TO HELP WITH WOUND HEALING:    Protein: Meats, beans, eggs, milk and yogurt particularly Greek yogurt), tofu, soy nuts, soy protein products    Vitamin C: Citrus fruits and juices, strawberries, tomatoes, tomato juice, peppers, baked potatoes, spinach, broccoli, cauliflower, [place] sprouts, cabbage    Vitamin A: Dark green, leafy vegetables, orange or yellow vegetables, cantaloupe, fortified  dairy products, liver, fortified cereals    Zinc: Fortified cereals, red meats, seafood    Consider [place] by [employer] (These are essential branch chain amino acids that help with tissue building and wound healing) and take 2 packets/day. you can order online at [employer] or [employer]    ADDITIONAL REMINDERS:    The treatment plan has been discussed at length with you and your provider. Follow all instructions carefully, it is very important. If you do not follow all instructions, you are at  risk of your wound not healing, infection, possible loss of limb and even end of life.  Please call the clinic during regular business hours ( 7:30 AM - 5:30 PM) if you notice increased bleeding, redness, warmth, pain or pus like drainage or start running a fever greater than 100.3.    For after hour emergencies, please call your primary physician or go to the nearest emergency room.

## 2024-05-22 NOTE — PROGRESS NOTES
.Weekly Wound Education Note    Teaching Provided To: Family;Patient  Training Topics: Dressing;Edema control;Cleasing and general instructions;Compression;Discharge instructions;Negative presssure therapy  Training Method: Explain/Verbal;Written  Training Response: Patient responds and understands        Notes: Wounds improving. Dressing changed to honey gel, and foam to plantar foot. Will apply NPWT to amp site for the first time today. Spandagrip E applied to leg. WIll fax orders to .

## 2024-05-22 NOTE — PROGRESS NOTES
Patient ID: Leonie Denney is a 43 year old female.    Debridement Old surgical Right Toe (Comment which one)   Wound 05/08/24 #1 Right great toe amp site Toe (Comment which one) Right    Performed by: Renee Foreman MD  Authorized by: Renee Foreman MD      Consent   Consent obtained? verbal  Consent given by: patient    Debridement Details  Performed by: physician  Debridement type: surgical  Level of debridement: subcutaneous tissue    Pre-debridement measurements  Length (cm): 2.4  Width (cm): 0.8  Depth (cm): 1.6  Surface Area (cm^2): 1.92    Post-debridement measurements  Length (cm): 2.4  Width (cm): 0.8  Depth (cm): 2  Percent debrided: 100%  Surface Area (cm^2): 1.92  Area Debrided (cm^2): 1.92  Volume (cm^3): 3.84    Tissue and other material debrided: subcutaneous tissue  Devitalized tissue debrided: biofilm and slough  Instrument(s) utilized: curette  Bleeding: medium  Hemostasis obtained with: pressure  Procedural pain (0-10): 1  Post-procedural pain: 3   Response to treatment: procedure was tolerated well

## 2024-05-29 ENCOUNTER — OFFICE VISIT (OUTPATIENT)
Dept: WOUND CARE | Facility: HOSPITAL | Age: 44
End: 2024-05-29
Attending: INTERNAL MEDICINE
Payer: COMMERCIAL

## 2024-05-29 ENCOUNTER — TELEPHONE (OUTPATIENT)
Dept: ENDOCRINOLOGY CLINIC | Facility: CLINIC | Age: 44
End: 2024-05-29

## 2024-05-29 VITALS
SYSTOLIC BLOOD PRESSURE: 107 MMHG | DIASTOLIC BLOOD PRESSURE: 71 MMHG | HEART RATE: 102 BPM | TEMPERATURE: 97 F | RESPIRATION RATE: 16 BRPM

## 2024-05-29 DIAGNOSIS — M86.9 DIABETIC FOOT ULCER WITH OSTEOMYELITIS (HCC): ICD-10-CM

## 2024-05-29 DIAGNOSIS — L97.512 RIGHT FOOT ULCER, WITH FAT LAYER EXPOSED (HCC): Primary | ICD-10-CM

## 2024-05-29 DIAGNOSIS — E11.69 DIABETIC FOOT ULCER WITH OSTEOMYELITIS (HCC): ICD-10-CM

## 2024-05-29 DIAGNOSIS — M20.5X1 HALLUX LIMITUS OF RIGHT FOOT: ICD-10-CM

## 2024-05-29 DIAGNOSIS — E11.42 DIABETIC POLYNEUROPATHY ASSOCIATED WITH TYPE 2 DIABETES MELLITUS (HCC): ICD-10-CM

## 2024-05-29 DIAGNOSIS — L97.509 DIABETIC FOOT ULCER WITH OSTEOMYELITIS (HCC): ICD-10-CM

## 2024-05-29 DIAGNOSIS — E11.65 TYPE 2 DIABETES MELLITUS WITH HYPERGLYCEMIA, WITH LONG-TERM CURRENT USE OF INSULIN (HCC): ICD-10-CM

## 2024-05-29 DIAGNOSIS — E11.621 DIABETIC FOOT ULCER WITH OSTEOMYELITIS (HCC): ICD-10-CM

## 2024-05-29 DIAGNOSIS — Z79.4 TYPE 2 DIABETES MELLITUS WITH HYPERGLYCEMIA, WITH LONG-TERM CURRENT USE OF INSULIN (HCC): ICD-10-CM

## 2024-05-29 LAB — GLUCOSE BLD-MCNC: 163 MG/DL (ref 70–99)

## 2024-05-29 PROCEDURE — 97597 DBRDMT OPN WND 1ST 20 CM/<: CPT | Performed by: INTERNAL MEDICINE

## 2024-05-29 PROCEDURE — 82962 GLUCOSE BLOOD TEST: CPT | Performed by: INTERNAL MEDICINE

## 2024-05-29 PROCEDURE — 97605 NEG PRS WND THER DME<=50SQCM: CPT

## 2024-05-29 NOTE — TELEPHONE ENCOUNTER
[place] request for patient Dexcom but last streaming 1/4/2024 sending MCM to see if patient is still using Dexcom

## 2024-05-29 NOTE — PROGRESS NOTES
Patient ID: Leonie Denney is a 43 year old female.    Debridement Old surgical Right Toe (Comment which one)   Wound 05/08/24 #1 Right great toe amp site Toe (Comment which one) Right    Performed by: Renee Foreman MD  Authorized by: Renee Foreman MD      Consent   Consent obtained? verbal  Consent given by: patient  Risks discussed? procedural risks discussed    Debridement Details  Performed by: physician  Debridement type: conservative sharp  Pain control: lidocaine 4%  Pain control administration type: topical    Pre-debridement measurements  Length (cm): 2  Width (cm): 0.6  Depth (cm): 1  Surface Area (cm^2): 1.2    Post-debridement measurements  Length (cm): 2  Width (cm): 0.6  Depth (cm): 1.1  Percent debrided: 100%  Surface Area (cm^2): 1.2  Area Debrided (cm^2): 1.2  Volume (cm^3): 1.32    Devitalized tissue debrided: biofilm, necrotic debris and slough  Instrument(s) utilized: curette  Bleeding: small  Hemostasis obtained with: pressure  Procedural pain (0-10): 0  Post-procedural pain: 0   Response to treatment: procedure was tolerated well

## 2024-05-29 NOTE — PATIENT INSTRUCTIONS
Wound Cleaning and Dressings:    Wash your hands with soap and water. Always wear gloves while changing dressings. Donot touch wound / jani-wound skin with un-gloved hands. Remove old dressing, discard and place into trash.      DRESSINGS: collagen / wound vac 125 mm hg  Vashe soak x 15 min before dressing change.   Change dressing MWF     Compression Therapy : spandagrip  Compression Therapy Instructions:  1.  Put on first thing in the morning and may remove at bedside.  Okay to wear overnight      if comfortable.  Do not let stockings roll up/down and kink.  Hand wash stockings and      hang dry as needed.    2.  Avoid prolonged standing in one place.  It is better to have your calf muscles moving       to pump fluid out of the legs.    3.  Elevate leg(s) above the level of the heart when sitting or as much as possible.    4.  Take your diuretics as directed by your provider.  Do not skip doses or change doses      unless instructed to do so by your provider.    5. Do not get leg(s) with compression wrap wet. If wraps are too tight as indicated        By pain, numbness/tingling or discoloration of toes remove wrap completely       and call the   wound center.     Off-Loading:  Intense offloading of wounded area    Miscellaneous Instructions:  Supplement with a daily multivitamin   Low salt diet  Intense blood sugar control - Goal Blood sugar below 180 at all times recommended.  Increase protein intake / consider protein supplements - see below  Elevate extremities at all times when sitting / laying down.    DIETARY MODIFICATIONS TO HELP WITH WOUND HEALING:    Protein: Meats, beans, eggs, milk and yogurt particularly Greek yogurt), tofu, soy nuts, soy protein products    Vitamin C: Citrus fruits and juices, strawberries, tomatoes, tomato juice, peppers, baked potatoes, spinach, broccoli, cauliflower, [place] sprouts, cabbage    Vitamin A: Dark green, leafy vegetables, orange or yellow vegetables, cantaloupe,  fortified dairy products, liver, fortified cereals    Zinc: Fortified cereals, red meats, seafood    Consider [place] by [employer] (These are essential branch chain amino acids that help with tissue building and wound healing) and take 2 packets/day. you can order online at [employer] or [employer]    ADDITIONAL REMINDERS:    The treatment plan has been discussed at length with you and your provider. Follow all instructions carefully, it is very important. If you do not follow all instructions, you are at  risk of your wound not healing, infection, possible loss of limb and even end of life.  Please call the clinic during regular business hours ( 7:30 AM - 5:30 PM) if you notice increased bleeding, redness, warmth, pain or pus like drainage or start running a fever greater than 100.3.    For after hour emergencies, please call your primary physician or go to the nearest emergency room.

## 2024-05-29 NOTE — PROGRESS NOTES
[facility] PROGRESS NOTE  AD SHAH MD  5/29/2024    Chief Complaint:   Chief Complaint   Patient presents with    Wound Care     Patient is here for a wound care follow up. She denies any pain to the wound, but does complain of some discomfort from her boot. There was no dressing on the plantar foot.       HPI:   Subjective   Leonie Denney is a 43 year old female coming in for a follow-up visit.    HPI    Wound overall improved.   The dorsal foot and plantar wounds communicate   Tolerating wound vac OK  No s/o infection at this time.     BS better today at 164.     Review of Systems  Negative except HPI   Denies chest pain / SOB / palpitations  Denies fever.     Allergies  No Known Allergies    Current Meds:  Current Outpatient Medications   Medication Sig Dispense Refill    Empagliflozin (JARDIANCE) 25 MG Oral Tab Take 25 mg by mouth daily. 90 tablet 1    gentamicin 0.1 % External Ointment Apply 1 Application topically 3 (three) times daily. 30 g 3    acetaminophen 500 MG Oral Tab Take 1 tablet (500 mg total) by mouth every 4 (four) hours as needed for Fever (equal to or greater than 100.4). 50 tablet 0    HYDROcodone-acetaminophen 5-325 MG Oral Tab Take 1 tablet by mouth every 6 (six) hours as needed. 40 tablet 0    semaglutide (OZEMPIC, 2 MG/DOSE,) 8 MG/3ML Subcutaneous Solution Pen-injector Inject 2 mg into the skin once a week. 3 mL 0    Continuous Blood Gluc Sensor (DEXCOM G7 SENSOR) Does not apply Misc 1 each Every 10 days. 9 each 1    teraconazole 0.4 % Vaginal Cream Place vaginally every 4 (four) hours. AT BEDTIME           EXAM:   Objective   Objective    Physical Exam    Vital Signs  Vitals:    05/29/24 0856   BP: 107/71   Pulse: 102   Resp: 16   Temp: 97.3 °F (36.3 °C)       Wound Assessment  Negative Pressure Wound Therapy Dorsal;Right (Active)   Wound photographed/measured Yes 05/22/24 1123   Machine Status (On) Yes 05/22/24 1123   Site Assessment Clean;Dry;Intact 05/22/24 1123    Wendy-wound Assessment Clean;Dry;Intact 05/22/24 1123   Unit Type [facility] 05/22/24 1123   Dressing Type Black foam 05/22/24 1123   Number of Foam Pieces Used 1 05/22/24 1123   Cycle Continuous 05/22/24 1123   Target Pressure (mmHg) 125 05/22/24 1123   Canister Changed Yes 05/22/24 1123       Wound 05/08/24 #1 Right great toe amp site Toe (Comment which one) Right (Active)   Wound Image   05/29/24 0902   Drainage Amount Scant 05/29/24 0902   Drainage Description Yellow;Serous 05/29/24 0902   Treatments Wound Vac - Neg Pressure 05/22/24 1123   Wound Vac Brand [facility] 05/22/24 1123   Wound Length (cm) 2 cm 05/29/24 0902   Wound Width (cm) 0.6 cm 05/29/24 0902   Wound Surface Area (cm^2) 1.2 cm^2 05/29/24 0902   Wound Depth (cm) 1 cm 05/29/24 0902   Wound Volume (cm^3) 1.2 cm^3 05/29/24 0902   Wound Healing % 49 05/29/24 0902   Margins Well-defined edges 05/29/24 0902   Non-staged Wound Description Full thickness 05/29/24 0902   Wendy-wound Assessment Edema;Maceration;Moist 05/29/24 0902   Wound Granulation Tissue Firm;Pink;Red 05/29/24 0902   Wound Bed Granulation (%) 60 % 05/29/24 0902   Wound Bed Slough (%) 40 % 05/29/24 0902   Wound Bed Eschar (%) 95 % 05/08/24 0911   Wound Odor None 05/29/24 0902   Tunneling? No 05/29/24 0902   Undermining? No 05/29/24 0902   Sinus Tracts? No 05/29/24 0902       Wound 05/08/24 #2 Right plantar foot Foot Right;Plantar (Active)   Wound Image   05/29/24 0904   Drainage Amount CARMEN 05/29/24 0904   Drainage Description Serous;Yellow 05/15/24 1043   Treatments Compression 05/08/24 0913   Wound Length (cm) 0.7 cm 05/29/24 0904   Wound Width (cm) 0.2 cm 05/29/24 0904   Wound Surface Area (cm^2) 0.14 cm^2 05/29/24 0904   Wound Depth (cm) 0.9 cm 05/29/24 0904   Wound Volume (cm^3) 0.126 cm^3 05/29/24 0904   Wound Healing % 88 05/29/24 0904   Margins Well-defined edges 05/29/24 0904   Non-staged Wound Description Full thickness 05/29/24 0904   Wendy-wound Assessment Edema;Moist;Maceration 05/29/24 0904    Wound Granulation Tissue Firm;[place];Pink 05/29/24 0904   Wound Bed Granulation (%) 50 % 05/29/24 0904   Wound Bed Epithelium (%) 50 % 05/08/24 0913   Wound Bed Slough (%) 50 % 05/29/24 0904   Wound Bed Eschar (%) 50 % 05/08/24 0913   Wound Odor None 05/29/24 0904   Tunneling? No 05/29/24 0904   Undermining? No 05/29/24 0904   Sinus Tracts? No 05/29/24 0904           ASSESSMENT AND PLAN:     Assessment   Assessment    Encounter Diagnosis  1. Right foot ulcer, with fat layer exposed ([facility])    2. Diabetic foot ulcer with osteomyelitis (HCC)    3. Type 2 diabetes mellitus with hyperglycemia, with long-term current use of insulin ([facility])    4. Hallux limitus of right foot    5. Diabetic polyneuropathy associated with type 2 diabetes mellitus ([facility])        Problem List  Patient Active Problem List   Diagnosis    Type 2 diabetes mellitus with hyperglycemia, with long-term current use of insulin ([facility])    Anxiety    Neuropathy    Meralgia paresthetica of left side    Vitamin D deficiency    B12 deficiency    Diabetic neuropathy (HCC)    Hyperglycemia due to type 2 diabetes mellitus (HCC)    Morbid obesity (HCC)    Other specified fetal and placental problems affecting management of mother, antepartum    Primary hypercoagulable state (HCC)    Uncontrolled type 2 diabetes mellitus    Noncompliance with medication regimen    Ulcer of right foot, limited to breakdown of skin (HCC)    Hyponatremia    Foot ulcer, right, with fat layer exposed (HCC)    Therapeutic drug monitoring    FLORENCIA (acute kidney injury) ([facility])    Labial abscess    Cellulitis of labia    Hyperglycemia    Leukocytosis, unspecified type    Cellulitis and abscess of foot    Refusal of statin medication by patient         MANAGEMENT    Continue wound vac  Serial debridements as needed to improve healing.   W/o s/o infection  Intense offloading  Intense BS control   Return one week    PROCEDURES:    Debridement Old surgical Right Toe (Comment which one)   Wound  05/08/24 #1 Right great toe amp site Toe (Comment which one) Right     Performed by: Renee Foreman MD  Authorized by: Renee Foreman MD       Consent   Consent obtained? verbal  Consent given by: patient  Risks discussed? procedural risks discussed     Debridement Details  Performed by: physician  Debridement type: conservative sharp  Pain control: lidocaine 4%  Pain control administration type: topical     Pre-debridement measurements  Length (cm): 2  Width (cm): 0.6  Depth (cm): 1  Surface Area (cm^2): 1.2     Post-debridement measurements  Length (cm): 2  Width (cm): 0.6  Depth (cm): 1.1  Percent debrided: 100%  Surface Area (cm^2): 1.2  Area Debrided (cm^2): 1.2  Volume (cm^3): 1.32     Devitalized tissue debrided: biofilm, necrotic debris and slough  Instrument(s) utilized: curette  Bleeding: small  Hemostasis obtained with: pressure  Procedural pain (0-10): 0  Post-procedural pain: 0   Response to treatment: procedure was tolerated well               Patient Instructions     Wound Cleaning and Dressings:    Wash your hands with soap and water. Always wear gloves while changing dressings. Donot touch wound / jani-wound skin with un-gloved hands. Remove old dressing, discard and place into trash.      DRESSINGS: collagen / wound vac 125 mm hg  Vashe soak x 15 min before dressing change.   Change dressing MWF     Compression Therapy : spandagrip  Compression Therapy Instructions:  1.  Put on first thing in the morning and may remove at bedside.  Okay to wear overnight      if comfortable.  Do not let stockings roll up/down and kink.  Hand wash stockings and      hang dry as needed.    2.  Avoid prolonged standing in one place.  It is better to have your calf muscles moving       to pump fluid out of the legs.    3.  Elevate leg(s) above the level of the heart when sitting or as much as possible.    4.  Take your diuretics as directed by your provider.  Do not skip doses or change doses      unless  instructed to do so by your provider.    5. Do not get leg(s) with compression wrap wet. If wraps are too tight as indicated        By pain, numbness/tingling or discoloration of toes remove wrap completely       and call the   wound center.     Off-Loading:  Intense offloading of wounded area    Miscellaneous Instructions:  Supplement with a daily multivitamin   Low salt diet  Intense blood sugar control - Goal Blood sugar below 180 at all times recommended.  Increase protein intake / consider protein supplements - see below  Elevate extremities at all times when sitting / laying down.    DIETARY MODIFICATIONS TO HELP WITH WOUND HEALING:    Protein: Meats, beans, eggs, milk and yogurt particularly Greek yogurt), tofu, soy nuts, soy protein products    Vitamin C: Citrus fruits and juices, strawberries, tomatoes, tomato juice, peppers, baked potatoes, spinach, broccoli, cauliflower, [place] sprouts, cabbage    Vitamin A: Dark green, leafy vegetables, orange or yellow vegetables, cantaloupe, fortified dairy products, liver, fortified cereals    Zinc: Fortified cereals, red meats, seafood    Consider [place] by [email or web address] (These are essential branch chain amino acids that help with tissue building and wound healing) and take 2 packets/day. you can order online at [employer] or [employer]    ADDITIONAL REMINDERS:    The treatment plan has been discussed at length with you and your provider. Follow all instructions carefully, it is very important. If you do not follow all instructions, you are at  risk of your wound not healing, infection, possible loss of limb and even end of life.  Please call the clinic during regular business hours ( 7:30 AM - 5:30 PM) if you notice increased bleeding, redness, warmth, pain or pus like drainage or start running a fever greater than 100.3.    For after hour emergencies, please call your primary physician or go to the nearest emergency room.      Patient/Caregiver Education: There are no  barriers to learning. Medical education for above diagnosis given.   Answered all questions.    Outcome: Patient verbalizes understanding. Patient is notified to call with any questions, complications, allergies, or worsening or changing symptoms.  Patient is to call with any side effects or complications as a result of the treatments today.      DOCUMENTATION OF TIME SPENT: Code selection for this visit was based on time spent : 35 min on date of service in preparing to see the patient, obtaining and/or reviewing separately obtained history, performing a medically appropriate examination, counseling and educating the patient/family/caregiver, ordering medications or testing, referring and communicating with other healthcare providers, documenting clinical information in the E HR, independently interpreting results and communicating results to the patient/family/caregiver and care coordination with the patient's other providers.    Followup: Return in about 1 week (around 6/5/2024) for Wound followup.      Note to Patient:  The 21st Century Cures Act makes medical notes like these available to patients in the interest of transparency. However, be advised this is a medical document and is intended as [id number] communication; it is written in medical language and may appear blunt, direct, or contain abbreviations or verbiage that are unfamiliar. Medical documents are intended to carry relevant information, facts as evident, and the clinical opinion of the practitioner.    Also, please note that this report has been produced using speech recognition software and may contain errors related to that system including, but not limited to, errors in grammar, punctuation, and spelling, as well as words and phrases that possibly may have been recognized inappropriately.  If there are any questions or concerns, contact the dictating provider for clarification.      Renee Belcher MD  5/29/2024  9:14 AM

## 2024-05-29 NOTE — PROGRESS NOTES
Weekly Wound Education Note    Teaching Provided To: Patient  Training Topics: Cleasing and general instructions;Discharge instructions;Dressing;Compression;Edema control;Off-loading;Negative presssure therapy  Training Method: Explain/Verbal  Training Response: Patient responds and understands;Reinforcement needed        Notes: Stable. Both wound communcate. Alessia packed in wound, hydrofera transfer (sample) and gauze applied to plantar foot. Dorsal great toe amp site: 1 black foam, NPWT 125mmhg - bridged up to dorsal foot. Spandagrip E from base of toes to below knee. Orders faxed to .

## 2024-06-05 ENCOUNTER — OFFICE VISIT (OUTPATIENT)
Dept: WOUND CARE | Facility: HOSPITAL | Age: 44
End: 2024-06-05
Attending: INTERNAL MEDICINE
Payer: COMMERCIAL

## 2024-06-05 ENCOUNTER — TELEPHONE (OUTPATIENT)
Dept: ENDOCRINOLOGY CLINIC | Facility: CLINIC | Age: 44
End: 2024-06-05

## 2024-06-05 VITALS
DIASTOLIC BLOOD PRESSURE: 81 MMHG | SYSTOLIC BLOOD PRESSURE: 123 MMHG | RESPIRATION RATE: 15 BRPM | TEMPERATURE: 98 F | HEART RATE: 97 BPM

## 2024-06-05 DIAGNOSIS — E11.621 DIABETIC FOOT ULCER WITH OSTEOMYELITIS (HCC): ICD-10-CM

## 2024-06-05 DIAGNOSIS — E11.65 TYPE 2 DIABETES MELLITUS WITH HYPERGLYCEMIA, WITH LONG-TERM CURRENT USE OF INSULIN (HCC): ICD-10-CM

## 2024-06-05 DIAGNOSIS — T14.8XXA INFECTED WOUND: ICD-10-CM

## 2024-06-05 DIAGNOSIS — L08.9 INFECTED WOUND: ICD-10-CM

## 2024-06-05 DIAGNOSIS — E11.69 DIABETIC FOOT ULCER WITH OSTEOMYELITIS (HCC): ICD-10-CM

## 2024-06-05 DIAGNOSIS — M20.5X1 HALLUX LIMITUS OF RIGHT FOOT: ICD-10-CM

## 2024-06-05 DIAGNOSIS — M86.9 DIABETIC FOOT ULCER WITH OSTEOMYELITIS (HCC): ICD-10-CM

## 2024-06-05 DIAGNOSIS — Z79.4 TYPE 2 DIABETES MELLITUS WITH HYPERGLYCEMIA, WITH LONG-TERM CURRENT USE OF INSULIN (HCC): ICD-10-CM

## 2024-06-05 DIAGNOSIS — E11.42 DIABETIC POLYNEUROPATHY ASSOCIATED WITH TYPE 2 DIABETES MELLITUS (HCC): ICD-10-CM

## 2024-06-05 DIAGNOSIS — L97.512 RIGHT FOOT ULCER, WITH FAT LAYER EXPOSED (HCC): Primary | ICD-10-CM

## 2024-06-05 DIAGNOSIS — L97.509 DIABETIC FOOT ULCER WITH OSTEOMYELITIS (HCC): ICD-10-CM

## 2024-06-05 LAB — GLUCOSE BLD-MCNC: 228 MG/DL (ref 70–99)

## 2024-06-05 PROCEDURE — 87186 SC STD MICRODIL/AGAR DIL: CPT | Performed by: INTERNAL MEDICINE

## 2024-06-05 PROCEDURE — 87205 SMEAR GRAM STAIN: CPT | Performed by: INTERNAL MEDICINE

## 2024-06-05 PROCEDURE — 87077 CULTURE AEROBIC IDENTIFY: CPT | Performed by: INTERNAL MEDICINE

## 2024-06-05 PROCEDURE — 99213 OFFICE O/P EST LOW 20 MIN: CPT

## 2024-06-05 PROCEDURE — 87070 CULTURE OTHR SPECIMN AEROBIC: CPT | Performed by: INTERNAL MEDICINE

## 2024-06-05 PROCEDURE — 11042 DBRDMT SUBQ TIS 1ST 20SQCM/<: CPT | Performed by: INTERNAL MEDICINE

## 2024-06-05 PROCEDURE — 82962 GLUCOSE BLOOD TEST: CPT | Performed by: INTERNAL MEDICINE

## 2024-06-05 NOTE — TELEPHONE ENCOUNTER
Call to patient- she cannot come to appointment tomorrow- just had procedure to take out wound vac and has another one upcoming soon- hard for her to move around. Patient has not been wearing Dexcom - she was wearing it for 5-7 days after her last appointment, could not remember how many days, related to a wound that developed at the site. She was going to try placing it again around the end of week/weekend- she will call once she gets the sensor in to get the code to share the data.

## 2024-06-05 NOTE — PROGRESS NOTES
[facility] PROGRESS NOTE  AD SHAH MD  6/5/2024    Chief Complaint:   Chief Complaint   Patient presents with    Wound Care     Pt here for follow up wound care visit right plantar foot and right great toe wound. Pt denies any concerns or issues. Pt denies any pain in wound at this time.        HPI:   Subjective   Leonie Denney is a 44 year old female coming in for a follow-up visit.    HPI    Wound dimensions stable.   Malodor present.   No purulent drainage  Has been using wound vac for a while with no issues.     Plantar foot wound open with significant depth but not connecting with dorsal wound.     Skin subs approved but OOP 1500 $  (50 $ copay per visit) - pt agreeable to pay using [employer] card.     BS 200s multiple visits.   Saw DM NP  Brie - started jardiance.     Offloading as much as possible.     Review of Systems  Negative except HPI   Denies chest pain / SOB / palpitations  Denies fever.     Allergies  No Known Allergies    Current Meds:  Current Outpatient Medications   Medication Sig Dispense Refill    Empagliflozin (JARDIANCE) 25 MG Oral Tab Take 25 mg by mouth daily. 90 tablet 1    gentamicin 0.1 % External Ointment Apply 1 Application topically 3 (three) times daily. 30 g 3    acetaminophen 500 MG Oral Tab Take 1 tablet (500 mg total) by mouth every 4 (four) hours as needed for Fever (equal to or greater than 100.4). 50 tablet 0    HYDROcodone-acetaminophen 5-325 MG Oral Tab Take 1 tablet by mouth every 6 (six) hours as needed. 40 tablet 0    semaglutide (OZEMPIC, 2 MG/DOSE,) 8 MG/3ML Subcutaneous Solution Pen-injector Inject 2 mg into the skin once a week. 3 mL 0    Continuous Blood Gluc Sensor (DEXCOM G7 SENSOR) Does not apply Misc 1 each Every 10 days. 9 each 1    teraconazole 0.4 % Vaginal Cream Place vaginally every 4 (four) hours. AT BEDTIME           EXAM:   Objective   Objective    Physical Exam    Vital Signs  Vitals:    06/05/24 1109   BP: 123/81   Pulse: 97   Resp: 15    Temp: 97.7 °F (36.5 °C)       Wound Assessment  Negative Pressure Wound Therapy Dorsal;Right (Active)   Wound photographed/measured Yes 05/29/24 1106   Machine Status (On) Yes 05/29/24 1106   Site Assessment Clean;Dry;Intact 05/29/24 1106   Wendy-wound Assessment Clean;Dry;Intact 05/29/24 1106   Unit Type KCI 05/29/24 1106   Dressing Type Black foam 05/29/24 1106   Number of Foam Pieces Used 1 05/29/24 1106   Cycle Continuous 05/29/24 1106   Target Pressure (mmHg) 125 05/29/24 1106   Canister Changed Yes 05/29/24 1106       Wound 05/08/24 #1 Right great toe amp site Toe (Comment which one) Right (Active)   Wound Image   06/05/24 1114   Drainage Amount Scant 06/05/24 1114   Drainage Description Serosanguineous 06/05/24 1114   Treatments Wound Vac - Neg Pressure 05/29/24 0902   Wound Vac Brand KCI 05/29/24 0902   Wound Length (cm) 1.9 cm 06/05/24 1114   Wound Width (cm) 0.9 cm 06/05/24 1114   Wound Surface Area (cm^2) 1.71 cm^2 06/05/24 1114   Wound Depth (cm) 1.4 cm 06/05/24 1114   Wound Volume (cm^3) 2.394 cm^3 06/05/24 1114   Wound Healing % -1 06/05/24 1114   Margins Well-defined edges 06/05/24 1114   Non-staged Wound Description Full thickness 06/05/24 1114   Wendy-wound Assessment Edema;Maceration;Moist 06/05/24 1114   Wound Granulation Tissue Firm;Pink;Red 06/05/24 1114   Wound Bed Granulation (%) 50 % 06/05/24 1114   Wound Bed Slough (%) 50 % 06/05/24 1114   Wound Bed Eschar (%) 95 % 05/08/24 0911   Wound Odor None 06/05/24 1114   Tunneling? No 05/29/24 0902   Undermining? No 05/29/24 0902   Sinus Tracts? No 05/29/24 0902       Wound 05/08/24 #2 Right plantar foot Foot Right;Plantar (Active)   Wound Image   06/05/24 1115   Drainage Amount CARMEN 06/05/24 1115   Drainage Description Serous;Yellow 05/15/24 1043   Treatments Compression 05/08/24 0913   Wound Length (cm) 0.5 cm 06/05/24 1115   Wound Width (cm) 0.1 cm 06/05/24 1115   Wound Surface Area (cm^2) 0.05 cm^2 06/05/24 1115   Wound Depth (cm) 0.8 cm 06/05/24  1115   Wound Volume (cm^3) 0.04 cm^3 06/05/24 1115   Wound Healing % 96 06/05/24 1115   Margins Well-defined edges 06/05/24 1115   Non-staged Wound Description Full thickness 06/05/24 1115   Wendy-wound Assessment Edema;Moist;Maceration 06/05/24 1115   Wound Granulation Tissue Firm;[place];Pink 06/05/24 1115   Wound Bed Granulation (%) 100 % 06/05/24 1115   Wound Bed Epithelium (%) 50 % 05/08/24 0913   Wound Bed Slough (%) 50 % 05/29/24 0904   Wound Bed Eschar (%) 50 % 05/08/24 0913   Wound Odor None 06/05/24 1115   Tunneling? No 05/29/24 0904   Undermining? No 05/29/24 0904   Sinus Tracts? No 05/29/24 0904           ASSESSMENT AND PLAN:     Assessment   Assessment    Encounter Diagnosis  1. Right foot ulcer, with fat layer exposed ([facility])    2. Diabetic foot ulcer with osteomyelitis ([facility])    3. Type 2 diabetes mellitus with hyperglycemia, with long-term current use of insulin ([facility])    4. Hallux limitus of right foot    5. Diabetic polyneuropathy associated with type 2 diabetes mellitus ([facility])        Problem List  Patient Active Problem List   Diagnosis    Type 2 diabetes mellitus with hyperglycemia, with long-term current use of insulin ([facility])    Anxiety    Neuropathy    Meralgia paresthetica of left side    Vitamin D deficiency    B12 deficiency    Diabetic neuropathy (HCC)    Hyperglycemia due to type 2 diabetes mellitus (HCC)    Morbid obesity (HCC)    Other specified fetal and placental problems affecting management of mother, antepartum    Primary hypercoagulable state (HCC)    Uncontrolled type 2 diabetes mellitus    Noncompliance with medication regimen    Ulcer of right foot, limited to breakdown of skin (HCC)    Hyponatremia    Foot ulcer, right, with fat layer exposed (HCC)    Therapeutic drug monitoring    FLORENCIA (acute kidney injury) ([facility])    Labial abscess    Cellulitis of labia    Hyperglycemia    Leukocytosis, unspecified type    Cellulitis and abscess of foot    Refusal of statin medication by patient          MANAGEMENT    Wound culture  Start empiric gentamicin topical BID after VASHE soaks for 7 days to decolonize wound bed.   Hold wound vac for 1 week.   Start Skin subs next week.   Intense BS control.   Intense offloading.   Return one week.     PROCEDURES:    Debridement Old surgical Right Toe (Comment which one)   Wound 05/08/24 #1 Right great toe amp site Toe (Comment which one) Right     Performed by: Renee Foreman MD  Authorized by: Renee Foreman MD       Consent   Consent obtained? verbal  Consent given by: patient     Debridement Details  Performed by: physician  Debridement type: surgical  Level of debridement: subcutaneous tissue     Pre-debridement measurements  Length (cm): 1.9  Width (cm): 0.9  Depth (cm): 1.4  Surface Area (cm^2): 1.71     Post-debridement measurements  Length (cm): 1.9  Width (cm): 0.9  Depth (cm): 1.6  Percent debrided: 100%  Surface Area (cm^2): 1.71  Area Debrided (cm^2): 1.71  Volume (cm^3): 2.74     Tissue and other material debrided: subcutaneous tissue  Devitalized tissue debrided: biofilm and slough  Instrument(s) utilized: blade, curette and forceps  Bleeding: medium  Hemostasis obtained with: pressure  Procedural pain (0-10): 0  Post-procedural pain: 1   Response to treatment: procedure was tolerated well         MEDICAL NECESSSITY FOR SURGICAL DEBRIDEMENT:    Surgical debridement is necessary in this patient to stimulate and hasten the rate of wound healing by  removing biofilm and devitalized tissue, down to subcutaneous level.   Research has consistently proven that traditional bedside sharp debridement provides excellent results in reducing the square surface area of wounds. The use of surgical sharp debridement can effectively remove devitalized tissue, hence aids in achieving good wound healing and advancing the rate of wound closure,  and is a proven significant component to advancing wound closure.    Patient Instructions     Wound Cleaning and  Dressings:    Wash your hands with soap and water. Always wear gloves while changing dressings. Donot touch wound / jani-wound skin with un-gloved hands. Remove old dressing, discard and place into trash.    Change dressings BID.   DRESSINGS: gentamicin / gauze   Vashe soak x 15 min before dressing change.   Change dressing MWF     Compression Therapy : spandagrip  Compression Therapy Instructions:  1.  Put on first thing in the morning and may remove at bedside.  Okay to wear overnight      if comfortable.  Do not let stockings roll up/down and kink.  Hand wash stockings and      hang dry as needed.    2.  Avoid prolonged standing in one place.  It is better to have your calf muscles moving       to pump fluid out of the legs.    3.  Elevate leg(s) above the level of the heart when sitting or as much as possible.    4.  Take your diuretics as directed by your provider.  Do not skip doses or change doses      unless instructed to do so by your provider.    5. Do not get leg(s) with compression wrap wet. If wraps are too tight as indicated        By pain, numbness/tingling or discoloration of toes remove wrap completely       and call the   wound center.     Off-Loading:  Intense offloading of wounded area    Miscellaneous Instructions:  Supplement with a daily multivitamin   Low salt diet  Intense blood sugar control - Goal Blood sugar below 180 at all times recommended.  Increase protein intake / consider protein supplements - see below  Elevate extremities at all times when sitting / laying down.    DIETARY MODIFICATIONS TO HELP WITH WOUND HEALING:    Protein: Meats, beans, eggs, milk and yogurt particularly Greek yogurt), tofu, soy nuts, soy protein products    Vitamin C: Citrus fruits and juices, strawberries, tomatoes, tomato juice, peppers, baked potatoes, spinach, broccoli, cauliflower, [place] sprouts, cabbage    Vitamin A: Dark green, leafy vegetables, orange or yellow vegetables, cantaloupe, fortified  dairy products, liver, fortified cereals    Zinc: Fortified cereals, red meats, seafood    Consider [place] by [employer] (These are essential branch chain amino acids that help with tissue building and wound healing) and take 2 packets/day. you can order online at [employer] or [employer]    ADDITIONAL REMINDERS:    The treatment plan has been discussed at length with you and your provider. Follow all instructions carefully, it is very important. If you do not follow all instructions, you are at  risk of your wound not healing, infection, possible loss of limb and even end of life.  Please call the clinic during regular business hours ( 7:30 AM - 5:30 PM) if you notice increased bleeding, redness, warmth, pain or pus like drainage or start running a fever greater than 100.3.    For after hour emergencies, please call your primary physician or go to the nearest emergency room.    Orders  Orders Placed This Encounter   Procedures    Debridement Old surgical Right Toe (Comment which one)       Patient/Caregiver Education: There are no barriers to learning. Medical education for above diagnosis given.   Answered all questions.    Outcome: Patient verbalizes understanding. Patient is notified to call with any questions, complications, allergies, or worsening or changing symptoms.  Patient is to call with any side effects or complications as a result of the treatments today.      DOCUMENTATION OF TIME SPENT: Code selection for this visit was based on time spent : 40 min on date of service in preparing to see the patient, obtaining and/or reviewing separately obtained history, performing a medically appropriate examination, counseling and educating the patient/family/caregiver, ordering medications or testing, referring and communicating with other healthcare providers, documenting clinical information in the E HR, independently interpreting results and communicating results to the patient/family/caregiver and care coordination  with the patient's other providers.    Followup: Return in about 1 week (around 6/12/2024) for Wound followup.      Note to Patient:  The 21st Century Cures Act makes medical notes like these available to patients in the interest of transparency. However, be advised this is a medical document and is intended as ptvq-su-kqay communication; it is written in medical language and may appear blunt, direct, or contain abbreviations or verbiage that are unfamiliar. Medical documents are intended to carry relevant information, facts as evident, and the clinical opinion of the practitioner.    Also, please note that this report has been produced using speech recognition software and may contain errors related to that system including, but not limited to, errors in grammar, punctuation, and spelling, as well as words and phrases that possibly may have been recognized inappropriately.  If there are any questions or concerns, contact the dictating provider for clarification.      Renee Belcher MD  6/5/2024  11:23 AM

## 2024-06-05 NOTE — PROGRESS NOTES
Patient ID: Leonie Denney is a 44 year old female.    Debridement Old surgical Right Toe (Comment which one)   Wound 05/08/24 #1 Right great toe amp site Toe (Comment which one) Right    Performed by: Renee Foreman MD  Authorized by: Renee Foreman MD      Consent   Consent obtained? verbal  Consent given by: patient    Debridement Details  Performed by: physician  Debridement type: surgical  Level of debridement: subcutaneous tissue    Pre-debridement measurements  Length (cm): 1.9  Width (cm): 0.9  Depth (cm): 1.4  Surface Area (cm^2): 1.71    Post-debridement measurements  Length (cm): 1.9  Width (cm): 0.9  Depth (cm): 1.6  Percent debrided: 100%  Surface Area (cm^2): 1.71  Area Debrided (cm^2): 1.71  Volume (cm^3): 2.74    Tissue and other material debrided: subcutaneous tissue  Devitalized tissue debrided: biofilm and slough  Instrument(s) utilized: blade, curette and forceps  Bleeding: medium  Hemostasis obtained with: pressure  Procedural pain (0-10): 0  Post-procedural pain: 1   Response to treatment: procedure was tolerated well

## 2024-06-05 NOTE — PROGRESS NOTES
.Weekly Wound Education Note    Teaching Provided To: Patient;Family  Training Topics: Dressing;Edema control;Cleasing and general instructions;Compression;Discharge instructions  Training Method: Explain/Verbal;Written  Training Response: Patient responds and understands        Notes: Wounds stable. Wounds cultured this visit. Will hold vac for one week. Dressing changed to gentaminin, gauze, and medipore tape with spandagrip E.

## 2024-06-05 NOTE — PATIENT INSTRUCTIONS
Wound Cleaning and Dressings:    Wash your hands with soap and water. Always wear gloves while changing dressings. Donot touch wound / jani-wound skin with un-gloved hands. Remove old dressing, discard and place into trash.    Change dressings BID.   DRESSINGS: gentamicin / gauze   Vashe soak x 15 min before dressing change.   Change dressing MWF     Compression Therapy : spandagrip  Compression Therapy Instructions:  1.  Put on first thing in the morning and may remove at bedside.  Okay to wear overnight      if comfortable.  Do not let stockings roll up/down and kink.  Hand wash stockings and      hang dry as needed.    2.  Avoid prolonged standing in one place.  It is better to have your calf muscles moving       to pump fluid out of the legs.    3.  Elevate leg(s) above the level of the heart when sitting or as much as possible.    4.  Take your diuretics as directed by your provider.  Do not skip doses or change doses      unless instructed to do so by your provider.    5. Do not get leg(s) with compression wrap wet. If wraps are too tight as indicated        By pain, numbness/tingling or discoloration of toes remove wrap completely       and call the   wound center.     Off-Loading:  Intense offloading of wounded area    Miscellaneous Instructions:  Supplement with a daily multivitamin   Low salt diet  Intense blood sugar control - Goal Blood sugar below 180 at all times recommended.  Increase protein intake / consider protein supplements - see below  Elevate extremities at all times when sitting / laying down.    DIETARY MODIFICATIONS TO HELP WITH WOUND HEALING:    Protein: Meats, beans, eggs, milk and yogurt particularly Greek yogurt), tofu, soy nuts, soy protein products    Vitamin C: Citrus fruits and juices, strawberries, tomatoes, tomato juice, peppers, baked potatoes, spinach, broccoli, cauliflower, [place] sprouts, cabbage    Vitamin A: Dark green, leafy vegetables, orange or yellow vegetables,  cantaloupe, fortified dairy products, liver, fortified cereals    Zinc: Fortified cereals, red meats, seafood    Consider [place] [employer] (These are essential branch chain amino acids that help with tissue building and wound healing) and take 2 packets/day. you can order online at [employer] or [employer]    ADDITIONAL REMINDERS:    The treatment plan has been discussed at length with you and your provider. Follow all instructions carefully, it is very important. If you do not follow all instructions, you are at  risk of your wound not healing, infection, possible loss of limb and even end of life.  Please call the clinic during regular business hours ( 7:30 AM - 5:30 PM) if you notice increased bleeding, redness, warmth, pain or pus like drainage or start running a fever greater than 100.3.    For after hour emergencies, please call your primary physician or go to the nearest emergency room.

## 2024-06-05 NOTE — TELEPHONE ENCOUNTER
Pt was supposed to see me Friday but appt needed to be moved. Can we see if she is streaming on dexcom so I can at least review her CGM? If she is not please ask her to do so and pull dexcom report in 2 weeks for me to review.     Also I have an opening tomorrow if pt can move appt to then.

## 2024-06-07 NOTE — PROGRESS NOTES
Called and spoke to pt and informed her of culture results and provider recommendations. Pt stated understanding and no questions at this time.

## 2024-06-10 NOTE — PROGRESS NOTES
Called and spoke with patient : informed patient that provider has another recommendation related to recent wound culture. Informed patient to continue using topical gentmycin ointment to wound bed as well as a new script for an oral ABT was sent to patient pharmacy - bactrim. Informed patient which pharmacy medication was sent too, patient states understanding and says she will have her  pick it up.

## 2024-06-10 NOTE — PROGRESS NOTES
Called patient about recent culture results. Received voicemail at this time, unidentifiable. Left message for patient to return call to wound clinic at earliest convenience.

## 2024-06-12 ENCOUNTER — OFFICE VISIT (OUTPATIENT)
Dept: WOUND CARE | Facility: HOSPITAL | Age: 44
End: 2024-06-12
Attending: INTERNAL MEDICINE
Payer: COMMERCIAL

## 2024-06-12 VITALS
RESPIRATION RATE: 16 BRPM | SYSTOLIC BLOOD PRESSURE: 127 MMHG | TEMPERATURE: 97 F | DIASTOLIC BLOOD PRESSURE: 85 MMHG | HEART RATE: 95 BPM

## 2024-06-12 DIAGNOSIS — Z79.4 TYPE 2 DIABETES MELLITUS WITH HYPERGLYCEMIA, WITH LONG-TERM CURRENT USE OF INSULIN (HCC): ICD-10-CM

## 2024-06-12 DIAGNOSIS — E11.65 TYPE 2 DIABETES MELLITUS WITH HYPERGLYCEMIA, WITH LONG-TERM CURRENT USE OF INSULIN (HCC): ICD-10-CM

## 2024-06-12 DIAGNOSIS — L97.512 RIGHT FOOT ULCER, WITH FAT LAYER EXPOSED (HCC): Primary | ICD-10-CM

## 2024-06-12 DIAGNOSIS — E11.69 DIABETIC FOOT ULCER WITH OSTEOMYELITIS (HCC): ICD-10-CM

## 2024-06-12 DIAGNOSIS — E11.621 DIABETIC FOOT ULCER WITH OSTEOMYELITIS (HCC): ICD-10-CM

## 2024-06-12 DIAGNOSIS — M86.9 DIABETIC FOOT ULCER WITH OSTEOMYELITIS (HCC): ICD-10-CM

## 2024-06-12 DIAGNOSIS — M20.5X1 HALLUX LIMITUS OF RIGHT FOOT: ICD-10-CM

## 2024-06-12 DIAGNOSIS — L97.509 DIABETIC FOOT ULCER WITH OSTEOMYELITIS (HCC): ICD-10-CM

## 2024-06-12 LAB — GLUCOSE BLD-MCNC: 135 MG/DL (ref 70–99)

## 2024-06-12 PROCEDURE — 97597 DBRDMT OPN WND 1ST 20 CM/<: CPT | Performed by: INTERNAL MEDICINE

## 2024-06-12 PROCEDURE — 82962 GLUCOSE BLOOD TEST: CPT | Performed by: INTERNAL MEDICINE

## 2024-06-12 RX ORDER — SULFAMETHOXAZOLE AND TRIMETHOPRIM 400; 80 MG/1; MG/1
1 TABLET ORAL 2 TIMES DAILY
Qty: 14 TABLET | Refills: 0 | Status: SHIPPED | OUTPATIENT
Start: 2024-06-12

## 2024-06-12 NOTE — PROGRESS NOTES
Patient ID: Leonie Denney is a 44 year old female.    Debridement Old surgical Right Toe (Comment which one)   Wound 05/08/24 #1 Right great toe amp site Toe (Comment which one) Right    Performed by: Renee Foreman MD  Authorized by: Renee Foreman MD      Consent   Consent obtained? verbal  Consent given by: patient  Risks discussed? procedural risks discussed    Debridement Details  Performed by: physician  Debridement type: conservative sharp  Pain control: lidocaine 4%  Pain control administration type: topical    Pre-debridement measurements  Length (cm): 2.2  Width (cm): 1.7  Depth (cm): 1.4  Surface Area (cm^2): 3.74    Post-debridement measurements  Length (cm): 2.2  Width (cm): 1.7  Depth (cm): 1.4  Percent debrided: 30%  Surface Area (cm^2): 3.74  Area Debrided (cm^2): 1.12  Volume (cm^3): 5.24    Devitalized tissue debrided: biofilm, necrotic debris and slough  Instrument(s) utilized: curette, forceps and blade  Bleeding: medium  Hemostasis obtained with: pressure  Procedural pain (0-10): 0  Post-procedural pain: 0   Response to treatment: procedure was tolerated well

## 2024-06-12 NOTE — PROGRESS NOTES
[facility] PROGRESS NOTE  AD SHAH MD  6/12/2024    Chief Complaint:   Chief Complaint   Patient presents with    Wound Care     Patient is here for a wound care follow up. She denies any pain or new wound concerns.       HPI:   Subjective   Leonie Denney is a 44 year old female coming in for a follow-up visit.    HPI    Wound stable.   Plantar foot wound almost closed.   Using gentamicin on both wounds - no s/o active infection but large amount of nonviable tissue present.   Debridement done today.   Epifix approved    Oral Antibiotics will run out this weekend.     Review of Systems  Negative except HPI   Denies chest pain / SOB / palpitations  Denies fever.     Allergies  No Known Allergies    Current Meds:  Current Outpatient Medications   Medication Sig Dispense Refill    sulfamethoxazole-trimethoprim (BACTRIM) 400-80 MG Oral Tab Take 1 tablet by mouth 2 (two) times daily. 14 tablet 0    sulfamethoxazole-trimethoprim -160 MG Oral Tab per tablet Take 1 tablet by mouth 2 (two) times daily for 7 days. 14 tablet 0    Empagliflozin (JARDIANCE) 25 MG Oral Tab Take 25 mg by mouth daily. 90 tablet 1    gentamicin 0.1 % External Ointment Apply 1 Application topically 3 (three) times daily. 30 g 3    acetaminophen 500 MG Oral Tab Take 1 tablet (500 mg total) by mouth every 4 (four) hours as needed for Fever (equal to or greater than 100.4). 50 tablet 0    HYDROcodone-acetaminophen 5-325 MG Oral Tab Take 1 tablet by mouth every 6 (six) hours as needed. 40 tablet 0    semaglutide (OZEMPIC, 2 MG/DOSE,) 8 MG/3ML Subcutaneous Solution Pen-injector Inject 2 mg into the skin once a week. 3 mL 0    Continuous Blood Gluc Sensor (DEXCOM G7 SENSOR) Does not apply Misc 1 each Every 10 days. 9 each 1    teraconazole 0.4 % Vaginal Cream Place vaginally every 4 (four) hours. AT BEDTIME           EXAM:   Objective   Objective    Physical Exam    Vital Signs  Vitals:    06/12/24 0924   BP: 127/85   Pulse: 95    Resp: 16   Temp: 97.2 °F (36.2 °C)       Wound Assessment  Negative Pressure Wound Therapy Dorsal;Right (Active)   Wound photographed/measured Yes 05/29/24 1106   Machine Status (On) Yes 05/29/24 1106   Site Assessment Clean;Dry;Intact 05/29/24 1106   Wendy-wound Assessment Clean;Dry;Intact 05/29/24 1106   Unit Type KCI 05/29/24 1106   Dressing Type Black foam 05/29/24 1106   Number of Foam Pieces Used 1 05/29/24 1106   Cycle Continuous 05/29/24 1106   Target Pressure (mmHg) 125 05/29/24 1106   Canister Changed Yes 05/29/24 1106       Wound 05/08/24 #1 Right great toe amp site Toe (Comment which one) Right (Active)   Wound Image    06/12/24 0929   Drainage Amount Small 06/12/24 0929   Drainage Description Serous;Yellow 06/12/24 0929   Treatments Compression 06/12/24 0929   Wound Vac Brand [facility] 05/29/24 0902   Wound Length (cm) 2.2 cm 06/12/24 0929   Wound Width (cm) 1.7 cm 06/12/24 0929   Wound Surface Area (cm^2) 3.74 cm^2 06/12/24 0929   Wound Depth (cm) 1.4 cm 06/12/24 0929   Wound Volume (cm^3) 5.236 cm^3 06/12/24 0929   Wound Healing % -120 06/12/24 0929   Margins Well-defined edges 06/12/24 0929   Non-staged Wound Description Full thickness 06/12/24 0929   Wendy-wound Assessment Edema;Maceration;Moist 06/12/24 0929   Wound Granulation Tissue Spongy;Red 06/12/24 0929   Wound Bed Granulation (%) 70 % 06/12/24 0929   Wound Bed Slough (%) 30 % 06/12/24 0929   Wound Bed Eschar (%) 95 % 05/08/24 0911   Wound Odor None 06/12/24 0929   Tunneling? No 06/12/24 0929   Undermining? No 06/12/24 0929   Sinus Tracts? No 06/12/24 0929       Wound 05/08/24 #2 Right plantar foot Foot Right;Plantar (Active)   Wound Image   06/12/24 0928   Drainage Amount None 06/12/24 0928   Drainage Description Serous;Yellow 05/15/24 1043   Treatments Compression 06/12/24 0928   Wound Length (cm) 0.1 cm 06/12/24 0928   Wound Width (cm) 0.1 cm 06/12/24 0928   Wound Surface Area (cm^2) 0.01 cm^2 06/12/24 0928   Wound Depth (cm) 0.1 cm 06/12/24  0928   Wound Volume (cm^3) 0.001 cm^3 06/12/24 0928   Wound Healing % 100 06/12/24 0928   Margins Well-defined edges 06/12/24 0928   Non-staged Wound Description Full thickness 06/12/24 0928   Wendy-wound Assessment Clean;Dry 06/12/24 0928   Wound Granulation Tissue Firm;Pink 06/12/24 0928   Wound Bed Granulation (%) 100 % 06/12/24 0928   Wound Bed Epithelium (%) 50 % 05/08/24 0913   Wound Bed Slough (%) 50 % 05/29/24 0904   Wound Bed Eschar (%) 50 % 05/08/24 0913   Wound Odor None 06/12/24 0928   Tunneling? No 06/12/24 0928   Undermining? No 06/12/24 0928   Sinus Tracts? No 06/12/24 0928           ASSESSMENT AND PLAN:     Assessment   Assessment    Encounter Diagnosis  1. Right foot ulcer, with fat layer exposed (HCC)    2. Diabetic foot ulcer with osteomyelitis ([facility])    3. Type 2 diabetes mellitus with hyperglycemia, with long-term current use of insulin ([facility])    4. Hallux limitus of right foot        Problem List  Patient Active Problem List   Diagnosis    Type 2 diabetes mellitus with hyperglycemia, with long-term current use of insulin (HCC)    Anxiety    Neuropathy    Meralgia paresthetica of left side    Vitamin D deficiency    B12 deficiency    Diabetic neuropathy (HCC)    Hyperglycemia due to type 2 diabetes mellitus (HCC)    Morbid obesity (HCC)    Other specified fetal and placental problems affecting management of mother, antepartum    Primary hypercoagulable state (HCC)    Uncontrolled type 2 diabetes mellitus    Noncompliance with medication regimen    Ulcer of right foot, limited to breakdown of skin (HCC)    Hyponatremia    Foot ulcer, right, with fat layer exposed (HCC)    Therapeutic drug monitoring    FLORENCIA (acute kidney injury) (HCC)    Labial abscess    Cellulitis of labia    Hyperglycemia    Leukocytosis, unspecified type    Cellulitis and abscess of foot    Refusal of statin medication by patient         MANAGEMENT    Vac on hold for 1 more week, continue gentamcyin to both wounds BID and  bordered gauze, spandagrip E. Orders faxed to .     PROCEDURES:    Debridement Old surgical Right Toe (Comment which one)   Wound 05/08/24 #1 Right great toe amp site Toe (Comment which one) Right     Performed by: Renee Foreman MD  Authorized by: Renee Foreman MD       Consent   Consent obtained? verbal  Consent given by: patient  Risks discussed? procedural risks discussed     Debridement Details  Performed by: physician  Debridement type: conservative sharp  Pain control: lidocaine 4%  Pain control administration type: topical     Pre-debridement measurements  Length (cm): 2.2  Width (cm): 1.7  Depth (cm): 1.4  Surface Area (cm^2): 3.74     Post-debridement measurements  Length (cm): 2.2  Width (cm): 1.7  Depth (cm): 1.4  Percent debrided: 30%  Surface Area (cm^2): 3.74  Area Debrided (cm^2): 1.12  Volume (cm^3): 5.24     Devitalized tissue debrided: biofilm, necrotic debris and slough  Instrument(s) utilized: curette, forceps and blade  Bleeding: medium  Hemostasis obtained with: pressure  Procedural pain (0-10): 0  Post-procedural pain: 0   Response to treatment: procedure was tolerated well             Orders Placed This Encounter    Debridement Old surgical Right Toe (Comment which one)     This order was created via procedure documentation    sulfamethoxazole-trimethoprim (BACTRIM) 400-80 MG Oral Tab     Sig: Take 1 tablet by mouth 2 (two) times daily.     Dispense:  14 tablet     Refill:  0       Patient Instructions     Return in 1 week  Consider epifix and wound vac for next week.     Wound Cleaning and Dressings:    Wash your hands with soap and water. Always wear gloves while changing dressings. Donot touch wound / jani-wound skin with un-gloved hands. Remove old dressing, discard and place into trash.    Change dressings BID.   DRESSINGS: gentamicin / gauze   Vashe soak x 15 min before dressing change.   Change dressing twice a day    Compression Therapy : spandagrip E at all  times  Compression Therapy Instructions:  1.  Put on first thing in the morning and may remove at bedside.  Okay to wear overnight      if comfortable.  Do not let stockings roll up/down and kink.  Hand wash stockings and      hang dry as needed.    2.  Avoid prolonged standing in one place.  It is better to have your calf muscles moving       to pump fluid out of the legs.    3.  Elevate leg(s) above the level of the heart when sitting or as much as possible.    4.  Take your diuretics as directed by your provider.  Do not skip doses or change doses      unless instructed to do so by your provider.    5. Do not get leg(s) with compression wrap wet. If wraps are too tight as indicated        By pain, numbness/tingling or discoloration of toes remove wrap completely       and call the   wound center.     Off-Loading:  Intense offloading of wounded area    Miscellaneous Instructions:  Supplement with a daily multivitamin   Low salt diet  Intense blood sugar control - Goal Blood sugar below 180 at all times recommended.  Increase protein intake / consider protein supplements - see below  Elevate extremities at all times when sitting / laying down.    DIETARY MODIFICATIONS TO HELP WITH WOUND HEALING:    Protein: Meats, beans, eggs, milk and yogurt particularly Greek yogurt), tofu, soy nuts, soy protein products    Vitamin C: Citrus fruits and juices, strawberries, tomatoes, tomato juice, peppers, baked potatoes, spinach, broccoli, cauliflower, [place] sprouts, cabbage    Vitamin A: Dark green, leafy vegetables, orange or yellow vegetables, cantaloupe, fortified dairy products, liver, fortified cereals    Zinc: Fortified cereals, red meats, seafood    Consider [place] [employer] (These are essential branch chain amino acids that help with tissue building and wound healing) and take 2 packets/day. you can order online at [employer] or [employer]    ADDITIONAL REMINDERS:    The treatment plan has been discussed at length with  you and your provider. Follow all instructions carefully, it is very important. If you do not follow all instructions, you are at  risk of your wound not healing, infection, possible loss of limb and even end of life.  Please call the clinic during regular business hours ( 7:30 AM - 5:30 PM) if you notice increased bleeding, redness, warmth, pain or pus like drainage or start running a fever greater than 100.3.    For after hour emergencies, please call your primary physician or go to the nearest emergency room.      Patient/Caregiver Education: There are no barriers to learning. Medical education for above diagnosis given.   Answered all questions.    Outcome: Patient verbalizes understanding. Patient is notified to call with any questions, complications, allergies, or worsening or changing symptoms.  Patient is to call with any side effects or complications as a result of the treatments today.      DOCUMENTATION OF TIME SPENT: Code selection for this visit was based on time spent : 35 min on date of service in preparing to see the patient, obtaining and/or reviewing separately obtained history, performing a medically appropriate examination, counseling and educating the patient/family/caregiver, ordering medications or testing, referring and communicating with other healthcare providers, documenting clinical information in the E HR, independently interpreting results and communicating results to the patient/family/caregiver and care coordination with the patient's other providers.    Followup: Return in about 1 week (around 6/19/2024) for Wound followup.      Note to Patient:  The 21st Century Cures Act makes medical notes like these available to patients in the interest of transparency. However, be advised this is a medical document and is intended as [id number] communication; it is written in medical language and may appear blunt, direct, or contain abbreviations or verbiage that are unfamiliar. Medical  documents are intended to carry relevant information, facts as evident, and the clinical opinion of the practitioner.    Also, please note that this report has been produced using speech recognition software and may contain errors related to that system including, but not limited to, errors in grammar, punctuation, and spelling, as well as words and phrases that possibly may have been recognized inappropriately.  If there are any questions or concerns, contact the dictating provider for clarification.      Renee Belcher MD  6/12/2024  11:55 AM

## 2024-06-12 NOTE — PROGRESS NOTES
Weekly Wound Education Note    Teaching Provided To: Patient  Training Topics: Cleasing and general instructions;Compression;Discharge instructions;Dressing;Edema control  Training Method: Explain/Verbal  Training Response: Patient responds and understands;Reinforcement needed        Notes: Stable. Vac on hold for 1 more week, continue gentamcyin to both wounds BID and bordered gauze, spandagrip E. Orders faxed to .

## 2024-06-12 NOTE — PATIENT INSTRUCTIONS
Return in 1 week  Consider epifix and wound vac for next week.     Wound Cleaning and Dressings:    Wash your hands with soap and water. Always wear gloves while changing dressings. Donot touch wound / jani-wound skin with un-gloved hands. Remove old dressing, discard and place into trash.    Change dressings BID.   DRESSINGS: gentamicin / gauze   Vashe soak x 15 min before dressing change.   Change dressing twice a day    Compression Therapy : spandagrip E at all times  Compression Therapy Instructions:  1.  Put on first thing in the morning and may remove at bedside.  Okay to wear overnight      if comfortable.  Do not let stockings roll up/down and kink.  Hand wash stockings and      hang dry as needed.    2.  Avoid prolonged standing in one place.  It is better to have your calf muscles moving       to pump fluid out of the legs.    3.  Elevate leg(s) above the level of the heart when sitting or as much as possible.    4.  Take your diuretics as directed by your provider.  Do not skip doses or change doses      unless instructed to do so by your provider.    5. Do not get leg(s) with compression wrap wet. If wraps are too tight as indicated        By pain, numbness/tingling or discoloration of toes remove wrap completely       and call the   wound center.     Off-Loading:  Intense offloading of wounded area    Miscellaneous Instructions:  Supplement with a daily multivitamin   Low salt diet  Intense blood sugar control - Goal Blood sugar below 180 at all times recommended.  Increase protein intake / consider protein supplements - see below  Elevate extremities at all times when sitting / laying down.    DIETARY MODIFICATIONS TO HELP WITH WOUND HEALING:    Protein: Meats, beans, eggs, milk and yogurt particularly Greek yogurt), tofu, soy nuts, soy protein products    Vitamin C: Citrus fruits and juices, strawberries, tomatoes, tomato juice, peppers, baked potatoes, spinach, broccoli, cauliflower, [place]  sprouts, cabbage    Vitamin A: Dark green, leafy vegetables, orange or yellow vegetables, cantaloupe, fortified dairy products, liver, fortified cereals    Zinc: Fortified cereals, red meats, seafood    Consider [place] by [employer] (These are essential branch chain amino acids that help with tissue building and wound healing) and take 2 packets/day. you can order online at [employer] or [employer]    ADDITIONAL REMINDERS:    The treatment plan has been discussed at length with you and your provider. Follow all instructions carefully, it is very important. If you do not follow all instructions, you are at  risk of your wound not healing, infection, possible loss of limb and even end of life.  Please call the clinic during regular business hours ( 7:30 AM - 5:30 PM) if you notice increased bleeding, redness, warmth, pain or pus like drainage or start running a fever greater than 100.3.    For after hour emergencies, please call your primary physician or go to the nearest emergency room.

## 2024-06-19 ENCOUNTER — OFFICE VISIT (OUTPATIENT)
Dept: WOUND CARE | Facility: HOSPITAL | Age: 44
End: 2024-06-19
Attending: INTERNAL MEDICINE

## 2024-06-19 VITALS
DIASTOLIC BLOOD PRESSURE: 84 MMHG | HEART RATE: 93 BPM | TEMPERATURE: 97 F | RESPIRATION RATE: 16 BRPM | SYSTOLIC BLOOD PRESSURE: 119 MMHG

## 2024-06-19 DIAGNOSIS — L97.509 DIABETIC FOOT ULCER WITH OSTEOMYELITIS (HCC): ICD-10-CM

## 2024-06-19 DIAGNOSIS — T14.8XXA INFECTED WOUND: ICD-10-CM

## 2024-06-19 DIAGNOSIS — Z79.4 TYPE 2 DIABETES MELLITUS WITH HYPERGLYCEMIA, WITH LONG-TERM CURRENT USE OF INSULIN (HCC): ICD-10-CM

## 2024-06-19 DIAGNOSIS — E11.69 DIABETIC FOOT ULCER WITH OSTEOMYELITIS (HCC): ICD-10-CM

## 2024-06-19 DIAGNOSIS — E11.65 TYPE 2 DIABETES MELLITUS WITH HYPERGLYCEMIA, WITH LONG-TERM CURRENT USE OF INSULIN (HCC): ICD-10-CM

## 2024-06-19 DIAGNOSIS — L08.9 INFECTED WOUND: ICD-10-CM

## 2024-06-19 DIAGNOSIS — E11.42 DIABETIC POLYNEUROPATHY ASSOCIATED WITH TYPE 2 DIABETES MELLITUS (HCC): ICD-10-CM

## 2024-06-19 DIAGNOSIS — L97.512 RIGHT FOOT ULCER, WITH FAT LAYER EXPOSED (HCC): Primary | ICD-10-CM

## 2024-06-19 DIAGNOSIS — M20.5X1 HALLUX LIMITUS OF RIGHT FOOT: ICD-10-CM

## 2024-06-19 DIAGNOSIS — E11.621 DIABETIC FOOT ULCER WITH OSTEOMYELITIS (HCC): ICD-10-CM

## 2024-06-19 DIAGNOSIS — M86.9 DIABETIC FOOT ULCER WITH OSTEOMYELITIS (HCC): ICD-10-CM

## 2024-06-19 LAB — GLUCOSE BLD-MCNC: 174 MG/DL (ref 70–99)

## 2024-06-19 PROCEDURE — 82962 GLUCOSE BLOOD TEST: CPT | Performed by: INTERNAL MEDICINE

## 2024-06-19 PROCEDURE — 15275 SKIN SUB GRAFT FACE/NK/HF/G: CPT | Performed by: INTERNAL MEDICINE

## 2024-06-19 NOTE — PROGRESS NOTES
[facility] PROGRESS NOTE  AD SHAH MD  6/19/2024    Chief Complaint:   Chief Complaint   Patient presents with    Wound Care     Patient is here for a wound care follow up. She denies any pain or new wound concerns. She would like to verify how much more time off she should request, and any restrictions that she might have.        HPI:   Subjective   Leonie Denney is a 44 year old female coming in for a follow-up visit.    HPI    Wound seems improved.   Dimensions smaller.   Completed 2 week topical gentamicin.   Offloading        Will start use of skin substitutes and Wound vac today.     Review of Systems  Negative except HPI   Denies chest pain / SOB / palpitations  Denies fever.     Allergies  No Known Allergies    Current Meds:  Current Outpatient Medications   Medication Sig Dispense Refill    sulfamethoxazole-trimethoprim (BACTRIM) 400-80 MG Oral Tab Take 1 tablet by mouth 2 (two) times daily. 14 tablet 0    Empagliflozin (JARDIANCE) 25 MG Oral Tab Take 25 mg by mouth daily. 90 tablet 1    gentamicin 0.1 % External Ointment Apply 1 Application topically 3 (three) times daily. 30 g 3    acetaminophen 500 MG Oral Tab Take 1 tablet (500 mg total) by mouth every 4 (four) hours as needed for Fever (equal to or greater than 100.4). 50 tablet 0    HYDROcodone-acetaminophen 5-325 MG Oral Tab Take 1 tablet by mouth every 6 (six) hours as needed. 40 tablet 0    semaglutide (OZEMPIC, 2 MG/DOSE,) 8 MG/3ML Subcutaneous Solution Pen-injector Inject 2 mg into the skin once a week. 3 mL 0    Continuous Blood Gluc Sensor (DEXCOM G7 SENSOR) Does not apply Misc 1 each Every 10 days. 9 each 1    teraconazole 0.4 % Vaginal Cream Place vaginally every 4 (four) hours. AT BEDTIME           EXAM:   Objective   Objective    Physical Exam    Vital Signs  Vitals:    06/19/24 0919   BP: 119/84   Pulse: 93   Resp: 16   Temp: 97.2 °F (36.2 °C)       Wound Assessment  Negative Pressure Wound Therapy Dorsal;Right  (Active)   Wound photographed/measured Yes 05/29/24 1106   Machine Status (On) Yes 05/29/24 1106   Site Assessment Clean;Dry;Intact 05/29/24 1106   Wendy-wound Assessment Clean;Dry;Intact 05/29/24 1106   Unit Type I 05/29/24 1106   Dressing Type Black foam 05/29/24 1106   Number of Foam Pieces Used 1 05/29/24 1106   Cycle Continuous 05/29/24 1106   Target Pressure (mmHg) 125 05/29/24 1106   Canister Changed Yes 05/29/24 1106       Wound 05/08/24 #1 Right great toe amp site Toe (Comment which one) Right (Active)   Wound Image   06/19/24 0922   Drainage Amount Moderate 06/19/24 0922   Drainage Description Serosanguineous 06/19/24 0922   Treatments Compression 06/12/24 0929   Wound Vac Brand [facility] 05/29/24 0902   Wound Length (cm) 1.4 cm 06/19/24 0922   Wound Width (cm) 1 cm 06/19/24 0922   Wound Surface Area (cm^2) 1.4 cm^2 06/19/24 0922   Wound Depth (cm) 0.9 cm 06/19/24 0922   Wound Volume (cm^3) 1.26 cm^3 06/19/24 0922   Wound Healing % 47 06/19/24 0922   Margins Well-defined edges 06/19/24 0922   Non-staged Wound Description Full thickness 06/19/24 0922   Wendy-wound Assessment Edema;Maceration;Moist;Callous 06/19/24 0922   Wound Granulation Tissue Spongy;Pink 06/19/24 0922   Wound Bed Granulation (%) 70 % 06/19/24 0922   Wound Bed Slough (%) 30 % 06/19/24 0922   Wound Bed Eschar (%) 95 % 05/08/24 0911   Wound Odor None 06/19/24 0922   Tunneling? No 06/19/24 0922   Undermining? No 06/19/24 0922   Sinus Tracts? No 06/19/24 0922       Wound 05/08/24 #2 Right plantar foot Foot Right;Plantar (Active)   Wound Image   06/19/24 0924   Drainage Amount None 06/19/24 0924   Drainage Description Serous;Yellow 05/15/24 1043   Treatments Compression 06/12/24 0928   Wound Length (cm) 0.4 cm 06/19/24 0924   Wound Width (cm) 0.1 cm 06/19/24 0924   Wound Surface Area (cm^2) 0.04 cm^2 06/19/24 0924   Wound Depth (cm) 0.1 cm 06/19/24 0924   Wound Volume (cm^3) 0.004 cm^3 06/19/24 0924   Wound Healing % 100 06/19/24 0924   Margins  Poorly defined 06/19/24 0924   Non-staged Wound Description Full thickness 06/19/24 0924   Wendy-wound Assessment Dry;Callous;Edema 06/19/24 0924   Wound Granulation Tissue Spongy;[place] 06/19/24 0924   Wound Bed Granulation (%) 100 % 06/19/24 0924   Wound Bed Epithelium (%) 50 % 05/08/24 0913   Wound Bed Slough (%) 50 % 05/29/24 0904   Wound Bed Eschar (%) 50 % 05/08/24 0913   Wound Odor None 06/19/24 0924   Tunneling? No 06/19/24 0924   Undermining? No 06/19/24 0924   Sinus Tracts? No 06/19/24 0924           ASSESSMENT AND PLAN:     Assessment   Assessment    Encounter Diagnosis  1. Right foot ulcer, with fat layer exposed (HCC)    2. Diabetic foot ulcer with osteomyelitis (HCC)    3. Type 2 diabetes mellitus with hyperglycemia, with long-term current use of insulin (HCC)    4. Hallux limitus of right foot    5. Infected wound    6. Diabetic polyneuropathy associated with type 2 diabetes mellitus (HCC)        Problem List  Patient Active Problem List   Diagnosis    Type 2 diabetes mellitus with hyperglycemia, with long-term current use of insulin (HCC)    Anxiety    Neuropathy    Meralgia paresthetica of left side    Vitamin D deficiency    B12 deficiency    Diabetic neuropathy (HCC)    Hyperglycemia due to type 2 diabetes mellitus (HCC)    Morbid obesity (HCC)    Other specified fetal and placental problems affecting management of mother, antepartum    Primary hypercoagulable state (HCC)    Uncontrolled type 2 diabetes mellitus    Noncompliance with medication regimen    Ulcer of right foot, limited to breakdown of skin (HCC)    Hyponatremia    Foot ulcer, right, with fat layer exposed (HCC)    Therapeutic drug monitoring    FLORENCIA (acute kidney injury) (HCC)    Labial abscess    Cellulitis of labia    Hyperglycemia    Leukocytosis, unspecified type    Cellulitis and abscess of foot    Refusal of statin medication by patient         MANAGEMENT    Extend FLA for 3 more months- paperwork will be done today.   Skin  subs placement done today  Resume wound vac.   Intense BS control  Continue offloading.   Return one week.     PROCEDURES:    Cellular tissue product application Old surgical Right Toe (Comment which one)     Date/Time: 6/19/2024 9:49 AM     Performed by: Renee Foreman MD  Authorized by: Renee Foreman MD  Associated wounds:   Wound 05/08/24 #1 Right great toe amp site Toe (Comment which one) Right  Consent:     Consent obtained:  Verbal    Consent given by:  Patient    Risks discussed:  Bleeding, infection and pain    Alternatives discussed:  Alternative treatment  Procedure details:     Location:  head/hands/feet/digits/genitalia    Product applied:  Epifix    Product lot #:  [id number]    Product expiration:  12/1/2028    Amount used (cm^2):  16    Secured/Fixated: Yes      Secured/Fixated with:  Sorbact  Post-procedure details:     Patient tolerance of procedure:  Tolerated well, no immediate complications  Comments:      Dressed with black foam and KCI vac NPWT 125mmhg.        MEDICAL NECESSITY FOR SKIN SUBS.     Despite optimal treatment: management of co-morbidities DM and OM, edema control, off-loading, nutritional optimization with MVI and supplements, control of infection, and gold standard wound treatment wound continues to show delayed healing not meeting healing goals (10% current wound size per week).   To avoid further hospitalizations, infections, and loss of work days, this application is being performed in anticipation of staged, related and further necessary applications in attempt to achieve rapid wound closure.  Affixed by Non-Adherent, steri-strips and further secured with secondary dressing.  Amount of product wasted: none.  Patient tolerated procedure well without any complications, was instructed to leave non-adherent in place until next appointment and keep it dry.  Follow-up in 7 days.  Patient Instructions     Wound Cleaning and Dressings:    Wash your hands with soap  and water. Always wear gloves while changing dressings. Donot touch wound / jani-wound skin with un-gloved hands. Remove old dressing, discard and place into trash.    Change dressings BID.   DRESSINGS: gentamicin / gauze   Vashe soak x 15 min before dressing change.   Change dressing twice a day    Compression Therapy : spandagrip E at all times  Compression Therapy Instructions:  1.  Put on first thing in the morning and may remove at bedside.  Okay to wear overnight      if comfortable.  Do not let stockings roll up/down and kink.  Hand wash stockings and      hang dry as needed.    2.  Avoid prolonged standing in one place.  It is better to have your calf muscles moving       to pump fluid out of the legs.    3.  Elevate leg(s) above the level of the heart when sitting or as much as possible.    4.  Take your diuretics as directed by your provider.  Do not skip doses or change doses      unless instructed to do so by your provider.    5. Do not get leg(s) with compression wrap wet. If wraps are too tight as indicated        By pain, numbness/tingling or discoloration of toes remove wrap completely       and call the   wound center.     Off-Loading:  Intense offloading of wounded area    Miscellaneous Instructions:  Supplement with a daily multivitamin   Low salt diet  Intense blood sugar control - Goal Blood sugar below 180 at all times recommended.  Increase protein intake / consider protein supplements - see below  Elevate extremities at all times when sitting / laying down.    DIETARY MODIFICATIONS TO HELP WITH WOUND HEALING:    Protein: Meats, beans, eggs, milk and yogurt particularly Greek yogurt), tofu, soy nuts, soy protein products    Vitamin C: Citrus fruits and juices, strawberries, tomatoes, tomato juice, peppers, baked potatoes, spinach, broccoli, cauliflower, [place] sprouts, cabbage    Vitamin A: Dark green, leafy vegetables, orange or yellow vegetables, cantaloupe, fortified dairy products,  liver, fortified cereals    Zinc: Fortified cereals, red meats, seafood    Consider [place] [employer] (These are essential branch chain amino acids that help with tissue building and wound healing) and take 2 packets/day. you can order online at [employer] or [employer]    ADDITIONAL REMINDERS:    The treatment plan has been discussed at length with you and your provider. Follow all instructions carefully, it is very important. If you do not follow all instructions, you are at  risk of your wound not healing, infection, possible loss of limb and even end of life.  Please call the clinic during regular business hours ( 7:30 AM - 5:30 PM) if you notice increased bleeding, redness, warmth, pain or pus like drainage or start running a fever greater than 100.3.    For after hour emergencies, please call your primary physician or go to the nearest emergency room.    Orders  Orders Placed This Encounter   Procedures    Cellular tissue product application Old surgical Right Toe (Comment which one)         Patient/Caregiver Education: There are no barriers to learning. Medical education for above diagnosis given.   Answered all questions.    Outcome: Patient verbalizes understanding. Patient is notified to call with any questions, complications, allergies, or worsening or changing symptoms.  Patient is to call with any side effects or complications as a result of the treatments today.      DOCUMENTATION OF TIME SPENT: Code selection for this visit was based on time spent : 40 min on date of service in preparing to see the patient, obtaining and/or reviewing separately obtained history, performing a medically appropriate examination, counseling and educating the patient/family/caregiver, ordering medications or testing, referring and communicating with other healthcare providers, documenting clinical information in the E HR, independently interpreting results and communicating results to the patient/family/caregiver and care  coordination with the patient's other providers.    Followup: Return in about 1 week (around 6/26/2024) for Wound followup.      Note to Patient:  The 21st Century Cures Act makes medical notes like these available to patients in the interest of transparency. However, be advised this is a medical document and is intended as tzah-an-ehhd communication; it is written in medical language and may appear blunt, direct, or contain abbreviations or verbiage that are unfamiliar. Medical documents are intended to carry relevant information, facts as evident, and the clinical opinion of the practitioner.    Also, please note that this report has been produced using speech recognition software and may contain errors related to that system including, but not limited to, errors in grammar, punctuation, and spelling, as well as words and phrases that possibly may have been recognized inappropriately.  If there are any questions or concerns, contact the dictating provider for clarification.      Renee Belcher MD  6/19/2024  9:34 AM

## 2024-06-19 NOTE — PROGRESS NOTES
Patient ID: Leonie Denney is a 44 year old female.    Cellular tissue product application Old surgical Right Toe (Comment which one)    Date/Time: 6/19/2024 9:49 AM    Performed by: Renee Foreman MD  Authorized by: Renee Foreman MD  Associated wounds:   Wound 05/08/24 #1 Right great toe amp site Toe (Comment which one) Right  Consent:     Consent obtained:  Verbal    Consent given by:  Patient    Risks discussed:  Bleeding, infection and pain    Alternatives discussed:  Alternative treatment  Procedure details:     Location:  head/hands/feet/digits/genitalia    Product applied:  Epifix    Product lot #:  [id number]    Product expiration:  12/1/2028    Amount used (cm^2):  16    Secured/Fixated: Yes      Secured/Fixated with:  Sorbact  Post-procedure details:     Patient tolerance of procedure:  Tolerated well, no immediate complications  Comments:      Dressed with black foam and KCI vac NPWT 125mmhg.

## 2024-06-19 NOTE — PROGRESS NOTES
Weekly Wound Education Note    Teaching Provided To: Patient;Family  Training Topics: Cleasing and general instructions;Compression;Discharge instructions;Dressing;Edema control;Off-loading  Training Method: Explain/Verbal  Training Response: Patient responds and understands;Reinforcement needed        Notes: Stable. Epifix #1 applied (small piece applied to plantar wound), sorbact, black foam, NPWT 125mmhg to great to amp site. Plantar foot: hydrofera transfer and gauze - ok to be secured under vac drape. Pt made aware of $50 copay per application of Epifix, she was ok to proceed with application today. Orders faxed to . Pt states that [facility] paperwork will be faxed to the wound clinic tomorrow for provider.

## 2024-06-19 NOTE — PATIENT INSTRUCTIONS
Wound Cleaning and Dressings:    Wash your hands with soap and water. Always wear gloves while changing dressings. Donot touch wound / jani-wound skin with un-gloved hands. Remove old dressing, discard and place into trash.    Change dressings BID.   DRESSINGS: gentamicin / gauze   Vashe soak x 15 min before dressing change.   Change dressing twice a day    Compression Therapy : spandagrip E at all times  Compression Therapy Instructions:  1.  Put on first thing in the morning and may remove at bedside.  Okay to wear overnight      if comfortable.  Do not let stockings roll up/down and kink.  Hand wash stockings and      hang dry as needed.    2.  Avoid prolonged standing in one place.  It is better to have your calf muscles moving       to pump fluid out of the legs.    3.  Elevate leg(s) above the level of the heart when sitting or as much as possible.    4.  Take your diuretics as directed by your provider.  Do not skip doses or change doses      unless instructed to do so by your provider.    5. Do not get leg(s) with compression wrap wet. If wraps are too tight as indicated        By pain, numbness/tingling or discoloration of toes remove wrap completely       and call the   wound center.     Off-Loading:  Intense offloading of wounded area    Miscellaneous Instructions:  Supplement with a daily multivitamin   Low salt diet  Intense blood sugar control - Goal Blood sugar below 180 at all times recommended.  Increase protein intake / consider protein supplements - see below  Elevate extremities at all times when sitting / laying down.    DIETARY MODIFICATIONS TO HELP WITH WOUND HEALING:    Protein: Meats, beans, eggs, milk and yogurt particularly Greek yogurt), tofu, soy nuts, soy protein products    Vitamin C: Citrus fruits and juices, strawberries, tomatoes, tomato juice, peppers, baked potatoes, spinach, broccoli, cauliflower, [place] sprouts, cabbage    Vitamin A: Dark green, leafy vegetables, orange or  yellow vegetables, cantaloupe, fortified dairy products, liver, fortified cereals    Zinc: Fortified cereals, red meats, seafood    Consider [place] [employer] (These are essential branch chain amino acids that help with tissue building and wound healing) and take 2 packets/day. you can order online at [employer] or [employer]    ADDITIONAL REMINDERS:    The treatment plan has been discussed at length with you and your provider. Follow all instructions carefully, it is very important. If you do not follow all instructions, you are at  risk of your wound not healing, infection, possible loss of limb and even end of life.  Please call the clinic during regular business hours ( 7:30 AM - 5:30 PM) if you notice increased bleeding, redness, warmth, pain or pus like drainage or start running a fever greater than 100.3.    For after hour emergencies, please call your primary physician or go to the nearest emergency room.

## 2024-06-21 ENCOUNTER — TELEPHONE (OUTPATIENT)
Dept: PODIATRY CLINIC | Facility: CLINIC | Age: 44
End: 2024-06-21

## 2024-06-21 NOTE — TELEPHONE ENCOUNTER
Patient called to request revision on forms. Notified rep that completed last form. Rep stated patient needs to provide new forms because 3 revisions have already been completed. Informed patient to provide new forms. Patient verbalized understanding.

## 2024-06-26 ENCOUNTER — OFFICE VISIT (OUTPATIENT)
Dept: WOUND CARE | Facility: HOSPITAL | Age: 44
End: 2024-06-26
Attending: INTERNAL MEDICINE

## 2024-06-26 ENCOUNTER — APPOINTMENT (OUTPATIENT)
Dept: WOUND CARE | Facility: HOSPITAL | Age: 44
End: 2024-06-26
Attending: INTERNAL MEDICINE

## 2024-06-26 VITALS
DIASTOLIC BLOOD PRESSURE: 75 MMHG | HEART RATE: 106 BPM | TEMPERATURE: 98 F | RESPIRATION RATE: 16 BRPM | SYSTOLIC BLOOD PRESSURE: 121 MMHG

## 2024-06-26 DIAGNOSIS — M86.9 DIABETIC FOOT ULCER WITH OSTEOMYELITIS (HCC): ICD-10-CM

## 2024-06-26 DIAGNOSIS — L97.512 RIGHT FOOT ULCER, WITH FAT LAYER EXPOSED (HCC): Primary | ICD-10-CM

## 2024-06-26 DIAGNOSIS — M20.5X1 HALLUX LIMITUS OF RIGHT FOOT: ICD-10-CM

## 2024-06-26 DIAGNOSIS — E11.69 DIABETIC FOOT ULCER WITH OSTEOMYELITIS (HCC): ICD-10-CM

## 2024-06-26 DIAGNOSIS — Z79.4 TYPE 2 DIABETES MELLITUS WITH HYPERGLYCEMIA, WITH LONG-TERM CURRENT USE OF INSULIN (HCC): ICD-10-CM

## 2024-06-26 DIAGNOSIS — L08.9 INFECTED WOUND: ICD-10-CM

## 2024-06-26 DIAGNOSIS — T14.8XXA INFECTED WOUND: ICD-10-CM

## 2024-06-26 DIAGNOSIS — E11.65 TYPE 2 DIABETES MELLITUS WITH HYPERGLYCEMIA, WITH LONG-TERM CURRENT USE OF INSULIN (HCC): ICD-10-CM

## 2024-06-26 DIAGNOSIS — E11.621 DIABETIC FOOT ULCER WITH OSTEOMYELITIS (HCC): ICD-10-CM

## 2024-06-26 DIAGNOSIS — L97.509 DIABETIC FOOT ULCER WITH OSTEOMYELITIS (HCC): ICD-10-CM

## 2024-06-26 LAB — GLUCOSE BLD-MCNC: 132 MG/DL (ref 70–99)

## 2024-06-26 PROCEDURE — 15275 SKIN SUB GRAFT FACE/NK/HF/G: CPT | Performed by: INTERNAL MEDICINE

## 2024-06-26 PROCEDURE — 82962 GLUCOSE BLOOD TEST: CPT | Performed by: INTERNAL MEDICINE

## 2024-06-26 NOTE — PATIENT INSTRUCTIONS
Wound Cleaning and Dressings:    Shower with protection.   DRESSINGS:epifix skin substitute/ wound vac on dorsal wound / HF transfer on plantar foot wound.   Change dressing 3 times a week    Compression Therapy : spandagrip E at all times  Compression Therapy Instructions:  1.  Put on first thing in the morning and may remove at bedside.  Okay to wear overnight      if comfortable.  Do not let stockings roll up/down and kink.  Hand wash stockings and      hang dry as needed.    2.  Avoid prolonged standing in one place.  It is better to have your calf muscles moving       to pump fluid out of the legs.    3.  Elevate leg(s) above the level of the heart when sitting or as much as possible.    4.  Take your diuretics as directed by your provider.  Do not skip doses or change doses      unless instructed to do so by your provider.    5. Do not get leg(s) with compression wrap wet. If wraps are too tight as indicated        By pain, numbness/tingling or discoloration of toes remove wrap completely       and call the   wound center.     Off-Loading:  Intense offloading of wounded area    Miscellaneous Instructions:  Supplement with a daily multivitamin   Low salt diet  Intense blood sugar control - Goal Blood sugar below 180 at all times recommended.  Increase protein intake / consider protein supplements - see below  Elevate extremities at all times when sitting / laying down.    DIETARY MODIFICATIONS TO HELP WITH WOUND HEALING:    Protein: Meats, beans, eggs, milk and yogurt particularly Greek yogurt), tofu, soy nuts, soy protein products    Vitamin C: Citrus fruits and juices, strawberries, tomatoes, tomato juice, peppers, baked potatoes, spinach, broccoli, cauliflower, [place] sprouts, cabbage    Vitamin A: Dark green, leafy vegetables, orange or yellow vegetables, cantaloupe, fortified dairy products, liver, fortified cereals    Zinc: Fortified cereals, red meats, seafood    Consider [place] [employer] (These  are essential branch chain amino acids that help with tissue building and wound healing) and take 2 packets/day. you can order online at [employer] or [employer]    ADDITIONAL REMINDERS:    The treatment plan has been discussed at length with you and your provider. Follow all instructions carefully, it is very important. If you do not follow all instructions, you are at  risk of your wound not healing, infection, possible loss of limb and even end of life.  Please call the clinic during regular business hours ( 7:30 AM - 5:30 PM) if you notice increased bleeding, redness, warmth, pain or pus like drainage or start running a fever greater than 100.3.    For after hour emergencies, please call your primary physician or go to the nearest emergency room.

## 2024-06-26 NOTE — PROGRESS NOTES
Patient ID: Leonie Denney is a 44 year old female.    Cellular tissue product application Old surgical Right Toe (Comment which one)    Date/Time: 6/26/2024 11:23 AM    Performed by: Renee Foreman MD  Authorized by: Renee Foreman MD  Associated wounds:   Wound 05/08/24 #1 Right great toe amp site Toe (Comment which one) Right  Consent:     Consent obtained:  Verbal    Consent given by:  Patient    Risks discussed:  Bleeding, infection and pain    Alternatives discussed:  Alternative treatment  Procedure details:     Location:  head/hands/feet/digits/genitalia    Product applied:  Epifix    Product lot #:  [id number]    Product expiration:  1/1/2029    Amount used (cm^2):  6    Secured/Fixated: Yes      Secured/Fixated with:  Sorbact  Post-procedure details:     Patient tolerance of procedure:  Tolerated well, no immediate complications  Comments:      Dressed with 1 black foam and NPWT 125mmhg.

## 2024-06-26 NOTE — PROGRESS NOTES
[facility] PROGRESS NOTE  AD SHAH MD  6/26/2024    Chief Complaint:   Chief Complaint   Patient presents with    Wound Care     Patient is here for a wound care follow up. She denies any pain or new wound concerns. She does complain of some discomfort to her wound, and nausea this morning.        HPI:   Subjective   Leonie Denney is a 44 year old female coming in for a follow-up visit.    HPI    Wound looks v good with minimal nonviable tissue wound bed despite measurements appearing same.   Of note, dorsal and plantar foot wounds collect.   No s/o active infection - recently completed gentamicin.     Second application of epifix today.     Wound vac tolerated well    Review of Systems  Negative except HPI   Denies chest pain / SOB / palpitations  Denies fever.     Allergies  No Known Allergies    Current Meds:  Current Outpatient Medications   Medication Sig Dispense Refill    sulfamethoxazole-trimethoprim (BACTRIM) 400-80 MG Oral Tab Take 1 tablet by mouth 2 (two) times daily. 14 tablet 0    Empagliflozin (JARDIANCE) 25 MG Oral Tab Take 25 mg by mouth daily. 90 tablet 1    gentamicin 0.1 % External Ointment Apply 1 Application topically 3 (three) times daily. 30 g 3    acetaminophen 500 MG Oral Tab Take 1 tablet (500 mg total) by mouth every 4 (four) hours as needed for Fever (equal to or greater than 100.4). 50 tablet 0    HYDROcodone-acetaminophen 5-325 MG Oral Tab Take 1 tablet by mouth every 6 (six) hours as needed. 40 tablet 0    semaglutide (OZEMPIC, 2 MG/DOSE,) 8 MG/3ML Subcutaneous Solution Pen-injector Inject 2 mg into the skin once a week. 3 mL 0    Continuous Blood Gluc Sensor (DEXCOM G7 SENSOR) Does not apply Misc 1 each Every 10 days. 9 each 1    teraconazole 0.4 % Vaginal Cream Place vaginally every 4 (four) hours. AT BEDTIME           EXAM:   Objective   Objective    Physical Exam    Vital Signs  Vitals:    06/26/24 1036   BP: 121/75   Pulse: 106   Resp: 16   Temp: 97.5 °F (36.4  °C)       Wound Assessment  Negative Pressure Wound Therapy Dorsal;Right (Active)   Wound photographed/measured Yes 06/19/24 1003   Machine Status (On) Yes 06/19/24 1003   Site Assessment Clean;Dry;Intact 06/19/24 1003   Wendy-wound Assessment Clean;Dry;Intact 06/19/24 1003   Unit Type KCI 06/19/24 1003   Dressing Type Black foam 06/19/24 1003   Number of Foam Pieces Used 1 06/19/24 1003   Cycle Continuous 06/19/24 1003   Target Pressure (mmHg) 125 06/19/24 1003   Canister Changed Yes 06/19/24 1003       Wound 05/08/24 #1 Right great toe amp site Toe (Comment which one) Right (Active)   Wound Image   06/26/24 1046   Drainage Amount None 06/26/24 1046   Drainage Description Serosanguineous 06/19/24 0922   Treatments Compression;Skin Substitutes;Wound Vac - Neg Pressure 06/19/24 0922   Wound Vac Brand KCI 06/19/24 0922   Wound Length (cm) 1 cm 06/26/24 1046   Wound Width (cm) 0.5 cm 06/26/24 1046   Wound Surface Area (cm^2) 0.5 cm^2 06/26/24 1046   Wound Depth (cm) 1.1 cm 06/26/24 1046   Wound Volume (cm^3) 0.55 cm^3 06/26/24 1046   Wound Healing % 77 06/26/24 1046   Margins Well-defined edges 06/26/24 1046   Non-staged Wound Description Full thickness 06/26/24 1046   Wendy-wound Assessment Edema;Maceration;Moist;Callous 06/26/24 1046   Wound Granulation Tissue Firm;Pink 06/26/24 1046   Wound Bed Granulation (%) 75 % 06/26/24 1046   Wound Bed Slough (%) 30 % 06/19/24 0922   Wound Bed Eschar (%) 95 % 05/08/24 0911   Wound Odor None 06/26/24 1046   Shape 25% intergrated skin sun 06/26/24 1046   Tunneling? No 06/19/24 0922   Undermining? No 06/19/24 0922   Sinus Tracts? No 06/19/24 0922       Wound 05/08/24 #2 Right plantar foot Foot Right;Plantar (Active)   Wound Image   06/26/24 1044   Drainage Amount None 06/26/24 1044   Drainage Description Serous;Yellow 05/15/24 1043   Treatments Compression 06/19/24 0924   Wound Length (cm) 0.4 cm 06/26/24 1044   Wound Width (cm) 0.1 cm 06/26/24 1044   Wound Surface Area (cm^2) 0.04  cm^2 06/26/24 1044   Wound Depth (cm) 0.5 cm 06/26/24 1044   Wound Volume (cm^3) 0.02 cm^3 06/26/24 1044   Wound Healing % 98 06/26/24 1044   Margins Well-defined edges 06/26/24 1044   Non-staged Wound Description Full thickness 06/26/24 1044   Wendy-wound Assessment Edema;Callous;Moist 06/26/24 1044   Wound Granulation Tissue Spongy;Pale [place] 06/19/24 0924   Wound Bed Granulation (%) 100 % 06/19/24 0924   Wound Bed Epithelium (%) 50 % 05/08/24 0913   Wound Bed Slough (%) 50 % 05/29/24 0904   Wound Bed Eschar (%) 50 % 05/08/24 0913   Wound Odor None 06/26/24 1044   Shape unable to view wound bed 06/26/24 1044   Tunneling? No 06/19/24 0924   Undermining? No 06/19/24 0924   Sinus Tracts? No 06/19/24 0924           ASSESSMENT AND PLAN:     Assessment   Assessment    Encounter Diagnosis  1. Right foot ulcer, with fat layer exposed ([facility])    2. Diabetic foot ulcer with osteomyelitis ([facility])    3. Type 2 diabetes mellitus with hyperglycemia, with long-term current use of insulin ([facility])    4. Hallux limitus of right foot    5. Infected wound        Problem List  Patient Active Problem List   Diagnosis    Type 2 diabetes mellitus with hyperglycemia, with long-term current use of insulin ([facility])    Anxiety    Neuropathy    Meralgia paresthetica of left side    Vitamin D deficiency    B12 deficiency    Diabetic neuropathy ([facility])    Hyperglycemia due to type 2 diabetes mellitus (HCC)    Morbid obesity ([facility])    Other specified fetal and placental problems affecting management of mother, antepartum    Primary hypercoagulable state (HCC)    Uncontrolled type 2 diabetes mellitus    Noncompliance with medication regimen    Ulcer of right foot, limited to breakdown of skin ([facility])    Hyponatremia    Foot ulcer, right, with fat layer exposed ([facility])    Therapeutic drug monitoring    FLORENCIA (acute kidney injury) ([facility])    Labial abscess    Cellulitis of labia    Hyperglycemia    Leukocytosis, unspecified type    Cellulitis and abscess of foot     Refusal of statin medication by patient         MANAGEMENT    PROCEDURES:    Cellular tissue product application Old surgical Right Toe (Comment which one)     Date/Time: 6/26/2024 11:23 AM     Performed by: Renee Foreman MD  Authorized by: Renee Foreman MD  Associated wounds:   Wound 05/08/24 #1 Right great toe amp site Toe (Comment which one) Right  Consent:     Consent obtained:  Verbal    Consent given by:  Patient    Risks discussed:  Bleeding, infection and pain    Alternatives discussed:  Alternative treatment  Procedure details:     Location:  head/hands/feet/digits/genitalia    Product applied:  Epifix    Product lot #:  [id number]    Product expiration:  1/1/2029    Amount used (cm^2):  6    Secured/Fixated: Yes      Secured/Fixated with:  Sorbact  Post-procedure details:     Patient tolerance of procedure:  Tolerated well, no immediate complications  Comments:      Dressed with 1 black foam and NPWT 125mmhg.       MEDICAL NECESSITY FOR SKIN SUBS.     Despite optimal treatment: management of co-morbidities DM and OM, edema control, off-loading, nutritional optimization with MVI and supplements, control of infection, and gold standard wound treatment wound continues to show delayed healing not meeting healing goals (10% current wound size per week).   To avoid further hospitalizations, infections, and loss of work days, this application is being performed in anticipation of staged, related and further necessary applications in attempt to achieve rapid wound closure.  Affixed by Non-Adherent, steri-strips and further secured with secondary dressing.  Amount of product wasted: none.  Patient tolerated procedure well without any complications, was instructed to leave non-adherent in place until next appointment and keep it dry.  Follow-up in 7 days.    Wound vac application after skin sub placement.           Patient Instructions     Wound Cleaning and Dressings:    Shower with protection.    DRESSINGS:epifix skin substitute/ wound vac on dorsal wound / HF transfer on plantar foot wound.   Change dressing 3 times a week    Compression Therapy : spandagrip E at all times  Compression Therapy Instructions:  1.  Put on first thing in the morning and may remove at bedside.  Okay to wear overnight      if comfortable.  Do not let stockings roll up/down and kink.  Hand wash stockings and      hang dry as needed.    2.  Avoid prolonged standing in one place.  It is better to have your calf muscles moving       to pump fluid out of the legs.    3.  Elevate leg(s) above the level of the heart when sitting or as much as possible.    4.  Take your diuretics as directed by your provider.  Do not skip doses or change doses      unless instructed to do so by your provider.    5. Do not get leg(s) with compression wrap wet. If wraps are too tight as indicated        By pain, numbness/tingling or discoloration of toes remove wrap completely       and call the   wound center.     Off-Loading:  Intense offloading of wounded area    Miscellaneous Instructions:  Supplement with a daily multivitamin   Low salt diet  Intense blood sugar control - Goal Blood sugar below 180 at all times recommended.  Increase protein intake / consider protein supplements - see below  Elevate extremities at all times when sitting / laying down.    DIETARY MODIFICATIONS TO HELP WITH WOUND HEALING:    Protein: Meats, beans, eggs, milk and yogurt particularly Greek yogurt), tofu, soy nuts, soy protein products    Vitamin C: Citrus fruits and juices, strawberries, tomatoes, tomato juice, peppers, baked potatoes, spinach, broccoli, cauliflower, [place] sprouts, cabbage    Vitamin A: Dark green, leafy vegetables, orange or yellow vegetables, cantaloupe, fortified dairy products, liver, fortified cereals    Zinc: Fortified cereals, red meats, seafood    Consider [place] [employer] (These are essential branch chain amino acids that help with  tissue building and wound healing) and take 2 packets/day. you can order online at [employer] or [employer]    ADDITIONAL REMINDERS:    The treatment plan has been discussed at length with you and your provider. Follow all instructions carefully, it is very important. If you do not follow all instructions, you are at  risk of your wound not healing, infection, possible loss of limb and even end of life.  Please call the clinic during regular business hours ( 7:30 AM - 5:30 PM) if you notice increased bleeding, redness, warmth, pain or pus like drainage or start running a fever greater than 100.3.    For after hour emergencies, please call your primary physician or go to the nearest emergency room.    Orders  Orders Placed This Encounter   Procedures    Cellular tissue product application Old surgical Right Toe (Comment which one)       Patient/Caregiver Education: There are no barriers to learning. Medical education for above diagnosis given.   Answered all questions.    Outcome: Patient verbalizes understanding. Patient is notified to call with any questions, complications, allergies, or worsening or changing symptoms.  Patient is to call with any side effects or complications as a result of the treatments today.      DOCUMENTATION OF TIME SPENT: Code selection for this visit was based on time spent : 40 min on date of service in preparing to see the patient, obtaining and/or reviewing separately obtained history, performing a medically appropriate examination, counseling and educating the patient/family/caregiver, ordering medications or testing, referring and communicating with other healthcare providers, documenting clinical information in the E HR, independently interpreting results and communicating results to the patient/family/caregiver and care coordination with the patient's other providers.    Followup: Return in about 1 week (around 7/3/2024) for Wound followup.      Note to Patient:  The 21st Century Cures Act  makes medical notes like these available to patients in the interest of transparency. However, be advised this is a medical document and is intended as [id number] communication; it is written in medical language and may appear blunt, direct, or contain abbreviations or verbiage that are unfamiliar. Medical documents are intended to carry relevant information, facts as evident, and the clinical opinion of the practitioner.    Also, please note that this report has been produced using speech recognition software and may contain errors related to that system including, but not limited to, errors in grammar, punctuation, and spelling, as well as words and phrases that possibly may have been recognized inappropriately.  If there are any questions or concerns, contact the dictating provider for clarification.      Renee Belcher MD  6/26/2024  11:28 AM

## 2024-06-26 NOTE — PROGRESS NOTES
Weekly Wound Education Note    Teaching Provided To: Patient  Training Topics: Cleasing and general instructions;Compression;Discharge instructions;Dressing;Edema control  Training Method: Explain/Verbal  Training Response: Patient responds and understands;Reinforcement needed        Notes: Stable. Epifix #2 applied, sorbact, 1 black foam. NPWT 125mmhg to right great toe amp site. Endoform, hydrofera transfer, gauze, vac drape. Orders faxed to . Pt brought in her [place] paperwork.

## 2024-07-03 ENCOUNTER — OFFICE VISIT (OUTPATIENT)
Dept: WOUND CARE | Facility: HOSPITAL | Age: 44
End: 2024-07-03
Attending: INTERNAL MEDICINE
Payer: COMMERCIAL

## 2024-07-03 VITALS
HEART RATE: 90 BPM | TEMPERATURE: 98 F | SYSTOLIC BLOOD PRESSURE: 116 MMHG | RESPIRATION RATE: 16 BRPM | DIASTOLIC BLOOD PRESSURE: 81 MMHG

## 2024-07-03 DIAGNOSIS — E11.621 DIABETIC FOOT ULCER WITH OSTEOMYELITIS (HCC): ICD-10-CM

## 2024-07-03 DIAGNOSIS — E11.69 DIABETIC FOOT ULCER WITH OSTEOMYELITIS (HCC): ICD-10-CM

## 2024-07-03 DIAGNOSIS — L08.9 INFECTED WOUND: ICD-10-CM

## 2024-07-03 DIAGNOSIS — L97.512 RIGHT FOOT ULCER, WITH FAT LAYER EXPOSED (HCC): Primary | ICD-10-CM

## 2024-07-03 DIAGNOSIS — T14.8XXA INFECTED WOUND: ICD-10-CM

## 2024-07-03 DIAGNOSIS — E11.65 TYPE 2 DIABETES MELLITUS WITH HYPERGLYCEMIA, WITH LONG-TERM CURRENT USE OF INSULIN (HCC): ICD-10-CM

## 2024-07-03 DIAGNOSIS — L97.509 DIABETIC FOOT ULCER WITH OSTEOMYELITIS (HCC): ICD-10-CM

## 2024-07-03 DIAGNOSIS — M20.5X1 HALLUX LIMITUS OF RIGHT FOOT: ICD-10-CM

## 2024-07-03 DIAGNOSIS — Z79.4 TYPE 2 DIABETES MELLITUS WITH HYPERGLYCEMIA, WITH LONG-TERM CURRENT USE OF INSULIN (HCC): ICD-10-CM

## 2024-07-03 DIAGNOSIS — E11.42 DIABETIC POLYNEUROPATHY ASSOCIATED WITH TYPE 2 DIABETES MELLITUS (HCC): ICD-10-CM

## 2024-07-03 DIAGNOSIS — M86.9 DIABETIC FOOT ULCER WITH OSTEOMYELITIS (HCC): ICD-10-CM

## 2024-07-03 LAB — GLUCOSE BLD-MCNC: 108 MG/DL (ref 70–99)

## 2024-07-03 PROCEDURE — 97597 DBRDMT OPN WND 1ST 20 CM/<: CPT | Performed by: INTERNAL MEDICINE

## 2024-07-03 PROCEDURE — 82962 GLUCOSE BLOOD TEST: CPT | Performed by: INTERNAL MEDICINE

## 2024-07-03 NOTE — PATIENT INSTRUCTIONS
VAC VACATION.     Wound Cleaning and Dressings:    Shower with protection.   VASHE SOAK X 15 MIN  DRESSINGS- topical gentamicin on both wounds    Change dressing 3 times a DAY    Compression Therapy : spandagrip E at all times  Compression Therapy Instructions:  1.  Put on first thing in the morning and may remove at bedside.  Okay to wear overnight      if comfortable.  Do not let stockings roll up/down and kink.  Hand wash stockings and      hang dry as needed.    2.  Avoid prolonged standing in one place.  It is better to have your calf muscles moving       to pump fluid out of the legs.    3.  Elevate leg(s) above the level of the heart when sitting or as much as possible.    4.  Take your diuretics as directed by your provider.  Do not skip doses or change doses      unless instructed to do so by your provider.    5. Do not get leg(s) with compression wrap wet. If wraps are too tight as indicated        By pain, numbness/tingling or discoloration of toes remove wrap completely       and call the   wound center.     Off-Loading:  Intense offloading of wounded area    Miscellaneous Instructions:  Supplement with a daily multivitamin   Low salt diet  Intense blood sugar control - Goal Blood sugar below 180 at all times recommended.  Increase protein intake / consider protein supplements - see below  Elevate extremities at all times when sitting / laying down.    DIETARY MODIFICATIONS TO HELP WITH WOUND HEALING:    Protein: Meats, beans, eggs, milk and yogurt particularly Greek yogurt), tofu, soy nuts, soy protein products    Vitamin C: Citrus fruits and juices, strawberries, tomatoes, tomato juice, peppers, baked potatoes, spinach, broccoli, cauliflower, [place] sprouts, cabbage    Vitamin A: Dark green, leafy vegetables, orange or yellow vegetables, cantaloupe, fortified dairy products, liver, fortified cereals    Zinc: Fortified cereals, red meats, seafood    Consider [place] [employer] (These are essential  branch chain amino acids that help with tissue building and wound healing) and take 2 packets/day. you can order online at [employer] or [employer]    ADDITIONAL REMINDERS:    The treatment plan has been discussed at length with you and your provider. Follow all instructions carefully, it is very important. If you do not follow all instructions, you are at  risk of your wound not healing, infection, possible loss of limb and even end of life.  Please call the clinic during regular business hours ( 7:30 AM - 5:30 PM) if you notice increased bleeding, redness, warmth, pain or pus like drainage or start running a fever greater than 100.3.    For after hour emergencies, please call your primary physician or go to the nearest emergency room.

## 2024-07-03 NOTE — PROGRESS NOTES
Patient ID: Leonie Denney is a 44 year old female.    Debridement Old surgical Right Toe (Comment which one)   Wound 05/08/24 #1 Right great toe amp site Toe (Comment which one) Right    Performed by: Renee Foreman MD  Authorized by: Renee Foreman MD      Consent   Consent obtained? verbal  Consent given by: patient  Risks discussed? procedural risks discussed    Debridement Details  Performed by: physician  Debridement type: conservative sharp  Pain control: lidocaine 4%  Pain control administration type: topical    Pre-debridement measurements  Length (cm): 1  Width (cm): 0.4  Depth (cm): 1.5  Surface Area (cm^2): 0.4    Post-debridement measurements  Length (cm): 1  Width (cm): 0.4  Depth (cm): 1.5  Percent debrided: 50%  Surface Area (cm^2): 0.4  Area Debrided (cm^2): 0.2  Volume (cm^3): 0.6    Devitalized tissue debrided: biofilm and slough  Instrument(s) utilized: curette  Bleeding: small  Hemostasis obtained with: pressure  Procedural pain (0-10): 1  Post-procedural pain: 0   Response to treatment: procedure was tolerated well

## 2024-07-03 NOTE — PROGRESS NOTES
[facility] PROGRESS NOTE  AD SHAH MD  7/3/2024    Chief Complaint:   Chief Complaint   Patient presents with    Wound Care     Patients is here for a follow up. Patients stated no issue with the wound vac. Wound was not turned on        HPI:   Subjective   Leonie Denney is a 44 year old female coming in for a follow-up visit.    HPI    Wound stable. Depth improved - all dimensions improved  Tissue quality also improved  There is mild odor - which cna happen with having wound vac on for a while.   She will benefit from a  vac break with a decolonizing topical agent.     Plantar wound does not connect with the dorsal foot wound anymore.     Review of Systems  Negative except HPI   Denies chest pain / SOB / palpitations  Denies fever.     Allergies  No Known Allergies    Current Meds:  Current Outpatient Medications   Medication Sig Dispense Refill    sulfamethoxazole-trimethoprim (BACTRIM) 400-80 MG Oral Tab Take 1 tablet by mouth 2 (two) times daily. 14 tablet 0    Empagliflozin (JARDIANCE) 25 MG Oral Tab Take 25 mg by mouth daily. 90 tablet 1    gentamicin 0.1 % External Ointment Apply 1 Application topically 3 (three) times daily. 30 g 3    acetaminophen 500 MG Oral Tab Take 1 tablet (500 mg total) by mouth every 4 (four) hours as needed for Fever (equal to or greater than 100.4). 50 tablet 0    HYDROcodone-acetaminophen 5-325 MG Oral Tab Take 1 tablet by mouth every 6 (six) hours as needed. 40 tablet 0    semaglutide (OZEMPIC, 2 MG/DOSE,) 8 MG/3ML Subcutaneous Solution Pen-injector Inject 2 mg into the skin once a week. 3 mL 0    Continuous Blood Gluc Sensor (DEXCOM G7 SENSOR) Does not apply Misc 1 each Every 10 days. 9 each 1    teraconazole 0.4 % Vaginal Cream Place vaginally every 4 (four) hours. AT BEDTIME           EXAM:   Objective   Objective    Physical Exam    Vital Signs  Vitals:    07/03/24 1056   BP: 116/81   Pulse: 90   Resp: 16   Temp: 97.8 °F (36.6 °C)       Wound  Assessment  Negative Pressure Wound Therapy Dorsal;Right (Active)   Wound photographed/measured Yes 06/26/24 1344   Machine Status (On) Yes 06/26/24 1344   Site Assessment Clean;Dry;Intact 06/26/24 1344   Wendy-wound Assessment Clean;Dry;Intact 06/26/24 1344   Unit Type I 06/26/24 1344   Dressing Type Black foam 06/26/24 1344   Number of Foam Pieces Used 1 06/26/24 1344   Cycle Continuous 06/26/24 1344   Target Pressure (mmHg) 125 06/26/24 1344   Canister Changed Yes 06/26/24 1344       Wound 05/08/24 #1 Right great toe amp site Toe (Comment which one) Right (Active)   Wound Image   07/03/24 1059   Drainage Amount Small 07/03/24 1059   Drainage Description Serosanguineous 07/03/24 1059   Treatments Compression;Wound Vac - Neg Pressure;Skin Substitutes 06/26/24 1046   Wound Vac Brand [facility] 06/26/24 1046   Wound Length (cm) 1 cm 07/03/24 1059   Wound Width (cm) 0.4 cm 07/03/24 1059   Wound Surface Area (cm^2) 0.4 cm^2 07/03/24 1059   Wound Depth (cm) 1.5 cm 07/03/24 1059   Wound Volume (cm^3) 0.6 cm^3 07/03/24 1059   Wound Healing % 75 07/03/24 1059   Margins Well-defined edges 07/03/24 1059   Non-staged Wound Description Full thickness 07/03/24 1059   Wendy-wound Assessment Edema;Maceration;Moist 07/03/24 1059   Wound Granulation Tissue Firm;Pink 07/03/24 1059   Wound Bed Granulation (%) 50 % 07/03/24 1059   Wound Bed Slough (%) 30 % 06/19/24 0922   Wound Bed Eschar (%) 95 % 05/08/24 0911   Wound Odor None 07/03/24 1059   Shape 50% adipose 07/03/24 1059   Tunneling? No 06/26/24 1046   Undermining? No 06/26/24 1046   Sinus Tracts? No 06/26/24 1046       Wound 05/08/24 #2 Right plantar foot Foot Right;Plantar (Active)   Wound Image   07/03/24 1100   Drainage Amount Scant 07/03/24 1100   Drainage Description Serosanguineous 07/03/24 1100   Treatments Compression;Wound Vac - Neg Pressure 06/26/24 1044   Wound Length (cm) 0.4 cm 07/03/24 1100   Wound Width (cm) 0.1 cm 07/03/24 1100   Wound Surface Area (cm^2) 0.04 cm^2  07/03/24 1100   Wound Depth (cm) 0.5 cm 07/03/24 1100   Wound Volume (cm^3) 0.02 cm^3 07/03/24 1100   Wound Healing % 98 07/03/24 1100   Margins Well-defined edges 07/03/24 1100   Non-staged Wound Description Full thickness 07/03/24 1100   Wendy-wound Assessment Edema;Dry 07/03/24 1100   Wound Granulation Tissue Pale Grey;Firm 07/03/24 1100   Wound Bed Granulation (%) 100 % 07/03/24 1100   Wound Bed Epithelium (%) 50 % 05/08/24 0913   Wound Bed Slough (%) 50 % 05/29/24 0904   Wound Bed Eschar (%) 50 % 05/08/24 0913   Wound Odor None 07/03/24 1100   Shape unable to view wound bed 06/26/24 1044   Tunneling? No 06/26/24 1044   Undermining? No 06/26/24 1044   Sinus Tracts? No 06/26/24 1044           ASSESSMENT AND PLAN:     Assessment   Assessment    Encounter Diagnosis  1. Right foot ulcer, with fat layer exposed ([facility])    2. Infected wound    3. Diabetic foot ulcer with osteomyelitis (HCC)    4. Type 2 diabetes mellitus with hyperglycemia, with long-term current use of insulin ([facility])    5. Hallux limitus of right foot    6. Diabetic polyneuropathy associated with type 2 diabetes mellitus (HCC)        Problem List  Patient Active Problem List   Diagnosis    Type 2 diabetes mellitus with hyperglycemia, with long-term current use of insulin (HCC)    Anxiety    Neuropathy    Meralgia paresthetica of left side    Vitamin D deficiency    B12 deficiency    Diabetic neuropathy (HCC)    Hyperglycemia due to type 2 diabetes mellitus (HCC)    Morbid obesity (HCC)    Other specified fetal and placental problems affecting management of mother, antepartum    Primary hypercoagulable state (HCC)    Uncontrolled type 2 diabetes mellitus    Noncompliance with medication regimen    Ulcer of right foot, limited to breakdown of skin (HCC)    Hyponatremia    Foot ulcer, right, with fat layer exposed ([facility])    Therapeutic drug monitoring    FLORENCIA (acute kidney injury) ([facility])    Labial abscess    Cellulitis of labia    Hyperglycemia     Leukocytosis, unspecified type    Cellulitis and abscess of foot    Refusal of statin medication by patient         MANAGEMENT    Vac vacation x 1 week.   Trial of topical gentamicin   W/o s/o worsening infection  Resume epifix and wound vac next week  Will check coverage for kerecis as well.   Return one week    PROCEDURES:    Debridement Old surgical Right Toe (Comment which one)   Wound 05/08/24 #1 Right great toe amp site Toe (Comment which one) Right     Performed by: Renee Foreman MD  Authorized by: Renee Foreman MD       Consent   Consent obtained? verbal  Consent given by: patient  Risks discussed? procedural risks discussed     Debridement Details  Performed by: physician  Debridement type: conservative sharp  Pain control: lidocaine 4%  Pain control administration type: topical     Pre-debridement measurements  Length (cm): 1  Width (cm): 0.4  Depth (cm): 1.5  Surface Area (cm^2): 0.4     Post-debridement measurements  Length (cm): 1  Width (cm): 0.4  Depth (cm): 1.5  Percent debrided: 50%  Surface Area (cm^2): 0.4  Area Debrided (cm^2): 0.2  Volume (cm^3): 0.6     Devitalized tissue debrided: biofilm and slough  Instrument(s) utilized: curette  Bleeding: small  Hemostasis obtained with: pressure  Procedural pain (0-10): 1  Post-procedural pain: 0   Response to treatment: procedure was tolerated well          Patient Instructions     VAC VACATION.     Wound Cleaning and Dressings:    Shower with protection.   VASHE SOAK X 15 MIN  DRESSINGS- topical gentamicin on both wounds    Change dressing 3 times a DAY    Compression Therapy : spandagrip E at all times  Compression Therapy Instructions:  1.  Put on first thing in the morning and may remove at bedside.  Okay to wear overnight      if comfortable.  Do not let stockings roll up/down and kink.  Hand wash stockings and      hang dry as needed.    2.  Avoid prolonged standing in one place.  It is better to have your calf muscles moving       to  pump fluid out of the legs.    3.  Elevate leg(s) above the level of the heart when sitting or as much as possible.    4.  Take your diuretics as directed by your provider.  Do not skip doses or change doses      unless instructed to do so by your provider.    5. Do not get leg(s) with compression wrap wet. If wraps are too tight as indicated        By pain, numbness/tingling or discoloration of toes remove wrap completely       and call the   wound center.     Off-Loading:  Intense offloading of wounded area    Miscellaneous Instructions:  Supplement with a daily multivitamin   Low salt diet  Intense blood sugar control - Goal Blood sugar below 180 at all times recommended.  Increase protein intake / consider protein supplements - see below  Elevate extremities at all times when sitting / laying down.    DIETARY MODIFICATIONS TO HELP WITH WOUND HEALING:    Protein: Meats, beans, eggs, milk and yogurt particularly Greek yogurt), tofu, soy nuts, soy protein products    Vitamin C: Citrus fruits and juices, strawberries, tomatoes, tomato juice, peppers, baked potatoes, spinach, broccoli, cauliflower, [place] sprouts, cabbage    Vitamin A: Dark green, leafy vegetables, orange or yellow vegetables, cantaloupe, fortified dairy products, liver, fortified cereals    Zinc: Fortified cereals, red meats, seafood    Consider [place] [employer] (These are essential branch chain amino acids that help with tissue building and wound healing) and take 2 packets/day. you can order online at [employer] or [email or web address]    ADDITIONAL REMINDERS:    The treatment plan has been discussed at length with you and your provider. Follow all instructions carefully, it is very important. If you do not follow all instructions, you are at  risk of your wound not healing, infection, possible loss of limb and even end of life.  Please call the clinic during regular business hours ( 7:30 AM - 5:30 PM) if you notice increased bleeding, redness, warmth, pain  or pus like drainage or start running a fever greater than 100.3.    For after hour emergencies, please call your primary physician or go to the nearest emergency room.    Orders  Orders Placed This Encounter   Procedures    Debridement Old surgical Right Toe (Comment which one)     Patient/Caregiver Education: There are no barriers to learning. Medical education for above diagnosis given.   Answered all questions.    Outcome: Patient verbalizes understanding. Patient is notified to call with any questions, complications, allergies, or worsening or changing symptoms.  Patient is to call with any side effects or complications as a result of the treatments today.      DOCUMENTATION OF TIME SPENT: Code selection for this visit was based on time spent : 35 min on date of service in preparing to see the patient, obtaining and/or reviewing separately obtained history, performing a medically appropriate examination, counseling and educating the patient/family/caregiver, ordering medications or testing, referring and communicating with other healthcare providers, documenting clinical information in the E HR, independently interpreting results and communicating results to the patient/family/caregiver and care coordination with the patient's other providers.    Followup: Return in about 1 week (around 7/10/2024) for Wound followup.      Note to Patient:  The 21st Century Cures Act makes medical notes like these available to patients in the interest of transparency. However, be advised this is a medical document and is intended as [id number]-[id number] communication; it is written in medical language and may appear blunt, direct, or contain abbreviations or verbiage that are unfamiliar. Medical documents are intended to carry relevant information, facts as evident, and the clinical opinion of the practitioner.    Also, please note that this report has been produced using speech recognition software and may contain errors related to  that system including, but not limited to, errors in grammar, punctuation, and spelling, as well as words and phrases that possibly may have been recognized inappropriately.  If there are any questions or concerns, contact the dictating provider for clarification.      Renee Belcher MD  7/3/2024  11:38 AM

## 2024-07-03 NOTE — PROGRESS NOTES
Weekly Wound Education Note    Teaching Provided To: Patient  Training Topics: Cleasing and general instructions;Discharge instructions;Dressing;Foot care  Training Method: Explain/Verbal  Training Response: Patient responds and understands;Reinforcement needed        Notes: Stable. Hold vac for 1 week. Gentamycin and bordered gauze to both wounds. Spandagrip E applied. Orders faxed to .

## 2024-07-10 ENCOUNTER — OFFICE VISIT (OUTPATIENT)
Dept: WOUND CARE | Facility: HOSPITAL | Age: 44
End: 2024-07-10
Attending: INTERNAL MEDICINE
Payer: COMMERCIAL

## 2024-07-10 VITALS
DIASTOLIC BLOOD PRESSURE: 88 MMHG | TEMPERATURE: 98 F | RESPIRATION RATE: 16 BRPM | SYSTOLIC BLOOD PRESSURE: 118 MMHG | HEART RATE: 94 BPM

## 2024-07-10 DIAGNOSIS — M86.9 DIABETIC FOOT ULCER WITH OSTEOMYELITIS (HCC): ICD-10-CM

## 2024-07-10 DIAGNOSIS — Z22.322 MRSA (METHICILLIN RESISTANT STAPHYLOCOCCUS AUREUS) COLONIZATION: ICD-10-CM

## 2024-07-10 DIAGNOSIS — Z79.4 TYPE 2 DIABETES MELLITUS WITH HYPERGLYCEMIA, WITH LONG-TERM CURRENT USE OF INSULIN (HCC): ICD-10-CM

## 2024-07-10 DIAGNOSIS — T14.8XXA INFECTED WOUND: ICD-10-CM

## 2024-07-10 DIAGNOSIS — L97.509 DIABETIC FOOT ULCER WITH OSTEOMYELITIS (HCC): ICD-10-CM

## 2024-07-10 DIAGNOSIS — E11.69 DIABETIC FOOT ULCER WITH OSTEOMYELITIS (HCC): ICD-10-CM

## 2024-07-10 DIAGNOSIS — L97.512 RIGHT FOOT ULCER, WITH FAT LAYER EXPOSED (HCC): Primary | ICD-10-CM

## 2024-07-10 DIAGNOSIS — E11.42 DIABETIC POLYNEUROPATHY ASSOCIATED WITH TYPE 2 DIABETES MELLITUS (HCC): ICD-10-CM

## 2024-07-10 DIAGNOSIS — M20.5X1 HALLUX LIMITUS OF RIGHT FOOT: ICD-10-CM

## 2024-07-10 DIAGNOSIS — L08.9 INFECTED WOUND: ICD-10-CM

## 2024-07-10 DIAGNOSIS — E11.621 DIABETIC FOOT ULCER WITH OSTEOMYELITIS (HCC): ICD-10-CM

## 2024-07-10 DIAGNOSIS — E11.65 TYPE 2 DIABETES MELLITUS WITH HYPERGLYCEMIA, WITH LONG-TERM CURRENT USE OF INSULIN (HCC): ICD-10-CM

## 2024-07-10 LAB — GLUCOSE BLD-MCNC: 150 MG/DL (ref 70–99)

## 2024-07-10 PROCEDURE — 82962 GLUCOSE BLOOD TEST: CPT | Performed by: INTERNAL MEDICINE

## 2024-07-10 PROCEDURE — 15275 SKIN SUB GRAFT FACE/NK/HF/G: CPT | Performed by: INTERNAL MEDICINE

## 2024-07-10 NOTE — PROGRESS NOTES
Weekly Wound Education Note    Teaching Provided To: Patient;Family  Training Topics: Cleasing and general instructions;Compression;Discharge instructions;Dressing;Edema control  Training Method: Explain/Verbal  Training Response: Patient responds and understands;Reinforcement needed        Notes: Slightly improved. Epifix #3 applied today, 1 steri-strip, silicone contact layer, hydrofera transfer, bordered gauze to great toe amp site. Plantar wound: triad paste and gauze. Orders faxed to . Per provider ok to return wound vac.

## 2024-07-10 NOTE — PROGRESS NOTES
Patient ID: Leonie Denney is a 44 year old female.    Cellular tissue product application Old surgical Right Toe (Comment which one)    Date/Time: 7/10/2024 10:28 AM    Performed by: Renee Foreman MD  Authorized by: Renee Foreman MD  Associated wounds:   Wound 05/08/24 #1 Right great toe amp site Toe (Comment which one) Right  Consent:     Consent obtained:  Verbal    Consent given by:  Patient    Risks discussed:  Bleeding, infection and pain    Alternatives discussed:  Alternative treatment  Procedure details:     Location:  head/hands/feet/digits/genitalia    Product applied:  Epifix    Product lot #:  [id number]    Product expiration:  12/1/2028    Amount used (cm^2):  4    Amount wasted (cm^2):  0    Reason for waste:  Size of wound    Secured/Fixated: Yes      Secured/Fixated with:  1 steri-strip, silicone contact layer  Post-procedure details:     Patient tolerance of procedure:  Tolerated well, no immediate complications  Comments:      Dressed with hydrofera transfer and bordered gauze

## 2024-07-10 NOTE — PATIENT INSTRUCTIONS
Continue to hod wound vac    Wound Cleaning and Dressings:    Shower with protection.   VASHE SOAK X 15 MIN  DRESSINGS-Epifix skin subs / steristrips / contact layer / HF transfer    Change dressing weekly / PRN    Compression Therapy : spandagrip E at all times  Compression Therapy Instructions:  1.  Put on first thing in the morning and may remove at bedside.  Okay to wear overnight      if comfortable.  Do not let stockings roll up/down and kink.  Hand wash stockings and      hang dry as needed.    2.  Avoid prolonged standing in one place.  It is better to have your calf muscles moving       to pump fluid out of the legs.    3.  Elevate leg(s) above the level of the heart when sitting or as much as possible.    4.  Take your diuretics as directed by your provider.  Do not skip doses or change doses      unless instructed to do so by your provider.    5. Do not get leg(s) with compression wrap wet. If wraps are too tight as indicated        By pain, numbness/tingling or discoloration of toes remove wrap completely       and call the   wound center.     Off-Loading:  Intense offloading of wounded area    Miscellaneous Instructions:  Supplement with a daily multivitamin   Low salt diet  Intense blood sugar control - Goal Blood sugar below 180 at all times recommended.  Increase protein intake / consider protein supplements - see below  Elevate extremities at all times when sitting / laying down.    DIETARY MODIFICATIONS TO HELP WITH WOUND HEALING:    Protein: Meats, beans, eggs, milk and yogurt particularly Greek yogurt), tofu, soy nuts, soy protein products    Vitamin C: Citrus fruits and juices, strawberries, tomatoes, tomato juice, peppers, baked potatoes, spinach, broccoli, cauliflower, [place] sprouts, cabbage    Vitamin A: Dark green, leafy vegetables, orange or yellow vegetables, cantaloupe, fortified dairy products, liver, fortified cereals    Zinc: Fortified cereals, red meats, seafood    Consider [place]  [employer] (These are essential branch chain amino acids that help with tissue building and wound healing) and take 2 packets/day. you can order online at [employer] or [employer]    ADDITIONAL REMINDERS:    The treatment plan has been discussed at length with you and your provider. Follow all instructions carefully, it is very important. If you do not follow all instructions, you are at  risk of your wound not healing, infection, possible loss of limb and even end of life.  Please call the clinic during regular business hours ( 7:30 AM - 5:30 PM) if you notice increased bleeding, redness, warmth, pain or pus like drainage or start running a fever greater than 100.3.    For after hour emergencies, please call your primary physician or go to the nearest emergency room.

## 2024-07-10 NOTE — PROGRESS NOTES
[facility] PROGRESS NOTE  AD SHAH MD  7/10/2024    Chief Complaint:   Chief Complaint   Patient presents with    Wound Care     Follow up for right foot wound. No complaints at this time.        HPI:   Subjective   Leonie Denney is a 44 year old female coming in for a follow-up visit.    HPI    Wound improved. Dimensions smaller. No s/o infeciton now  Completed a week of topical gentamicin.   Will resume epifix    Review of Systems  Negative except HPI   Denies chest pain / SOB / palpitations  Denies fever.     Allergies  No Known Allergies    Current Meds:  Current Outpatient Medications   Medication Sig Dispense Refill    sulfamethoxazole-trimethoprim (BACTRIM) 400-80 MG Oral Tab Take 1 tablet by mouth 2 (two) times daily. 14 tablet 0    Empagliflozin (JARDIANCE) 25 MG Oral Tab Take 25 mg by mouth daily. 90 tablet 1    gentamicin 0.1 % External Ointment Apply 1 Application topically 3 (three) times daily. 30 g 3    acetaminophen 500 MG Oral Tab Take 1 tablet (500 mg total) by mouth every 4 (four) hours as needed for Fever (equal to or greater than 100.4). 50 tablet 0    HYDROcodone-acetaminophen 5-325 MG Oral Tab Take 1 tablet by mouth every 6 (six) hours as needed. 40 tablet 0    semaglutide (OZEMPIC, 2 MG/DOSE,) 8 MG/3ML Subcutaneous Solution Pen-injector Inject 2 mg into the skin once a week. 3 mL 0    Continuous Blood Gluc Sensor (DEXCOM G7 SENSOR) Does not apply Misc 1 each Every 10 days. 9 each 1    teraconazole 0.4 % Vaginal Cream Place vaginally every 4 (four) hours. AT BEDTIME           EXAM:   Objective   Objective    Physical Exam    Vital Signs  Vitals:    07/10/24 0700   BP: 118/88   Pulse: 94   Resp: 16   Temp: 98.1 °F (36.7 °C)       Wound Assessment  Negative Pressure Wound Therapy Dorsal;Right (Active)   Wound photographed/measured Yes 06/26/24 1344   Machine Status (On) Yes 06/26/24 1344   Site Assessment Clean;Dry;Intact 06/26/24 1344   Wendy-wound Assessment Clean;Dry;Intact  06/26/24 1344   Unit Type I 06/26/24 1344   Dressing Type Black foam 06/26/24 1344   Number of Foam Pieces Used 1 06/26/24 1344   Cycle Continuous 06/26/24 1344   Target Pressure (mmHg) 125 06/26/24 1344   Canister Changed Yes 06/26/24 1344       Wound 05/08/24 #1 Right great toe amp site Toe (Comment which one) Right (Active)   Wound Image   07/10/24 1009   Drainage Amount Small 07/10/24 1009   Drainage Description Serosanguineous 07/10/24 1009   Treatments Compression 07/03/24 1059   Wound Vac Brand [facility] 06/26/24 1046   Wound Length (cm) 1 cm 07/10/24 1009   Wound Width (cm) 0.3 cm 07/10/24 1009   Wound Surface Area (cm^2) 0.3 cm^2 07/10/24 1009   Wound Depth (cm) 1 cm 07/10/24 1009   Wound Volume (cm^3) 0.3 cm^3 07/10/24 1009   Wound Healing % 87 07/10/24 1009   Margins Epibole (Rolled edges) 07/10/24 1009   Non-staged Wound Description Full thickness 07/10/24 1009   Wendy-wound Assessment Edema;Maceration;Moist 07/10/24 1009   Wound Granulation Tissue Pink;Pale Murrell;Firm 07/10/24 1009   Wound Bed Granulation (%) 90 % 07/10/24 1009   Wound Bed Slough (%) 10 % 07/10/24 1009   Wound Bed Eschar (%) 95 % 05/08/24 0911   Wound Odor None 07/10/24 1009   Shape 50% adipose 07/03/24 1059   Tunneling? No 07/03/24 1059   Undermining? No 07/03/24 1059   Sinus Tracts? No 07/03/24 1059       Wound 05/08/24 #2 Right plantar foot Foot Right;Plantar (Active)   Wound Image   07/10/24 1008   Drainage Amount Scant 07/10/24 1008   Drainage Description Serosanguineous 07/10/24 1008   Treatments Compression 07/03/24 1100   Wound Length (cm) 0.3 cm 07/10/24 1008   Wound Width (cm) 0.1 cm 07/10/24 1008   Wound Surface Area (cm^2) 0.03 cm^2 07/10/24 1008   Wound Depth (cm) 0.1 cm 07/10/24 1008   Wound Volume (cm^3) 0.003 cm^3 07/10/24 1008   Wound Healing % 100 07/10/24 1008   Margins Epibole (Rolled edges) 07/10/24 1008   Non-staged Wound Description Full thickness 07/10/24 1008   Wendy-wound Assessment Edema;Dry 07/10/24 1008   Wound  Granulation Tissue Pale Grey;Firm 07/10/24 1008   Wound Bed Granulation (%) 100 % 07/10/24 1008   Wound Bed Epithelium (%) 50 % 05/08/24 0913   Wound Bed Slough (%) 50 % 05/29/24 0904   Wound Bed Eschar (%) 50 % 05/08/24 0913   Wound Odor None 07/10/24 1008   Shape unable to view wound bed 06/26/24 1044   Tunneling? No 07/03/24 1100   Undermining? No 07/03/24 1100   Sinus Tracts? No 07/03/24 1100           ASSESSMENT AND PLAN:     Assessment   Assessment    Encounter Diagnosis  1. Right foot ulcer, with fat layer exposed (HCC)    2. Diabetic foot ulcer with osteomyelitis ([facility])    3. Infected wound    4. MRSA (methicillin resistant Staphylococcus aureus) colonization    5. Type 2 diabetes mellitus with hyperglycemia, with long-term current use of insulin ([facility])    6. Hallux limitus of right foot    7. Diabetic polyneuropathy associated with type 2 diabetes mellitus ([facility])        Problem List  Patient Active Problem List   Diagnosis    Type 2 diabetes mellitus with hyperglycemia, with long-term current use of insulin ([facility])    Anxiety    Neuropathy    Meralgia paresthetica of left side    Vitamin D deficiency    B12 deficiency    Diabetic neuropathy (HCC)    Hyperglycemia due to type 2 diabetes mellitus (HCC)    Morbid obesity (HCC)    Other specified fetal and placental problems affecting management of mother, antepartum    Primary hypercoagulable state (HCC)    Uncontrolled type 2 diabetes mellitus    Noncompliance with medication regimen    Ulcer of right foot, limited to breakdown of skin (HCC)    Hyponatremia    Foot ulcer, right, with fat layer exposed (HCC)    Therapeutic drug monitoring    FLORENCIA (acute kidney injury) (HCC)    Labial abscess    Cellulitis of labia    Hyperglycemia    Leukocytosis, unspecified type    Cellulitis and abscess of foot    Refusal of statin medication by patient         MANAGEMENT    Resume epifix.   Intense BS control   Offloading to continue  Return one week.     PROCEDURES:    Cellular  tissue product application Old surgical Right Toe (Comment which one)     Date/Time: 7/10/2024 10:28 AM     Performed by: Renee Foreman MD  Authorized by: Renee Foreman MD  Associated wounds:   Wound 05/08/24 #1 Right great toe amp site Toe (Comment which one) Right  Consent:     Consent obtained:  Verbal    Consent given by:  Patient    Risks discussed:  Bleeding, infection and pain    Alternatives discussed:  Alternative treatment  Procedure details:     Location:  head/hands/feet/digits/genitalia    Product applied:  Epifix    Product lot #:  [id number]    Product expiration:  12/1/2028    Amount used (cm^2):  4    Amount wasted (cm^2):  0    Reason for waste:  Size of wound    Secured/Fixated: Yes      Secured/Fixated with:  1 steri-strip, silicone contact layer  Post-procedure details:     Patient tolerance of procedure:  Tolerated well, no immediate complications  Comments:      Dressed with hydrofera transfer and bordered gauze      MEDICAL NECESSITY FOR SKIN SUBS.     Despite optimal treatment: management of co-morbidities DM and OM, edema control, off-loading, nutritional optimization with MVI and supplements, control of infection, and gold standard wound treatment wound continues to show delayed healing not meeting healing goals (10% current wound size per week).   To avoid further hospitalizations, infections, and loss of work days, this application is being performed in anticipation of staged, related and further necessary applications in attempt to achieve rapid wound closure.  Affixed by Non-Adherent, steri-strips and further secured with secondary dressing.  Amount of product wasted: none.  Patient tolerated procedure well without any complications, was instructed to leave non-adherent in place until next appointment and keep it dry.  Follow-up in 7 days.    Patient Instructions     Continue to hod wound vac    Wound Cleaning and Dressings:    Shower with protection.   PILY THOMPSON  X 15 MIN  DRESSINGS-Epifix skin subs / steristrips / contact layer / HF transfer    Change dressing weekly / PRN    Compression Therapy : spandagrip E at all times  Compression Therapy Instructions:  1.  Put on first thing in the morning and may remove at bedside.  Okay to wear overnight      if comfortable.  Do not let stockings roll up/down and kink.  Hand wash stockings and      hang dry as needed.    2.  Avoid prolonged standing in one place.  It is better to have your calf muscles moving       to pump fluid out of the legs.    3.  Elevate leg(s) above the level of the heart when sitting or as much as possible.    4.  Take your diuretics as directed by your provider.  Do not skip doses or change doses      unless instructed to do so by your provider.    5. Do not get leg(s) with compression wrap wet. If wraps are too tight as indicated        By pain, numbness/tingling or discoloration of toes remove wrap completely       and call the   wound center.     Off-Loading:  Intense offloading of wounded area    Miscellaneous Instructions:  Supplement with a daily multivitamin   Low salt diet  Intense blood sugar control - Goal Blood sugar below 180 at all times recommended.  Increase protein intake / consider protein supplements - see below  Elevate extremities at all times when sitting / laying down.    DIETARY MODIFICATIONS TO HELP WITH WOUND HEALING:    Protein: Meats, beans, eggs, milk and yogurt particularly Greek yogurt), tofu, soy nuts, soy protein products    Vitamin C: Citrus fruits and juices, strawberries, tomatoes, tomato juice, peppers, baked potatoes, spinach, broccoli, cauliflower, Palmer sprouts, cabbage    Vitamin A: Dark green, leafy vegetables, orange or yellow vegetables, cantaloupe, fortified dairy products, liver, fortified cereals    Zinc: Fortified cereals, red meats, seafood    Consider [place] [employer] (These are essential branch chain amino acids that help with tissue building and  wound healing) and take 2 packets/day. you can order online at [employer] or [employer]    ADDITIONAL REMINDERS:    The treatment plan has been discussed at length with you and your provider. Follow all instructions carefully, it is very important. If you do not follow all instructions, you are at  risk of your wound not healing, infection, possible loss of limb and even end of life.  Please call the clinic during regular business hours ( 7:30 AM - 5:30 PM) if you notice increased bleeding, redness, warmth, pain or pus like drainage or start running a fever greater than 100.3.    For after hour emergencies, please call your primary physician or go to the nearest emergency room.    Orders  Orders Placed This Encounter   Procedures    Cellular tissue product application Old surgical Right Toe (Comment which one)         Patient/Caregiver Education: There are no barriers to learning. Medical education for above diagnosis given.   Answered all questions.    Outcome: Patient verbalizes understanding. Patient is notified to call with any questions, complications, allergies, or worsening or changing symptoms.  Patient is to call with any side effects or complications as a result of the treatments today.      DOCUMENTATION OF TIME SPENT: Code selection for this visit was based on time spent : 40 min on date of service in preparing to see the patient, obtaining and/or reviewing separately obtained history, performing a medically appropriate examination, counseling and educating the patient/family/caregiver, ordering medications or testing, referring and communicating with other healthcare providers, documenting clinical information in the E HR, independently interpreting results and communicating results to the patient/family/caregiver and care coordination with the patient's other providers.    Followup: Return in about 1 week (around 7/17/2024) for Wound followup.      Note to Patient:  The 21st Century Cures Act makes medical  notes like these available to patients in the interest of transparency. However, be advised this is a medical document and is intended as [id number] communication; it is written in medical language and may appear blunt, direct, or contain abbreviations or verbiage that are unfamiliar. Medical documents are intended to carry relevant information, facts as evident, and the clinical opinion of the practitioner.    Also, please note that this report has been produced using speech recognition software and may contain errors related to that system including, but not limited to, errors in grammar, punctuation, and spelling, as well as words and phrases that possibly may have been recognized inappropriately.  If there are any questions or concerns, contact the dictating provider for clarification.      Renee Belcher MD  7/10/2024  10:26 AM

## 2024-07-16 ENCOUNTER — TELEPHONE (OUTPATIENT)
Dept: ENDOCRINOLOGY CLINIC | Facility: CLINIC | Age: 44
End: 2024-07-16

## 2024-07-16 DIAGNOSIS — E11.65 TYPE 2 DIABETES MELLITUS WITH HYPERGLYCEMIA, WITH LONG-TERM CURRENT USE OF INSULIN (HCC): Primary | ICD-10-CM

## 2024-07-16 DIAGNOSIS — Z79.4 TYPE 2 DIABETES MELLITUS WITH HYPERGLYCEMIA, WITH LONG-TERM CURRENT USE OF INSULIN (HCC): Primary | ICD-10-CM

## 2024-07-16 NOTE — TELEPHONE ENCOUNTER
Patient provided with Endo information.     Patient states she is checking her sugars every night she is testing at 124-135.    She states she is getting her BG sugars checked every week at wound clinic.    Patient has trouble ambulating, once that is resolved she will reach out to schedule with Endo.  
Pt cancelled her appt for today and I have not been able to review CGM since last visit as pt was not wearing. Please contact and refer to endocrinology MD to establish care.   
normal...

## 2024-07-17 ENCOUNTER — OFFICE VISIT (OUTPATIENT)
Dept: WOUND CARE | Facility: HOSPITAL | Age: 44
End: 2024-07-17
Attending: INTERNAL MEDICINE
Payer: COMMERCIAL

## 2024-07-17 VITALS
HEART RATE: 99 BPM | DIASTOLIC BLOOD PRESSURE: 78 MMHG | SYSTOLIC BLOOD PRESSURE: 117 MMHG | TEMPERATURE: 98 F | RESPIRATION RATE: 16 BRPM

## 2024-07-17 DIAGNOSIS — E11.69 TYPE 2 DIABETES MELLITUS WITH OBESITY (HCC): ICD-10-CM

## 2024-07-17 DIAGNOSIS — E11.621 DIABETIC FOOT ULCER WITH OSTEOMYELITIS (HCC): ICD-10-CM

## 2024-07-17 DIAGNOSIS — E66.9 TYPE 2 DIABETES MELLITUS WITH OBESITY (HCC): ICD-10-CM

## 2024-07-17 DIAGNOSIS — L08.9 INFECTED WOUND: ICD-10-CM

## 2024-07-17 DIAGNOSIS — E11.42 DIABETIC POLYNEUROPATHY ASSOCIATED WITH TYPE 2 DIABETES MELLITUS (HCC): ICD-10-CM

## 2024-07-17 DIAGNOSIS — T14.8XXA INFECTED WOUND: ICD-10-CM

## 2024-07-17 DIAGNOSIS — L97.509 DIABETIC FOOT ULCER WITH OSTEOMYELITIS (HCC): ICD-10-CM

## 2024-07-17 DIAGNOSIS — Z22.322 MRSA (METHICILLIN RESISTANT STAPHYLOCOCCUS AUREUS) COLONIZATION: ICD-10-CM

## 2024-07-17 DIAGNOSIS — L97.512 RIGHT FOOT ULCER, WITH FAT LAYER EXPOSED (HCC): Primary | ICD-10-CM

## 2024-07-17 DIAGNOSIS — E66.01 CLASS 2 SEVERE OBESITY DUE TO EXCESS CALORIES WITH SERIOUS COMORBIDITY AND BODY MASS INDEX (BMI) OF 36.0 TO 36.9 IN ADULT (HCC): ICD-10-CM

## 2024-07-17 DIAGNOSIS — M86.9 DIABETIC FOOT ULCER WITH OSTEOMYELITIS (HCC): ICD-10-CM

## 2024-07-17 DIAGNOSIS — M20.5X1 HALLUX LIMITUS OF RIGHT FOOT: ICD-10-CM

## 2024-07-17 DIAGNOSIS — E11.69 DIABETIC FOOT ULCER WITH OSTEOMYELITIS (HCC): ICD-10-CM

## 2024-07-17 LAB — GLUCOSE BLD-MCNC: 129 MG/DL (ref 70–99)

## 2024-07-17 PROCEDURE — 82962 GLUCOSE BLOOD TEST: CPT | Performed by: INTERNAL MEDICINE

## 2024-07-17 PROCEDURE — 15275 SKIN SUB GRAFT FACE/NK/HF/G: CPT | Performed by: INTERNAL MEDICINE

## 2024-07-17 NOTE — PROGRESS NOTES
[facility] PROGRESS NOTE  AD SHAH MD  7/17/2024    Chief Complaint:   Chief Complaint   Patient presents with    Wound Care     Patient here for follow up. She denies any new concerns or pain.       HPI:   Subjective   Leonie Denney is a 44 year old female coming in for a follow-up visit.    HPI    Dorsal foot Wound improved slightly - tissue filling in the wound base.   Doing well with epifix.   No s/o active infection.     Plantar foot wound seems closed to me - but staff thinks it maybe minimally open as there was a drop of drainage on the dressing.     Consider Kerecis  skin substitute.     Review of Systems  Negative except HPI   Denies chest pain / SOB / palpitations  Denies fever.     Allergies  No Known Allergies    Current Meds:  Current Outpatient Medications   Medication Sig Dispense Refill    sulfamethoxazole-trimethoprim (BACTRIM) 400-80 MG Oral Tab Take 1 tablet by mouth 2 (two) times daily. 14 tablet 0    Empagliflozin (JARDIANCE) 25 MG Oral Tab Take 25 mg by mouth daily. 90 tablet 1    gentamicin 0.1 % External Ointment Apply 1 Application topically 3 (three) times daily. 30 g 3    acetaminophen 500 MG Oral Tab Take 1 tablet (500 mg total) by mouth every 4 (four) hours as needed for Fever (equal to or greater than 100.4). 50 tablet 0    HYDROcodone-acetaminophen 5-325 MG Oral Tab Take 1 tablet by mouth every 6 (six) hours as needed. 40 tablet 0    semaglutide (OZEMPIC, 2 MG/DOSE,) 8 MG/3ML Subcutaneous Solution Pen-injector Inject 2 mg into the skin once a week. 3 mL 0    Continuous Blood Gluc Sensor (DEXCOM G7 SENSOR) Does not apply Misc 1 each Every 10 days. 9 each 1    teraconazole 0.4 % Vaginal Cream Place vaginally every 4 (four) hours. AT BEDTIME           EXAM:   Objective   Objective    Physical Exam    Vital Signs  Vitals:    07/17/24 0700   BP: 117/78   Pulse: 99   Resp: 16   Temp: 98 °F (36.7 °C)       Wound Assessment  Wound 05/08/24 #1 Right great toe amp site Toe  (Comment which one) Right (Active)   Wound Image   07/17/24 1014   Drainage Amount Small 07/17/24 1014   Drainage Description Serous;Yellow 07/17/24 1014   Treatments Compression 07/10/24 1009   Wound Vac Brand KCI 06/26/24 1046   Wound Length (cm) 1 cm 07/17/24 1014   Wound Width (cm) 0.1 cm 07/17/24 1014   Wound Surface Area (cm^2) 0.1 cm^2 07/17/24 1014   Wound Depth (cm) 1 cm 07/17/24 1014   Wound Volume (cm^3) 0.1 cm^3 07/17/24 1014   Wound Healing % 96 07/17/24 1014   Margins Epibole (Rolled edges) 07/17/24 1014   Non-staged Wound Description Full thickness 07/17/24 1014   Wendy-wound Assessment Edema;Maceration;Moist 07/17/24 1014   Wound Granulation Tissue Pale Grey;Firm 07/17/24 1014   Wound Bed Granulation (%) 100 % 07/17/24 1014   Wound Bed Slough (%) 10 % 07/10/24 1009   Wound Bed Eschar (%) 95 % 05/08/24 0911   Wound Odor None 07/17/24 1014   Shape 50% adipose 07/03/24 1059   Tunneling? No 07/03/24 1059   Undermining? No 07/03/24 1059   Sinus Tracts? No 07/03/24 1059       Wound 05/08/24 #2 Right plantar foot Foot Right;Plantar (Active)   Wound Image   07/17/24 1016   Drainage Amount Small 07/17/24 1016   Drainage Description Serous;Yellow 07/17/24 1016   Treatments Compression 07/10/24 1008   Wound Length (cm) 0.4 cm 07/17/24 1016   Wound Width (cm) 0.1 cm 07/17/24 1016   Wound Surface Area (cm^2) 0.04 cm^2 07/17/24 1016   Wound Depth (cm) 0.1 cm 07/17/24 1016   Wound Volume (cm^3) 0.004 cm^3 07/17/24 1016   Wound Healing % 100 07/17/24 1016   Margins Epibole (Rolled edges) 07/17/24 1016   Non-staged Wound Description Full thickness 07/17/24 1016   Wendy-wound Assessment Edema;Dry 07/17/24 1016   Wound Granulation Tissue Pale Grey;Firm 07/17/24 1016   Wound Bed Granulation (%) 100 % 07/17/24 1016   Wound Bed Epithelium (%) 50 % 05/08/24 0913   Wound Bed Slough (%) 50 % 05/29/24 0904   Wound Bed Eschar (%) 50 % 05/08/24 0913   Wound Odor None 07/17/24 1016   Shape unable to view wound bed 06/26/24 1045    Tunneling? No 07/10/24 1008   Undermining? No 07/10/24 1008   Sinus Tracts? No 07/10/24 1008           ASSESSMENT AND PLAN:     Assessment   Assessment    Encounter Diagnosis  1. Right foot ulcer, with fat layer exposed (HCC)    2. Diabetic foot ulcer with osteomyelitis (HCC)    3. Infected wound    4. MRSA (methicillin resistant Staphylococcus aureus) colonization    5. Hallux limitus of right foot    6. Diabetic polyneuropathy associated with type 2 diabetes mellitus ([facility])    7. Class 2 severe obesity due to excess calories with serious comorbidity and body mass index (BMI) of 36.0 to 36.9 in adult ([facility])    8. Type 2 diabetes mellitus with obesity ([facility])        Problem List  Patient Active Problem List   Diagnosis    Type 2 diabetes mellitus with hyperglycemia, with long-term current use of insulin ([facility])    Anxiety    Neuropathy    Meralgia paresthetica of left side    Vitamin D deficiency    B12 deficiency    Diabetic neuropathy (HCC)    Hyperglycemia due to type 2 diabetes mellitus (HCC)    Morbid obesity (HCC)    Other specified fetal and placental problems affecting management of mother, antepartum    Primary hypercoagulable state ([facility])    Uncontrolled type 2 diabetes mellitus    Noncompliance with medication regimen    Ulcer of right foot, limited to breakdown of skin (HCC)    Hyponatremia    Foot ulcer, right, with fat layer exposed (HCC)    Therapeutic drug monitoring    FLORENCIA (acute kidney injury) ([facility])    Labial abscess    Cellulitis of labia    Hyperglycemia    Leukocytosis, unspecified type    Cellulitis and abscess of foot    Refusal of statin medication by patient         MANAGEMENT    Dc wound vac - done  Continue epifix skin subs  Intense BS control  Maximal offloading  W/o s/o infection  Return one week    PROCEDURES:    Cellular tissue product application Old surgical Right Toe (Comment which one)     Date/Time: 7/17/2024 10:34 AM     Performed by: Renee Foreman MD  Authorized by: Simi  Renee BRINK MD  Associated wounds:   Wound 05/08/24 #1 Right great toe amp site Toe (Comment which one) Right  Consent:     Consent obtained:  Verbal    Consent given by:  Patient    Risks discussed:  Bleeding, infection and pain    Alternatives discussed:  Alternative treatment  Procedure details:     Location:  head/hands/feet/digits/genitalia    Product applied:  Epifix    Product lot #:  [id number]    Product expiration:  4/1/2029    Amount used (cm^2):  6    Amount wasted (cm^2):  0    Reason for waste:  Size of wound    Secured/Fixated: Yes      Secured/Fixated with:  1 steri-strip and silicone contact layer  Post-procedure details:     Patient tolerance of procedure:  Tolerated well, no immediate complications  Comments:      Dressed with hydrofera transfer and bordered gauze.       MEDICAL NECESSITY FOR SKIN SUBS.     Despite optimal treatment: management of co-morbidities DM and OM, edema control, off-loading, nutritional optimization with MVI and supplements, control of infection, and gold standard wound treatment wound continues to show delayed healing not meeting healing goals (10% current wound size per week).   To avoid further hospitalizations, infections, and loss of work days, this application is being performed in anticipation of staged, related and further necessary applications in attempt to achieve rapid wound closure.  Affixed by Non-Adherent, steri-strips and further secured with secondary dressing.  Amount of product wasted: none.  Patient tolerated procedure well without any complications, was instructed to leave non-adherent in place until next appointment and keep it dry.  Follow-up in 7 days.    Wound Cleaning and Dressings:    Shower with protection.   VASHE SOAK X 15 MIN  DRESSINGS-Epifix skin subs / steristrips / contact layer / HF transfer    Change dressing weekly / PRN    Compression Therapy : spandagrip E at all times  Compression Therapy Instructions:  1.  Put on first  thing in the morning and may remove at bedside.  Okay to wear overnight      if comfortable.  Do not let stockings roll up/down and kink.  Hand wash stockings and      hang dry as needed.    2.  Avoid prolonged standing in one place.  It is better to have your calf muscles moving       to pump fluid out of the legs.    3.  Elevate leg(s) above the level of the heart when sitting or as much as possible.    4.  Take your diuretics as directed by your provider.  Do not skip doses or change doses      unless instructed to do so by your provider.    5. Do not get leg(s) with compression wrap wet. If wraps are too tight as indicated        By pain, numbness/tingling or discoloration of toes remove wrap completely       and call the   wound center.     Off-Loading:  Intense offloading of wounded area    Miscellaneous Instructions:  Supplement with a daily multivitamin   Low salt diet  Intense blood sugar control - Goal Blood sugar below 180 at all times recommended.  Increase protein intake / consider protein supplements - see below  Elevate extremities at all times when sitting / laying down.    DIETARY MODIFICATIONS TO HELP WITH WOUND HEALING:    Protein: Meats, beans, eggs, milk and yogurt particularly Greek yogurt), tofu, soy nuts, soy protein products    Vitamin C: Citrus fruits and juices, strawberries, tomatoes, tomato juice, peppers, baked potatoes, spinach, broccoli, cauliflower, [place] sprouts, cabbage    Vitamin A: Dark green, leafy vegetables, orange or yellow vegetables, cantaloupe, fortified dairy products, liver, fortified cereals    Zinc: Fortified cereals, red meats, seafood    Consider [place] by [employer] (These are essential branch chain amino acids that help with tissue building and wound healing) and take 2 packets/day. you can order online at [employer] or [employer]    ADDITIONAL REMINDERS:    The treatment plan has been discussed at length with you and your provider. Follow all instructions carefully, it  is very important. If you do not follow all instructions, you are at  risk of your wound not healing, infection, possible loss of limb and even end of life.  Please call the clinic during regular business hours ( 7:30 AM - 5:30 PM) if you notice increased bleeding, redness, warmth, pain or pus like drainage or start running a fever greater than 100.3.    For after hour emergencies, please call your primary physician or go to the nearest emergency room.    Orders  Orders Placed This Encounter   Procedures    Cellular tissue product application Old surgical Right Toe (Comment which one)     Patient/Caregiver Education: There are no barriers to learning. Medical education for above diagnosis given.   Answered all questions.    Outcome: Patient verbalizes understanding. Patient is notified to call with any questions, complications, allergies, or worsening or changing symptoms.  Patient is to call with any side effects or complications as a result of the treatments today.      DOCUMENTATION OF TIME SPENT: Code selection for this visit was based on time spent : 40 min on date of service in preparing to see the patient, obtaining and/or reviewing separately obtained history, performing a medically appropriate examination, counseling and educating the patient/family/caregiver, ordering medications or testing, referring and communicating with other healthcare providers, documenting clinical information in the E HR, independently interpreting results and communicating results to the patient/family/caregiver and care coordination with the patient's other providers.    Followup: Return in about 1 week (around 7/24/2024) for Wound followup.      Note to Patient:  The 21st Century Cures Act makes medical notes like these available to patients in the interest of transparency. However, be advised this is a medical document and is intended as [id number]-le-[id number] communication; it is written in medical language and may appear blunt, direct,  or contain abbreviations or verbiage that are unfamiliar. Medical documents are intended to carry relevant information, facts as evident, and the clinical opinion of the practitioner.    Also, please note that this report has been produced using speech recognition software and may contain errors related to that system including, but not limited to, errors in grammar, punctuation, and spelling, as well as words and phrases that possibly may have been recognized inappropriately.  If there are any questions or concerns, contact the dictating provider for clarification.      Renee Belcher MD  7/17/2024  10:24 AM

## 2024-07-17 NOTE — PROGRESS NOTES
Patient ID: Leonie Denney is a 44 year old female.    Cellular tissue product application Old surgical Right Toe (Comment which one)    Date/Time: 7/17/2024 10:34 AM    Performed by: Renee Foreman MD  Authorized by: Renee Foreman MD  Associated wounds:   Wound 05/08/24 #1 Right great toe amp site Toe (Comment which one) Right  Consent:     Consent obtained:  Verbal    Consent given by:  Patient    Risks discussed:  Bleeding, infection and pain    Alternatives discussed:  Alternative treatment  Procedure details:     Location:  head/hands/feet/digits/genitalia    Product applied:  Epifix    Product lot #:  [id number]    Product expiration:  4/1/2029    Amount used (cm^2):  6    Amount wasted (cm^2):  0    Reason for waste:  Size of wound    Secured/Fixated: Yes      Secured/Fixated with:  1 steri-strip and silicone contact layer  Post-procedure details:     Patient tolerance of procedure:  Tolerated well, no immediate complications  Comments:      Dressed with hydrofera transfer and bordered gauze.

## 2024-07-17 NOTE — PATIENT INSTRUCTIONS
Dc wound vac - done  Continue epifix skin subs  Intense BS control  Maximal offloading  Return one week    Wound Cleaning and Dressings:    Shower with protection.   VASHE SOAK X 15 MIN  DRESSINGS-Epifix skin subs / steristrips / contact layer / HF transfer    Change dressing weekly / PRN    Compression Therapy : spandagrip E at all times  Compression Therapy Instructions:  1.  Put on first thing in the morning and may remove at bedside.  Okay to wear overnight      if comfortable.  Do not let stockings roll up/down and kink.  Hand wash stockings and      hang dry as needed.    2.  Avoid prolonged standing in one place.  It is better to have your calf muscles moving       to pump fluid out of the legs.    3.  Elevate leg(s) above the level of the heart when sitting or as much as possible.    4.  Take your diuretics as directed by your provider.  Do not skip doses or change doses      unless instructed to do so by your provider.    5. Do not get leg(s) with compression wrap wet. If wraps are too tight as indicated        By pain, numbness/tingling or discoloration of toes remove wrap completely       and call the   wound center.     Off-Loading:  Intense offloading of wounded area    Miscellaneous Instructions:  Supplement with a daily multivitamin   Low salt diet  Intense blood sugar control - Goal Blood sugar below 180 at all times recommended.  Increase protein intake / consider protein supplements - see below  Elevate extremities at all times when sitting / laying down.    DIETARY MODIFICATIONS TO HELP WITH WOUND HEALING:    Protein: Meats, beans, eggs, milk and yogurt particularly Greek yogurt), tofu, soy nuts, soy protein products    Vitamin C: Citrus fruits and juices, strawberries, tomatoes, tomato juice, peppers, baked potatoes, spinach, broccoli, cauliflower, [place] sprouts, cabbage    Vitamin A: Dark green, leafy vegetables, orange or yellow vegetables, cantaloupe, fortified dairy products, liver,  fortified cereals    Zinc: Fortified cereals, red meats, seafood    Consider [place] by [employer] (These are essential branch chain amino acids that help with tissue building and wound healing) and take 2 packets/day. you can order online at [employer] or [employer]    ADDITIONAL REMINDERS:    The treatment plan has been discussed at length with you and your provider. Follow all instructions carefully, it is very important. If you do not follow all instructions, you are at  risk of your wound not healing, infection, possible loss of limb and even end of life.  Please call the clinic during regular business hours ( 7:30 AM - 5:30 PM) if you notice increased bleeding, redness, warmth, pain or pus like drainage or start running a fever greater than 100.3.    For after hour emergencies, please call your primary physician or go to the nearest emergency room.

## 2024-07-17 NOTE — PROGRESS NOTES
Weekly Wound Education Note    Teaching Provided To: Patient;Family  Training Topics: Cleasing and general instructions;Compression;Discharge instructions;Dressing;Edema control;Off-loading;Nutrition;Skin substitute  Training Method: Explain/Verbal  Training Response: Patient responds and understands;Reinforcement needed        Notes: Slightly improved. Epifix #4 applied today, 1 steri-strip pull from plantar aspect of foot to dorsal, silicone contact layer, hydrofera transfer, bordered gauze, spandagrip E to right great toe amp site. triad paste and bordered gauze to right plantar foot wound. Continue to limit walking, wear CAM boot.

## 2024-07-24 ENCOUNTER — APPOINTMENT (OUTPATIENT)
Dept: WOUND CARE | Facility: HOSPITAL | Age: 44
End: 2024-07-24
Attending: INTERNAL MEDICINE
Payer: COMMERCIAL

## 2024-07-24 VITALS
DIASTOLIC BLOOD PRESSURE: 78 MMHG | HEART RATE: 105 BPM | SYSTOLIC BLOOD PRESSURE: 108 MMHG | RESPIRATION RATE: 16 BRPM | TEMPERATURE: 97 F

## 2024-07-24 DIAGNOSIS — L97.512 RIGHT FOOT ULCER, WITH FAT LAYER EXPOSED (HCC): Primary | ICD-10-CM

## 2024-07-24 DIAGNOSIS — L97.509 DIABETIC FOOT ULCER WITH OSTEOMYELITIS (HCC): ICD-10-CM

## 2024-07-24 DIAGNOSIS — E11.42 DIABETIC POLYNEUROPATHY ASSOCIATED WITH TYPE 2 DIABETES MELLITUS (HCC): ICD-10-CM

## 2024-07-24 DIAGNOSIS — E66.01 CLASS 2 SEVERE OBESITY DUE TO EXCESS CALORIES WITH SERIOUS COMORBIDITY AND BODY MASS INDEX (BMI) OF 36.0 TO 36.9 IN ADULT (HCC): ICD-10-CM

## 2024-07-24 DIAGNOSIS — E11.621 DIABETIC FOOT ULCER WITH OSTEOMYELITIS (HCC): ICD-10-CM

## 2024-07-24 DIAGNOSIS — M86.9 DIABETIC FOOT ULCER WITH OSTEOMYELITIS (HCC): ICD-10-CM

## 2024-07-24 DIAGNOSIS — L08.9 INFECTED WOUND: ICD-10-CM

## 2024-07-24 DIAGNOSIS — M20.5X1 HALLUX LIMITUS OF RIGHT FOOT: ICD-10-CM

## 2024-07-24 DIAGNOSIS — T14.8XXA INFECTED WOUND: ICD-10-CM

## 2024-07-24 DIAGNOSIS — E11.69 DIABETIC FOOT ULCER WITH OSTEOMYELITIS (HCC): ICD-10-CM

## 2024-07-24 DIAGNOSIS — Z22.322 MRSA (METHICILLIN RESISTANT STAPHYLOCOCCUS AUREUS) COLONIZATION: ICD-10-CM

## 2024-07-24 LAB — GLUCOSE BLD-MCNC: 200 MG/DL (ref 70–99)

## 2024-07-24 PROCEDURE — 97597 DBRDMT OPN WND 1ST 20 CM/<: CPT | Performed by: INTERNAL MEDICINE

## 2024-07-24 PROCEDURE — 82962 GLUCOSE BLOOD TEST: CPT | Performed by: INTERNAL MEDICINE

## 2024-07-24 RX ORDER — GENTAMICIN SULFATE 3 MG/ML
SOLUTION/ DROPS OPHTHALMIC
Qty: 15 ML | Refills: 2 | Status: SHIPPED | OUTPATIENT
Start: 2024-07-24

## 2024-07-24 NOTE — PATIENT INSTRUCTIONS
Trial of gentamicin to decolonize wound bed  Intense BS control  Maximal offloading  Return one week    Wound Cleaning and Dressings:    Shower with protection.   VASHE SOAK X 15 MIN  DRESSINGS- gentamicin topical.   Change dressing daily     Compression Therapy : spandagrip E at all times  Compression Therapy Instructions:  1.  Put on first thing in the morning and may remove at bedside.  Okay to wear overnight      if comfortable.  Do not let stockings roll up/down and kink.  Hand wash stockings and      hang dry as needed.    2.  Avoid prolonged standing in one place.  It is better to have your calf muscles moving       to pump fluid out of the legs.    3.  Elevate leg(s) above the level of the heart when sitting or as much as possible.    4.  Take your diuretics as directed by your provider.  Do not skip doses or change doses      unless instructed to do so by your provider.    5. Do not get leg(s) with compression wrap wet. If wraps are too tight as indicated        By pain, numbness/tingling or discoloration of toes remove wrap completely       and call the   wound center.     Off-Loading:  Intense offloading of wounded area    Miscellaneous Instructions:  Supplement with a daily multivitamin   Low salt diet  Intense blood sugar control - Goal Blood sugar below 180 at all times recommended.  Increase protein intake / consider protein supplements - see below  Elevate extremities at all times when sitting / laying down.    DIETARY MODIFICATIONS TO HELP WITH WOUND HEALING:    Protein: Meats, beans, eggs, milk and yogurt particularly Greek yogurt), tofu, soy nuts, soy protein products    Vitamin C: Citrus fruits and juices, strawberries, tomatoes, tomato juice, peppers, baked potatoes, spinach, broccoli, cauliflower, [place] sprouts, cabbage    Vitamin A: Dark green, leafy vegetables, orange or yellow vegetables, cantaloupe, fortified dairy products, liver, fortified cereals    Zinc: Fortified cereals, red  meats, seafood    Consider [place] by [employer] (These are essential branch chain amino acids that help with tissue building and wound healing) and take 2 packets/day. you can order online at [employer] or [employer]    ADDITIONAL REMINDERS:    The treatment plan has been discussed at length with you and your provider. Follow all instructions carefully, it is very important. If you do not follow all instructions, you are at  risk of your wound not healing, infection, possible loss of limb and even end of life.  Please call the clinic during regular business hours ( 7:30 AM - 5:30 PM) if you notice increased bleeding, redness, warmth, pain or pus like drainage or start running a fever greater than 100.3.    For after hour emergencies, please call your primary physician or go to the nearest emergency room.

## 2024-07-24 NOTE — PROGRESS NOTES
Weekly Wound Education Note    Teaching Provided To: Patient  Training Topics: Cleasing and general instructions;Discharge instructions;Dressing;Off-loading;Compression;Edema control  Training Method: Explain/Verbal  Training Response: Patient responds and understands;Reinforcement needed        Notes: Stable. Gentamycin drops ordered. Alessia, hydrofera transfer, bordered gauze, spandagrip E. Pt educated on dressings. Pt returned to work but is in a wheelchair and elevating leg as much as possible.

## 2024-07-24 NOTE — PROGRESS NOTES
Patient ID: Leonie Denney is a 44 year old female.    Debridement Diabetic Ulcer Right Toe (Comment which one)   Wound 05/08/24 #1 Right great toe amp site Toe (Comment which one) Right    Performed by: Renee Foreman MD  Authorized by: Renee Foreman MD      Consent   Consent obtained? verbal  Consent given by: patient  Risks discussed? procedural risks discussed    Debridement Details  Performed by: physician  Debridement type: conservative sharp  Pain control: lidocaine 4%  Pain control administration type: topical    Pre-debridement measurements  Length (cm): 1  Width (cm): 0.2  Depth (cm): 0.9  Surface Area (cm^2): 0.2    Post-debridement measurements  Length (cm): 1  Width (cm): 0.2  Depth (cm): 0.9  Percent debrided: 100%  Surface Area (cm^2): 0.2  Area Debrided (cm^2): 0.2  Volume (cm^3): 0.18    Devitalized tissue debrided: biofilm and necrotic debris  Instrument(s) utilized: curette  Bleeding: small  Hemostasis obtained with: pressure  Procedural pain (0-10): 0  Post-procedural pain: 0   Response to treatment: procedure was tolerated well

## 2024-07-24 NOTE — PROGRESS NOTES
[facility] PROGRESS NOTE  AD SHAH MD  7/24/2024    Chief Complaint:   Chief Complaint   Patient presents with    Wound Care     Follow up for right foot wounds. No complaints at this time.        HPI:   Subjective   Leonie Denney is a 44 year old female coming in for a follow-up visit.    HPI    Wound stalled - not much response to skin substitutes - depth persists.   She says there is drainage from plantar ulcer however it looks closed.  Epifix # 4 last week.   No s/o active infection but minimal odor - which could be from colonization as well.     Review of Systems  Negative except HPI   Denies chest pain / SOB / palpitations  Denies fever.     Allergies  No Known Allergies    Current Meds:  Current Outpatient Medications   Medication Sig Dispense Refill    gentamicin 0.3 % Ophthalmic Solution 2 drops into affected area topically TID 15 mL 2    sulfamethoxazole-trimethoprim (BACTRIM) 400-80 MG Oral Tab Take 1 tablet by mouth 2 (two) times daily. 14 tablet 0    Empagliflozin (JARDIANCE) 25 MG Oral Tab Take 25 mg by mouth daily. 90 tablet 1    gentamicin 0.1 % External Ointment Apply 1 Application topically 3 (three) times daily. 30 g 3    acetaminophen 500 MG Oral Tab Take 1 tablet (500 mg total) by mouth every 4 (four) hours as needed for Fever (equal to or greater than 100.4). 50 tablet 0    HYDROcodone-acetaminophen 5-325 MG Oral Tab Take 1 tablet by mouth every 6 (six) hours as needed. 40 tablet 0    semaglutide (OZEMPIC, 2 MG/DOSE,) 8 MG/3ML Subcutaneous Solution Pen-injector Inject 2 mg into the skin once a week. 3 mL 0    Continuous Blood Gluc Sensor (DEXCOM G7 SENSOR) Does not apply Misc 1 each Every 10 days. 9 each 1    teraconazole 0.4 % Vaginal Cream Place vaginally every 4 (four) hours. AT BEDTIME           EXAM:   Objective   Objective    Physical Exam    Vital Signs  Vitals:    07/24/24 1100   BP: 108/78   Pulse: 105   Resp: 16   Temp: 97.3 °F (36.3 °C)       Wound Assessment  Wound  05/08/24 #1 Right great toe amp site Toe (Comment which one) Right (Active)   Wound Image   07/24/24 1322   Drainage Amount Small 07/24/24 1308   Drainage Description Serous;Yellow 07/24/24 1308   Treatments Compression;Skin Substitutes;Shoe 07/17/24 1014   Shoe Type CAM 07/17/24 1014   Wound Vac Brand KCI 06/26/24 1046   Wound Length (cm) 1 cm 07/24/24 1308   Wound Width (cm) 0.2 cm 07/24/24 1308   Wound Surface Area (cm^2) 0.2 cm^2 07/24/24 1308   Wound Depth (cm) 0.9 cm 07/24/24 1308   Wound Volume (cm^3) 0.18 cm^3 07/24/24 1308   Wound Healing % 92 07/24/24 1308   Margins Epibole (Rolled edges) 07/24/24 1308   Non-staged Wound Description Full thickness 07/24/24 1308   Wendy-wound Assessment Edema;Maceration;Moist 07/24/24 1308   Wound Granulation Tissue Pale Grey;Firm 07/17/24 1014   Wound Bed Granulation (%) 100 % 07/17/24 1014   Wound Bed Slough (%) 10 % 07/10/24 1009   Wound Bed Eschar (%) 95 % 05/08/24 0911   Wound Odor None 07/24/24 1308   Shape 100% integrating Epifix 07/24/24 1308   Tunneling? No 07/17/24 1014   Undermining? No 07/17/24 1014   Sinus Tracts? No 07/17/24 1014   Remy Scale Grade 2 07/24/24 1308       Wound 05/08/24 #2 Right plantar foot Foot Right;Plantar (Active)   Wound Image   07/24/24 1310   Drainage Amount Small 07/24/24 1310   Drainage Description Serous;Yellow 07/24/24 1310   Treatments Compression 07/17/24 1016   Wound Length (cm) 0.7 cm 07/24/24 1310   Wound Width (cm) 0.1 cm 07/24/24 1310   Wound Surface Area (cm^2) 0.07 cm^2 07/24/24 1310   Wound Depth (cm) 0.1 cm 07/24/24 1310   Wound Volume (cm^3) 0.007 cm^3 07/24/24 1310   Wound Healing % 99 07/24/24 1310   Margins Well-defined edges 07/24/24 1310   Non-staged Wound Description Full thickness 07/24/24 1310   Wendy-wound Assessment Edema;Dry 07/24/24 1310   Wound Granulation Tissue Pink;Firm 07/24/24 1310   Wound Bed Granulation (%) 100 % 07/24/24 1310   Wound Bed Epithelium (%) 50 % 05/08/24 0913   Wound Bed Slough (%) 50 %  05/29/24 0904   Wound Bed Eschar (%) 50 % 05/08/24 0913   Wound Odor None 07/24/24 1310   Shape unable to view wound bed 06/26/24 1044   Tunneling? No 07/17/24 1016   Undermining? No 07/17/24 1016   Sinus Tracts? No 07/17/24 1016   Remy Scale Grade 2 07/24/24 1310           ASSESSMENT AND PLAN:     Assessment     Encounter Diagnosis  1. Right foot ulcer, with fat layer exposed ([facility])    2. Diabetic foot ulcer with osteomyelitis ([facility])    3. Infected wound    4. MRSA (methicillin resistant Staphylococcus aureus) colonization    5. Diabetic polyneuropathy associated with type 2 diabetes mellitus ([facility])    6. Class 2 severe obesity due to excess calories with serious comorbidity and body mass index (BMI) of 36.0 to 36.9 in adult ([facility])    7. Hallux limitus of right foot            PROCEDURES:    Debridement Diabetic Ulcer Right Toe (Comment which one)   Wound 05/08/24 #1 Right great toe amp site Toe (Comment which one) Right     Performed by: Renee Foreman MD  Authorized by: Renee Foreman MD       Consent   Consent obtained? verbal  Consent given by: patient  Risks discussed? procedural risks discussed     Debridement Details  Performed by: physician  Debridement type: conservative sharp  Pain control: lidocaine 4%  Pain control administration type: topical     Pre-debridement measurements  Length (cm): 1  Width (cm): 0.2  Depth (cm): 0.9  Surface Area (cm^2): 0.2     Post-debridement measurements  Length (cm): 1  Width (cm): 0.2  Depth (cm): 0.9  Percent debrided: 100%  Surface Area (cm^2): 0.2  Area Debrided (cm^2): 0.2  Volume (cm^3): 0.18     Devitalized tissue debrided: biofilm and necrotic debris  Instrument(s) utilized: curette  Bleeding: small  Hemostasis obtained with: pressure  Procedural pain (0-10): 0  Post-procedural pain: 0   Response to treatment: procedure was tolerated well           PLAN OF CARE:    One week of gentamicin drops to de-colonize wound bed  Also start collagen on wound bed -  cover with HF transfer.   Watch out for signs of early infection - counseled.   Plan of care discussed with patient in detail - All questions answered   Return in one week.     Orders  Orders Placed This Encounter   Procedures    Debridement Diabetic Ulcer Right Toe (Comment which one)       Meds & Refills for this Visit:  Requested Prescriptions     Signed Prescriptions Disp Refills    gentamicin 0.3 % Ophthalmic Solution 15 mL 2     Si drops into affected area topically TID         Patient Instructions     Trial of gentamicin to decolonize wound bed  Intense BS control  Maximal offloading  Return one week    Wound Cleaning and Dressings:    Shower with protection.   VASHE SOAK X 15 MIN  DRESSINGS- gentamicin topical.   Change dressing daily     Compression Therapy : spandagrip E at all times  Compression Therapy Instructions:  1.  Put on first thing in the morning and may remove at bedside.  Okay to wear overnight      if comfortable.  Do not let stockings roll up/down and kink.  Hand wash stockings and      hang dry as needed.    2.  Avoid prolonged standing in one place.  It is better to have your calf muscles moving       to pump fluid out of the legs.    3.  Elevate leg(s) above the level of the heart when sitting or as much as possible.    4.  Take your diuretics as directed by your provider.  Do not skip doses or change doses      unless instructed to do so by your provider.    5. Do not get leg(s) with compression wrap wet. If wraps are too tight as indicated        By pain, numbness/tingling or discoloration of toes remove wrap completely       and call the   wound center.     Off-Loading:  Intense offloading of wounded area    Miscellaneous Instructions:  Supplement with a daily multivitamin   Low salt diet  Intense blood sugar control - Goal Blood sugar below 180 at all times recommended.  Increase protein intake / consider protein supplements - see below  Elevate extremities at all times when  sitting / laying down.    DIETARY MODIFICATIONS TO HELP WITH WOUND HEALING:    Protein: Meats, beans, eggs, milk and yogurt particularly Greek yogurt), tofu, soy nuts, soy protein products    Vitamin C: Citrus fruits and juices, strawberries, tomatoes, tomato juice, peppers, baked potatoes, spinach, broccoli, cauliflower, [place] sprouts, cabbage    Vitamin A: Dark green, leafy vegetables, orange or yellow vegetables, cantaloupe, fortified dairy products, liver, fortified cereals    Zinc: Fortified cereals, red meats, seafood    Consider [place] [employer] (These are essential branch chain amino acids that help with tissue building and wound healing) and take 2 packets/day. you can order online at [employer] or [employer]    ADDITIONAL REMINDERS:    The treatment plan has been discussed at length with you and your provider. Follow all instructions carefully, it is very important. If you do not follow all instructions, you are at  risk of your wound not healing, infection, possible loss of limb and even end of life.  Please call the clinic during regular business hours ( 7:30 AM - 5:30 PM) if you notice increased bleeding, redness, warmth, pain or pus like drainage or start running a fever greater than 100.3.    For after hour emergencies, please call your primary physician or go to the nearest emergency room.      Patient/Caregiver Education: There are no barriers to learning. Medical education for above diagnosis given.   Answered all questions.    Outcome: Patient verbalizes understanding. Patient is notified to call with any questions, complications, allergies, or worsening or changing symptoms.  Patient is to call with any side effects or complications as a result of the treatments today.      DOCUMENTATION OF TIME SPENT: Code selection for this visit was based on time spent : 30 min on date of service in preparing to see the patient, obtaining and/or reviewing separately obtained history, performing a medically  appropriate examination, counseling and educating the patient/family/caregiver, ordering medications or testing, referring and communicating with other healthcare providers, documenting clinical information in the E HR, independently interpreting results and communicating results to the patient/family/caregiver and care coordination with the patient's other providers.    Followup: Return in about 1 week (around 7/31/2024) for Wound followup.      Note to Patient:  The 21st Century Cures Act makes medical notes like these available to patients in the interest of transparency. However, be advised this is a medical document and is intended as [id number] communication; it is written in medical language and may appear blunt, direct, or contain abbreviations or verbiage that are unfamiliar. Medical documents are intended to carry relevant information, facts as evident, and the clinical opinion of the practitioner.    Also, please note that this report has been produced using speech recognition software and may contain errors related to that system including, but not limited to, errors in grammar, punctuation, and spelling, as well as words and phrases that possibly may have been recognized inappropriately.  If there are any questions or concerns, contact the dictating provider for clarification.      Renee Belcher MD  7/24/2024  1:42 PM

## 2024-07-31 ENCOUNTER — OFFICE VISIT (OUTPATIENT)
Dept: WOUND CARE | Facility: HOSPITAL | Age: 44
End: 2024-07-31
Attending: INTERNAL MEDICINE
Payer: COMMERCIAL

## 2024-07-31 VITALS
HEART RATE: 99 BPM | TEMPERATURE: 98 F | RESPIRATION RATE: 16 BRPM | DIASTOLIC BLOOD PRESSURE: 79 MMHG | SYSTOLIC BLOOD PRESSURE: 118 MMHG

## 2024-07-31 DIAGNOSIS — E11.621 DIABETIC FOOT ULCER WITH OSTEOMYELITIS (HCC): ICD-10-CM

## 2024-07-31 DIAGNOSIS — T14.8XXA INFECTED WOUND: ICD-10-CM

## 2024-07-31 DIAGNOSIS — E66.01 CLASS 2 SEVERE OBESITY DUE TO EXCESS CALORIES WITH SERIOUS COMORBIDITY AND BODY MASS INDEX (BMI) OF 36.0 TO 36.9 IN ADULT (HCC): ICD-10-CM

## 2024-07-31 DIAGNOSIS — Z22.322 MRSA (METHICILLIN RESISTANT STAPHYLOCOCCUS AUREUS) COLONIZATION: ICD-10-CM

## 2024-07-31 DIAGNOSIS — L08.9 INFECTED WOUND: ICD-10-CM

## 2024-07-31 DIAGNOSIS — M86.9 DIABETIC FOOT ULCER WITH OSTEOMYELITIS (HCC): ICD-10-CM

## 2024-07-31 DIAGNOSIS — L97.512 RIGHT FOOT ULCER, WITH FAT LAYER EXPOSED (HCC): Primary | ICD-10-CM

## 2024-07-31 DIAGNOSIS — E11.69 DIABETIC FOOT ULCER WITH OSTEOMYELITIS (HCC): ICD-10-CM

## 2024-07-31 DIAGNOSIS — E11.42 DIABETIC POLYNEUROPATHY ASSOCIATED WITH TYPE 2 DIABETES MELLITUS (HCC): ICD-10-CM

## 2024-07-31 DIAGNOSIS — L97.509 DIABETIC FOOT ULCER WITH OSTEOMYELITIS (HCC): ICD-10-CM

## 2024-07-31 DIAGNOSIS — M20.5X1 HALLUX LIMITUS OF RIGHT FOOT: ICD-10-CM

## 2024-07-31 LAB — GLUCOSE BLD-MCNC: 186 MG/DL (ref 70–99)

## 2024-07-31 PROCEDURE — 82962 GLUCOSE BLOOD TEST: CPT | Performed by: INTERNAL MEDICINE

## 2024-07-31 PROCEDURE — 29581 APPL MULTLAYER CMPRN SYS LEG: CPT

## 2024-07-31 NOTE — PATIENT INSTRUCTIONS
Resume HH - need dressing changes 3 times a week  Continue topical gentamicin to decolonize wound bed  Intense BS control  Maximal offloading- switch to [employer] shoe.   Return 2 weeks    Wound Cleaning and Dressings:    Shower with protection.   VASHE SOAK X 15 MIN  DRESSINGS- gentamicin topical  / collagen / HF transfer /under wraps.   Change dressing 3 times a week    Compression Therapy : coflex-2  Compression Therapy Instructions:  1.  Put on first thing in the morning and may remove at bedside.  Okay to wear overnight      if comfortable.  Do not let stockings roll up/down and kink.  Hand wash stockings and      hang dry as needed.    2.  Avoid prolonged standing in one place.  It is better to have your calf muscles moving       to pump fluid out of the legs.    3.  Elevate leg(s) above the level of the heart when sitting or as much as possible.    4.  Take your diuretics as directed by your provider.  Do not skip doses or change doses      unless instructed to do so by your provider.    5. Do not get leg(s) with compression wrap wet. If wraps are too tight as indicated        By pain, numbness/tingling or discoloration of toes remove wrap completely       and call the   wound center.     Off-Loading:  Intense offloading of wounded area    Miscellaneous Instructions:  Supplement with a daily multivitamin   Low salt diet  Intense blood sugar control - Goal Blood sugar below 180 at all times recommended.  Increase protein intake / consider protein supplements - see below  Elevate extremities at all times when sitting / laying down.    DIETARY MODIFICATIONS TO HELP WITH WOUND HEALING:    Protein: Meats, beans, eggs, milk and yogurt particularly Greek yogurt), tofu, soy nuts, soy protein products    Vitamin C: Citrus fruits and juices, strawberries, tomatoes, tomato juice, peppers, baked potatoes, spinach, broccoli, cauliflower, [place] sprouts, cabbage    Vitamin A: Dark green, leafy vegetables, orange or yellow  vegetables, cantaloupe, fortified dairy products, liver, fortified cereals    Zinc: Fortified cereals, red meats, seafood    Consider [place] [employer] (These are essential branch chain amino acids that help with tissue building and wound healing) and take 2 packets/day. you can order online at [employer] or [employer]    ADDITIONAL REMINDERS:    The treatment plan has been discussed at length with you and your provider. Follow all instructions carefully, it is very important. If you do not follow all instructions, you are at  risk of your wound not healing, infection, possible loss of limb and even end of life.  Please call the clinic during regular business hours ( 7:30 AM - 5:30 PM) if you notice increased bleeding, redness, warmth, pain or pus like drainage or start running a fever greater than 100.3.    For after hour emergencies, please call your primary physician or go to the nearest emergency room.

## 2024-07-31 NOTE — PROGRESS NOTES
Weekly Wound Education Note    Teaching Provided To: Patient  Training Topics: Cleasing and general instructions;Compression;Discharge instructions;Dressing;Edema control  Training Method: Explain/Verbal  Training Response: Patient responds and understands;Reinforcement needed        Notes: Stable. Gentamycin drops, billy, hydrofera transfer, ABD pad, coflex 2 30-40mmhg. educated on compression wrap and information provided. Orders faxed to HH and 1 wrap kit sent with patient for first HH visit.

## 2024-07-31 NOTE — PROGRESS NOTES
[facility] PROGRESS NOTE  AD SHAH MD  7/31/2024    Chief Complaint:   Chief Complaint   Patient presents with    Wound Care     Pt here for follow up wound care visit to right plantar foot and right great toe. Pt denies any concerns or issues, pt denies any pain in wounds.        HPI:   Subjective   Leonie Denney is a 44 year old female coming in for a follow-up visit.    HPI    Wound stable - no malodor - dimensions minimally improved.   Will try compression wraps before trying skin subs again.   Will change to darco shoe instead of CAM boot.  Discussed offloading in detail.   No s/o infection today    Review of Systems  Negative except HPI   Denies chest pain / SOB / palpitations  Denies fever.     Allergies  No Known Allergies    Current Meds:  Current Outpatient Medications   Medication Sig Dispense Refill    gentamicin 0.3 % Ophthalmic Solution 2 drops into affected area topically TID 15 mL 2    sulfamethoxazole-trimethoprim (BACTRIM) 400-80 MG Oral Tab Take 1 tablet by mouth 2 (two) times daily. 14 tablet 0    Empagliflozin (JARDIANCE) 25 MG Oral Tab Take 25 mg by mouth daily. 90 tablet 1    gentamicin 0.1 % External Ointment Apply 1 Application topically 3 (three) times daily. 30 g 3    acetaminophen 500 MG Oral Tab Take 1 tablet (500 mg total) by mouth every 4 (four) hours as needed for Fever (equal to or greater than 100.4). 50 tablet 0    HYDROcodone-acetaminophen 5-325 MG Oral Tab Take 1 tablet by mouth every 6 (six) hours as needed. 40 tablet 0    semaglutide (OZEMPIC, 2 MG/DOSE,) 8 MG/3ML Subcutaneous Solution Pen-injector Inject 2 mg into the skin once a week. 3 mL 0    Continuous Blood Gluc Sensor (DEXCOM G7 SENSOR) Does not apply Misc 1 each Every 10 days. 9 each 1    teraconazole 0.4 % Vaginal Cream Place vaginally every 4 (four) hours. AT BEDTIME           EXAM:   Objective   Objective    Physical Exam    Vital Signs  Vitals:    07/31/24 0700   BP: 118/79   Pulse: 99   Resp: 16    Temp: 98.2 °F (36.8 °C)       Wound Assessment  Wound 05/08/24 #1 Right great toe amp site Toe (Comment which one) Right (Active)   Wound Image   07/31/24 0959   Drainage Amount None 07/31/24 0959   Drainage Description Serous;Yellow 07/24/24 1308   Treatments Compression 07/24/24 1308   Shoe Type CAM 07/24/24 1308   Wound Vac Brand KCI 06/26/24 1046   Wound Length (cm) 0.8 cm 07/31/24 0959   Wound Width (cm) 0.2 cm 07/31/24 0959   Wound Surface Area (cm^2) 0.16 cm^2 07/31/24 0959   Wound Depth (cm) 0.7 cm 07/31/24 0959   Wound Volume (cm^3) 0.112 cm^3 07/31/24 0959   Wound Healing % 95 07/31/24 0959   Margins Well-defined edges 07/31/24 0959   Non-staged Wound Description Full thickness 07/31/24 0959   Wendy-wound Assessment Edema 07/31/24 0959   Wound Granulation Tissue Pale Grey;Firm 07/31/24 0959   Wound Bed Granulation (%) 100 % 07/31/24 0959   Wound Bed Slough (%) 10 % 07/10/24 1009   Wound Bed Eschar (%) 95 % 05/08/24 0911   Wound Odor None 07/31/24 0959   Shape 100% integrating Epifix 07/24/24 1308   Tunneling? No 07/31/24 0959   Undermining? No 07/31/24 0959   Sinus Tracts? No 07/31/24 0959   Remy Scale Grade 2 07/31/24 0959       Wound 05/08/24 #2 Right plantar foot Foot Right;Plantar (Active)   Wound Image   07/31/24 1002   Drainage Amount None 07/31/24 1002   Drainage Description Serous;Yellow 07/24/24 1310   Treatments Compression 07/24/24 1310   Wound Length (cm) 0.5 cm 07/31/24 1002   Wound Width (cm) 0.1 cm 07/31/24 1002   Wound Surface Area (cm^2) 0.05 cm^2 07/31/24 1002   Wound Depth (cm) 0.1 cm 07/31/24 1002   Wound Volume (cm^3) 0.005 cm^3 07/31/24 1002   Wound Healing % 100 07/31/24 1002   Margins Well-defined edges 07/31/24 1002   Non-staged Wound Description Full thickness 07/31/24 1002   Wendy-wound Assessment Edema;Dry 07/31/24 1002   Wound Granulation Tissue Pink;Firm 07/24/24 1310   Wound Bed Granulation (%) 100 % 07/24/24 1310   Wound Bed Epithelium (%) 50 % 05/08/24 0913   Wound Bed  Slough (%) 50 % 05/29/24 0904   Wound Bed Eschar (%) 50 % 05/08/24 0913   Wound Odor None 07/31/24 1002   Shape unable to view wound bed 07/31/24 1002   Tunneling? No 07/17/24 1016   Undermining? No 07/17/24 1016   Sinus Tracts? No 07/17/24 1016   Remy Scale Grade 2 07/31/24 1002           ASSESSMENT AND PLAN:     Assessment     Encounter Diagnosis  1. Right foot ulcer, with fat layer exposed ([facility])    2. Diabetic foot ulcer with osteomyelitis ([facility])    3. Infected wound    4. MRSA (methicillin resistant Staphylococcus aureus) colonization    5. Diabetic polyneuropathy associated with type 2 diabetes mellitus ([facility])    6. Class 2 severe obesity due to excess calories with serious comorbidity and body mass index (BMI) of 36.0 to 36.9 in adult ([facility])    7. Hallux limitus of right foot      PROCEDURES:    Nonselective debridement right dorsal foot wound with curette to remove nonviable tissue.     PLAN OF CARE:    Dc cam boot   Start darco - dispensed today.   Start compression wraps  Topical gent / collagen / HF transfer under wraps  Intense BS control.   Watch out for signs of early infection - counseled.   Plan of care discussed with patient in detail - All questions answered   Return in 2 week.       Patient Instructions     Resume HH - need dressing changes 3 times a week  Continue topical gentamicin to decolonize wound bed  Intense BS control  Maximal offloading- switch to Darco shoe.   Return 2 weeks    Wound Cleaning and Dressings:    Shower with protection.   VASHE SOAK X 15 MIN  DRESSINGS- gentamicin topical  / collagen / HF transfer /under wraps.   Change dressing 3 times a week    Compression Therapy : coflex-2  Compression Therapy Instructions:  1.  Put on first thing in the morning and may remove at bedside.  Okay to wear overnight      if comfortable.  Do not let stockings roll up/down and kink.  Hand wash stockings and      hang dry as needed.    2.  Avoid prolonged standing in one place.  It is better to  have your calf muscles moving       to pump fluid out of the legs.    3.  Elevate leg(s) above the level of the heart when sitting or as much as possible.    4.  Take your diuretics as directed by your provider.  Do not skip doses or change doses      unless instructed to do so by your provider.    5. Do not get leg(s) with compression wrap wet. If wraps are too tight as indicated        By pain, numbness/tingling or discoloration of toes remove wrap completely       and call the   wound center.     Off-Loading:  Intense offloading of wounded area    Miscellaneous Instructions:  Supplement with a daily multivitamin   Low salt diet  Intense blood sugar control - Goal Blood sugar below 180 at all times recommended.  Increase protein intake / consider protein supplements - see below  Elevate extremities at all times when sitting / laying down.    DIETARY MODIFICATIONS TO HELP WITH WOUND HEALING:    Protein: Meats, beans, eggs, milk and yogurt particularly Greek yogurt), tofu, soy nuts, soy protein products    Vitamin C: Citrus fruits and juices, strawberries, tomatoes, tomato juice, peppers, baked potatoes, spinach, broccoli, cauliflower, [place] sprouts, cabbage    Vitamin A: Dark green, leafy vegetables, orange or yellow vegetables, cantaloupe, fortified dairy products, liver, fortified cereals    Zinc: Fortified cereals, red meats, seafood    Consider [place] by [employer] (These are essential branch chain amino acids that help with tissue building and wound healing) and take 2 packets/day. you can order online at [employer] or [employer]    ADDITIONAL REMINDERS:    The treatment plan has been discussed at length with you and your provider. Follow all instructions carefully, it is very important. If you do not follow all instructions, you are at  risk of your wound not healing, infection, possible loss of limb and even end of life.  Please call the clinic during regular business hours ( 7:30 AM - 5:30 PM) if you notice  increased bleeding, redness, warmth, pain or pus like drainage or start running a fever greater than 100.3.    For after hour emergencies, please call your primary physician or go to the nearest emergency room.      Patient/Caregiver Education: There are no barriers to learning. Medical education for above diagnosis given.   Answered all questions.    Outcome: Patient verbalizes understanding. Patient is notified to call with any questions, complications, allergies, or worsening or changing symptoms.  Patient is to call with any side effects or complications as a result of the treatments today.      DOCUMENTATION OF TIME SPENT: Code selection for this visit was based on time spent : 30 min on date of service in preparing to see the patient, obtaining and/or reviewing separately obtained history, performing a medically appropriate examination, counseling and educating the patient/family/caregiver, ordering medications or testing, referring and communicating with other healthcare providers, documenting clinical information in the E HR, independently interpreting results and communicating results to the patient/family/caregiver and care coordination with the patient's other providers.    Followup: Return in about 2 weeks (around 8/14/2024) for Wound followup.      Note to Patient:  The 21st Century Cures Act makes medical notes like these available to patients in the interest of transparency. However, be advised this is a medical document and is intended as ctju-sk-ckky communication; it is written in medical language and may appear blunt, direct, or contain abbreviations or verbiage that are unfamiliar. Medical documents are intended to carry relevant information, facts as evident, and the clinical opinion of the practitioner.    Also, please note that this report has been produced using speech recognition software and may contain errors related to that system including, but not limited to, errors in grammar,  punctuation, and spelling, as well as words and phrases that possibly may have been recognized inappropriately.  If there are any questions or concerns, contact the dictating provider for clarification.      Renee Belcher MD  7/31/2024  10:23 AM

## 2024-08-14 ENCOUNTER — TELEPHONE (OUTPATIENT)
Dept: PODIATRY CLINIC | Facility: CLINIC | Age: 44
End: 2024-08-14

## 2024-08-14 ENCOUNTER — OFFICE VISIT (OUTPATIENT)
Dept: WOUND CARE | Facility: HOSPITAL | Age: 44
End: 2024-08-14
Attending: INTERNAL MEDICINE
Payer: COMMERCIAL

## 2024-08-14 VITALS
RESPIRATION RATE: 16 BRPM | TEMPERATURE: 97 F | SYSTOLIC BLOOD PRESSURE: 99 MMHG | DIASTOLIC BLOOD PRESSURE: 71 MMHG | HEART RATE: 92 BPM

## 2024-08-14 DIAGNOSIS — E11.42 DIABETIC POLYNEUROPATHY ASSOCIATED WITH TYPE 2 DIABETES MELLITUS (HCC): ICD-10-CM

## 2024-08-14 DIAGNOSIS — L97.512 RIGHT FOOT ULCER, WITH FAT LAYER EXPOSED (HCC): Primary | ICD-10-CM

## 2024-08-14 DIAGNOSIS — Z22.322 MRSA (METHICILLIN RESISTANT STAPHYLOCOCCUS AUREUS) COLONIZATION: ICD-10-CM

## 2024-08-14 DIAGNOSIS — L97.512 FOOT ULCER, RIGHT, WITH FAT LAYER EXPOSED (HCC): Primary | ICD-10-CM

## 2024-08-14 DIAGNOSIS — T14.8XXA INFECTED WOUND: ICD-10-CM

## 2024-08-14 DIAGNOSIS — Z98.890 STATUS POST FOOT SURGERY: ICD-10-CM

## 2024-08-14 DIAGNOSIS — E66.01 CLASS 2 SEVERE OBESITY DUE TO EXCESS CALORIES WITH SERIOUS COMORBIDITY AND BODY MASS INDEX (BMI) OF 36.0 TO 36.9 IN ADULT (HCC): ICD-10-CM

## 2024-08-14 DIAGNOSIS — Z47.89 ORTHOPEDIC AFTERCARE: ICD-10-CM

## 2024-08-14 DIAGNOSIS — L08.9 INFECTED WOUND: ICD-10-CM

## 2024-08-14 DIAGNOSIS — G89.18 POSTOPERATIVE PAIN: ICD-10-CM

## 2024-08-14 DIAGNOSIS — E11.69 DIABETIC FOOT ULCER WITH OSTEOMYELITIS (HCC): ICD-10-CM

## 2024-08-14 DIAGNOSIS — E11.621 DIABETIC FOOT ULCER WITH OSTEOMYELITIS (HCC): ICD-10-CM

## 2024-08-14 DIAGNOSIS — M86.9 DIABETIC FOOT ULCER WITH OSTEOMYELITIS (HCC): ICD-10-CM

## 2024-08-14 DIAGNOSIS — L97.509 DIABETIC FOOT ULCER WITH OSTEOMYELITIS (HCC): ICD-10-CM

## 2024-08-14 LAB — GLUCOSE BLD-MCNC: 193 MG/DL (ref 70–99)

## 2024-08-14 PROCEDURE — 97597 DBRDMT OPN WND 1ST 20 CM/<: CPT | Performed by: INTERNAL MEDICINE

## 2024-08-14 PROCEDURE — 82962 GLUCOSE BLOOD TEST: CPT | Performed by: INTERNAL MEDICINE

## 2024-08-14 NOTE — PROGRESS NOTES
[facility] PROGRESS NOTE  AD SHAH MD  8/14/2024    Chief Complaint:   Chief Complaint   Patient presents with    Wound Care     Patient here for follow up. Patient wants to know when she can start physical therapy. Patient states her wrap had to be removed after only two days as it was too painful and her foot turned black and blue. She states HH never showed up even though they called and texted multiple times.       HPI:   Subjective   Leonie Denney is a 44 year old female coming in for a follow-up visit.    HPI    Dorsal foot wound improved.   Pt states HH did not go to the home for dressing changes    pt also stated the coflex 2 wrap was not tolerated and she noted bruising to the top of her foot when she removed it.     Plantar foot wound seems closed    Wants to start PT.     Wants to return to work.     No s/o inflection.       Review of Systems  Negative except HPI   Denies chest pain / SOB / palpitations  Denies fever.     Allergies  No Known Allergies    Current Meds:  Current Outpatient Medications   Medication Sig Dispense Refill    gentamicin 0.3 % Ophthalmic Solution 2 drops into affected area topically TID 15 mL 2    sulfamethoxazole-trimethoprim (BACTRIM) 400-80 MG Oral Tab Take 1 tablet by mouth 2 (two) times daily. 14 tablet 0    Empagliflozin (JARDIANCE) 25 MG Oral Tab Take 25 mg by mouth daily. 90 tablet 1    gentamicin 0.1 % External Ointment Apply 1 Application topically 3 (three) times daily. 30 g 3    acetaminophen 500 MG Oral Tab Take 1 tablet (500 mg total) by mouth every 4 (four) hours as needed for Fever (equal to or greater than 100.4). 50 tablet 0    HYDROcodone-acetaminophen 5-325 MG Oral Tab Take 1 tablet by mouth every 6 (six) hours as needed. 40 tablet 0    semaglutide (OZEMPIC, 2 MG/DOSE,) 8 MG/3ML Subcutaneous Solution Pen-injector Inject 2 mg into the skin once a week. 3 mL 0    Continuous Blood Gluc Sensor (DEXCOM G7 SENSOR) Does not apply Misc 1 each Every 10  days. 9 each 1    teraconazole 0.4 % Vaginal Cream Place vaginally every 4 (four) hours. AT BEDTIME           EXAM:   Objective   Objective    Physical Exam    Vital Signs  Vitals:    08/14/24 1113   BP: 99/71   Pulse: 92   Resp: 16   Temp: 97.4 °F (36.3 °C)       Wound Assessment  Wound 05/08/24 #1 Right great toe amp site Toe (Comment which one) Right (Active)   Wound Image   08/14/24 1119   Drainage Amount None 07/31/24 0959   Drainage Description Serous;Yellow 08/14/24 1119   Treatments Compression 08/14/24 1119   Shoe Type Vero post op shoe;Peg liner 08/14/24 1119   Wound Vac Brand KCI 06/26/24 1046   Wound Length (cm) 0.9 cm 08/14/24 1119   Wound Width (cm) 0.2 cm 08/14/24 1119   Wound Surface Area (cm^2) 0.18 cm^2 08/14/24 1119   Wound Depth (cm) 1 cm 08/14/24 1119   Wound Volume (cm^3) 0.18 cm^3 08/14/24 1119   Wound Healing % 92 08/14/24 1119   Margins Well-defined edges 08/14/24 1119   Non-staged Wound Description Full thickness 08/14/24 1119   Wendy-wound Assessment Edema;Maceration;Moist 08/14/24 1119   Wound Granulation Tissue Pale Grey;Firm 07/31/24 0959   Wound Bed Granulation (%) 100 % 07/31/24 0959   Wound Bed Slough (%) 100 % 08/14/24 1119   Wound Bed Eschar (%) 95 % 05/08/24 0911   Wound Odor None 08/14/24 1119   Shape 100% integrating Epifix 07/24/24 1308   Tunneling? No 08/14/24 1119   Undermining? No 08/14/24 1119   Sinus Tracts? No 08/14/24 1119   Remy Scale Grade 2 08/14/24 1119       Wound 05/08/24 #2 Right plantar foot Foot Right;Plantar (Active)   Wound Image   08/14/24 1121   Drainage Amount None 08/14/24 1121   Drainage Description Serous;Yellow 07/24/24 1310   Treatments Compression;Shoe 08/14/24 1121   Shoe Type Vero post op shoe;Peg liner 08/14/24 1121   Wound Length (cm) 0 cm 08/14/24 1121   Wound Width (cm) 0 cm 08/14/24 1121   Wound Surface Area (cm^2) 0 cm^2 08/14/24 1121   Wound Depth (cm) 0 cm 08/14/24 1121   Wound Volume (cm^3) 0 cm^3 08/14/24 1121   Wound Healing % 100  08/14/24 1121   Margins Well-defined edges 08/14/24 1121   Non-staged Wound Description Full thickness 08/14/24 1121   Wendy-wound Assessment Edema;Dry 08/14/24 1121   Wound Granulation Tissue Pink;Firm 07/24/24 1310   Wound Bed Granulation (%) 100 % 07/24/24 1310   Wound Bed Epithelium (%) 100 % 08/14/24 1121   Wound Bed Slough (%) 50 % 05/29/24 0904   Wound Bed Eschar (%) 50 % 05/08/24 0913   Wound Odor None 08/14/24 1121   Shape unable to view wound bed 07/31/24 1002   Tunneling? No 07/17/24 1016   Undermining? No 07/17/24 1016   Sinus Tracts? No 07/17/24 1016   Remy Scale Grade 2 08/14/24 1121           ASSESSMENT AND PLAN:     Assessment     Encounter Diagnosis  1. Right foot ulcer, with fat layer exposed ([facility])    2. Diabetic foot ulcer with osteomyelitis ([facility])    3. Infected wound    4. MRSA (methicillin resistant Staphylococcus aureus) colonization    5. Diabetic polyneuropathy associated with type 2 diabetes mellitus ([facility])    6. Class 2 severe obesity due to excess calories with serious comorbidity and body mass index (BMI) of 36.0 to 36.9 in adult ([facility])        PROCEDURES:    Debridement Diabetic Ulcer Right Toe (Comment which one)   Wound 05/08/24 #1 Right great toe amp site Toe (Comment which one) Right     Performed by: Renee Foreman MD  Authorized by: Renee Foreman MD       Consent   Consent obtained? verbal  Consent given by: patient  Risks discussed? procedural risks discussed     Debridement Details  Performed by: physician  Debridement type: conservative sharp  Pain control: lidocaine 4%  Pain control administration type: topical     Pre-debridement measurements  Length (cm): 0.9  Width (cm): 0.2  Depth (cm): 1  Surface Area (cm^2): 0.18     Post-debridement measurements  Length (cm): 0.9  Width (cm): 0.2  Depth (cm): 1  Percent debrided: 100%  Surface Area (cm^2): 0.18  Area Debrided (cm^2): 0.18  Volume (cm^3): 0.18     Devitalized tissue debrided: biofilm, necrotic debris and  briseida  Instrument(s) utilized: curette  Bleeding: small  Hemostasis obtained with: pressure  Procedural pain (0-10): 0  Post-procedural pain: 0   Response to treatment: procedure was tolerated well      PLAN OF CARE:    Continue topical gentamicin and collagen  Continue offloading.   Ok to start PT but continue to offload wounded area.   Watch out for signs of early infection - counseled.   Plan of care discussed with patient in detail - All questions answered   Return in one week.     Orders  Orders Placed This Encounter   Procedures    Debridement     Patient Instructions     Return 2 weeks    Wound Cleaning and Dressings:    Shower with protection.   VASHE SOAK X 15 MIN  DRESSINGS- gentamicin topical  / collagen / HF transfer /under wraps.   Change dressing 3 times a week    Compression Therapy : double spandagrip  Compression Therapy Instructions:  1.  Put on first thing in the morning and may remove at bedside.  Okay to wear overnight      if comfortable.  Do not let stockings roll up/down and kink.  Hand wash stockings and      hang dry as needed.    2.  Avoid prolonged standing in one place.  It is better to have your calf muscles moving       to pump fluid out of the legs.    3.  Elevate leg(s) above the level of the heart when sitting or as much as possible.    4.  Take your diuretics as directed by your provider.  Do not skip doses or change doses      unless instructed to do so by your provider.    5. Do not get leg(s) with compression wrap wet. If wraps are too tight as indicated        By pain, numbness/tingling or discoloration of toes remove wrap completely       and call the   wound center.     Off-Loading:  Darco shoe  Intense offloading of wounded area    Miscellaneous Instructions:  Supplement with a daily multivitamin   Low salt diet  Intense blood sugar control - Goal Blood sugar below 180 at all times recommended.  Increase protein intake / consider protein supplements - see below  Elevate  extremities at all times when sitting / laying down.    DIETARY MODIFICATIONS TO HELP WITH WOUND HEALING:    Protein: Meats, beans, eggs, milk and yogurt particularly Greek yogurt), tofu, soy nuts, soy protein products    Vitamin C: Citrus fruits and juices, strawberries, tomatoes, tomato juice, peppers, baked potatoes, spinach, broccoli, cauliflower, [place] sprouts, cabbage    Vitamin A: Dark green, leafy vegetables, orange or yellow vegetables, cantaloupe, fortified dairy products, liver, fortified cereals    Zinc: Fortified cereals, red meats, seafood    Consider [place] [employer] (These are essential branch chain amino acids that help with tissue building and wound healing) and take 2 packets/day. you can order online at [employer] or [employer]    ADDITIONAL REMINDERS:    The treatment plan has been discussed at length with you and your provider. Follow all instructions carefully, it is very important. If you do not follow all instructions, you are at  risk of your wound not healing, infection, possible loss of limb and even end of life.  Please call the clinic during regular business hours ( 7:30 AM - 5:30 PM) if you notice increased bleeding, redness, warmth, pain or pus like drainage or start running a fever greater than 100.3.    For after hour emergencies, please call your primary physician or go to the nearest emergency room.      Patient/Caregiver Education: There are no barriers to learning. Medical education for above diagnosis given.   Answered all questions.    Outcome: Patient verbalizes understanding. Patient is notified to call with any questions, complications, allergies, or worsening or changing symptoms.  Patient is to call with any side effects or complications as a result of the treatments today.      DOCUMENTATION OF TIME SPENT: Code selection for this visit was based on time spent : 35 min on date of service in preparing to see the patient, obtaining and/or reviewing separately obtained  history, performing a medically appropriate examination, counseling and educating the patient/family/caregiver, ordering medications or testing, referring and communicating with other healthcare providers, documenting clinical information in the E HR, independently interpreting results and communicating results to the patient/family/caregiver and care coordination with the patient's other providers.    Followup: Return in about 2 weeks (around 8/28/2024) for Wound followup.      Note to Patient:  The 21st Century Cures Act makes medical notes like these available to patients in the interest of transparency. However, be advised this is a medical document and is intended as obpa-je-pilu communication; it is written in medical language and may appear blunt, direct, or contain abbreviations or verbiage that are unfamiliar. Medical documents are intended to carry relevant information, facts as evident, and the clinical opinion of the practitioner.    Also, please note that this report has been produced using speech recognition software and may contain errors related to that system including, but not limited to, errors in grammar, punctuation, and spelling, as well as words and phrases that possibly may have been recognized inappropriately.  If there are any questions or concerns, contact the dictating provider for clarification.      Renee Belcher MD  8/14/2024  1:13 PM

## 2024-08-14 NOTE — PROGRESS NOTES
Weekly Wound Education Note    Teaching Provided To: Patient  Training Topics: Cleasing and general instructions;Compression;Discharge instructions;Dressing;Edema control;Off-loading  Training Method: Explain/Verbal  Training Response: Patient responds and understands;Reinforcement needed        Notes: Stable. Per provider plantar foot is healed. Pt states HH did not go to the home for dressing changes, pt also stated the coflex 2 wrap was not tolerated and she noted bruising to the top of her foot when she removed it. Today in clinic: billy, hydrofera transfer, bordered gauze, frances E x2.

## 2024-08-14 NOTE — PATIENT INSTRUCTIONS
Return 2 weeks    Wound Cleaning and Dressings:    Shower with protection.   VASHE SOAK X 15 MIN  DRESSINGS- gentamicin topical  / collagen / HF transfer /under wraps.   Change dressing 3 times a week    Compression Therapy : double spandagrip  Compression Therapy Instructions:  1.  Put on first thing in the morning and may remove at bedside.  Okay to wear overnight      if comfortable.  Do not let stockings roll up/down and kink.  Hand wash stockings and      hang dry as needed.    2.  Avoid prolonged standing in one place.  It is better to have your calf muscles moving       to pump fluid out of the legs.    3.  Elevate leg(s) above the level of the heart when sitting or as much as possible.    4.  Take your diuretics as directed by your provider.  Do not skip doses or change doses      unless instructed to do so by your provider.    5. Do not get leg(s) with compression wrap wet. If wraps are too tight as indicated        By pain, numbness/tingling or discoloration of toes remove wrap completely       and call the   wound center.     Off-Loading:  Darco shoe  Intense offloading of wounded area    Miscellaneous Instructions:  Supplement with a daily multivitamin   Low salt diet  Intense blood sugar control - Goal Blood sugar below 180 at all times recommended.  Increase protein intake / consider protein supplements - see below  Elevate extremities at all times when sitting / laying down.    DIETARY MODIFICATIONS TO HELP WITH WOUND HEALING:    Protein: Meats, beans, eggs, milk and yogurt particularly Greek yogurt), tofu, soy nuts, soy protein products    Vitamin C: Citrus fruits and juices, strawberries, tomatoes, tomato juice, peppers, baked potatoes, spinach, broccoli, cauliflower, [place] sprouts, cabbage    Vitamin A: Dark green, leafy vegetables, orange or yellow vegetables, cantaloupe, fortified dairy products, liver, fortified cereals    Zinc: Fortified cereals, red meats, seafood    Consider [place] by  [employer] (These are essential branch chain amino acids that help with tissue building and wound healing) and take 2 packets/day. you can order online at [employer] or [employer]    ADDITIONAL REMINDERS:    The treatment plan has been discussed at length with you and your provider. Follow all instructions carefully, it is very important. If you do not follow all instructions, you are at  risk of your wound not healing, infection, possible loss of limb and even end of life.  Please call the clinic during regular business hours ( 7:30 AM - 5:30 PM) if you notice increased bleeding, redness, warmth, pain or pus like drainage or start running a fever greater than 100.3.    For after hour emergencies, please call your primary physician or go to the nearest emergency room.

## 2024-08-14 NOTE — PROGRESS NOTES
Patient ID: Leonie Denney is a 44 year old female.    Debridement Diabetic Ulcer Right Toe (Comment which one)   Wound 05/08/24 #1 Right great toe amp site Toe (Comment which one) Right    Performed by: Renee Foreman MD  Authorized by: Renee Foreman MD      Consent   Consent obtained? verbal  Consent given by: patient  Risks discussed? procedural risks discussed    Debridement Details  Performed by: physician  Debridement type: conservative sharp  Pain control: lidocaine 4%  Pain control administration type: topical    Pre-debridement measurements  Length (cm): 0.9  Width (cm): 0.2  Depth (cm): 1  Surface Area (cm^2): 0.18    Post-debridement measurements  Length (cm): 0.9  Width (cm): 0.2  Depth (cm): 1  Percent debrided: 100%  Surface Area (cm^2): 0.18  Area Debrided (cm^2): 0.18  Volume (cm^3): 0.18    Devitalized tissue debrided: biofilm, necrotic debris and slough  Instrument(s) utilized: curette  Bleeding: small  Hemostasis obtained with: pressure  Procedural pain (0-10): 0  Post-procedural pain: 0   Response to treatment: procedure was tolerated well

## 2024-08-19 NOTE — TELEPHONE ENCOUNTER
Spoke with patient. She is wanting to start Physical Therapy and would like to go to the same place she went in February. This is an outside location and for [facility] in [place]. She would like the order faxed there. The number is [phone].    She is wanting to know next steps as she is wanting to return to work on September 12th. She mentioned being measured for a shoe. She wanted to make an appointment for before she goes back to work but the soonest opening is September 16th.

## 2024-08-21 ENCOUNTER — APPOINTMENT (OUTPATIENT)
Dept: WOUND CARE | Facility: HOSPITAL | Age: 44
End: 2024-08-21
Attending: INTERNAL MEDICINE
Payer: COMMERCIAL

## 2024-08-21 ENCOUNTER — TELEPHONE (OUTPATIENT)
Dept: NEUROLOGY | Facility: CLINIC | Age: 44
End: 2024-08-21

## 2024-08-21 DIAGNOSIS — R77.9 ABNORMALITY OF PLASMA PROTEIN: Primary | ICD-10-CM

## 2024-08-21 NOTE — TELEPHONE ENCOUNTER
My chart message regarding hematology referral      Itzel Guthrie MD [facility] [place] Nurse  Hello,    Can we let this patient know that I was going back and reviewing the blood work I ordered earlier this year, and their SPEP was abnormal (very mildly borderline).  This suggests some abnormal proteins in the blood. It is a mild finding and I am not sure if it relates to the neurological symptoms, but I would recommend getting an opinion from hematology.   Can we put a consult for hematology for abnormal plasma protein (R77.9)?

## 2024-08-27 NOTE — TELEPHONE ENCOUNTER
Spoke with patient and let her know that Physical Therapy order had been sent to number provided. Wanting to go back to work on 9/12/24, wanting to know if she can be seen sooner to be cleared to work.

## 2024-08-28 ENCOUNTER — OFFICE VISIT (OUTPATIENT)
Dept: WOUND CARE | Facility: HOSPITAL | Age: 44
End: 2024-08-28
Attending: INTERNAL MEDICINE
Payer: COMMERCIAL

## 2024-08-28 ENCOUNTER — HOSPITAL ENCOUNTER (OUTPATIENT)
Dept: GENERAL RADIOLOGY | Facility: HOSPITAL | Age: 44
Discharge: HOME | End: 2024-08-28
Attending: INTERNAL MEDICINE
Payer: COMMERCIAL

## 2024-08-28 VITALS
RESPIRATION RATE: 16 BRPM | TEMPERATURE: 98 F | HEART RATE: 93 BPM | SYSTOLIC BLOOD PRESSURE: 110 MMHG | DIASTOLIC BLOOD PRESSURE: 80 MMHG

## 2024-08-28 DIAGNOSIS — L97.509 DIABETIC FOOT ULCER WITH OSTEOMYELITIS (HCC): Primary | ICD-10-CM

## 2024-08-28 DIAGNOSIS — Z22.322 MRSA (METHICILLIN RESISTANT STAPHYLOCOCCUS AUREUS) COLONIZATION: ICD-10-CM

## 2024-08-28 DIAGNOSIS — Z79.4 TYPE 2 DIABETES MELLITUS WITH HYPERGLYCEMIA, WITH LONG-TERM CURRENT USE OF INSULIN (HCC): ICD-10-CM

## 2024-08-28 DIAGNOSIS — L97.509 DIABETIC FOOT ULCER WITH OSTEOMYELITIS (HCC): ICD-10-CM

## 2024-08-28 DIAGNOSIS — E11.621 DIABETIC FOOT ULCER WITH OSTEOMYELITIS (HCC): Primary | ICD-10-CM

## 2024-08-28 DIAGNOSIS — M86.9 DIABETIC FOOT ULCER WITH OSTEOMYELITIS (HCC): Primary | ICD-10-CM

## 2024-08-28 DIAGNOSIS — E11.69 DIABETIC FOOT ULCER WITH OSTEOMYELITIS (HCC): Primary | ICD-10-CM

## 2024-08-28 DIAGNOSIS — M86.9 DIABETIC FOOT ULCER WITH OSTEOMYELITIS (HCC): ICD-10-CM

## 2024-08-28 DIAGNOSIS — E11.42 DIABETIC POLYNEUROPATHY ASSOCIATED WITH TYPE 2 DIABETES MELLITUS (HCC): ICD-10-CM

## 2024-08-28 DIAGNOSIS — E11.65 TYPE 2 DIABETES MELLITUS WITH HYPERGLYCEMIA, WITH LONG-TERM CURRENT USE OF INSULIN (HCC): ICD-10-CM

## 2024-08-28 DIAGNOSIS — E11.69 TYPE 2 DIABETES MELLITUS WITH OBESITY (HCC): ICD-10-CM

## 2024-08-28 DIAGNOSIS — E11.621 DIABETIC FOOT ULCER WITH OSTEOMYELITIS (HCC): ICD-10-CM

## 2024-08-28 DIAGNOSIS — E66.9 TYPE 2 DIABETES MELLITUS WITH OBESITY (HCC): ICD-10-CM

## 2024-08-28 DIAGNOSIS — L08.9 INFECTED WOUND: ICD-10-CM

## 2024-08-28 DIAGNOSIS — L97.512 RIGHT FOOT ULCER, WITH FAT LAYER EXPOSED (HCC): ICD-10-CM

## 2024-08-28 DIAGNOSIS — E66.01 CLASS 2 SEVERE OBESITY DUE TO EXCESS CALORIES WITH SERIOUS COMORBIDITY AND BODY MASS INDEX (BMI) OF 36.0 TO 36.9 IN ADULT (HCC): ICD-10-CM

## 2024-08-28 DIAGNOSIS — M20.5X1 HALLUX LIMITUS OF RIGHT FOOT: ICD-10-CM

## 2024-08-28 DIAGNOSIS — T14.8XXA INFECTED WOUND: ICD-10-CM

## 2024-08-28 DIAGNOSIS — E11.69 DIABETIC FOOT ULCER WITH OSTEOMYELITIS (HCC): ICD-10-CM

## 2024-08-28 LAB — GLUCOSE BLD-MCNC: 325 MG/DL (ref 70–99)

## 2024-08-28 PROCEDURE — 82962 GLUCOSE BLOOD TEST: CPT | Performed by: INTERNAL MEDICINE

## 2024-08-28 PROCEDURE — 11042 DBRDMT SUBQ TIS 1ST 20SQCM/<: CPT | Performed by: INTERNAL MEDICINE

## 2024-08-28 PROCEDURE — 73630 X-RAY EXAM OF FOOT: CPT | Performed by: INTERNAL MEDICINE

## 2024-08-28 NOTE — PATIENT INSTRUCTIONS
Return 1 weeks    Wound Cleaning and Dressings:    Shower with protection.   VASHE SOAK X 15 MIN  DRESSINGS- gentamicin topical  / Endoformcollagen / HF transfer /under wraps.   Change dressing 3 times a week    Compression Therapy : double spandagrip  Compression Therapy Instructions:  1.  Put on first thing in the morning and may remove at bedside.  Okay to wear overnight      if comfortable.  Do not let stockings roll up/down and kink.  Hand wash stockings and      hang dry as needed.    2.  Avoid prolonged standing in one place.  It is better to have your calf muscles moving       to pump fluid out of the legs.    3.  Elevate leg(s) above the level of the heart when sitting or as much as possible.    4.  Take your diuretics as directed by your provider.  Do not skip doses or change doses      unless instructed to do so by your provider.    5. Do not get leg(s) with compression wrap wet. If wraps are too tight as indicated        By pain, numbness/tingling or discoloration of toes remove wrap completely       and call the   wound center.     Off-Loading:  Darco shoe  Intense offloading of wounded area    Miscellaneous Instructions:  Supplement with a daily multivitamin   Low salt diet  Intense blood sugar control - Goal Blood sugar below 180 at all times recommended.  Increase protein intake / consider protein supplements - see below  Elevate extremities at all times when sitting / laying down.    DIETARY MODIFICATIONS TO HELP WITH WOUND HEALING:    Protein: Meats, beans, eggs, milk and yogurt particularly Greek yogurt), tofu, soy nuts, soy protein products    Vitamin C: Citrus fruits and juices, strawberries, tomatoes, tomato juice, peppers, baked potatoes, spinach, broccoli, cauliflower, [place] sprouts, cabbage    Vitamin A: Dark green, leafy vegetables, orange or yellow vegetables, cantaloupe, fortified dairy products, liver, fortified cereals    Zinc: Fortified cereals, red meats, seafood    Consider  [place] by [employer] (These are essential branch chain amino acids that help with tissue building and wound healing) and take 2 packets/day. you can order online at [employer] or [employer]    ADDITIONAL REMINDERS:    The treatment plan has been discussed at length with you and your provider. Follow all instructions carefully, it is very important. If you do not follow all instructions, you are at  risk of your wound not healing, infection, possible loss of limb and even end of life.  Please call the clinic during regular business hours ( 7:30 AM - 5:30 PM) if you notice increased bleeding, redness, warmth, pain or pus like drainage or start running a fever greater than 100.3.    For after hour emergencies, please call your primary physician or go to the nearest emergency room.

## 2024-08-28 NOTE — PROGRESS NOTES
[facility] PROGRESS NOTE  AD SHAH MD  8/28/2024    Chief Complaint:   Chief Complaint   Patient presents with    Wound Care     Patient here for follow up. She states she wants to know what we're going to do as the wounds not closed.       HPI:   Subjective   Leonie Denney is a 44 year old female coming in for a follow-up visit.    HPI    Wound stalled   Dimensions not improving.   Continues to offload - taking time off work - but she wants to return to work with accommodations soon.   Recent culture 6/8/24 - MRSA - we are continuing to use topical gentamicin in wound base.     Will order xray    Review of Systems  Negative except HPI   Denies chest pain / SOB / palpitations  Denies fever.     Allergies  No Known Allergies    Current Meds:  Current Outpatient Medications   Medication Sig Dispense Refill    gentamicin 0.3 % Ophthalmic Solution 2 drops into affected area topically TID 15 mL 2    sulfamethoxazole-trimethoprim (BACTRIM) 400-80 MG Oral Tab Take 1 tablet by mouth 2 (two) times daily. 14 tablet 0    Empagliflozin (JARDIANCE) 25 MG Oral Tab Take 25 mg by mouth daily. 90 tablet 1    gentamicin 0.1 % External Ointment Apply 1 Application topically 3 (three) times daily. 30 g 3    acetaminophen 500 MG Oral Tab Take 1 tablet (500 mg total) by mouth every 4 (four) hours as needed for Fever (equal to or greater than 100.4). 50 tablet 0    HYDROcodone-acetaminophen 5-325 MG Oral Tab Take 1 tablet by mouth every 6 (six) hours as needed. 40 tablet 0    semaglutide (OZEMPIC, 2 MG/DOSE,) 8 MG/3ML Subcutaneous Solution Pen-injector Inject 2 mg into the skin once a week. 3 mL 0    Continuous Blood Gluc Sensor (DEXCOM G7 SENSOR) Does not apply Misc 1 each Every 10 days. 9 each 1    teraconazole 0.4 % Vaginal Cream Place vaginally every 4 (four) hours. AT BEDTIME           EXAM:   Objective   Objective    Physical Exam    Vital Signs  Vitals:    08/28/24 0906   BP: 110/80   Pulse: 93   Resp: 16   Temp:  98 °F (36.7 °C)       Wound Assessment  Wound 05/08/24 #1 Right great toe amp site Toe (Comment which one) Right (Active)   Wound Image   08/28/24 0931   Drainage Amount Small 08/28/24 0909   Drainage Description Serous;Yellow 08/28/24 0909   Treatments Compression 08/14/24 1119   Shoe Type Vero post op shoe;Peg liner 08/14/24 1119   Wound Vac Brand KCI 06/26/24 1046   Wound Length (cm) 0.9 cm 08/28/24 0909   Wound Width (cm) 0.5 cm 08/28/24 0909   Wound Surface Area (cm^2) 0.45 cm^2 08/28/24 0909   Wound Depth (cm) 0.8 cm 08/28/24 0909   Wound Volume (cm^3) 0.36 cm^3 08/28/24 0909   Wound Healing % 85 08/28/24 0909   Margins Well-defined edges 08/28/24 0909   Non-staged Wound Description Full thickness 08/28/24 0909   Wendy-wound Assessment Edema;Maceration;Moist 08/28/24 0909   Wound Granulation Tissue Pink;Pale [place];Spongy 08/28/24 0909   Wound Bed Granulation (%) 90 % 08/28/24 0909   Wound Bed Slough (%) 10 % 08/28/24 0909   Wound Bed Eschar (%) 95 % 05/08/24 0911   Wound Odor None 08/28/24 0909   Shape 100% integrating Epifix 07/24/24 1308   Tunneling? No 08/14/24 1119   Undermining? No 08/14/24 1119   Sinus Tracts? No 08/14/24 1119   Remy Scale Grade 2 08/14/24 1119       Wound 05/08/24 #2 Right plantar foot Foot Right;Plantar (Active)   Wound Image   08/28/24 0910   Drainage Amount None 08/28/24 0910   Drainage Description Serous;Yellow 07/24/24 1310   Treatments Compression;Shoe 08/14/24 1121   Shoe Type Vero post op shoe;Peg liner 08/14/24 1121   Wound Length (cm) 0 cm 08/28/24 0910   Wound Width (cm) 0 cm 08/28/24 0910   Wound Surface Area (cm^2) 0 cm^2 08/28/24 0910   Wound Depth (cm) 0 cm 08/28/24 0910   Wound Volume (cm^3) 0 cm^3 08/28/24 0910   Wound Healing % 100 08/28/24 0910   Margins Well-defined edges 08/28/24 0910   Non-staged Wound Description Full thickness 08/28/24 0910   Wendy-wound Assessment Edema;Dry 08/28/24 0910   Wound Granulation Tissue Pink;Firm 07/24/24 1310   Wound Bed Granulation  (%) 100 % 07/24/24 1310   Wound Bed Epithelium (%) 100 % 08/28/24 0910   Wound Bed Slough (%) 50 % 05/29/24 0904   Wound Bed Eschar (%) 50 % 05/08/24 0913   Wound Odor None 08/28/24 0910   Shape unable to view wound bed 07/31/24 1002   Tunneling? No 07/17/24 1016   Undermining? No 07/17/24 1016   Sinus Tracts? No 07/17/24 1016   Remy Scale Grade 2 08/14/24 1121           ASSESSMENT AND PLAN:     Assessment     Encounter Diagnosis  1. Diabetic foot ulcer with osteomyelitis ([facility])    2. Right foot ulcer, with fat layer exposed ([facility])    3. Infected wound    4. MRSA (methicillin resistant Staphylococcus aureus) colonization    5. Diabetic polyneuropathy associated with type 2 diabetes mellitus ([facility])    6. Class 2 severe obesity due to excess calories with serious comorbidity and body mass index (BMI) of 36.0 to 36.9 in adult ([facility])    7. Hallux limitus of right foot    8. Type 2 diabetes mellitus with hyperglycemia, with long-term current use of insulin ([facility])    9. Type 2 diabetes mellitus with obesity ([facility])        PROCEDURES:    Debridement Diabetic Ulcer Right Toe (Comment which one)   Wound 05/08/24 #1 Right great toe amp site Toe (Comment which one) Right     Performed by: Renee Foreman MD  Authorized by: Renee Foreman MD       Consent   Consent obtained? verbal  Consent given by: patient  Risks discussed? procedural risks discussed     Debridement Details  Performed by: physician  Debridement type: surgical  Level of debridement: subcutaneous tissue  Pain control: lidocaine 4%  Pain control administration type: topical     Pre-debridement measurements  Length (cm): 0.9  Width (cm): 0.5  Depth (cm): 0.8  Surface Area (cm^2): 0.45     Post-debridement measurements  Length (cm): 1  Width (cm): 0.5  Depth (cm): 0.9  Percent debrided: 100%  Surface Area (cm^2): 0.5  Area Debrided (cm^2): 0.5  Volume (cm^3): 0.45     Tissue and other material debrided: subcutaneous tissue  Devitalized tissue debrided:  biofilm, necrotic debris and slough  Instrument(s) utilized: curette  Bleeding: medium  Hemostasis obtained with: pressure  Procedural pain (0-10): 0  Post-procedural pain: 0   Response to treatment: procedure was tolerated well        MEDICAL NECESSSITY FOR SURGICAL DEBRIDEMENT:    Surgical debridement is necessary in this patient to stimulate and hasten the rate of wound healing by  removing biofilm and devitalized tissue, down to subcutaneous level.   Research has consistently proven that traditional bedside sharp debridement provides excellent results in reducing the square surface area of wounds. The use of surgical sharp debridement can effectively remove devitalized tissue, hence aids in achieving good wound healing and advancing the rate of wound closure,  and is a proven significant component to advancing wound closure.      PLAN OF CARE:    Check xray foot  Continue topical gentamicin   Switch to endoform collagen  Consider wound culture next visit  Watch out for signs of early infection - counseled.   Re-consult surgeon - see Dr. Kaufman in our office for followup.   Stressed offloading if pt returns to work.   Plan of care discussed with patient in detail - All questions answered   Return in one week.     Orders  Orders Placed This Encounter   Procedures    Debridement Diabetic Ulcer Right Toe (Comment which one)     Patient Instructions     Return 1 weeks    Wound Cleaning and Dressings:    Shower with protection.   VASHE SOAK X 15 MIN  DRESSINGS- gentamicin topical  / Endoformcollagen / HF transfer /under wraps.   Change dressing 3 times a week    Compression Therapy : double spandagrip  Compression Therapy Instructions:  1.  Put on first thing in the morning and may remove at bedside.  Okay to wear overnight      if comfortable.  Do not let stockings roll up/down and kink.  Hand wash stockings and      hang dry as needed.    2.  Avoid prolonged standing in one place.  It is better to have your calf  muscles moving       to pump fluid out of the legs.    3.  Elevate leg(s) above the level of the heart when sitting or as much as possible.    4.  Take your diuretics as directed by your provider.  Do not skip doses or change doses      unless instructed to do so by your provider.    5. Do not get leg(s) with compression wrap wet. If wraps are too tight as indicated        By pain, numbness/tingling or discoloration of toes remove wrap completely       and call the   wound center.     Off-Loading:  Darco shoe  Intense offloading of wounded area    Miscellaneous Instructions:  Supplement with a daily multivitamin   Low salt diet  Intense blood sugar control - Goal Blood sugar below 180 at all times recommended.  Increase protein intake / consider protein supplements - see below  Elevate extremities at all times when sitting / laying down.    DIETARY MODIFICATIONS TO HELP WITH WOUND HEALING:    Protein: Meats, beans, eggs, milk and yogurt particularly Greek yogurt), tofu, soy nuts, soy protein products    Vitamin C: Citrus fruits and juices, strawberries, tomatoes, tomato juice, peppers, baked potatoes, spinach, broccoli, cauliflower, [place] sprouts, cabbage    Vitamin A: Dark green, leafy vegetables, orange or yellow vegetables, cantaloupe, fortified dairy products, liver, fortified cereals    Zinc: Fortified cereals, red meats, seafood    Consider [place] by [employer] (These are essential branch chain amino acids that help with tissue building and wound healing) and take 2 packets/day. you can order online at [employer] or [employer]    ADDITIONAL REMINDERS:    The treatment plan has been discussed at length with you and your provider. Follow all instructions carefully, it is very important. If you do not follow all instructions, you are at  risk of your wound not healing, infection, possible loss of limb and even end of life.  Please call the clinic during regular business hours ( 7:30 AM - 5:30 PM) if you notice  increased bleeding, redness, warmth, pain or pus like drainage or start running a fever greater than 100.3.    For after hour emergencies, please call your primary physician or go to the nearest emergency room.      Patient/Caregiver Education: There are no barriers to learning. Medical education for above diagnosis given.   Answered all questions.    Outcome: Patient verbalizes understanding. Patient is notified to call with any questions, complications, allergies, or worsening or changing symptoms.  Patient is to call with any side effects or complications as a result of the treatments today.      DOCUMENTATION OF TIME SPENT: Code selection for this visit was based on time spent : 40 min on date of service in preparing to see the patient, obtaining and/or reviewing separately obtained history, performing a medically appropriate examination, counseling and educating the patient/family/caregiver, ordering medications or testing, referring and communicating with other healthcare providers, documenting clinical information in the E HR, independently interpreting results and communicating results to the patient/family/caregiver and care coordination with the patient's other providers.    Followup: Return in about 1 week (around 9/4/2024) for Wound followup.      Note to Patient:  The 21st Century Cures Act makes medical notes like these available to patients in the interest of transparency. However, be advised this is a medical document and is intended as [id number] communication; it is written in medical language and may appear blunt, direct, or contain abbreviations or verbiage that are unfamiliar. Medical documents are intended to carry relevant information, facts as evident, and the clinical opinion of the practitioner.    Also, please note that this report has been produced using speech recognition software and may contain errors related to that system including, but not limited to, errors in grammar,  punctuation, and spelling, as well as words and phrases that possibly may have been recognized inappropriately.  If there are any questions or concerns, contact the dictating provider for clarification.      Renee Belcher MD  8/28/2024  9:35 AM

## 2024-08-28 NOTE — PROGRESS NOTES
Weekly Wound Education Note    Teaching Provided To: Patient  Training Topics: Cleasing and general instructions;Compression;Discharge instructions;Dressing;Edema control;Off-loading  Training Method: Explain/Verbal  Training Response: Patient responds and understands;Reinforcement needed        Notes: Stable. Gentamycin drops, endoform, hydrofera transfer, bordered gauze, spandagrip E x2. Continue to wear darco shoe with peg liner, limit walking.  Xray ordered of right foot. Think about culturing wound next week if no improved.

## 2024-08-28 NOTE — PROGRESS NOTES
Patient ID: Leonie Denney is a 44 year old female.    Debridement Diabetic Ulcer Right Toe (Comment which one)   Wound 05/08/24 #1 Right great toe amp site Toe (Comment which one) Right    Performed by: Renee Foreman MD  Authorized by: Renee Foreman MD      Consent   Consent obtained? verbal  Consent given by: patient  Risks discussed? procedural risks discussed    Debridement Details  Performed by: physician  Debridement type: surgical  Level of debridement: subcutaneous tissue  Pain control: lidocaine 4%  Pain control administration type: topical    Pre-debridement measurements  Length (cm): 0.9  Width (cm): 0.5  Depth (cm): 0.8  Surface Area (cm^2): 0.45    Post-debridement measurements  Length (cm): 1  Width (cm): 0.5  Depth (cm): 0.9  Percent debrided: 100%  Surface Area (cm^2): 0.5  Area Debrided (cm^2): 0.5  Volume (cm^3): 0.45    Tissue and other material debrided: subcutaneous tissue  Devitalized tissue debrided: biofilm, necrotic debris and slough  Instrument(s) utilized: curette  Bleeding: medium  Hemostasis obtained with: pressure  Procedural pain (0-10): 0  Post-procedural pain: 0   Response to treatment: procedure was tolerated well

## 2024-08-28 NOTE — TELEPHONE ENCOUNTER
Please just send an external physical therapy order to that location with the same diagnoses and information I had on the original thank you

## 2024-08-29 NOTE — PROGRESS NOTES
Spoke to patient - per provider not bone infection noted in xray. Continue dressings as ordered. All questions answered at this time.

## 2024-09-04 ENCOUNTER — OFFICE VISIT (OUTPATIENT)
Dept: WOUND CARE | Facility: HOSPITAL | Age: 44
End: 2024-09-04
Attending: INTERNAL MEDICINE
Payer: COMMERCIAL

## 2024-09-04 VITALS
DIASTOLIC BLOOD PRESSURE: 81 MMHG | RESPIRATION RATE: 16 BRPM | HEART RATE: 94 BPM | SYSTOLIC BLOOD PRESSURE: 113 MMHG | TEMPERATURE: 97 F

## 2024-09-04 DIAGNOSIS — M20.5X1 HALLUX LIMITUS OF RIGHT FOOT: ICD-10-CM

## 2024-09-04 DIAGNOSIS — E11.69 DIABETIC FOOT ULCER WITH OSTEOMYELITIS (HCC): Primary | ICD-10-CM

## 2024-09-04 DIAGNOSIS — T14.8XXA INFECTED WOUND: ICD-10-CM

## 2024-09-04 DIAGNOSIS — E11.621 DIABETIC FOOT ULCER WITH OSTEOMYELITIS (HCC): Primary | ICD-10-CM

## 2024-09-04 DIAGNOSIS — Z22.322 MRSA (METHICILLIN RESISTANT STAPHYLOCOCCUS AUREUS) COLONIZATION: ICD-10-CM

## 2024-09-04 DIAGNOSIS — E11.42 DIABETIC POLYNEUROPATHY ASSOCIATED WITH TYPE 2 DIABETES MELLITUS (HCC): ICD-10-CM

## 2024-09-04 DIAGNOSIS — M86.9 DIABETIC FOOT ULCER WITH OSTEOMYELITIS (HCC): Primary | ICD-10-CM

## 2024-09-04 DIAGNOSIS — L97.512 RIGHT FOOT ULCER, WITH FAT LAYER EXPOSED (HCC): ICD-10-CM

## 2024-09-04 DIAGNOSIS — E66.01 CLASS 2 SEVERE OBESITY DUE TO EXCESS CALORIES WITH SERIOUS COMORBIDITY AND BODY MASS INDEX (BMI) OF 36.0 TO 36.9 IN ADULT (HCC): ICD-10-CM

## 2024-09-04 DIAGNOSIS — L97.509 DIABETIC FOOT ULCER WITH OSTEOMYELITIS (HCC): Primary | ICD-10-CM

## 2024-09-04 DIAGNOSIS — L08.9 INFECTED WOUND: ICD-10-CM

## 2024-09-04 LAB — GLUCOSE BLD-MCNC: 153 MG/DL (ref 70–99)

## 2024-09-04 PROCEDURE — 99213 OFFICE O/P EST LOW 20 MIN: CPT

## 2024-09-04 PROCEDURE — 82962 GLUCOSE BLOOD TEST: CPT | Performed by: INTERNAL MEDICINE

## 2024-09-04 RX ORDER — IBUPROFEN 800 MG/1
800 TABLET, FILM COATED ORAL EVERY 8 HOURS PRN
Qty: 40 TABLET | Refills: 0 | Status: SHIPPED | OUTPATIENT
Start: 2024-09-04

## 2024-09-04 NOTE — PATIENT INSTRUCTIONS
See podiatrist Dr. Kuafman in one week  Return 3 weeks to see me   Return to work note complated.     Ms. Denney was seen by me at [facility]  for treatment of her foot wound.      The patient may return to work the following limitations on 9/12/24 :   No lifting over 5 lbs  No prolonged standing or walking  Do not wear steel toe work boots - Please allow patient to wear her Darco shoe recommended by us - for proper healing.   Use a cane to aid with walking      Wound Cleaning and Dressings:    Shower with protection.   VASHE SOAK X 15 MIN  DRESSINGS- gentamicin topical  / Endoform collagen / HF transfer /under wraps.   Change dressing 3 times a week    Compression Therapy : double spandagrip  Compression Therapy Instructions:  1.  Put on first thing in the morning and may remove at bedside.  Okay to wear overnight      if comfortable.  Do not let stockings roll up/down and kink.  Hand wash stockings and      hang dry as needed.    2.  Avoid prolonged standing in one place.  It is better to have your calf muscles moving       to pump fluid out of the legs.    3.  Elevate leg(s) above the level of the heart when sitting or as much as possible.    4.  Take your diuretics as directed by your provider.  Do not skip doses or change doses      unless instructed to do so by your provider.    5. Do not get leg(s) with compression wrap wet. If wraps are too tight as indicated        By pain, numbness/tingling or discoloration of toes remove wrap completely       and call the   wound center.     Off-Loading:  Darco shoe  Intense offloading of wounded area    Miscellaneous Instructions:  Supplement with a daily multivitamin   Low salt diet  Intense blood sugar control - Goal Blood sugar below 180 at all times recommended.  Increase protein intake / consider protein supplements - see below  Elevate extremities at all times when sitting / laying down.    DIETARY MODIFICATIONS TO HELP WITH WOUND  HEALING:    Protein: Meats, beans, eggs, milk and yogurt particularly Greek yogurt), tofu, soy nuts, soy protein products    Vitamin C: Citrus fruits and juices, strawberries, tomatoes, tomato juice, peppers, baked potatoes, spinach, broccoli, cauliflower, [place] sprouts, cabbage    Vitamin A: Dark green, leafy vegetables, orange or yellow vegetables, cantaloupe, fortified dairy products, liver, fortified cereals    Zinc: Fortified cereals, red meats, seafood    Consider [place] by [employer] (These are essential branch chain amino acids that help with tissue building and wound healing) and take 2 packets/day. you can order online at [employer] or [employer]    ADDITIONAL REMINDERS:    The treatment plan has been discussed at length with you and your provider. Follow all instructions carefully, it is very important. If you do not follow all instructions, you are at  risk of your wound not healing, infection, possible loss of limb and even end of life.  Please call the clinic during regular business hours ( 7:30 AM - 5:30 PM) if you notice increased bleeding, redness, warmth, pain or pus like drainage or start running a fever greater than 100.3.    For after hour emergencies, please call your primary physician or go to the nearest emergency room.

## 2024-09-04 NOTE — PROGRESS NOTES
[facility] PROGRESS NOTE  AD SHAH MD  9/4/2024    Chief Complaint:   Chief Complaint   Patient presents with    Wound Care     Arrivs for follow-up. Spanda  in place. Concerned about more severe pain. Does not like to take Norco as it makes her \"loopy\".       HPI:   Subjective   Leonie Denney is a 44 year old female coming in for a follow-up visit.    HPI    Wound stable - improving slowly but surely.   No s/o infection.   Xray foot - no s/o osteomyelitis.   Has podiatry follow up with Dr. Kaufman next week.     Wants ibuprofen 800 mg for PRN pain.     Wants to return to work.   She will need restrictions  - she has brought in her work position requirements    She needs handicap placard paperwork completed.   She will need maximal offloading to enable faster wound healing and also to prevent future wounds as well due to foot deformity.       Review of Systems  Negative except HPI   Denies chest pain / SOB / palpitations  Denies fever.     Allergies  No Known Allergies    Current Meds:  Current Outpatient Medications   Medication Sig Dispense Refill    ibuprofen 800 MG Oral Tab Take 1 tablet (800 mg total) by mouth every 8 (eight) hours as needed for Pain. 40 tablet 0    gentamicin 0.3 % Ophthalmic Solution 2 drops into affected area topically TID 15 mL 2    sulfamethoxazole-trimethoprim (BACTRIM) 400-80 MG Oral Tab Take 1 tablet by mouth 2 (two) times daily. 14 tablet 0    Empagliflozin (JARDIANCE) 25 MG Oral Tab Take 25 mg by mouth daily. 90 tablet 1    gentamicin 0.1 % External Ointment Apply 1 Application topically 3 (three) times daily. 30 g 3    acetaminophen 500 MG Oral Tab Take 1 tablet (500 mg total) by mouth every 4 (four) hours as needed for Fever (equal to or greater than 100.4). 50 tablet 0    HYDROcodone-acetaminophen 5-325 MG Oral Tab Take 1 tablet by mouth every 6 (six) hours as needed. 40 tablet 0    semaglutide (OZEMPIC, 2 MG/DOSE,) 8 MG/3ML Subcutaneous Solution Pen-injector  Inject 2 mg into the skin once a week. 3 mL 0    Continuous Blood Gluc Sensor (DEXCOM G7 SENSOR) Does not apply Misc 1 each Every 10 days. 9 each 1    teraconazole 0.4 % Vaginal Cream Place vaginally every 4 (four) hours. AT BEDTIME           EXAM:   Objective   Objective    Physical Exam    Vital Signs  Vitals:    09/04/24 1013   BP: 113/81   Pulse: 94   Resp: 16   Temp: 97.4 °F (36.3 °C)       Wound Assessment  Wound 05/08/24 #1 Right great toe amp site Toe (Comment which one) Right (Active)   Wound Image   09/04/24 1015   Drainage Amount Scant 09/04/24 1015   Drainage Description Yellow 09/04/24 1015   Treatments Compression 08/28/24 0909   Shoe Type Vero post op shoe;Peg liner 08/28/24 0909   Wound Vac Brand KCI 06/26/24 1046   Wound Length (cm) 0.5 cm 09/04/24 1015   Wound Width (cm) 0.4 cm 09/04/24 1015   Wound Surface Area (cm^2) 0.2 cm^2 09/04/24 1015   Wound Depth (cm) 0.8 cm 09/04/24 1015   Wound Volume (cm^3) 0.16 cm^3 09/04/24 1015   Wound Healing % 93 09/04/24 1015   Margins Well-defined edges 09/04/24 1015   Non-staged Wound Description Full thickness 09/04/24 1015   Wendy-wound Assessment Edema;Dry 09/04/24 1015   Wound Granulation Tissue Pink;Firm 09/04/24 1015   Wound Bed Granulation (%) 100 % 09/04/24 1015   Wound Bed Slough (%) 10 % 08/28/24 0909   Wound Bed Eschar (%) 95 % 05/08/24 0911   Wound Odor None 09/04/24 1015   Shape 100% integrating Epifix 07/24/24 1308   Tunneling? No 09/04/24 1015   Undermining? No 09/04/24 1015   Sinus Tracts? No 09/04/24 1015   Remy Scale Grade 2 09/04/24 1015       Wound 05/08/24 #2 Right plantar foot Foot Right;Plantar (Active)   Wound Image   09/04/24 1018   Drainage Amount None 09/04/24 1018   Drainage Description Serous;Yellow 07/24/24 1310   Treatments Compression;Shoe 08/14/24 1121   Shoe Type Vero post op shoe;Peg liner 08/28/24 0910   Wound Length (cm) 0 cm 09/04/24 1018   Wound Width (cm) 0 cm 09/04/24 1018   Wound Surface Area (cm^2) 0 cm^2 09/04/24  1018   Wound Depth (cm) 0 cm 09/04/24 1018   Wound Volume (cm^3) 0 cm^3 09/04/24 1018   Wound Healing % 100 09/04/24 1018   Margins Flat and Intact 09/04/24 1018   Non-staged Wound Description Full thickness 09/04/24 1018   Wendy-wound Assessment Edema;Dry 09/04/24 1018   Wound Granulation Tissue Pink;Firm 07/24/24 1310   Wound Bed Granulation (%) 100 % 07/24/24 1310   Wound Bed Epithelium (%) 100 % 09/04/24 1018   Wound Bed Slough (%) 50 % 05/29/24 0904   Wound Bed Eschar (%) 50 % 05/08/24 0913   Wound Odor None 09/04/24 1018   Shape unable to view wound bed 07/31/24 1002   Tunneling? No 09/04/24 1018   Undermining? No 09/04/24 1018   Sinus Tracts? No 09/04/24 1018   Remy Scale Grade 2 09/04/24 1018           ASSESSMENT AND PLAN:     Assessment     Encounter Diagnosis  1. Diabetic foot ulcer with osteomyelitis ([facility])    2. Right foot ulcer, with fat layer exposed ([facility])    3. Infected wound    4. MRSA (methicillin resistant Staphylococcus aureus) colonization    5. Class 2 severe obesity due to excess calories with serious comorbidity and body mass index (BMI) of 36.0 to 36.9 in adult ([facility])    6. Hallux limitus of right foot    7. Diabetic polyneuropathy associated with type 2 diabetes mellitus ([facility])      PROCEDURES:    Nonselective debridement using curette to remove nonviable tissue - bleeding response present - no pain - tolerated well  no change in dimensions.     PLAN OF CARE:    Continue topical gentamicin drops to address wound base colonization   Continue collagen.   Maximize offloading  Intensify BS control.   Serial debridements as needed.   Watch out for signs of early infection - counseled.   Work note completed.   Handicap application completed.   Plan of care discussed with patient in detail - All questions answered   Return in one week for podiatry consult..  See me in 3 weeks.     Pt returning to work with limitations. Continue gentamycin drops, endoform, hydrofera transfer, bordered gauze,  spandagrip E x2, dacro shoe. Scheduled with Dr. Kaufman on Monday 9/9/24.     Meds & Refills for this Visit:  Requested Prescriptions     Signed Prescriptions Disp Refills    ibuprofen 800 MG Oral Tab 40 tablet 0     Sig: Take 1 tablet (800 mg total) by mouth every 8 (eight) hours as needed for Pain.         Patient Instructions     See podiatrist Dr. Kaufman in one week  Return 3 weeks to see me   Return to work note complated.   Handicap parking DMV form completed today.     Wound Cleaning and Dressings:    Shower with protection.   VASHE SOAK X 15 MIN  DRESSINGS- gentamicin topical  / Endoform collagen / HF transfer /under wraps.   Change dressing 3 times a week    Compression Therapy : double spandagrip  Compression Therapy Instructions:  1.  Put on first thing in the morning and may remove at bedside.  Okay to wear overnight      if comfortable.  Do not let stockings roll up/down and kink.  Hand wash stockings and      hang dry as needed.    2.  Avoid prolonged standing in one place.  It is better to have your calf muscles moving       to pump fluid out of the legs.    3.  Elevate leg(s) above the level of the heart when sitting or as much as possible.    4.  Take your diuretics as directed by your provider.  Do not skip doses or change doses      unless instructed to do so by your provider.    5. Do not get leg(s) with compression wrap wet. If wraps are too tight as indicated        By pain, numbness/tingling or discoloration of toes remove wrap completely       and call the   wound center.     Off-Loading:  Darco shoe  Intense offloading of wounded area    Miscellaneous Instructions:  Supplement with a daily multivitamin   Low salt diet  Intense blood sugar control - Goal Blood sugar below 180 at all times recommended.  Increase protein intake / consider protein supplements - see below  Elevate extremities at all times when sitting / laying down.    DIETARY MODIFICATIONS TO HELP WITH WOUND HEALING:    Protein:  Meats, beans, eggs, milk and yogurt particularly Greek yogurt), tofu, soy nuts, soy protein products    Vitamin C: Citrus fruits and juices, strawberries, tomatoes, tomato juice, peppers, baked potatoes, spinach, broccoli, cauliflower, [place] sprouts, cabbage    Vitamin A: Dark green, leafy vegetables, orange or yellow vegetables, cantaloupe, fortified dairy products, liver, fortified cereals    Zinc: Fortified cereals, red meats, seafood    Consider [place] by [employer] (These are essential branch chain amino acids that help with tissue building and wound healing) and take 2 packets/day. you can order online at [employer] or [employer]    ADDITIONAL REMINDERS:    The treatment plan has been discussed at length with you and your provider. Follow all instructions carefully, it is very important. If you do not follow all instructions, you are at  risk of your wound not healing, infection, possible loss of limb and even end of life.  Please call the clinic during regular business hours ( 7:30 AM - 5:30 PM) if you notice increased bleeding, redness, warmth, pain or pus like drainage or start running a fever greater than 100.3.    For after hour emergencies, please call your primary physician or go to the nearest emergency room.      Patient/Caregiver Education: There are no barriers to learning. Medical education for above diagnosis given.   Answered all questions.    Outcome: Patient verbalizes understanding. Patient is notified to call with any questions, complications, allergies, or worsening or changing symptoms.  Patient is to call with any side effects or complications as a result of the treatments today.      DOCUMENTATION OF TIME SPENT: Code selection for this visit was based on time spent : 40 min on date of service in preparing to see the patient, obtaining and/or reviewing separately obtained history, performing a medically appropriate examination, counseling and educating the patient/family/caregiver, ordering  medications or testing, referring and communicating with other healthcare providers, documenting clinical information in the E HR, independently interpreting results and communicating results to the patient/family/caregiver and care coordination with the patient's other providers.    Followup: Return in about 1 week (around 9/11/2024) for Wound followup/ podiatry consult Dr. Kaufman.      Note to Patient:  The 21st Century Cures Act makes medical notes like these available to patients in the interest of transparency. However, be advised this is a medical document and is intended as fjoo-vs-sown communication; it is written in medical language and may appear blunt, direct, or contain abbreviations or verbiage that are unfamiliar. Medical documents are intended to carry relevant information, facts as evident, and the clinical opinion of the practitioner.    Also, please note that this report has been produced using speech recognition software and may contain errors related to that system including, but not limited to, errors in grammar, punctuation, and spelling, as well as words and phrases that possibly may have been recognized inappropriately.  If there are any questions or concerns, contact the dictating provider for clarification.      Renee Belcher MD  9/4/2024  12:21 PM

## 2024-09-04 NOTE — PROGRESS NOTES
Weekly Wound Education Note    Teaching Provided To: Patient;Family  Training Topics: Cleasing and general instructions;Compression;Discharge instructions;Dressing;Edema control;Off-loading  Training Method: Explain/Verbal  Training Response: Patient responds and understands;Reinforcement needed        Notes: Stable. xray 8/28 - negative for bone infection. Pt returning to work with limitations. Continue gentamycin drops, endoform, hydrofera transfer, bordered gauze, spandagrip E x2, dacro shoe. Scheduled with Dr. Kaufman on Monday 9/9/24.

## 2024-09-09 ENCOUNTER — OFFICE VISIT (OUTPATIENT)
Dept: WOUND CARE | Facility: HOSPITAL | Age: 44
End: 2024-09-09
Attending: INTERNAL MEDICINE
Payer: COMMERCIAL

## 2024-09-09 VITALS
RESPIRATION RATE: 16 BRPM | SYSTOLIC BLOOD PRESSURE: 132 MMHG | TEMPERATURE: 97 F | DIASTOLIC BLOOD PRESSURE: 84 MMHG | HEART RATE: 104 BPM

## 2024-09-09 DIAGNOSIS — M76.822 POSTERIOR TIBIAL TENDON DYSFUNCTION (PTTD) OF BOTH LOWER EXTREMITIES: ICD-10-CM

## 2024-09-09 DIAGNOSIS — Z89.411 HISTORY OF AMPUTATION OF RIGHT GREAT TOE (HCC): Primary | ICD-10-CM

## 2024-09-09 DIAGNOSIS — M25.375 FOOT JOINT INSTABILITY, LEFT: ICD-10-CM

## 2024-09-09 DIAGNOSIS — R26.9 GAIT DIFFICULTY: ICD-10-CM

## 2024-09-09 DIAGNOSIS — M76.821 POSTERIOR TIBIAL TENDON DYSFUNCTION (PTTD) OF BOTH LOWER EXTREMITIES: ICD-10-CM

## 2024-09-09 DIAGNOSIS — E11.42 TYPE 2 DIABETES MELLITUS WITH POLYNEUROPATHY (HCC): ICD-10-CM

## 2024-09-09 DIAGNOSIS — M25.374 FOOT JOINT INSTABILITY, RIGHT: ICD-10-CM

## 2024-09-09 LAB — GLUCOSE BLD-MCNC: 157 MG/DL (ref 70–99)

## 2024-09-09 PROCEDURE — 99213 OFFICE O/P EST LOW 20 MIN: CPT | Performed by: PODIATRIST

## 2024-09-09 PROCEDURE — 82962 GLUCOSE BLOOD TEST: CPT | Performed by: INTERNAL MEDICINE

## 2024-09-09 NOTE — PROGRESS NOTES
Subjective   Leonie Denney is a 44 year old female.    Chief Complaint   Patient presents with    Wound Care     Patient arrives for a wound care follow appointment. Patient states she has no pain. Patient has been using her gentamicin drops and covering with a bandaid. Patient expresses concern over work related note.        HPI  Leonie Denney is a 44 year old female with history of DM2 with peripheral neuropathy who is following up in wound care center for evaluation of right great toe amputation site.  Status post right hallux amputation by Dr. Sung.  Patient has been seeing Dr. Belcher in the wound care center since her procedure.  Patient has since healed up except for a flap to the surgical site.  Patient denies any recent drainage.  She has been placing gentamicin in the flap/crevice.  Denies recent signs of infection.  Patient also shares that she has concerns with flatfeet to both of her feet, with her left foot causing her pain.  She is ambulating with a cane today.  Hoping discuss options.  No other concerns.    Review of Systems  Denies nausea, vomiting, fever, chills, shortness of breath, chest pain, and calf pain.    Objective    Physical Exam:    Derm:  Wound Assessment  Wound 05/08/24 #1 Right great toe amp site Toe (Comment which one) Right (Active)   Wound Image    09/09/24 1306   Drainage Amount Scant 09/09/24 1306   Drainage Description Serous;Yellow 09/09/24 1306   Treatments Compression;Shoe 09/04/24 1015   Shoe Type Vero post op shoe;Peg liner 09/04/24 1015   Wound Vac Brand KCI 06/26/24 1046   Wound Length (cm) 0.1 cm 09/09/24 1306   Wound Width (cm) 0.1 cm 09/09/24 1306   Wound Surface Area (cm^2) 0.01 cm^2 09/09/24 1306   Wound Depth (cm) 0.1 cm 09/09/24 1306   Wound Volume (cm^3) 0.001 cm^3 09/09/24 1306   Wound Healing % 100 09/09/24 1306   Margins Well-defined edges 09/09/24 1306   Non-staged Wound Description Full thickness 09/09/24 1306   Wendy-wound Assessment Dry;Edema 09/09/24  1306   Wound Granulation Tissue Pink;Firm 09/09/24 1306   Wound Bed Granulation (%) 100 % 09/09/24 1306   Wound Bed Slough (%) 10 % 08/28/24 0909   Wound Bed Eschar (%) 95 % 05/08/24 0911   Wound Odor None 09/09/24 1306   Shape 100% integrating Epifix 07/24/24 1308   Tunneling? No 09/09/24 1306   Undermining? No 09/09/24 1306   Sinus Tracts? No 09/09/24 1306   Remy Scale Grade 2 09/04/24 1015       Vascular: pedal pulses are palpable. CFT <3 sec to digits  Musculoskeletal: Significantly decreased medial longitudinal arch noted to bilateral feet with prominent plantar navicular and cuboid noted to the left foot.  These deformities are rigid in nature and are nonreducible.  AP adductus deformity to bilateral forefeet.  Gait is assisted with a cane  Neurological: Gross sensation intact via light touch.  Protective sensation diminished via Ipswitch test.    Vital Signs  Vital Signs    09/09/24 1304   BP: 132/84   Pulse: 104   Resp: 16   Temp: 97 °F (36.1 °C)   PainSc: 0 - (None)         Allergies  No Known Allergies    Assessment    Encounter Diagnosis  1. History of amputation of right great toe (HCC)    2. Type 2 diabetes mellitus with polyneuropathy ([facility])    3. Foot joint instability, right    4. Foot joint instability, left    5. Posterior tibial tendon dysfunction (PTTD) of both lower extremities    6. Gait difficulty        Problem List  Patient Active Problem List   Diagnosis    Type 2 diabetes mellitus with hyperglycemia, with long-term current use of insulin ([facility])    Anxiety    Neuropathy    Meralgia paresthetica of left side    Vitamin D deficiency    B12 deficiency    Diabetic neuropathy ([facility])    Hyperglycemia due to type 2 diabetes mellitus (HCC)    Morbid obesity ([facility])    Other specified fetal and placental problems affecting management of mother, antepartum    Primary hypercoagulable state (HCC)    Uncontrolled type 2 diabetes mellitus    Noncompliance with medication regimen    Ulcer of right foot,  limited to breakdown of skin (HCC)    Hyponatremia    Foot ulcer, right, with fat layer exposed (HCC)    Therapeutic drug monitoring    FLORENCIA (acute kidney injury) (HCC)    Labial abscess    Cellulitis of labia    Hyperglycemia    Leukocytosis, unspecified type    Cellulitis and abscess of foot    Refusal of statin medication by patient       Plan  Orders:  No orders of the defined types were placed in this encounter.        -Patient examined, chart history reviewed.    -Evaluated patient's right hallux amputation site --there is a crevice at the incision site.  After cleansing well with 4 x 4 gauze, it appears that the wound itself is epithelialized with macerated tissue within it.  No current signs of infection.    -Explained the importance of cleansing this area daily and keeping it as dry as possible.  Betadine placed within the area today and cover with a Band-Aid.  Recommend patient continue doing this until noticing no further maceration.    -Did have a brief discussion with patient for possibility of surgical revision of flap if needed in the future.  Currently, there is no current signs of infection and patient is healed.  Will be important to control maceration within the area.    -In regards to patient's bilateral foot deformities, briefly had discussion in regards to surgical reconstruction.  I do believe that patient is not a good surgical candidate for this currently and I am recommending a discussion for custom bracing in the future.  Patient does share that she has a brace that she utilizes, which does help with her pain.  Will have patient follow-up in [facility] or Dr. Sung's outpatient clinic for further discussion.  In the meantime, recommend diabetic shoes with custom inserts and toe filler to right great toe.  Patient was agreeable to this and an order was placed to  prosthetics today.    -Encouraged patient to continue with tight glycemic control     -Educated on signs of infection and  encouraged patient to seek immediate medical attention if noticing any of these signs.    Follow-Up  As needed    Isac Kaufman DPM    9/9/2024    Dragon speech recognition software was used to prepare this note.  Errors in word recognition may occur.  Please contact me with any questions/concerns with this note.

## 2024-09-09 NOTE — PROGRESS NOTES
Weekly Wound Education Note    Teaching Provided To: Patient  Training Topics: Discharge instructions;Cleasing and general instructions;Dressing  Training Method: Demonstration;Explain/Verbal;Written  Training Response: Patient responds and understands        Notes: Cleanse right great toe wound with saline or wound cleanser, dry thoroughly. Using qtip apply small amt betadine, apply dry gauze and bandaid. Change daily.  .

## 2024-09-25 ENCOUNTER — APPOINTMENT (OUTPATIENT)
Dept: WOUND CARE | Facility: HOSPITAL | Age: 44
End: 2024-09-25
Attending: INTERNAL MEDICINE
Payer: COMMERCIAL

## 2024-09-25 VITALS
SYSTOLIC BLOOD PRESSURE: 136 MMHG | TEMPERATURE: 98 F | RESPIRATION RATE: 16 BRPM | DIASTOLIC BLOOD PRESSURE: 82 MMHG | HEART RATE: 95 BPM

## 2024-09-25 DIAGNOSIS — E11.69 DIABETIC FOOT ULCER WITH OSTEOMYELITIS (HCC): ICD-10-CM

## 2024-09-25 DIAGNOSIS — E11.621 DIABETIC FOOT ULCER WITH OSTEOMYELITIS (HCC): ICD-10-CM

## 2024-09-25 DIAGNOSIS — E11.42 TYPE 2 DIABETES MELLITUS WITH POLYNEUROPATHY (HCC): Primary | ICD-10-CM

## 2024-09-25 DIAGNOSIS — M86.9 DIABETIC FOOT ULCER WITH OSTEOMYELITIS (HCC): ICD-10-CM

## 2024-09-25 DIAGNOSIS — L97.509 DIABETIC FOOT ULCER WITH OSTEOMYELITIS (HCC): ICD-10-CM

## 2024-09-25 DIAGNOSIS — L97.512 RIGHT FOOT ULCER, WITH FAT LAYER EXPOSED (HCC): ICD-10-CM

## 2024-09-25 DIAGNOSIS — E66.01 CLASS 2 SEVERE OBESITY DUE TO EXCESS CALORIES WITH SERIOUS COMORBIDITY AND BODY MASS INDEX (BMI) OF 36.0 TO 36.9 IN ADULT (HCC): ICD-10-CM

## 2024-09-25 LAB — GLUCOSE BLD-MCNC: 204 MG/DL (ref 70–99)

## 2024-09-25 PROCEDURE — 99213 OFFICE O/P EST LOW 20 MIN: CPT

## 2024-09-25 PROCEDURE — 82962 GLUCOSE BLOOD TEST: CPT | Performed by: INTERNAL MEDICINE

## 2024-09-25 NOTE — PROGRESS NOTES
[facility] PROGRESS NOTE  AD SHAH MD  9/25/2024    Chief Complaint:   Chief Complaint   Patient presents with    Wound Care     Follow up for right foot wound. No Complaints at this time.        HPI:   Subjective   Leonie Denney is a 44 year old female coming in for a follow-up visit.    HPI    Wound closed.   Seen by podiatrist. Confirmed that wound was closed  No s/o infeciton  BS high today 209  Last A1c value was 8.7% done 5/21/2024.      Review of Systems  Negative except HPI   Denies chest pain / SOB / palpitations  Denies fever.     Allergies  No Known Allergies    Current Meds:  Current Outpatient Medications   Medication Sig Dispense Refill    ibuprofen 800 MG Oral Tab Take 1 tablet (800 mg total) by mouth every 8 (eight) hours as needed for Pain. 40 tablet 0    gentamicin 0.3 % Ophthalmic Solution 2 drops into affected area topically TID 15 mL 2    sulfamethoxazole-trimethoprim (BACTRIM) 400-80 MG Oral Tab Take 1 tablet by mouth 2 (two) times daily. 14 tablet 0    Empagliflozin (JARDIANCE) 25 MG Oral Tab Take 25 mg by mouth daily. 90 tablet 1    gentamicin 0.1 % External Ointment Apply 1 Application topically 3 (three) times daily. 30 g 3    acetaminophen 500 MG Oral Tab Take 1 tablet (500 mg total) by mouth every 4 (four) hours as needed for Fever (equal to or greater than 100.4). 50 tablet 0    HYDROcodone-acetaminophen 5-325 MG Oral Tab Take 1 tablet by mouth every 6 (six) hours as needed. 40 tablet 0    semaglutide (OZEMPIC, 2 MG/DOSE,) 8 MG/3ML Subcutaneous Solution Pen-injector Inject 2 mg into the skin once a week. 3 mL 0    Continuous Blood Gluc Sensor (DEXCOM G7 SENSOR) Does not apply Misc 1 each Every 10 days. 9 each 1    teraconazole 0.4 % Vaginal Cream Place vaginally every 4 (four) hours. AT BEDTIME           EXAM:   Objective   Objective    Physical Exam    Vital Signs  Vitals:    09/25/24 1500   BP: 136/82   Pulse: 95   Resp: 16   Temp: 98.3 °F (36.8 °C)       Wound  Assessment  Wound 05/08/24 #1 Right great toe amp site Toe (Comment which one) Right (Active)   Date First Assessed/Time First Assessed: 05/08/24 0908    Wound Number (Wound Clinic Only): #1 Right great toe amp site  Primary Wound Type: Diabetic Ulcer  Location: Toe (Comment which one)  Wound Location Orientation: Right      Assessments 9/25/2024  3:48 PM   Wound Image     Drainage Amount None   Wound Length (cm) 0.1 cm   Wound Width (cm) 0.1 cm   Wound Surface Area (cm^2) 0.01 cm^2   Wound Depth (cm) 0.1 cm   Wound Volume (cm^3) 0.001 cm^3   Wound Healing % 100   Margins Well-defined edges   Non-staged Wound Description Full thickness   Wendy-wound Assessment Edema   Wound Granulation Tissue Pink;Firm   Wound Bed Granulation (%) 100 %   Wound Odor None   Remy Scale Grade 2       Inactive Orders   Date Order Priority Status Authorizing Provider   08/28/24 0937 Debridement Diabetic Ulcer Right Toe (Comment which one) Routine Completed Renee Foreman MD   08/14/24 1313 Debridement Diabetic Ulcer Right Toe (Comment which one) Routine Completed Renee Foreman MD   07/24/24 1330 Debridement Diabetic Ulcer Right Toe (Comment which one) Routine Completed Renee Foreman MD   07/17/24 1034 Cellular tissue product application Old surgical Right Toe (Comment which one) Routine Completed Renee Foreman MD   07/10/24 1028 Cellular tissue product application Old surgical Right Toe (Comment which one) Routine Completed Renee Foreman MD   07/03/24 1138 Debridement Old surgical Right Toe (Comment which one) Routine Completed Renee Foreman MD   06/26/24 1123 Cellular tissue product application Old surgical Right Toe (Comment which one) Routine Completed Renee Foreman MD   06/19/24 0949 Cellular tissue product application Old surgical Right Toe (Comment which one) Routine Completed Renee Foreman MD   06/12/24 0947 Debridement Old surgical Right Toe (Comment which one) Routine  Completed Renee Foreman MD   06/05/24 1136 Debridement Old surgical Right Toe (Comment which one) Routine Completed Renee Foreman MD   05/29/24 0924 Debridement Old surgical Right Toe (Comment which one) Routine Completed Renee Foreman MD   05/22/24 1140 Debridement Old surgical Right Toe (Comment which one) Routine Completed Renee Foreman MD   05/15/24 1059 Debridement Old surgical Right Toe (Comment which one) Routine Completed Renee Foreman MD   05/08/24 0943 Debridement Old surgical Right Toe (Comment which one) Routine Completed Renee Foreman MD          ASSESSMENT AND PLAN:     Assessment     Encounter Diagnosis  1. Type 2 diabetes mellitus with polyneuropathy ([facility])    2. Right foot ulcer, with fat layer exposed ([facility])    3. Diabetic foot ulcer with osteomyelitis ([facility])    4. Class 2 severe obesity due to excess calories with serious comorbidity and body mass index (BMI) of 36.0 to 36.9 in adult ([facility])        PLAN OF CARE:    Betadine daily.   Watch out for signs of early infection - counseled.   Plan of care discussed with patient in detail - All questions answered   Return  as needed      Patient Instructions     Return as needed  Ok to shower  Wound Cleaning and Dressings:  Betadine daily until DC by podiatrist.   See podiatrist in few weeks    Compression Therapy : spandagrip  Compression Therapy Instructions:  1.  Put on first thing in the morning and may remove at bedside.  Okay to wear overnight      if comfortable.  Do not let stockings roll up/down and kink.  Hand wash stockings and      hang dry as needed.    2.  Avoid prolonged standing in one place.  It is better to have your calf muscles moving       to pump fluid out of the legs.    3.  Elevate leg(s) above the level of the heart when sitting or as much as possible.    4.  Take your diuretics as directed by your provider.  Do not skip doses or change doses      unless instructed to do so by your  provider.    5. Do not get leg(s) with compression wrap wet. If wraps are too tight as indicated        By pain, numbness/tingling or discoloration of toes remove wrap completely       and call the   wound center.     Off-Loading:  Darco shoe  Intense offloading of wounded area    Miscellaneous Instructions:  Supplement with a daily multivitamin   Low salt diet  Intense blood sugar control - Goal Blood sugar below 180 at all times recommended.  Increase protein intake / consider protein supplements - see below  Elevate extremities at all times when sitting / laying down.    DIETARY MODIFICATIONS TO HELP WITH WOUND HEALING:    Protein: Meats, beans, eggs, milk and yogurt particularly Greek yogurt), tofu, soy nuts, soy protein products    Vitamin C: Citrus fruits and juices, strawberries, tomatoes, tomato juice, peppers, baked potatoes, spinach, broccoli, cauliflower, [place] sprouts, cabbage    Vitamin A: Dark green, leafy vegetables, orange or yellow vegetables, cantaloupe, fortified dairy products, liver, fortified cereals    Zinc: Fortified cereals, red meats, seafood    Consider [place] [employer] (These are essential branch chain amino acids that help with tissue building and wound healing) and take 2 packets/day. you can order online at [employer] or [employer]    ADDITIONAL REMINDERS:    The treatment plan has been discussed at length with you and your provider. Follow all instructions carefully, it is very important. If you do not follow all instructions, you are at  risk of your wound not healing, infection, possible loss of limb and even end of life.  Please call the clinic during regular business hours ( 7:30 AM - 5:30 PM) if you notice increased bleeding, redness, warmth, pain or pus like drainage or start running a fever greater than 100.3.    For after hour emergencies, please call your primary physician or go to the nearest emergency room.      Patient/Caregiver Education: There are no barriers to learning.  Medical education for above diagnosis given.   Answered all questions.    Outcome: Patient verbalizes understanding. Patient is notified to call with any questions, complications, allergies, or worsening or changing symptoms.  Patient is to call with any side effects or complications as a result of the treatments today.      DOCUMENTATION OF TIME SPENT: Code selection for this visit was based on time spent : 25 min on date of service in preparing to see the patient, obtaining and/or reviewing separately obtained history, performing a medically appropriate examination, counseling and educating the patient/family/caregiver, ordering medications or testing, referring and communicating with other healthcare providers, documenting clinical information in the E HR, independently interpreting results and communicating results to the patient/family/caregiver and care coordination with the patient's other providers.    Followup: Return in about 1 week (around 10/2/2024) for Wound followup.      Note to Patient:  The 21st Century Cures Act makes medical notes like these available to patients in the interest of transparency. However, be advised this is a medical document and is intended as [id number] communication; it is written in medical language and may appear blunt, direct, or contain abbreviations or verbiage that are unfamiliar. Medical documents are intended to carry relevant information, facts as evident, and the clinical opinion of the practitioner.    Also, please note that this report has been produced using speech recognition software and may contain errors related to that system including, but not limited to, errors in grammar, punctuation, and spelling, as well as words and phrases that possibly may have been recognized inappropriately.  If there are any questions or concerns, contact the dictating provider for clarification.      Renee Belcher MD  9/25/2024  4:01 PM

## 2024-09-25 NOTE — PATIENT INSTRUCTIONS
Return as needed  Ok to shower  Wound Cleaning and Dressings:  Betadine daily until DC by podiatrist.   See podiatrist in few weeks    Compression Therapy : spandagrip  Compression Therapy Instructions:  1.  Put on first thing in the morning and may remove at bedside.  Okay to wear overnight      if comfortable.  Do not let stockings roll up/down and kink.  Hand wash stockings and      hang dry as needed.    2.  Avoid prolonged standing in one place.  It is better to have your calf muscles moving       to pump fluid out of the legs.    3.  Elevate leg(s) above the level of the heart when sitting or as much as possible.    4.  Take your diuretics as directed by your provider.  Do not skip doses or change doses      unless instructed to do so by your provider.    5. Do not get leg(s) with compression wrap wet. If wraps are too tight as indicated        By pain, numbness/tingling or discoloration of toes remove wrap completely       and call the   wound center.     Off-Loading:  Darco shoe  Intense offloading of wounded area    Miscellaneous Instructions:  Supplement with a daily multivitamin   Low salt diet  Intense blood sugar control - Goal Blood sugar below 180 at all times recommended.  Increase protein intake / consider protein supplements - see below  Elevate extremities at all times when sitting / laying down.    DIETARY MODIFICATIONS TO HELP WITH WOUND HEALING:    Protein: Meats, beans, eggs, milk and yogurt particularly Greek yogurt), tofu, soy nuts, soy protein products    Vitamin C: Citrus fruits and juices, strawberries, tomatoes, tomato juice, peppers, baked potatoes, spinach, broccoli, cauliflower, [employer] sprouts, cabbage    Vitamin A: Dark green, leafy vegetables, orange or yellow vegetables, cantaloupe, fortified dairy products, liver, fortified cereals    Zinc: Fortified cereals, red meats, seafood    Consider [place] [employer] (These are essential branch chain amino acids that help with tissue  building and wound healing) and take 2 packets/day. you can order online at [employer] or [employer]    ADDITIONAL REMINDERS:    The treatment plan has been discussed at length with you and your provider. Follow all instructions carefully, it is very important. If you do not follow all instructions, you are at  risk of your wound not healing, infection, possible loss of limb and even end of life.  Please call the clinic during regular business hours ( 7:30 AM - 5:30 PM) if you notice increased bleeding, redness, warmth, pain or pus like drainage or start running a fever greater than 100.3.    For after hour emergencies, please call your primary physician or go to the nearest emergency room.

## 2024-09-25 NOTE — PROGRESS NOTES
Weekly Wound Education Note    Teaching Provided To: Patient  Training Topics: Cleasing and general instructions;Compression;Discharge instructions;Dressing;Edema control  Training Method: Explain/Verbal  Training Response: Patient responds and understands;Reinforcement needed        Notes: Per provider wound is healed. Provider recommends patient continue apply betadine and bandaid daily and make an appointment with podiatry. Anshul E x2 applied.

## 2024-10-02 ENCOUNTER — APPOINTMENT (OUTPATIENT)
Dept: WOUND CARE | Facility: HOSPITAL | Age: 44
End: 2024-10-02
Attending: INTERNAL MEDICINE
Payer: COMMERCIAL

## 2024-10-04 ENCOUNTER — TELEPHONE (OUTPATIENT)
Dept: WOUND CARE | Facility: HOSPITAL | Age: 44
End: 2024-10-04

## 2024-10-04 NOTE — TELEPHONE ENCOUNTER
Patient called asking if provider can write a return to work letter with no restrictions as of 10/10/24. Provider made aware. Pt to  letter later today.

## 2024-10-09 ENCOUNTER — APPOINTMENT (OUTPATIENT)
Dept: WOUND CARE | Facility: HOSPITAL | Age: 44
End: 2024-10-09
Attending: INTERNAL MEDICINE
Payer: COMMERCIAL

## 2024-10-16 ENCOUNTER — APPOINTMENT (OUTPATIENT)
Dept: WOUND CARE | Facility: HOSPITAL | Age: 44
End: 2024-10-16
Attending: INTERNAL MEDICINE
Payer: COMMERCIAL

## 2024-10-29 ENCOUNTER — OFFICE VISIT (OUTPATIENT)
Facility: LOCATION | Age: 44
End: 2024-10-29
Payer: COMMERCIAL

## 2024-10-29 DIAGNOSIS — M21.962 ACQUIRED FOOT DEFORMITY, LEFT: ICD-10-CM

## 2024-10-29 DIAGNOSIS — M25.375 FOOT JOINT INSTABILITY, LEFT: ICD-10-CM

## 2024-10-29 DIAGNOSIS — M14.672 CHARCOT'S JOINT OF FOOT, LEFT: ICD-10-CM

## 2024-10-29 DIAGNOSIS — E11.42 TYPE 2 DIABETES MELLITUS WITH POLYNEUROPATHY (HCC): Primary | ICD-10-CM

## 2024-10-29 DIAGNOSIS — R60.0 EDEMA OF RIGHT LOWER EXTREMITY: ICD-10-CM

## 2024-10-29 DIAGNOSIS — M20.41 HAMMER TOE OF SECOND TOE OF RIGHT FOOT: ICD-10-CM

## 2024-10-29 DIAGNOSIS — S98.111A AMPUTATED GREAT TOE OF RIGHT FOOT (HCC): ICD-10-CM

## 2024-10-29 DIAGNOSIS — M79.672 LEFT FOOT PAIN: ICD-10-CM

## 2024-10-29 PROCEDURE — 99213 OFFICE O/P EST LOW 20 MIN: CPT | Performed by: PODIATRIST

## 2024-10-29 NOTE — PROGRESS NOTES
[facility]  Progress Note  10/29/2024    Leonie Denney is a 44 year old female.   Chief Complaint   Patient presents with    Foot Pain     Patient is here to follow up from LOV 9/9/24. She would like for her right foot checked, post surgery. And is wondering about the ankle brace for the left foot. She rates pain 8/10, there is numbness and tingling, FBS this am was not checked.  A1C was done on 5/21/24 with the result of 8.7.          HPI:     Patient is a pleasant 44-year-old diabetic female with peripheral neuropathy who is presenting to clinic today for diabetic foot evaluation.  Patient states she is doing well overall following her right hallux amputation, which is now healed.  She states that it took several months to heal but has been discharged from the wound care center.  Patient continues to experience multiple concerns of bilateral feet.  She continues to get swelling more so to her right lower extremity.  She also states that her second digit is now starting to curl as well.  She is denying any pain to the site.  Denies any open wounds.  Patient also is inquiring about an ankle brace for her left ankle.  She has a history of Charcot with a collapsed midfoot.  She does utilize a strap across the midfoot currently, which is beneficial.  She is also picking up her diabetic shoes and inserts in the near future.  Rates her pain 8/10.  She does have numbness and tingling secondary to peripheral neuropathy.  Denying any open wounds or other complaints.  Denies recent nausea, vomiting, fevers, chills.  Most recent hemoglobin A1c was 8.7% on 5/21/2024      Allergies: Patient has no known allergies.   Current Outpatient Medications   Medication Sig Dispense Refill    ibuprofen 800 MG Oral Tab Take 1 tablet (800 mg total) by mouth every 8 (eight) hours as needed for Pain. 40 tablet 0    gentamicin 0.3 % Ophthalmic Solution 2 drops into affected area topically TID 15 mL 2     sulfamethoxazole-trimethoprim (BACTRIM) 400-80 MG Oral Tab Take 1 tablet by mouth 2 (two) times daily. 14 tablet 0    Empagliflozin (JARDIANCE) 25 MG Oral Tab Take 25 mg by mouth daily. 90 tablet 1    gentamicin 0.1 % External Ointment Apply 1 Application topically 3 (three) times daily. 30 g 3    acetaminophen 500 MG Oral Tab Take 1 tablet (500 mg total) by mouth every 4 (four) hours as needed for Fever (equal to or greater than 100.4). 50 tablet 0    semaglutide (OZEMPIC, 2 MG/DOSE,) 8 MG/3ML Subcutaneous Solution Pen-injector Inject 2 mg into the skin once a week. 3 mL 0    Continuous Blood Gluc Sensor (DEXCOM G7 SENSOR) Does not apply Misc 1 each Every 10 days. 9 each 1    teraconazole 0.4 % Vaginal Cream Place vaginally every 4 (four) hours. AT BEDTIME      HYDROcodone-acetaminophen 5-325 MG Oral Tab Take 1 tablet by mouth every 6 (six) hours as needed. (Patient not taking: Reported on 10/29/2024) 40 tablet 0      Past Medical History:    Abdominal pain    Anxiety state    Arthritis    B12 deficiency    Blurred vision    Calculus of kidney    Constipation    Constipation    Decorative tattoo    Depression    no meds- will see therapist    Diabetes mellitus (HCC)    Frequent use of laxatives    Frequent UTI    Frequent UTI    Irregular bowel habits    Itch of skin    Leg swelling    Loss of appetite    Nausea    Neuropathy    Night sweats    Obesity    Osteoarthritis    knees    Sleep disturbance    Type II or unspecified type diabetes mellitus without mention of complication, not stated as uncontrolled    on insulin currently/recently    Vitamin D deficiency    Weight loss      Past Surgical History:   Procedure Laterality Date    Colonoscopy N/A 2019    Procedure: COLONOSCOPY;  Surgeon: Tyrel Osbron DO;  Location:  ENDOSCOPY    Dilation/curettage,diagnostic      x2    Hc  section level i      x1    Kidney surgery      KIDNEY STONE REMOVAL X2    Other surgical history  04/10/2022    INCISION  AND DRAINAGE LEFT BREAST ABSCESS X 2      Family History   Problem Relation Age of Onset    Diabetes Mother     Diabetes Maternal Grandmother     Diabetes Maternal Grandfather       Social History     Socioeconomic History    Marital status:    Tobacco Use    Smoking status: Every Day     Current packs/day: 0.25     Average packs/day: 0.3 packs/day for 15.8 years (4.0 ttl pk-yrs)     Types: Cigarettes     Start date: 2009    Smokeless tobacco: Never    Tobacco comments:     Working towards quitting   Vaping Use    Vaping status: Never Used   Substance and Sexual Activity    Alcohol use: No    Drug use: No     Comment: CBD oil on occasion   Other Topics Concern    Caffeine Concern No    Exercise No    Seat Belt Yes    Special Diet No    Stress Concern No    Weight Concern No           REVIEW OF SYSTEMS:     10 point ROS completed and was negative, except for pertinent positive and negatives stated in subjective.       EXAM:     GENERAL: well developed, well nourished, in no apparent distress  EXTREMITIES:  Integument: Toenails x 9 are thickened, discolored, dystrophic, and left fourth and fifth digits are elongated.  Skin appears moist, warm, and supple. There are no color changes. No open lesions. No macerations. No Hyperkeratotic lesions.  Right hallux amputation site healed with no open ulcerations noted to bilateral feet.  No signs of infection bilaterally.  Vascular: pedal pulses are palpable. CFT <3 sec to digits  Musculoskeletal: Significantly decreased medial longitudinal arch noted to bilateral feet with prominent plantar navicular and cuboid noted to the left foot.  These deformities are rigid in nature and are nonreducible.  Abductus deformity to bilateral forefeet.  Gait is unassisted.  Status post right hallux amputation.  Flexible flexion contracture of lesser digits of right foot  Neurological: Gross sensation intact via light touch.  Protective sensation diminished via Ipswitch  test.       ASSESSMENT AND PLAN:   Diagnoses and all orders for this visit:    Type 2 diabetes mellitus with polyneuropathy (HCC)  -     DME - EXTERNAL     Charcot's joint of foot, left  -     DME - EXTERNAL     Amputated great toe of right foot (HCC)    Edema of right lower extremity    Acquired foot deformity, left  -     DME - EXTERNAL     Foot joint instability, left  -     DME - EXTERNAL     Hammer toe of second toe of right foot        Plan:   -Patient was seen and evaluated today in clinic.  Chart history reviewed.    -Discussed importance of proper pedal hygiene, daily foot checks, and tight glycemic control.    -Patient to avoid walking barefoot. Recommend ambulating with supportive diabetic shoes and inserts.    -Patient to monitor for acute signs of infection and seek immediate medical attention if any signs of infection or other concerns arise.    -Patient was provided with a crest pad for her right foot due to hammer digit deformities.    -In regards to the patient's left foot deformity secondary to Charcot event in her past, discussed further options.  Patient is not a great surgical candidate at this time due to ongoing elevated A1c with history of slow healing ulceration.  Because of this, recommending custom ankle brace.  Patient is agreeable.  An order for a custom [place] ankle brace was placed today through  prosthetics.    -Discussion was made on various ways to control the lower extremity swelling which include leg elevation, compression stockings, as well as diet and diuresis management by the patient's primary care physician.  Patient does have pumps that she bought for herself, which she feels has been beneficial.    -Discussed possibility of advanced therapies and programs to reduce edema including vascular surgical consultation, formal lymphedema reduction program, compression stockings/wraps, venous compression pumps, and elevation.    -Patient will continue with  compression    -Left fourth and fifth digit toenails debrided today without incident.    -All of the patient's questions and concerns were addressed.  They indicated their understanding of these issues and agrees to the plan.    Time spent reviewing pertinent information from patient's chart, reviewing any pertinent imaging, obtaining history and physical exam, discussing and mutually agreeing on a treatment plan, and documenting encounter: 25 minutes    RTC 3-6 months      Isac Kaufman DPM        [address] [id number]   [email or web address]            Simph[employer] speech recognition software was used to prepare this note.  Errors in word recognition may occur.  Please contact me with any questions/concerns with this note.

## 2024-10-30 RX ORDER — IBUPROFEN 600 MG/1
600 TABLET, FILM COATED ORAL EVERY 8 HOURS PRN
Qty: 30 TABLET | Refills: 0 | Status: SHIPPED | OUTPATIENT
Start: 2024-10-30 | End: 2024-11-09

## 2024-12-13 ENCOUNTER — APPOINTMENT (OUTPATIENT)
Dept: GENERAL RADIOLOGY | Age: 44
End: 2024-12-13
Attending: NURSE PRACTITIONER
Payer: COMMERCIAL

## 2024-12-13 ENCOUNTER — HOSPITAL ENCOUNTER (OUTPATIENT)
Age: 44
Discharge: HOME | End: 2024-12-13
Payer: COMMERCIAL

## 2024-12-13 VITALS
DIASTOLIC BLOOD PRESSURE: 72 MMHG | RESPIRATION RATE: 18 BRPM | HEART RATE: 86 BPM | HEIGHT: 71 IN | SYSTOLIC BLOOD PRESSURE: 125 MMHG | OXYGEN SATURATION: 98 % | BODY MASS INDEX: 32.9 KG/M2 | WEIGHT: 235 LBS | TEMPERATURE: 100 F

## 2024-12-13 DIAGNOSIS — R52 BODY ACHES: ICD-10-CM

## 2024-12-13 DIAGNOSIS — J02.9 SORE THROAT: ICD-10-CM

## 2024-12-13 DIAGNOSIS — R05.2 SUBACUTE COUGH: Primary | ICD-10-CM

## 2024-12-13 DIAGNOSIS — J22 LOWER RESPIRATORY TRACT INFECTION: ICD-10-CM

## 2024-12-13 LAB
POCT INFLUENZA A: NEGATIVE
POCT INFLUENZA B: NEGATIVE
S PYO AG THROAT QL: NEGATIVE
SARS-COV-2 RNA RESP QL NAA+PROBE: NOT DETECTED

## 2024-12-13 PROCEDURE — 87502 INFLUENZA DNA AMP PROBE: CPT | Performed by: NURSE PRACTITIONER

## 2024-12-13 PROCEDURE — 99214 OFFICE O/P EST MOD 30 MIN: CPT | Performed by: NURSE PRACTITIONER

## 2024-12-13 PROCEDURE — 71046 X-RAY EXAM CHEST 2 VIEWS: CPT | Performed by: NURSE PRACTITIONER

## 2024-12-13 PROCEDURE — U0002 COVID-19 LAB TEST NON-CDC: HCPCS | Performed by: NURSE PRACTITIONER

## 2024-12-13 PROCEDURE — 87880 STREP A ASSAY W/OPTIC: CPT | Performed by: NURSE PRACTITIONER

## 2024-12-13 RX ORDER — AZITHROMYCIN 250 MG/1
TABLET, FILM COATED ORAL
Qty: 6 TABLET | Refills: 0 | Status: SHIPPED | OUTPATIENT
Start: 2024-12-13 | End: 2024-12-18

## 2024-12-13 RX ORDER — BENZONATATE 100 MG/1
100 CAPSULE ORAL 3 TIMES DAILY PRN
Qty: 30 CAPSULE | Refills: 0 | Status: SHIPPED | OUTPATIENT
Start: 2024-12-13 | End: 2025-01-12

## 2024-12-13 NOTE — DISCHARGE INSTRUCTIONS
Take the antibiotics as prescribed.  Take the benzonatate capsules as needed for cough.      Interventions for sore throat: Warm salt water gargles 3 times daily.  Take a teaspoon of honey on its own or  in another liquid like juice or tea. Cough or throat drops.  Drink plenty of fluids, mainly consisting of water.  Follow-up with your doctor in 2 to 3 days.  Go to the nearest ER for new or worsening symptoms.

## 2024-12-13 NOTE — ED PROVIDER NOTES
Patient Seen in: [facility]      History     Chief Complaint   Patient presents with    Sore Throat     Stated Complaint: sore throat    Subjective:   44-year-old female with a lingering cough for 3 weeks, and sore throat that started 3 days ago.  States  is sick with similar symptoms.              Objective:     Past Medical History:    Abdominal pain    Anxiety state    Arthritis    B12 deficiency    Blurred vision    Calculus of kidney    Constipation    Constipation    Decorative tattoo    Depression    no meds- will see therapist    Diabetes mellitus (HCC)    Frequent use of laxatives    Frequent UTI    Frequent UTI    Irregular bowel habits    Itch of skin    Leg swelling    Loss of appetite    Nausea    Neuropathy    Night sweats    Obesity    Osteoarthritis    knees    Sleep disturbance    Type II or unspecified type diabetes mellitus without mention of complication, not stated as uncontrolled    on insulin currently/recently    Vitamin D deficiency    Weight loss              Past Surgical History:   Procedure Laterality Date    Colonoscopy N/A 2019    Procedure: COLONOSCOPY;  Surgeon: Tyrel Osborn DO;  Location:  ENDOSCOPY    Dilation/curettage,diagnostic      x2    Hc  section level i      x1    Kidney surgery      KIDNEY STONE REMOVAL X2    Other surgical history  04/10/2022    INCISION AND DRAINAGE LEFT BREAST ABSCESS X 2                Social History     Socioeconomic History    Marital status:    Tobacco Use    Smoking status: Every Day     Current packs/day: 0.25     Average packs/day: 0.3 packs/day for 15.9 years (4.0 ttl pk-yrs)     Types: Cigarettes     Start date:     Smokeless tobacco: Never    Tobacco comments:     Working towards quitting   Vaping Use    Vaping status: Never Used   Substance and Sexual Activity    Alcohol use: No    Drug use: No     Comment: CBD oil on occasion   Other Topics Concern    Caffeine Concern No    Exercise No     Seat Belt Yes    Special Diet No    Stress Concern No    Weight Concern No     Social Drivers of Health     Food Insecurity: No Food Insecurity (3/30/2024)    Food Insecurity     Food Insecurity: Never true   Transportation Needs: No Transportation Needs (3/30/2024)    Transportation Needs     Lack of Transportation: No   Housing Stability: Low Risk  (3/30/2024)    Housing Stability     Housing Instability: No              Review of Systems   Constitutional: Negative.    HENT:  Positive for sore throat.    Respiratory:  Positive for cough.    All other systems reviewed and are negative.      Positive for stated complaint: sore throat  Other systems are as noted in HPI.  Constitutional and vital signs reviewed.      All other systems reviewed and negative except as noted above.    Physical Exam     ED Triage Vitals [12/13/24 1136]   /72   Pulse 86   Resp 18   Temp 99.6 °F (37.6 °C)   Temp src Oral   SpO2 98 %   O2 Device None (Room air)       Current Vitals:   Vital Signs  BP: 125/72  Pulse: 86  Resp: 18  Temp: 99.6 °F (37.6 °C)  Temp src: Oral    Oxygen Therapy  SpO2: 98 %  O2 Device: None (Room air)        Physical Exam  Vitals and nursing note reviewed.   Constitutional:       General: She is not in acute distress.     Appearance: She is well-developed. She is not ill-appearing, toxic-appearing or diaphoretic.   HENT:      Head:      Jaw: No trismus.      Right Ear: Tympanic membrane, ear canal and external ear normal.      Left Ear: Tympanic membrane, ear canal and external ear normal.      Mouth/Throat:      Lips: Pink. No lesions.      Mouth: Mucous membranes are moist. No oral lesions or angioedema.      Tongue: No lesions.      Palate: No lesions.      Pharynx: Oropharynx is clear. Uvula midline. No pharyngeal swelling, oropharyngeal exudate, posterior oropharyngeal erythema or uvula swelling.   Cardiovascular:      Rate and Rhythm: Normal rate and regular rhythm.      Pulses: Normal pulses.      Heart  sounds: Normal heart sounds.   Pulmonary:      Effort: Pulmonary effort is normal. No respiratory distress.      Breath sounds: Normal breath sounds. No stridor. No wheezing, rhonchi or rales.   Skin:     General: Skin is warm.      Coloration: Skin is not jaundiced or pale.      Findings: No rash.   Neurological:      General: No focal deficit present.      Mental Status: She is alert.   Psychiatric:         Mood and Affect: Mood normal.             ED Course     Labs Reviewed   POCT RAPID STREP - Normal   POCT FLU TEST - Normal    Narrative:     This assay is a rapid molecular in vitro test utilizing nucleic acid amplification of influenza A and B viral RNA.   RAPID SARS-COV-2 BY PCR - Normal       ED Course as of 12/13/24 1318  ------------------------------------------------------------  Time: 12/13 1200  Comment: Chest x-ray order placed.  Patient made ready for x-ray.   XR CHEST PA + LAT CHEST (CPT=71046)    Result Date: 12/13/2024  PROCEDURE:  XR CHEST PA + LAT CHEST (CPT=71046)  INDICATIONS:  sore throat  COMPARISON:  [facility], XR, XR CHEST AP PORTABLE  (CPT=71045), 4/08/2022, 1:45 PM.  TECHNIQUE:  PA and lateral chest radiographs were obtained.  PATIENT STATED HISTORY: (As transcribed by Technologist)  Sore throat and cough.               CONCLUSION:   Stable cardiac and mediastinal contours.  Mild central peribronchial thickening may reflect bronchitis or reactive airway disease/asthma.  No discrete airspace consolidation.  The pleural spaces are clear.   LOCATION:  [facility]   Dictated by (CST): Alicia Love MD on 12/13/2024 at 12:47 PM     Finalized by (CST): Alicia Love MD on 12/13/2024 at 12:47 PM                 MDM              Medical Decision Making  Differential diagnosis initially included but was not limited to: Strep pharyngitis, viral pharyngitis, pneumonia, viral respiratory illness    Nontoxic-appearing adult female with a lingering cough for 3 weeks and sore throat for 3 days.  Not exhibiting  signs of respiratory distress, no adventitious breath sounds.There is no trismus, drooling, unilateral tonsillar tonsillar swelling, voice change/muffled voice, so I do not think this is epiglottitis/peritonsillar abscess.  Strep is negative.  I personally viewed, independently interpreted and radiology report was reviewed.  No pneumonia.  Negative flu.  Negative COVID.    With the prolonged cough, low-grade fever, will treat with azithromycin for lower respiratory tract infection.    Supportive/home management of diagnosis/illness/injury discussed. Red flag symptoms discussed.  Signs and symptoms/criteria that would necessitate reevaluation, including ER evaluation discussed.  Patient and/or responsible adult verbalize and agree with management and plan of care.    Speech recognition software was used during this dictation.  There may be minor errors in transcription.      Amount and/or Complexity of Data Reviewed  Labs: ordered. Decision-making details documented in ED Course.  Radiology: ordered and independent interpretation performed. Decision-making details documented in ED Course.        Disposition and Plan     Clinical Impression:  1. Subacute cough    2. Body aches    3. Lower respiratory tract infection    4. Sore throat         Disposition:  Discharge  12/13/2024  1:17 pm    Follow-up:  González Houser MD  [address] [place] [address] [phone]    Call in 3 days      The emergency department    Go to   As needed, If symptoms worsen          Medications Prescribed:  Current Discharge Medication List        START taking these medications    Details   benzonatate 100 MG Oral Cap Take 1 capsule (100 mg total) by mouth 3 (three) times daily as needed for cough.  Qty: 30 capsule, Refills: 0      azithromycin (ZITHROMAX Z-JASE) 250 MG Oral Tab 500 mg once followed by 250 mg daily x 4 days  Qty: 6 tablet, Refills: 0                 Supplementary Documentation:

## (undated) DIAGNOSIS — Z02.9 ENCOUNTERS FOR UNSPECIFIED ADMINISTRATIVE PURPOSE: ICD-10-CM

## (undated) DEVICE — CONTAINER,SPECIMEN,PNEU TUBE,4OZ,OR STRL: Brand: MEDLINE

## (undated) DEVICE — 3M™ RED DOT™ MONITORING ELECTRODE WITH FOAM TAPE AND STICKY GEL, 50/BAG, 20/CASE, 72/PLT 2570: Brand: RED DOT™

## (undated) DEVICE — BLADE SAW W9XL25MM THK0.51MM CUT THK0.56MM

## (undated) DEVICE — 450 ML BOTTLE OF 0.05% CHLORHEXIDINE GLUCONATE IN 99.95% STERILE WATER FOR IRRIGATION, USP AND APPLICATOR.: Brand: IRRISEPT ANTIMICROBIAL WOUND LAVAGE

## (undated) DEVICE — MEDI-VAC NON-CONDUCTIVE SUCTION TUBING: Brand: CARDINAL HEALTH

## (undated) DEVICE — ENDOSCOPY PACK UPPER: Brand: MEDLINE INDUSTRIES, INC.

## (undated) DEVICE — BANDAGE,GAUZE,CONFORMING,4"X75",STRL,LF: Brand: MEDLINE

## (undated) DEVICE — PAD,ABDOMINAL,8"X7.5",ST,LF,20/BX: Brand: MEDLINE INDUSTRIES, INC.

## (undated) DEVICE — FORCEP BIOPSY RJ4 LG CAP W/ND

## (undated) DEVICE — FILTERLINE NASAL ADULT O2/CO2

## (undated) DEVICE — STERILE POLYISOPRENE POWDER-FREE SURGICAL GLOVES: Brand: PROTEXIS

## (undated) DEVICE — MINI LAP PACK-LF: Brand: MEDLINE INDUSTRIES, INC.

## (undated) DEVICE — PREMIUM WET SKIN PREP TRAY: Brand: MEDLINE INDUSTRIES, INC.

## (undated) DEVICE — DISPOSABLE TOURNIQUET CUFF SINGLE BLADDER, DUAL PORT AND QUICK CONNECT CONNECTOR: Brand: COLOR CUFF

## (undated) DEVICE — 1200CC GUARDIAN II: Brand: GUARDIAN

## (undated) DEVICE — STERILE HOOK LOCK LATEX FREE ELASTIC BANDAGE 4INX5YD: Brand: HOOK LOCK™

## (undated) DEVICE — STOCKINETTE,DOUBLE PLY,6X48,STERILE: Brand: MEDLINE

## (undated) DEVICE — SLEEVE COMPR MD KNEE LEN SGL USE KENDALL SCD

## (undated) DEVICE — GAUZE SPONGES,USP TYPE VII GAUZE, 12 PLY: Brand: CURITY

## (undated) DEVICE — STERILE SYNTHETIC POLYISOPRENE POWDER-FREE SURGICAL GLOVES WITH HYDROGEL COATING, SMOOTH FINISH, STRAIGHT FINGER: Brand: PROTEXIS

## (undated) DEVICE — SLEEVE KENDALL SCD EXPRESS MED

## (undated) DEVICE — MEDI-VAC SUCTION HANDLE REGULAR CAPACITY: Brand: CARDINAL HEALTH

## (undated) DEVICE — LOWER EXTREMITY CDS-LF: Brand: MEDLINE INDUSTRIES, INC.

## (undated) DEVICE — SOLUTION IRRIG 1000ML 0.9% NACL USP BTL

## (undated) DEVICE — Device: Brand: DEFENDO AIR/WATER/SUCTION AND BIOPSY VALVE

## (undated) DEVICE — SOL NACL IRRIG 0.9% 1000ML BTL

## (undated) DEVICE — ENDOSCOPY PACK - LOWER: Brand: MEDLINE INDUSTRIES, INC.

## (undated) NOTE — LETTER
9/30/2022          To Whom It May Concern:    Ena Chaudhary is currently under my medical care and may  return to work at this time. Activity is restricted as follows: Patient to wear [facility] shoes for 2 weeks. If you require additional information please contact our office.         Sincerely,    Nu Bhatti DPM

## (undated) NOTE — LETTER
12/24/20        Goojitsuell Cancel  [address] [place] [facility]      Dear Tejeda ,    [facility] records indicate that you have outstanding lab work and or testing that was ordered for you and has not yet been completed:  CARD ECHO 2D DOPPLER (CPT=9

## (undated) NOTE — LETTER
Date: 7/31/2024  Patient name: Leonie Denney  YOB: 1980  Medical Record Number: [id number]  Primary Coverage: Payor: AETNA INS / Plan: HMO AETNA / Product Type: HMO /   Secondary Coverage:   Insurance ID: [id number]  Patient Address: [address] [place] [address]  Telephone Information:   Home Phone [phone]   Mobile [phone]       Encounter Date: 7/31/2024  Provider: Renee Belcher MD  Diagnosis:     ICD-10-CM   1. Right foot ulcer, with fat layer exposed ([facility])  L97.512   2. Diabetic foot ulcer with osteomyelitis ([facility])  E11.621    E11.69    L97.509    M86.9   3. Infected wound  T14.8XXA    L08.9   4. MRSA (methicillin resistant Staphylococcus aureus) colonization  Z22.322   5. Diabetic polyneuropathy associated with type 2 diabetes mellitus ([facility])  E11.42   6. Class 2 severe obesity due to excess calories with serious comorbidity and body mass index (BMI) of 36.0 to 36.9 in adult ([facility])  E66.01    Z68.36   7. Hallux limitus of right foot  M20.5X1         Wound 05/08/24 #1 Right great toe amp site Toe (Comment which one) Right (Active)   Date First Assessed/Time First Assessed: 05/08/24 0908    Wound Number (Wound Clinic Only): #1 Right great toe amp site  Primary Wound Type: Diabetic Ulcer  Location: Toe (Comment which one)  Wound Location Orientation: Right      Assessments 5/8/2024  9:11 AM 7/31/2024  9:59 AM   Wound Image        Drainage Amount Large None   Drainage Description Serosanguineous --   Treatments Compression Compression   Shoe Type -- Vero post op shoe;Peg liner   Wound Length (cm) 7.5 cm 0.8 cm   Wound Width (cm) 1.2 cm 0.2 cm   Wound Surface Area (cm^2) 9 cm^2 0.16 cm^2   Wound Depth (cm) -- 0.7 cm   Wound Volume (cm^3) -- 0.112 cm^3   Wound Healing % -- 95   Margins Undefined edges Well-defined edges   Non-staged Wound Description Full thickness Full thickness   Wendy-wound Assessment Dry;Edema;Ecchymosis;Moist;Maceration Edema   Wound Granulation Tissue  -- Pale Grey;Firm   Wound Bed Granulation (%) -- 100 %   Wound Bed Slough (%) 5 % --   Wound Bed Eschar (%) 95 % --   Wound Odor None None   Tunneling? No No   Undermining? No No   Sinus Tracts? No No   Remy Scale -- Grade 2       Inactive Orders   Date Order Priority Status Authorizing Provider   07/24/24 1330 Debridement Diabetic Ulcer Right Toe (Comment which one) Routine Completed Renee Foreman MD   07/17/24 1034 Cellular tissue product application Old surgical Right Toe (Comment which one) Routine Completed Renee Foreman MD   07/10/24 1028 Cellular tissue product application Old surgical Right Toe (Comment which one) Routine Completed Renee Foreman MD   07/03/24 1138 Debridement Old surgical Right Toe (Comment which one) Routine Completed Renee Foreman MD   06/26/24 1123 Cellular tissue product application Old surgical Right Toe (Comment which one) Routine Completed Renee Foreman MD   06/19/24 0949 Cellular tissue product application Old surgical Right Toe (Comment which one) Routine Completed Renee Foreman MD   06/12/24 0947 Debridement Old surgical Right Toe (Comment which one) Routine Completed Renee Foreman MD   06/05/24 1136 Debridement Old surgical Right Toe (Comment which one) Routine Completed Renee Foreman MD   05/29/24 0924 Debridement Old surgical Right Toe (Comment which one) Routine Completed Renee Foreman MD   05/22/24 1140 Debridement Old surgical Right Toe (Comment which one) Routine Completed Renee Foreman MD   05/15/24 1059 Debridement Old surgical Right Toe (Comment which one) Routine Completed Renee Foreman MD   05/08/24 0943 Debridement Old surgical Right Toe (Comment which one) Routine Completed Renee Foreman MD       Wound 05/08/24 #2 Right plantar foot Foot Right;Plantar (Active)   Date First Assessed/Time First Assessed: 05/08/24 0909    Wound Number (Wound Clinic Only): #2 Right plantar foot  Primary  Wound Type: Diabetic Ulcer  Location: Foot  Wound Location Orientation: Right;Plantar      Assessments 5/8/2024  9:13 AM 7/31/2024 10:02 AM   Wound Image       Drainage Amount Small None   Drainage Description Serosanguineous --   Treatments Compression Compression;Shoe   Shoe Type -- Vero post op shoe;Peg liner   Wound Length (cm) 2 cm 0.5 cm   Wound Width (cm) 0.9 cm 0.1 cm   Wound Surface Area (cm^2) 1.8 cm^2 0.05 cm^2   Wound Depth (cm) -- 0.1 cm   Wound Volume (cm^3) -- 0.005 cm^3   Wound Healing % -- 100   Margins Undefined edges Well-defined edges   Non-staged Wound Description Full thickness Full thickness   Wendy-wound Assessment Dry;Edema;Callous Edema;Dry   Wound Bed Epithelium (%) 50 % --   Wound Bed Eschar (%) 50 % --   Wound Odor None None   Shape -- unable to view wound bed   Tunneling? No --   Undermining? No --   Sinus Tracts? No --   Remy Scale -- Grade 2       Inactive Orders   Date Order Priority Status Authorizing Provider   05/15/24 1101 Debridement Old surgical Right;Plantar Foot Routine Completed Renee Foreman MD       Compression Wrap 07/31/24 Dorsal;Right (Active)   Placement Date: 07/31/24   Wound Location Orientation: Dorsal;Right      Assessments 7/31/2024 10:57 AM   Response to Treatment Well tolerated   Compression Layers Multilayer   Compression Product Type Coflex   Dressing Applied Yes   Compression Wrap Location Toes to Knee   Compression Wrap Status Clean;Dry;Intact       No associated orders.     Wound Number: All wounds    Wound Cleaning and Dressings:  Showering directions: May shower with protection  Wound cleansing:  Cleanse with normal saline or wound cleanser  Wound cleaning frequency:  3 times a week  Wound product: gentamicin drops, billy, hydrofera transfer, ABD pad  Dressing change frequency:  Change dressing 3x per week  Enzymatic agent:  Not applicable    Compression Therapy:   Coflex 2 layers 30-40mmhg  Patient sent in 1 kit for the first dressing  application with home health    Miscellaneous/Additional Orders:  Offloading: Darco shoe(s) with peg insert and Keep weight off right foot  Miscellaneous orders: Home health care nurse for wound care    Care Summary:  Care Summary: Discussed Plan of Care at beside with patient. Patient verbally acknowledges understanding of all instructions and all questions were answered.    Follow Up:  Return in about 2 weeks (around 8/14/2024) for Wound followup.      Additional Notes:  Reinstate home health for wound care.  RN to see pt for dressing changes 3 times a week (Monday, Wednesday, Friday). Patient is now in a compression wrap - please order supplies list above. 1 wrap kit sent with patient for first dressing change.

## (undated) NOTE — LETTER
Date: 5/29/2024  Patient name: Leonie Denney  YOB: 1980  Medical Record Number: [id number]  Primary Coverage: Payor: AETNA INS / Plan: HMO AETNA / Product Type: HMO /   Secondary Coverage:   Insurance ID: [id number]  Patient Address: [address] [place] [address]  Telephone Information:   Home Phone [phone]   Mobile [phone]       Encounter Date: 5/29/2024  Provider: Renee Belcher MD  Diagnosis:     ICD-10-CM   1. Right foot ulcer, with fat layer exposed (HCC)  L97.512   2. Diabetic foot ulcer with osteomyelitis (HCC)  E11.621    E11.69    L97.509    M86.9   3. Type 2 diabetes mellitus with hyperglycemia, with long-term current use of insulin (HCC)  E11.65    Z79.4   4. Hallux limitus of right foot  M20.5X1   5. Diabetic polyneuropathy associated with type 2 diabetes mellitus ([facility])  E11.42         Wound 05/08/24 #1 Right great toe amp site Toe (Comment which one) Right (Active)   Date First Assessed/Time First Assessed: 05/08/24 0908    Wound Number (Wound Clinic Only): #1 Right great toe amp site  Primary Wound Type: Old surgical  Location: Toe (Comment which one)  Wound Location Orientation: Right      Assessments 5/8/2024  9:11 AM 5/29/2024  9:02 AM   Wound Image         Drainage Amount Large Scant   Drainage Description Serosanguineous Yellow;Serous   Treatments Compression Wound Vac - Neg Pressure   Wound Vac Brand -- KCI   Wound Length (cm) 7.5 cm 2 cm   Wound Width (cm) 1.2 cm 0.6 cm   Wound Surface Area (cm^2) 9 cm^2 1.2 cm^2   Wound Depth (cm) -- 1 cm   Wound Volume (cm^3) -- 1.2 cm^3   Wound Healing % -- 49   Margins Undefined edges Well-defined edges   Non-staged Wound Description Full thickness Full thickness   Wendy-wound Assessment Dry;Edema;Ecchymosis;Moist;Maceration Edema;Maceration;Moist   Wound Granulation Tissue -- Firm;Pink;Red   Wound Bed Granulation (%) -- 60 %   Wound Bed Slough (%) 5 % 40 %   Wound Bed Eschar (%) 95 % --   Wound Odor None None    Tunneling? No No   Undermining? No No   Sinus Tracts? No No       Inactive Orders   Date Order Priority Status Authorizing Provider   05/29/24 0924 Debridement Old surgical Right Toe (Comment which one) Routine Completed Renee Foreman MD   05/22/24 1140 Debridement Old surgical Right Toe (Comment which one) Routine Completed Renee Foreman MD   05/15/24 1059 Debridement Old surgical Right Toe (Comment which one) Routine Completed Renee Foreman MD   05/08/24 0943 Debridement Old surgical Right Toe (Comment which one) Routine Completed Renee Foreman MD       Wound 05/08/24 #2 Right plantar foot Foot Right;Plantar (Active)   Date First Assessed/Time First Assessed: 05/08/24 0909    Wound Number (Wound Clinic Only): #2 Right plantar foot  Primary Wound Type: Old surgical  Location: Foot  Wound Location Orientation: Right;Plantar      Assessments 5/8/2024  9:13 AM 5/29/2024  9:04 AM   Wound Image       Drainage Amount Small Unable to assess   Drainage Description Serosanguineous --   Treatments Compression --   Wound Length (cm) 2 cm 0.7 cm   Wound Width (cm) 0.9 cm 0.2 cm   Wound Surface Area (cm^2) 1.8 cm^2 0.14 cm^2   Wound Depth (cm) -- 0.9 cm   Wound Volume (cm^3) -- 0.126 cm^3   Wound Healing % -- 88   Margins Undefined edges Well-defined edges   Non-staged Wound Description Full thickness Full thickness   Wendy-wound Assessment Dry;Edema;Callous Edema;Moist;Maceration   Wound Granulation Tissue -- Firm;Pale Grey;Pink   Wound Bed Granulation (%) -- 50 %   Wound Bed Epithelium (%) 50 % --   Wound Bed Slough (%) -- 50 %   Wound Bed Eschar (%) 50 % --   Wound Odor None None   Tunneling? No No   Undermining? No No   Sinus Tracts? No No       Inactive Orders   Date Order Priority Status Authorizing Provider   05/15/24 1101 Debridement Old surgical Right;Plantar Foot Routine Completed Renee Foreman MD       Negative Pressure Wound Therapy Dorsal;Right (Active)   Placement Date/Time:  05/22/24 1157   Wound Location Orientation: Dorsal;Right      Assessments 5/22/2024 11:23 AM 5/29/2024 11:06 AM   Wound photographed/measured Yes Yes   Machine Status (On) Yes Yes   Site Assessment Clean;Dry;Intact Clean;Dry;Intact   Wendy-wound Assessment Clean;Dry;Intact Clean;Dry;Intact   Unit Type KCI KCI   Dressing Type Black foam Black foam   Number of Foam Pieces Used 1 1   Cycle Continuous Continuous   Target Pressure (mmHg) 125 125   Canister Changed Yes Yes       No associated orders.     Wound Number: All wounds    Wound Cleaning and Dressings:  Showering directions: May shower with protection  Wound cleansing:  Cleanse with normal saline or wound cleanser  Wound cleaning frequency: 3 times a week  Dressing change frequency:  Change dressing 3x per week  Enzymatic agent:  Not applicable    Compression Therapy:   Spandagrip E    Negative Pressure Wound Therapy:  NPWT: KCI vac therapy, black form at 125 mmHg continuous. RN to change dressing 3x/week unless indicated below      Miscellaneous/Additional Orders:  Offloading: Cam boot(s)  Miscellaneous orders: Home health care nurse for wound care    Care Summary:  Care Summary: Discussed Plan of Care at beside with patient. Patient verbally acknowledges understanding of all instructions and all questions were answered.    Follow Up:  Return in about 1 week (around 6/5/2024) for Wound followup.      Additional Notes: *Both wounds communicate* Right great toe amp site: billy, black foam, NPWT 125mmhg (bridge to dorsal foot/leg). Plantar foot: billy, hydrofera transfer, gauze.

## (undated) NOTE — LETTER
Date: 2022    Patient Name: Carlotta Quinones   : 1980      To Whom it may concern: This letter has been written at the patient's request. The above patient was seen at the [facility] for treatment of a medical condition. This patient should be excused from attending work 2022 through 2022. Return to work date after this will be determined at next follow-up visit.          Sincerely,    Ayala Reeves MD

## (undated) NOTE — LETTER
Date: 9/4/2024    Patient Name: Leonie Denney          To Whom it may concern:    Ms. Denney was seen by me at [facility]  for treatment of her foot wound.     The patient may return to work the following limitations :   No lifting over 5 lbs  No prolonged standing or walking  Do not wear steel toe work boots - Please allow patient to wear her Darco shoe recommended by us - for proper healing.     Limitations will be in place until cleared by me.     Sincerely,          Renee Belcher MD, 09/04/24, 10:38 AM    [facility]  [address] [place] [address]

## (undated) NOTE — LETTER
24          Fanyricardo PAUL Олег  :  1980      To Whom It May Concern:    This patient was seen in our office on 24 .  Please excuse her from work today due to this office visit.    If this office may be of further assistance, please do not hesitate to contact us.      Sincerely,      Itzel Guthrie MD, FAES  Board-Certified in Neurology, Epilepsy, and Clinical Neurophysiology  [facility]

## (undated) NOTE — LETTER
9/30/2022          To Whom It May Concern:    Ediwn Rascon is currently under my medical care and may return to work at this time. Activity is restricted as follows: Patient to wear [facility] shoes for 2 weeks . If you require additional information please contact our office.     Next 2 weeks    Sincerely,    Miroslava Asher DPM

## (undated) NOTE — LETTER
Date: 7/3/2024  Patient name: Leonie Denney  YOB: 1980  Medical Record Number: [id number]  Primary Coverage: Payor: AETNA INS / Plan: HMO AETNA / Product Type: HMO /   Secondary Coverage:   Insurance ID: [id number]  Patient Address: [address] [place] [address]  Telephone Information:   Home Phone [phone]   Mobile [phone]       Encounter Date: 7/3/2024  Provider: Renee Belcher MD  Diagnosis:     ICD-10-CM   1. Right foot ulcer, with fat layer exposed ([facility])  L97.512   2. Infected wound  T14.8XXA    L08.9   3. Diabetic foot ulcer with osteomyelitis ([facility])  E11.621    E11.69    L97.509    M86.9   4. Type 2 diabetes mellitus with hyperglycemia, with long-term current use of insulin ([facility])  E11.65    Z79.4   5. Hallux limitus of right foot  M20.5X1   6. Diabetic polyneuropathy associated with type 2 diabetes mellitus ([facility])  E11.42         Wound 05/08/24 #1 Right great toe amp site Toe (Comment which one) Right (Active)   Date First Assessed/Time First Assessed: 05/08/24 0908    Wound Number (Wound Clinic Only): #1 Right great toe amp site  Primary Wound Type: Old surgical  Location: Toe (Comment which one)  Wound Location Orientation: Right      Assessments 5/8/2024  9:11 AM 7/3/2024 10:59 AM   Wound Image        Drainage Amount Large Small   Drainage Description Serosanguineous Serosanguineous   Treatments Compression Compression   Wound Length (cm) 7.5 cm 1 cm   Wound Width (cm) 1.2 cm 0.4 cm   Wound Surface Area (cm^2) 9 cm^2 0.4 cm^2   Wound Depth (cm) -- 1.5 cm   Wound Volume (cm^3) -- 0.6 cm^3   Wound Healing % -- 75   Margins Undefined edges Well-defined edges   Non-staged Wound Description Full thickness Full thickness   Wedny-wound Assessment Dry;Edema;Ecchymosis;Moist;Maceration Edema;Maceration;Moist   Wound Granulation Tissue -- Firm;Pink   Wound Bed Granulation (%) -- 50 %   Wound Bed Slough (%) 5 % --   Wound Bed Eschar (%) 95 % --   Wound Odor None None   Shape  -- 50% adipose   Tunneling? No No   Undermining? No No   Sinus Tracts? No No       Inactive Orders   Date Order Priority Status Authorizing Provider   07/03/24 1138 Debridement Old surgical Right Toe (Comment which one) Routine Completed Renee Foreman MD   06/26/24 1123 Cellular tissue product application Old surgical Right Toe (Comment which one) Routine Completed Renee Foreman MD   06/19/24 0949 Cellular tissue product application Old surgical Right Toe (Comment which one) Routine Completed Renee Foreman MD   06/12/24 0947 Debridement Old surgical Right Toe (Comment which one) Routine Completed Renee Foreman MD   06/05/24 1136 Debridement Old surgical Right Toe (Comment which one) Routine Completed Renee Foreman MD   05/29/24 0924 Debridement Old surgical Right Toe (Comment which one) Routine Completed Renee Foreman MD   05/22/24 1140 Debridement Old surgical Right Toe (Comment which one) Routine Completed Renee Foreman MD   05/15/24 1059 Debridement Old surgical Right Toe (Comment which one) Routine Completed Renee Foreman MD   05/08/24 0943 Debridement Old surgical Right Toe (Comment which one) Routine Completed Renee Foreman MD       Wound 05/08/24 #2 Right plantar foot Foot Right;Plantar (Active)   Date First Assessed/Time First Assessed: 05/08/24 0909    Wound Number (Wound Clinic Only): #2 Right plantar foot  Primary Wound Type: Old surgical  Location: Foot  Wound Location Orientation: Right;Plantar      Assessments 5/8/2024  9:13 AM 7/3/2024 11:00 AM   Wound Image       Drainage Amount Small Scant   Drainage Description Serosanguineous Serosanguineous   Treatments Compression Compression   Wound Length (cm) 2 cm 0.4 cm   Wound Width (cm) 0.9 cm 0.1 cm   Wound Surface Area (cm^2) 1.8 cm^2 0.04 cm^2   Wound Depth (cm) -- 0.5 cm   Wound Volume (cm^3) -- 0.02 cm^3   Wound Healing % -- 98   Margins Undefined edges Well-defined edges   Non-staged  Wound Description Full thickness Full thickness   Wendy-wound Assessment Dry;Edema;Callous Edema;Dry   Wound Granulation Tissue -- Pale Grey;Firm   Wound Bed Granulation (%) -- 100 %   Wound Bed Epithelium (%) 50 % --   Wound Bed Eschar (%) 50 % --   Wound Odor None None   Tunneling? No No   Undermining? No No   Sinus Tracts? No No       Inactive Orders   Date Order Priority Status Authorizing Provider   05/15/24 1101 Debridement Old surgical Right;Plantar Foot Routine Completed Renee Foreman MD       Negative Pressure Wound Therapy Dorsal;Right (Active)   Placement Date/Time: 05/22/24 1157   Wound Location Orientation: Dorsal;Right      Assessments 5/22/2024 11:23 AM 6/26/2024  1:44 PM   Wound photographed/measured Yes Yes   Machine Status (On) Yes Yes   Site Assessment Clean;Dry;Intact Clean;Dry;Intact   Wendy-wound Assessment Clean;Dry;Intact Clean;Dry;Intact   Unit Type KCI KCI   Dressing Type Black foam Black foam   Number of Foam Pieces Used 1 1   Cycle Continuous Continuous   Target Pressure (mmHg) 125 125   Canister Changed Yes Yes       No associated orders.       Wound Cleaning and Dressings:  Showering directions: May shower with protection  Wound cleansing:  Cleanse with normal saline or wound cleanser and Cleanse with Vashe  Wound cleaning frequency: 2 times per day  Wound product: Gentamicin, bordered gauze  Dressing change frequency:  Change dressing twice daily  Enzymatic agent:  Not applicable    Compression Therapy:   Spandagrip E    Negative Pressure Wound Therapy:  NPWT: On HOLD for 1 week    Miscellaneous/Additional Orders:  Offloading: Cam boot(s)  Miscellaneous orders: Home health care nurse for wound care    Care Summary:  Care Summary: Discussed Plan of Care at beside with patient. Patient verbally acknowledges understanding of all instructions and all questions were answered.    Follow Up:  Return in about 1 week (around 7/10/2024) for Wound followup.

## (undated) NOTE — LETTER
5/6/2022          To Whom It May Concern:    Gloria Castro is currently under my medical care and may return to Work at this time. She may return to Work on 5/9/2022. Activity is restricted as follows: NO RESTRICTIONS. If you require additional information please contact our office.         Sincerely,    Prateek Byrne DPM

## (undated) NOTE — LETTER
Date & Time: 2/25/2021, 2:30 PM  Patient: Rachelle Cha  Encounter Provider(s):    ROSA Sylvester       To Whom It May Concern:    Mg Alcantar was seen and treated in our department on 2/25/2021. She may return to work without any restrictions.

## (undated) NOTE — LETTER
To Whom It May Concern:    Leonie Denney has been under our care regarding ongoing medical issues. Because of this, she has been required to restrict her physical activities.    She may resume her usual activities, including work, on 10/10/24 with the following restrictions:    [x]  None     []    No heavy lifting (over 15 pounds) for               weeks   []    Part-time (no more than             hours per week) for               week   []  Other:        Please feel free to contact us if there are any questions.      Sincerely,        Renee Belcher MD, 10/04/24, 11:07 AM    [facility]  [address] [place] [address]  [phone]        Document generated by:  Renee Belcher MD

## (undated) NOTE — ED AVS SNAPSHOT
Tania Mason   MRN: [id number]    Department:  [facility] Emergency Department   Date of Visit:  7/10/2019           Disclosure     Insurance plans vary and the physician(s) referred by the ER may not be covered by your plan.  Please contact your tell this physician (or your personal doctor if your instructions are to return to your personal doctor) about any new or lasting problems. The primary care or specialist physician will see patients referred from the [facility] Emergency Department.  Amado Farrell

## (undated) NOTE — LETTER
[address]  [address]  Authorization for Surgical Operation and Procedure     Date:___________                                                                                                         Time:__________  I hereby authorize Surgeon(s):  Dilan Sung DPM, my physician and his/her assistants (if applicable), which may include medical students, residents, and/or fellows, to perform the following surgical operation/ procedure and administer such anesthesia as may be determined necessary by my physician:  Operation/Procedure name (s) Amputation of right great toe on Leonie Denney   2.   I recognize that during the surgical operation/procedure, unforeseen conditions may necessitate additional or different procedures than those listed above.  I, therefore, further authorize and request that the above-named surgeon, assistants, or designees perform such procedures as are, in their judgment, necessary and desirable.    3.   My surgeon/physician has discussed prior to my surgery the potential benefits, risks and side effects of this procedure; the likelihood of achieving goals; and potential problems that might occur during recuperation.  They also discussed reasonable alternatives to the procedure, including risks, benefits, and side effects related to the alternatives and risks related to not receiving this procedure.  I have had all my questions answered and I acknowledge that no guarantee has been made as to the result that may be obtained.    4.   Should the need arise during my operation/procedure, which includes change of level of care prior to discharge, I also consent to the administration of blood and/or blood products.  Further, I understand that despite careful testing and screening of blood or blood products by collecting agencies, I may still be subject to ill effects as a result of receiving a blood transfusion and/or blood products.  The following are  some, but not all, of the potential risks that can occur: fever and allergic reactions, hemolytic reactions, transmission of diseases such as Hepatitis, AIDS and Cytomegalovirus (CMV) and fluid overload.  In the event that I wish to have an autologous transfusion of my own blood, or a directed donor transfusion, I will discuss this with my physician.  Check only if Refusing Blood or Blood Products  I understand refusal of blood or blood products as deemed necessary by my physician may have serious consequences to my condition to include possible death. I hereby assume responsibility for my refusal and release the hospital, its personnel, and my physicians from any responsibility for the consequences of my refusal.          o  Refuse      5.   I authorize the use of any specimen, organs, tissues, body parts or foreign objects that may be removed from my body during the operation/procedure for diagnosis, research or teaching purposes and their subsequent disposal by hospital authorities.  I also authorize the release of specimen test results and/or written reports to my treating physician on the hospital medical staff or other referring or consulting physicians involved in my care, at the discretion of the Pathologist or my treating physician.    6.   I consent to the photographing or videotaping of the operations or procedures to be performed, including appropriate portions of my body for medical, scientific, or educational purposes, provided my identity is not revealed by the pictures or by descriptive texts accompanying them.  If the procedure has been photographed/videotaped, the surgeon will obtain the original picture, image, videotape or CD.  The hospital will not be responsible for storage, release or maintenance of the picture, image, tape or CD.    7.   I consent to the presence of a  or observers in the operating room as deemed necessary by my physician or their designees.    8.   I  recognize that in the event my procedure results in extended X-Ray/fluoroscopy time, I may develop a skin reaction.    9. If I have a Do Not Attempt Resuscitation (DNAR) order in place, that status will be suspended while in the operating room, procedural suite, and during the recovery period unless otherwise explicitly stated by me (or a person authorized to consent on my behalf). The surgeon or my attending physician will determine when the applicable recovery period ends for purposes of reinstating the DNAR order.  10. Patients having a sterilization procedure: I understand that if the procedure is successful the results will be permanent and it will therefore be impossible for me to inseminate, conceive, or bear children.  I also understand that the procedure is intended to result in sterility, although the result has not been guaranteed.   11. I acknowledge that my physician has explained sedation/analgesia administration to me including the risk and benefits I consent to the administration of sedation/analgesia as may be necessary or desirable in the judgment of my physician.    I CERTIFY THAT I HAVE READ AND FULLY UNDERSTAND THE ABOVE CONSENT TO OPERATION and/or OTHER PROCEDURE.    _________________________________________  __________________________________  Signature of Patient     Signature of Responsible Person         ___________________________________         Printed Name of Responsible Person           _________________________________                 Relationship to Patient  _________________________________________  ______________________________  Signature of Witness          Date  Time      Patient Name: Leonie Denney     : 1980                 Printed: 2024     Medical Record #: [id number]                     Page [address] of [address] [place] [address]  [address]    Consent for Anesthesia    I, Leonie Denney agree to be cared for  by an anesthesiologist, who is specially trained to monitor me and give me medicine to put me to sleep or keep me comfortable during my procedure    I understand that my anesthesiologist is not an employee or agent of [facility] or [employer]. He or she works for [employer].    As the patient asking for anesthesia services, I agree to:  Allow the anesthesiologist (anesthesia doctor) to give me medicine and do additional procedures as necessary. Some examples are: Starting or using an “IV” to give me medicine, fluids or blood during my procedure, and having a breathing tube placed to help me breathe when I’m asleep (intubation). In the event that my heart stops working properly, I understand that my anesthesiologist will make every effort to sustain my life, unless otherwise directed by [facility] Do Not Resuscitate documents.  Tell my anesthesia doctor before my procedure:  If I am pregnant.  The last time that I ate or drank.  All of the medicines I take (including prescriptions, herbal supplements, and pills I can buy without a prescription (including street drugs/illegal medications). Failure to inform my anesthesiologist about these medicines may increase my risk of anesthetic complications.  If I am allergic to anything or have had a reaction to anesthesia before.  I understand how the anesthesia medicine will help me (benefits).  I understand that with any type of anesthesia medicine there are risks:  The most common risks are: nausea, vomiting, sore throat, muscle soreness, damage to my eyes, mouth, or teeth (from breathing tube placement).  Rare risks include: remembering what happened during my procedure, allergic reactions to medications, injury to my airway, heart, lungs, vision, nerves, or muscles and in extremely rare instances death.  My doctor has explained to me other choices available to me for my care (alternatives).  Pregnant Patients (“epidural”):  I  understand that the risks of having an epidural (medicine given into my back to help control pain during labor), include itching, low blood pressure, difficulty urinating, headache or slowing of the baby’s heart. Very rare risks include infection, bleeding, seizure, irregular heart rhythms and nerve injury.  Regional Anesthesia (“spinal”, “epidural”, & “nerve blocks”):  I understand that rare but potential complications include headache, bleeding, infection, seizure, irregular heart rhythms, and nerve injury.    I can change my mind about having anesthesia services at any time before I get the medicine.    _____________________________________________________________________________  Patient (or Representative) Signature/Relationship to Patient  Date   Time    _____________________________________________________________________________   Name (if used)    Language/Organization   Time    _____________________________________________________________________________  Anesthesiologist Signature     Date   Time  I have discussed the procedure and information above with the patient (or patient’s representative) and answered their questions. The patient or their representative has agreed to have anesthesia services.    _____________________________________________________________________________  Witness        Date   Time  I have verified that the signature is that of the patient or patient’s representative, and that it was signed before the procedure  Patient Name: Leonie Denney     : 1980                 Printed: 2024     Medical Record #: [id number]                     Page 2 of 2

## (undated) NOTE — LETTER
Date: 7/17/2024  Patient name: Leonie Denney  YOB: 1980  Medical Record Number: [id number]  Primary Coverage: Payor: AETNA INS / Plan: HMO AETNA / Product Type: HMO /   Secondary Coverage:   Insurance ID: [id number]  Patient Address: [address] [place] [address]  Telephone Information:   Home Phone [phone]   Mobile [phone]       Encounter Date: 7/17/2024  Provider: Renee Belcher MD  Diagnosis:     ICD-10-CM   1. Right foot ulcer, with fat layer exposed ([facility])  L97.512   2. Diabetic foot ulcer with osteomyelitis ([facility])  E11.621    E11.69    L97.509    M86.9   3. Infected wound  T14.8XXA    L08.9   4. MRSA (methicillin resistant Staphylococcus aureus) colonization  Z22.322   5. Hallux limitus of right foot  M20.5X1   6. Diabetic polyneuropathy associated with type 2 diabetes mellitus ([facility])  E11.42   7. Class 2 severe obesity due to excess calories with serious comorbidity and body mass index (BMI) of 36.0 to 36.9 in adult ([facility])  E66.01    Z68.36   8. Type 2 diabetes mellitus with obesity ([facility])  E11.69    E66.9         Wound 05/08/24 #1 Right great toe amp site Toe (Comment which one) Right (Active)   Date First Assessed/Time First Assessed: 05/08/24 0908    Wound Number (Wound Clinic Only): #1 Right great toe amp site  Primary Wound Type: Old surgical  Location: Toe (Comment which one)  Wound Location Orientation: Right      Assessments 5/8/2024  9:11 AM 7/17/2024 10:14 AM   Wound Image        Drainage Amount Large Small   Drainage Description Serosanguineous Serous;Yellow   Treatments Compression Compression;Skin Substitutes;Shoe   Shoe Type -- CAM   Wound Length (cm) 7.5 cm 1 cm   Wound Width (cm) 1.2 cm 0.1 cm   Wound Surface Area (cm^2) 9 cm^2 0.1 cm^2   Wound Depth (cm) -- 1 cm   Wound Volume (cm^3) -- 0.1 cm^3   Wound Healing % -- 96   Margins Undefined edges Epibole (Rolled edges)   Non-staged Wound Description Full thickness Full thickness   Wendy-wound Assessment  Dry;Edema;Ecchymosis;Moist;Maceration Edema;Maceration;Moist   Wound Granulation Tissue -- Pale Grey;Firm   Wound Bed Granulation (%) -- 100 %   Wound Bed Slough (%) 5 % --   Wound Bed Eschar (%) 95 % --   Wound Odor None None   Tunneling? No No   Undermining? No No   Sinus Tracts? No No       Inactive Orders   Date Order Priority Status Authorizing Provider   07/17/24 1034 Cellular tissue product application Old surgical Right Toe (Comment which one) Routine Completed Renee Foreman MD   07/10/24 1028 Cellular tissue product application Old surgical Right Toe (Comment which one) Routine Completed Renee Foreman MD   07/03/24 1138 Debridement Old surgical Right Toe (Comment which one) Routine Completed Renee Foreman MD   06/26/24 1123 Cellular tissue product application Old surgical Right Toe (Comment which one) Routine Completed Renee Foreman MD   06/19/24 0949 Cellular tissue product application Old surgical Right Toe (Comment which one) Routine Completed Renee Foreman MD   06/12/24 0947 Debridement Old surgical Right Toe (Comment which one) Routine Completed Renee Foreman MD   06/05/24 1136 Debridement Old surgical Right Toe (Comment which one) Routine Completed Renee Foreman MD   05/29/24 0924 Debridement Old surgical Right Toe (Comment which one) Routine Completed Renee Foreman MD   05/22/24 1140 Debridement Old surgical Right Toe (Comment which one) Routine Completed Renee Foreman MD   05/15/24 1059 Debridement Old surgical Right Toe (Comment which one) Routine Completed Renee Foreman MD   05/08/24 0943 Debridement Old surgical Right Toe (Comment which one) Routine Completed Renee Foreman MD       Wound 05/08/24 #2 Right plantar foot Foot Right;Plantar (Active)   Date First Assessed/Time First Assessed: 05/08/24 0909    Wound Number (Wound Clinic Only): #2 Right plantar foot  Primary Wound Type: Old surgical  Location: Foot  Wound  Location Orientation: Right;Plantar      Assessments 5/8/2024  9:13 AM 7/17/2024 10:16 AM   Wound Image       Drainage Amount Small Small   Drainage Description Serosanguineous Serous;Yellow   Treatments Compression Compression   Wound Length (cm) 2 cm 0.4 cm   Wound Width (cm) 0.9 cm 0.1 cm   Wound Surface Area (cm^2) 1.8 cm^2 0.04 cm^2   Wound Depth (cm) -- 0.1 cm   Wound Volume (cm^3) -- 0.004 cm^3   Wound Healing % -- 100   Margins Undefined edges Epibole (Rolled edges)   Non-staged Wound Description Full thickness Full thickness   Wendy-wound Assessment Dry;Edema;Callous Edema;Dry   Wound Granulation Tissue -- Pale Grey;Firm   Wound Bed Granulation (%) -- 100 %   Wound Bed Epithelium (%) 50 % --   Wound Bed Eschar (%) 50 % --   Wound Odor None None   Tunneling? No No   Undermining? No No   Sinus Tracts? No No       Inactive Orders   Date Order Priority Status Authorizing Provider   05/15/24 1101 Debridement Old surgical Right;Plantar Foot Routine Completed eRnee Foreman MD       Follow Up:  Return in about 1 week (around 7/24/2024) for Wound followup.      Additional Notes:  Per patient HH discharged her. OK to discharge HH for wound care.

## (undated) NOTE — LETTER
Date: 7/10/2024  Patient name: Leonie Denney  YOB: 1980  Medical Record Number: [id number]  Primary Coverage: Payor: AETNA INS / Plan: HMO AETNA / Product Type: HMO /   Secondary Coverage:   Insurance ID: [id number]  Patient Address: [address] [place] [id number]  Telephone Information:   Home Phone [phone]   Mobile [phone]       Encounter Date: 7/10/2024  Provider: Renee Belcher MD  Diagnosis:     ICD-10-CM   1. Right foot ulcer, with fat layer exposed ([facility])  L97.512   2. Diabetic foot ulcer with osteomyelitis ([facility])  E11.621    E11.69    L97.509    M86.9   3. Infected wound  T14.8XXA    L08.9   4. MRSA (methicillin resistant Staphylococcus aureus) colonization  Z22.322   5. Type 2 diabetes mellitus with hyperglycemia, with long-term current use of insulin ([facility])  E11.65    Z79.4   6. Hallux limitus of right foot  M20.5X1   7. Diabetic polyneuropathy associated with type 2 diabetes mellitus ([facility])  E11.42         Wound 05/08/24 #1 Right great toe amp site Toe (Comment which one) Right (Active)   Date First Assessed/Time First Assessed: 05/08/24 0908    Wound Number (Wound Clinic Only): #1 Right great toe amp site  Primary Wound Type: Old surgical  Location: Toe (Comment which one)  Wound Location Orientation: Right      Assessments 5/8/2024  9:11 AM 7/10/2024 10:09 AM   Wound Image        Drainage Amount Large Small   Drainage Description Serosanguineous Serosanguineous   Treatments Compression Compression   Wound Length (cm) 7.5 cm 1 cm   Wound Width (cm) 1.2 cm 0.3 cm   Wound Surface Area (cm^2) 9 cm^2 0.3 cm^2   Wound Depth (cm) -- 1 cm   Wound Volume (cm^3) -- 0.3 cm^3   Wound Healing % -- 87   Margins Undefined edges Epibole (Rolled edges)   Non-staged Wound Description Full thickness Full thickness   Wendy-wound Assessment Dry;Edema;Ecchymosis;Moist;Maceration Edema;Maceration;Moist   Wound Granulation Tissue -- Pink;Pale Grey;Firm   Wound Bed Granulation (%) -- 90 %    Wound Bed Slough (%) 5 % 10 %   Wound Bed Eschar (%) 95 % --   Wound Odor None None   Tunneling? No --   Undermining? No --   Sinus Tracts? No --       Inactive Orders   Date Order Priority Status Authorizing Provider   07/10/24 1028 Cellular tissue product application Old surgical Right Toe (Comment which one) Routine Completed Renee Foreman MD   07/03/24 1138 Debridement Old surgical Right Toe (Comment which one) Routine Completed Renee Foreman MD   06/26/24 1123 Cellular tissue product application Old surgical Right Toe (Comment which one) Routine Completed Renee Foreman MD   06/19/24 0949 Cellular tissue product application Old surgical Right Toe (Comment which one) Routine Completed Renee Foreman MD   06/12/24 0947 Debridement Old surgical Right Toe (Comment which one) Routine Completed Renee Foreman MD   06/05/24 1136 Debridement Old surgical Right Toe (Comment which one) Routine Completed Renee Foreman MD   05/29/24 0924 Debridement Old surgical Right Toe (Comment which one) Routine Completed Renee Foreman MD   05/22/24 1140 Debridement Old surgical Right Toe (Comment which one) Routine Completed Renee Foreman MD   05/15/24 1059 Debridement Old surgical Right Toe (Comment which one) Routine Completed Renee Foreman MD   05/08/24 0943 Debridement Old surgical Right Toe (Comment which one) Routine Completed Renee Foreman MD       Wound 05/08/24 #2 Right plantar foot Foot Right;Plantar (Active)   Date First Assessed/Time First Assessed: 05/08/24 0909    Wound Number (Wound Clinic Only): #2 Right plantar foot  Primary Wound Type: Old surgical  Location: Foot  Wound Location Orientation: Right;Plantar      Assessments 5/8/2024  9:13 AM 7/10/2024 10:08 AM   Wound Image       Drainage Amount Small Scant   Drainage Description Serosanguineous Serosanguineous   Treatments Compression Compression   Wound Length (cm) 2 cm 0.3 cm   Wound Width (cm)  0.9 cm 0.1 cm   Wound Surface Area (cm^2) 1.8 cm^2 0.03 cm^2   Wound Depth (cm) -- 0.1 cm   Wound Volume (cm^3) -- 0.003 cm^3   Wound Healing % -- 100   Margins Undefined edges Epibole (Rolled edges)   Non-staged Wound Description Full thickness Full thickness   Wendy-wound Assessment Dry;Edema;Callous Edema;Dry   Wound Granulation Tissue -- Pale Grey;Firm   Wound Bed Granulation (%) -- 100 %   Wound Bed Epithelium (%) 50 % --   Wound Bed Eschar (%) 50 % --   Wound Odor None None   Tunneling? No No   Undermining? No No   Sinus Tracts? No No       Inactive Orders   Date Order Priority Status Authorizing Provider   05/15/24 1101 Debridement Old surgical Right;Plantar Foot Routine Completed Renee Foreman MD       Wound Cleaning and Dressings: Right great toe amp site  Showering directions: May shower with protection  Wound cleansing:  Cleanse with normal saline or wound cleanser and Cleanse with Vashe  Wound cleaning frequency:  weekly  Wound product: Epifix, steri-strip, silicone contact layer (mesh), hydrofera transfer, bordered gauze - if needed only change hydrofera transfer and bordered gauze. Please leave mesh in place.   Dressing change frequency:  Change dressingweekly or as needed  Enzymatic agent:  Not applicable    Compression Therapy:   Spandagrip E    Negative Pressure Wound Therapy:  NPWT: No negative pressure device therapy needed. Per provider patient to return machine to [facility]. Pt told to call number on machine to start returning process.       Miscellaneous/Additional Orders:  Offloading: Cam boot(s) and Keep weight off right foot  Miscellaneous orders: Home health care nurse for wound care    Care Summary:  Care Summary: Discussed Plan of Care at beside with patient. Patient verbally acknowledges understanding of all instructions and all questions were answered.    Follow Up:  Return in about 1 week (around 7/17/2024) for Wound followup.      Additional Notes:  Dressing to be changed weekly in  clinic or as needed.   Epifix, steri-strip, silicone contact layer (mesh), hydrofera transfer, bordered gauze - if needed only change hydrofera transfer and bordered gauze. Please leave mesh in place.  Plantar wound: triad paste and bordered gauze.

## (undated) NOTE — LETTER
Patient Name: Mahi Gifford  YOB: 1980          MRN number:  [id number]  Date:  10/9/2019  Referring Physician:  Winter Pickett    Progress Summary  Pt has attended 11 visits in Physical Therapy.  Pain Ratin/10    She states she notic SLS: R LE: 9 sec; L LE: 6  SLS: R LE: 5 sec; L LE: 6  Tandem stance: R lead 7 sec; L lead: 13 sec  Romberg EC: > 30 sec  Static Balance:   Narrow stance EC: > 30 sec with increased R LE WB  Tandem Stance- modified R back: 20, L back: 30       FOTO Function over a 90 day period. Treatment will include: Manual therapy Therapeutic exercise, Therapeutic Activity, Neuromuscular re-education, Pt.  Education, Modalities as needed (including heat/cold and e-stim for pain relief), HEP instruction and progression

## (undated) NOTE — LETTER
Date: 6/12/2024  Patient name: Leonie Denney  YOB: 1980  Medical Record Number: [id number]  Primary Coverage: Payor: AETNA INS / Plan: HMO AETNA / Product Type: HMO /   Secondary Coverage:   Insurance ID: [id number]  Patient Address: [address] [place] [address]  Telephone Information:   Home Phone [phone]   Mobile [phone]       Encounter Date: 6/12/2024  Provider: Renee Belcher MD  Diagnosis:     ICD-10-CM   1. Right foot ulcer, with fat layer exposed ([facility])  L97.512   2. Diabetic foot ulcer with osteomyelitis ([facility])  E11.621    E11.69    L97.509    M86.9   3. Type 2 diabetes mellitus with hyperglycemia, with long-term current use of insulin ([facility])  E11.65    Z79.4   4. Hallux limitus of right foot  M20.5X1         Wound 05/08/24 #1 Right great toe amp site Toe (Comment which one) Right (Active)   Date First Assessed/Time First Assessed: 05/08/24 0908    Wound Number (Wound Clinic Only): #1 Right great toe amp site  Primary Wound Type: Old surgical  Location: Toe (Comment which one)  Wound Location Orientation: Right      Assessments 5/8/2024  9:11 AM 6/12/2024  9:29 AM   Wound Image         Drainage Amount Large Small   Drainage Description Serosanguineous Serous;Yellow   Treatments Compression Compression   Wound Length (cm) 7.5 cm 2.2 cm   Wound Width (cm) 1.2 cm 1.7 cm   Wound Surface Area (cm^2) 9 cm^2 3.74 cm^2   Wound Depth (cm) -- 1.4 cm   Wound Volume (cm^3) -- 5.236 cm^3   Wound Healing % -- -120   Margins Undefined edges Well-defined edges   Non-staged Wound Description Full thickness Full thickness   Wendy-wound Assessment Dry;Edema;Ecchymosis;Moist;Maceration Edema;Maceration;Moist   Wound Granulation Tissue -- Spongy;Red   Wound Bed Granulation (%) -- 70 %   Wound Bed Slough (%) 5 % 30 %   Wound Bed Eschar (%) 95 % --   Wound Odor None None   Tunneling? No No   Undermining? No No   Sinus Tracts? No No       Inactive Orders   Date Order Priority Status  Authorizing Provider   06/12/24 0947 Debridement Old surgical Right Toe (Comment which one) Routine Completed Renee Foreman MD   06/05/24 1136 Debridement Old surgical Right Toe (Comment which one) Routine Completed Renee Foreman MD   05/29/24 0924 Debridement Old surgical Right Toe (Comment which one) Routine Completed Renee Foreman MD   05/22/24 1140 Debridement Old surgical Right Toe (Comment which one) Routine Completed Reene Foreman MD   05/15/24 1059 Debridement Old surgical Right Toe (Comment which one) Routine Completed Renee Foreman MD   05/08/24 0943 Debridement Old surgical Right Toe (Comment which one) Routine Completed Renee Foreman MD       Wound 05/08/24 #2 Right plantar foot Foot Right;Plantar (Active)   Date First Assessed/Time First Assessed: 05/08/24 0909    Wound Number (Wound Clinic Only): #2 Right plantar foot  Primary Wound Type: Old surgical  Location: Foot  Wound Location Orientation: Right;Plantar      Assessments 5/8/2024  9:13 AM 6/12/2024  9:28 AM   Wound Image       Drainage Amount Small None   Drainage Description Serosanguineous --   Treatments Compression Compression   Wound Length (cm) 2 cm 0.1 cm   Wound Width (cm) 0.9 cm 0.1 cm   Wound Surface Area (cm^2) 1.8 cm^2 0.01 cm^2   Wound Depth (cm) -- 0.1 cm   Wound Volume (cm^3) -- 0.001 cm^3   Wound Healing % -- 100   Margins Undefined edges Well-defined edges   Non-staged Wound Description Full thickness Full thickness   Wendy-wound Assessment Dry;Edema;Callous Clean;Dry   Wound Granulation Tissue -- Firm;Pink   Wound Bed Granulation (%) -- 100 %   Wound Bed Epithelium (%) 50 % --   Wound Bed Eschar (%) 50 % --   Wound Odor None None   Tunneling? No No   Undermining? No No   Sinus Tracts? No No       Inactive Orders   Date Order Priority Status Authorizing Provider   05/15/24 1101 Debridement Old surgical Right;Plantar Foot Routine Completed Renee Foreman MD       Negative Pressure  Wound Therapy Dorsal;Right (Active)   Placement Date/Time: 05/22/24 1157   Wound Location Orientation: Dorsal;Right      Assessments 5/22/2024 11:23 AM 5/29/2024 11:06 AM   Wound photographed/measured Yes Yes   Machine Status (On) Yes Yes   Site Assessment Clean;Dry;Intact Clean;Dry;Intact   Wendy-wound Assessment Clean;Dry;Intact Clean;Dry;Intact   Unit Type KCI KCI   Dressing Type Black foam Black foam   Number of Foam Pieces Used 1 1   Cycle Continuous Continuous   Target Pressure (mmHg) 125 125   Canister Changed Yes Yes       No associated orders.          Wound Cleaning and Dressings:  Showering directions: May shower with protection  Wound cleansing:  Cleanse with normal saline or wound cleanser  Wound cleaning frequency:  with dressing changes  Wound product: Other -Gentamicin, folded xeroform, ABD pad  Dressing change frequency:  Change dressing twice daily  Enzymatic agent:  Not applicable     Compression Therapy:   Spandagrip E     Miscellaneous/Additional Orders:  Miscellaneous orders: Home health care nurse for wound care     Care Summary:  Care Summary: Discussed Plan of Care at beside with patient. Patient verbally acknowledges understanding of all instructions and all questions were answered.      Additional Notes:  HH to see pt three times per week for dressing changes. Wound vac on hold for one week. Apply gentamicin coated gauze to both wounds, cover with dry gauze and medipore tape.    Follow Up:  Return in about 1 week (around 6/19/2024) for Wound followup.

## (undated) NOTE — LETTER
04/13/21        Dl Dean  [address] [facility]      Dear Juanita Perez,    [address] [facility] records indicate that you have outstanding lab work and or testing that was ordered for you and has not yet been completed:  Orders Placed This Encounte

## (undated) NOTE — Clinical Note
I had the pleasure of seeing Shakila Jameson on 4/19/2022. Please see my attached note.     Royer Dempsey MD FACS  [facility]

## (undated) NOTE — Clinical Note
I had the pleasure of seeing Xiomara Bethea on 5/3/2022. Please see my attached note.     Jazmine Vanegas MD FACS  [facility]

## (undated) NOTE — LETTER
[address]  [address]  Authorization for Surgical Operation and Procedure     Date:___________                                                                                                         Time:__________  I hereby authorize Surgeon(s):  Dilan Sung DPM, my physician and his/her assistants (if applicable), which may include medical students, residents, and/or fellows, to perform the following surgical operation/ procedure and administer such anesthesia as may be determined necessary by my physician:  Operation/Procedure name (s) Procedure(s):  AMPUTATION INCISION & DRAINAGE RIGHT HALLUX, POSSIBLE PARTIAL FIRST RAY AMPUTATION on Leonie Denney   2.   I recognize that during the surgical operation/procedure, unforeseen conditions may necessitate additional or different procedures than those listed above.  I, therefore, further authorize and request that the above-named surgeon, assistants, or designees perform such procedures as are, in their judgment, necessary and desirable.    3.   My surgeon/physician has discussed prior to my surgery the potential benefits, risks and side effects of this procedure; the likelihood of achieving goals; and potential problems that might occur during recuperation.  They also discussed reasonable alternatives to the procedure, including risks, benefits, and side effects related to the alternatives and risks related to not receiving this procedure.  I have had all my questions answered and I acknowledge that no guarantee has been made as to the result that may be obtained.    4.   Should the need arise during my operation/procedure, which includes change of level of care prior to discharge, I also consent to the administration of blood and/or blood products.  Further, I understand that despite careful testing and screening of blood or blood products by collecting agencies, I may still be subject to ill effects as a result of  receiving a blood transfusion and/or blood products.  The following are some, but not all, of the potential risks that can occur: fever and allergic reactions, hemolytic reactions, transmission of diseases such as Hepatitis, AIDS and Cytomegalovirus (CMV) and fluid overload.  In the event that I wish to have an autologous transfusion of my own blood, or a directed donor transfusion, I will discuss this with my physician.  Check only if Refusing Blood or Blood Products  I understand refusal of blood or blood products as deemed necessary by my physician may have serious consequences to my condition to include possible death. I hereby assume responsibility for my refusal and release the hospital, its personnel, and my physicians from any responsibility for the consequences of my refusal.          o  Refuse      5.   I authorize the use of any specimen, organs, tissues, body parts or foreign objects that may be removed from my body during the operation/procedure for diagnosis, research or teaching purposes and their subsequent disposal by hospital authorities.  I also authorize the release of specimen test results and/or written reports to my treating physician on the hospital medical staff or other referring or consulting physicians involved in my care, at the discretion of the Pathologist or my treating physician.    6.   I consent to the photographing or videotaping of the operations or procedures to be performed, including appropriate portions of my body for medical, scientific, or educational purposes, provided my identity is not revealed by the pictures or by descriptive texts accompanying them.  If the procedure has been photographed/videotaped, the surgeon will obtain the original picture, image, videotape or CD.  The hospital will not be responsible for storage, release or maintenance of the picture, image, tape or CD.    7.   I consent to the presence of a  or observers in the operating room  as deemed necessary by my physician or their designees.    8.   I recognize that in the event my procedure results in extended X-Ray/fluoroscopy time, I may develop a skin reaction.    9. If I have a Do Not Attempt Resuscitation (DNAR) order in place, that status will be suspended while in the operating room, procedural suite, and during the recovery period unless otherwise explicitly stated by me (or a person authorized to consent on my behalf). The surgeon or my attending physician will determine when the applicable recovery period ends for purposes of reinstating the DNAR order.  10. Patients having a sterilization procedure: I understand that if the procedure is successful the results will be permanent and it will therefore be impossible for me to inseminate, conceive, or bear children.  I also understand that the procedure is intended to result in sterility, although the result has not been guaranteed.   11. I acknowledge that my physician has explained sedation/analgesia administration to me including the risk and benefits I consent to the administration of sedation/analgesia as may be necessary or desirable in the judgment of my physician.    I CERTIFY THAT I HAVE READ AND FULLY UNDERSTAND THE ABOVE CONSENT TO OPERATION and/or OTHER PROCEDURE.    _________________________________________  __________________________________  Signature of Patient     Signature of Responsible Person         ___________________________________         Printed Name of Responsible Person           _________________________________                 Relationship to Patient  _________________________________________  ______________________________  Signature of Witness          Date  Time      Patient Name: Leonie PAUL Олег     : 1980                 Printed: 2024     Medical Record #: [id number]                     Page 1 of 2                                    [address]   [id number]    Consent for Anesthesia    ILeonie agree to be cared for by an anesthesiologist, who is specially trained to monitor me and give me medicine to put me to sleep or keep me comfortable during my procedure    I understand that my anesthesiologist is not an employee or agent of [facility] or [employer]. He or she works for [employer].    As the patient asking for anesthesia services, I agree to:  Allow the anesthesiologist (anesthesia doctor) to give me medicine and do additional procedures as necessary. Some examples are: Starting or using an “IV” to give me medicine, fluids or blood during my procedure, and having a breathing tube placed to help me breathe when I’m asleep (intubation). In the event that my heart stops working properly, I understand that my anesthesiologist will make every effort to sustain my life, unless otherwise directed by [facility] Do Not Resuscitate documents.  Tell my anesthesia doctor before my procedure:  If I am pregnant.  The last time that I ate or drank.  All of the medicines I take (including prescriptions, herbal supplements, and pills I can buy without a prescription (including street drugs/illegal medications). Failure to inform my anesthesiologist about these medicines may increase my risk of anesthetic complications.  If I am allergic to anything or have had a reaction to anesthesia before.  I understand how the anesthesia medicine will help me (benefits).  I understand that with any type of anesthesia medicine there are risks:  The most common risks are: nausea, vomiting, sore throat, muscle soreness, damage to my eyes, mouth, or teeth (from breathing tube placement).  Rare risks include: remembering what happened during my procedure, allergic reactions to medications, injury to my airway, heart, lungs, vision, nerves, or muscles and in extremely rare instances death.  My doctor has explained to me other choices available  to me for my care (alternatives).  Pregnant Patients (“epidural”):  I understand that the risks of having an epidural (medicine given into my back to help control pain during labor), include itching, low blood pressure, difficulty urinating, headache or slowing of the baby’s heart. Very rare risks include infection, bleeding, seizure, irregular heart rhythms and nerve injury.  Regional Anesthesia (“spinal”, “epidural”, & “nerve blocks”):  I understand that rare but potential complications include headache, bleeding, infection, seizure, irregular heart rhythms, and nerve injury.    I can change my mind about having anesthesia services at any time before I get the medicine.    _____________________________________________________________________________  Patient (or Representative) Signature/Relationship to Patient  Date   Time    _____________________________________________________________________________   Name (if used)    Language/Organization   Time    _____________________________________________________________________________  Anesthesiologist Signature     Date   Time  I have discussed the procedure and information above with the patient (or patient’s representative) and answered their questions. The patient or their representative has agreed to have anesthesia services.    _____________________________________________________________________________  Witness        Date   Time  I have verified that the signature is that of the patient or patient’s representative, and that it was signed before the procedure  Patient Name: Leonie Denney     : 1980                 Printed: 2024     Medical Record #: [id number]                     Page 2 of 2

## (undated) NOTE — LETTER
6/5/2020    Raeann Henley  [address] [place] [address] [facility] [id number]    Dear Kelby Lozoya    My office staff has made several attempts to contact you at my request.  It is in my best judgment for your health and well-being that you contact my office and schedul

## (undated) NOTE — LETTER
Date & Time: 10/31/2021, 12:42 PM  Patient: Ashlie Alvarado  Encounter Provider(s):    ROSA Terry       To Whom It May Concern:    Jaskaran Pereira was seen and treated in our department on 10/31/2021. She may return to work 11/3/2021.     If you hav

## (undated) NOTE — LETTER
Date: 6/26/2024  Patient name: Leonie Denney  YOB: 1980  Medical Record Number: [id number]  Primary Coverage: Payor: AETNA INS / Plan: HMO AETNA / Product Type: HMO /   Secondary Coverage:   Insurance ID: [id number]  Patient Address: [address] [place] [address]  Telephone Information:   Home Phone [phone]   Mobile [phone]       Encounter Date: 6/26/2024  Provider: Renee Belcher MD  Diagnosis:     ICD-10-CM   1. Right foot ulcer, with fat layer exposed ([facility])  L97.512   2. Diabetic foot ulcer with osteomyelitis (HCC)  E11.621    E11.69    L97.509    M86.9   3. Type 2 diabetes mellitus with hyperglycemia, with long-term current use of insulin ([facility])  E11.65    Z79.4   4. Hallux limitus of right foot  M20.5X1   5. Infected wound  T14.8XXA    L08.9         Wound 05/08/24 #1 Right great toe amp site Toe (Comment which one) Right (Active)   Date First Assessed/Time First Assessed: 05/08/24 0908    Wound Number (Wound Clinic Only): #1 Right great toe amp site  Primary Wound Type: Old surgical  Location: Toe (Comment which one)  Wound Location Orientation: Right      Assessments 5/8/2024  9:11 AM 6/26/2024 10:46 AM   Wound Image        Drainage Amount Large None   Drainage Description Serosanguineous --   Treatments Compression Compression;Wound Vac - Neg Pressure;Skin Substitutes   Wound Vac Brand -- KCI   Wound Length (cm) 7.5 cm 1 cm   Wound Width (cm) 1.2 cm 0.5 cm   Wound Surface Area (cm^2) 9 cm^2 0.5 cm^2   Wound Depth (cm) -- 1.1 cm   Wound Volume (cm^3) -- 0.55 cm^3   Wound Healing % -- 77   Margins Undefined edges Well-defined edges   Non-staged Wound Description Full thickness Full thickness   Wendy-wound Assessment Dry;Edema;Ecchymosis;Moist;Maceration Edema;Maceration;Moist;Callous   Wound Granulation Tissue -- Firm;Pink   Wound Bed Granulation (%) -- 75 %   Wound Bed Slough (%) 5 % --   Wound Bed Eschar (%) 95 % --   Wound Odor None None   Shape -- 25% intergrated  skin sun   Tunneling? No No   Undermining? No No   Sinus Tracts? No No       Inactive Orders   Date Order Priority Status Authorizing Provider   06/26/24 1123 Cellular tissue product application Old surgical Right Toe (Comment which one) Routine Completed Renee Foreman MD   06/19/24 0949 Cellular tissue product application Old surgical Right Toe (Comment which one) Routine Completed Renee Foreman MD   06/12/24 0947 Debridement Old surgical Right Toe (Comment which one) Routine Completed Renee Foreman MD   06/05/24 1136 Debridement Old surgical Right Toe (Comment which one) Routine Completed Renee Foreman MD   05/29/24 0924 Debridement Old surgical Right Toe (Comment which one) Routine Completed Renee Foreman MD   05/22/24 1140 Debridement Old surgical Right Toe (Comment which one) Routine Completed Renee Foreman MD   05/15/24 1059 Debridement Old surgical Right Toe (Comment which one) Routine Completed Renee Foreman MD   05/08/24 0943 Debridement Old surgical Right Toe (Comment which one) Routine Completed Renee Foreman MD       Wound 05/08/24 #2 Right plantar foot Foot Right;Plantar (Active)   Date First Assessed/Time First Assessed: 05/08/24 0909    Wound Number (Wound Clinic Only): #2 Right plantar foot  Primary Wound Type: Old surgical  Location: Foot  Wound Location Orientation: Right;Plantar      Assessments 5/8/2024  9:13 AM 6/26/2024 10:44 AM   Wound Image       Drainage Amount Small None   Drainage Description Serosanguineous --   Treatments Compression Compression;Wound Vac - Neg Pressure   Wound Length (cm) 2 cm 0.4 cm   Wound Width (cm) 0.9 cm 0.1 cm   Wound Surface Area (cm^2) 1.8 cm^2 0.04 cm^2   Wound Depth (cm) -- 0.5 cm   Wound Volume (cm^3) -- 0.02 cm^3   Wound Healing % -- 98   Margins Undefined edges Well-defined edges   Non-staged Wound Description Full thickness Full thickness   Wendy-wound Assessment Dry;Edema;Callous Edema;Callous;Moist    Wound Bed Epithelium (%) 50 % --   Wound Bed Eschar (%) 50 % --   Wound Odor None None   Shape -- unable to view wound bed   Tunneling? No No   Undermining? No No   Sinus Tracts? No No       Inactive Orders   Date Order Priority Status Authorizing Provider   05/15/24 1101 Debridement Old surgical Right;Plantar Foot Routine Completed Renee Froeman MD       Negative Pressure Wound Therapy Dorsal;Right (Active)   Placement Date/Time: 05/22/24 1157   Wound Location Orientation: Dorsal;Right      Assessments 5/22/2024 11:23 AM 6/26/2024  1:44 PM   Wound photographed/measured Yes Yes   Machine Status (On) Yes Yes   Site Assessment Clean;Dry;Intact Clean;Dry;Intact   Wendy-wound Assessment Clean;Dry;Intact Clean;Dry;Intact   Unit Type KCI KCI   Dressing Type Black foam Black foam   Number of Foam Pieces Used 1 1   Cycle Continuous Continuous   Target Pressure (mmHg) 125 125   Canister Changed Yes Yes       No associated orders.           Wound Cleaning and Dressings: Right great toe amp site  Showering directions: May shower with protection  Wound cleansing:  Cleanse with normal saline or wound cleanser  Wound cleaning frequency:  3 times a week  Wound product: EPIFIX skin sub applied in clinic PLEASE LEAVE IN PLACE, sorbact (green mesh), 1 black foam, vac drape  Dressing change frequency:  Change dressing 3x per week  Enzymatic agent:  Not applicable     Compression Therapy:   Spandagrip E     Negative Pressure Wound Therapy:  NPWT: KCI vac therapy, black form at 125 mmHg continuous. RN to change dressing 3x/week unless indicated below     Wound Cleaning and Dressings: Right plantar foot  Showering directions: May shower with protection  Wound cleansing:  Cleanse with normal saline or wound cleanser  Wound cleaning frequency:  3 times a week  Wound product: EPIFIX placed, hydrofera transfer, gauze, vac drape  Dressing change frequency:  Change dressing 3x per week     Miscellaneous/Additional Orders:  Offloading:  Cam boot(s)  Miscellaneous orders: Home health care nurse for wound care     Care Summary:  Care Summary: Discussed Plan of Care at beside with patient. Patient verbally acknowledges understanding of all instructions and all questions were answered.    HH RN change dressings Mondays and Fridays.     Follow Up:  Return in about 1 week (around 7/3/2024) for Wound followup.

## (undated) NOTE — LETTER
Date: 9/5/2019    Patient Name: Carmencita Palma          To Whom it may concern: This letter has been written at the patient's request. The above patient was seen at the [facility] for treatment of a medical condition.     This patient should

## (undated) NOTE — LETTER
Date: 5/17/2024  Patient name: Leonie Denney  YOB: 1980  Medical Record Number: [id number]  Primary Coverage: Payor: AETNA INS / Plan: HMO AETNA / Product Type: HMO /   Secondary Coverage:   Insurance ID: [id number]  Patient Address: [address] [place] [address]  Telephone Information:   Home Phone [phone]   Mobile [phone]       Encounter Date: 5/15/2024  Provider: Renee Belcher MD  Diagnosis:     ICD-10-CM   1. Right foot ulcer, with fat layer exposed (HCC)  L97.512   2. Diabetic foot ulcer with osteomyelitis (HCC)  E11.621    E11.69    L97.509    M86.9   3. Type 2 diabetes mellitus with hyperglycemia, with long-term current use of insulin (Formerly [facility])  E11.65    Z79.4   4. Hallux limitus of right foot  M20.5X1   5. Diabetic polyneuropathy associated with type 2 diabetes mellitus ([facility])  E11.42         Wound 05/08/24 #1 Right great toe amp site Toe (Comment which one) Right (Active)   Date First Assessed/Time First Assessed: 05/08/24 0908    Wound Number (Wound Clinic Only): #1 Right great toe amp site  Primary Wound Type: Old surgical  Location: Toe (Comment which one)  Wound Location Orientation: Right      Assessments 5/8/2024  9:11 AM 5/15/2024 10:42 AM   Wound Image        Drainage Amount Large --   Drainage Description Serosanguineous --   Treatments Compression --   Wound Length (cm) 7.5 cm 3.7 cm   Wound Width (cm) 1.2 cm 1.2 cm   Wound Surface Area (cm^2) 9 cm^2 4.44 cm^2   Wound Depth (cm) -- 2.6 cm   Wound Volume (cm^3) -- 11.544 cm^3   Wound Healing % -- -386   Margins Undefined edges --   Non-staged Wound Description Full thickness --   Wendy-wound Assessment Dry;Edema;Ecchymosis;Moist;Maceration --   Wound Bed Slough (%) 5 % --   Wound Bed Eschar (%) 95 % --   Wound Odor None --   Tunneling? No --   Undermining? No --   Sinus Tracts? No --       Inactive Orders   Date Order Priority Status Authorizing Provider   05/15/24 1059 Debridement Old surgical Right Toe  (Comment which one) Routine Completed Renee Foreman MD   05/08/24 0943 Debridement Old surgical Right Toe (Comment which one) Routine Completed Renee Foreman MD       Wound 05/08/24 #2 Right plantar foot Foot Right;Plantar (Active)   Date First Assessed/Time First Assessed: 05/08/24 0909    Wound Number (Wound Clinic Only): #2 Right plantar foot  Primary Wound Type: Old surgical  Location: Foot  Wound Location Orientation: Right;Plantar      Assessments 5/8/2024  9:13 AM 5/15/2024 10:44 AM   Wound Image       Drainage Amount Small --   Drainage Description Serosanguineous --   Treatments Compression --   Wound Length (cm) 2 cm 1.6 cm   Wound Width (cm) 0.9 cm 0.8 cm   Wound Surface Area (cm^2) 1.8 cm^2 1.28 cm^2   Wound Depth (cm) -- 1.1 cm   Wound Volume (cm^3) -- 1.408 cm^3   Wound Healing % -- -33   Margins Undefined edges --   Non-staged Wound Description Full thickness --   Wendy-wound Assessment Dry;Edema;Callous --   Wound Bed Epithelium (%) 50 % --   Wound Bed Eschar (%) 50 % --   Wound Odor None --   Tunneling? No --   Undermining? No --   Sinus Tracts? No --       Inactive Orders   Date Order Priority Status Authorizing Provider   05/15/24 1101 Debridement Old surgical Right;Plantar Foot Routine Completed Renee Foreman MD     Wound Number: All wounds    Wound Cleaning and Dressings:  Showering directions: May shower with protection  Wound cleansing:  Cleanse with normal saline or wound cleanser  Wound cleaning frequency: Daily  Wound product: Honey gel, Dry gauze, ABD pad, Kerlix, and Paper tape  Dressing change frequency:  Change dressing daily and/or PRN  Enzymatic agent:  Honey    Compression Therapy:   Spandagrip    Negative Pressure Wound Therapy:  NPWT: Vac ordered and first application will be at the wound clinic on 5/22/24. HH wound care RN to start 5/24/24.  KCI vac therapy, black form at 125 mmHg continuous. RN to change dressing 3x/week unless indicated  below      Miscellaneous/Additional Orders:  Offloading: Dacro shoe  Miscellaneous orders: Home health care nurse for wound care    Care Summary:  Care Summary: Discussed Plan of Care at beside with patient. Patient verbally acknowledges understanding of all instructions and all questions were answered.    Follow Up:  Return in about 1 week (around 5/22/2024) for Wound followup.      Additional Notes:   RN to start 5/24/24 - plan on NPWT dressing changes 3 times a week MWF.

## (undated) NOTE — LETTER
08/20/20        Carmencita Randp  [address] [facility]      Dear Jorje Rios,    [address] [facility] records indicate that you have outstanding lab work and or testing that was ordered for you and has not yet been completed:  Orders Placed This Encounte

## (undated) NOTE — LETTER
Date: 5/22/2024  Patient name: Leonie Denney  YOB: 1980  Medical Record Number: [id number]  Primary Coverage: Payor: AETNA INS / Plan: HMO AETNA / Product Type: HMO /   Secondary Coverage:   Insurance ID: [id number]  Patient Address: [address] [place] [address]  Telephone Information:   Home Phone [phone]   Mobile [phone]       Encounter Date: 5/22/2024  Provider: Renee Belcher MD  Diagnosis:     ICD-10-CM   1. Right foot ulcer, with fat layer exposed ([facility])  L97.512   2. Diabetic foot ulcer with osteomyelitis (HCC)  E11.621    E11.69    L97.509    M86.9   3. Type 2 diabetes mellitus with hyperglycemia, with long-term current use of insulin ([facility])  E11.65    Z79.4   4. Hallux limitus of right foot  M20.5X1   5. Diabetic polyneuropathy associated with type 2 diabetes mellitus ([facility])  E11.42         Wound 05/08/24 #1 Right great toe amp site Toe (Comment which one) Right (Active)   Date First Assessed/Time First Assessed: 05/08/24 0908    Wound Number (Wound Clinic Only): #1 Right great toe amp site  Primary Wound Type: Old surgical  Location: Toe (Comment which one)  Wound Location Orientation: Right      Assessments 5/8/2024  9:11 AM 5/22/2024 11:23 AM   Wound Image         Drainage Amount Large Scant   Drainage Description Serosanguineous Yellow;Serous   Treatments Compression Wound Vac - Neg Pressure   Wound Vac Brand -- KCI   Wound Length (cm) 7.5 cm 2.4 cm   Wound Width (cm) 1.2 cm 0.8 cm   Wound Surface Area (cm^2) 9 cm^2 1.92 cm^2   Wound Depth (cm) -- 1.6 cm   Wound Volume (cm^3) -- 3.072 cm^3   Wound Healing % -- -29   Margins Undefined edges Well-defined edges   Non-staged Wound Description Full thickness Full thickness   Wendy-wound Assessment Dry;Edema;Ecchymosis;Moist;Maceration Edema;Dry;Maceration;Moist   Wound Granulation Tissue -- Pink;Firm   Wound Bed Granulation (%) -- 40 %   Wound Bed Slough (%) 5 % 60 %   Wound Bed Eschar (%) 95 % --   Wound Odor None  None   Tunneling? No --   Undermining? No --   Sinus Tracts? No --       Inactive Orders   Date Order Priority Status Authorizing Provider   05/22/24 1140 Debridement Old surgical Right Toe (Comment which one) Routine Completed Renee Foreman MD   05/15/24 1059 Debridement Old surgical Right Toe (Comment which one) Routine Completed Renee Foreman MD   05/08/24 0943 Debridement Old surgical Right Toe (Comment which one) Routine Completed Renee Foreman MD       Wound 05/08/24 #2 Right plantar foot Foot Right;Plantar (Active)   Date First Assessed/Time First Assessed: 05/08/24 0909    Wound Number (Wound Clinic Only): #2 Right plantar foot  Primary Wound Type: Old surgical  Location: Foot  Wound Location Orientation: Right;Plantar      Assessments 5/8/2024  9:13 AM 5/22/2024 11:25 AM   Wound Image       Drainage Amount Small None   Drainage Description Serosanguineous --   Treatments Compression --   Wound Length (cm) 2 cm 1.6 cm   Wound Width (cm) 0.9 cm 0.1 cm   Wound Surface Area (cm^2) 1.8 cm^2 0.16 cm^2   Wound Depth (cm) -- 0.1 cm   Wound Volume (cm^3) -- 0.016 cm^3   Wound Healing % -- 98   Margins Undefined edges Well-defined edges   Non-staged Wound Description Full thickness Full thickness   Wendy-wound Assessment Dry;Edema;Callous Dry;Edema   Wound Granulation Tissue -- Pink;Firm   Wound Bed Granulation (%) -- 100 %   Wound Bed Epithelium (%) 50 % --   Wound Bed Eschar (%) 50 % --   Wound Odor None None   Tunneling? No --   Undermining? No --   Sinus Tracts? No --       Inactive Orders   Date Order Priority Status Authorizing Provider   05/15/24 1101 Debridement Old surgical Right;Plantar Foot Routine Completed Renee Foreman MD       Negative Pressure Wound Therapy Dorsal;Right (Active)   Placement Date/Time: 05/22/24 1157   Wound Location Orientation: Dorsal;Right      Assessments 5/22/2024 11:23 AM   Wound photographed/measured Yes   Machine Status (On) Yes   Site Assessment  Clean;Dry;Intact   Wendy-wound Assessment Clean;Dry;Intact   Unit Type KCI   Dressing Type Black foam   Number of Foam Pieces Used 1   Cycle Continuous   Target Pressure (mmHg) 125   Canister Changed Yes       No associated orders.             Wound Number: Plantar foot    Wound Cleaning and Dressings:  Showering directions: May shower with protection  Wound cleansing:  Cleanse with normal saline or wound cleanser  Wound cleaning frequency:  with dressing changes  Wound product: Honey gel and Other Non bordered foam  Dressing change frequency:  Change dressing 2x per week  Enzymatic agent:  Honey        Wound Number: Amp site    Wound Cleaning and Dressings:  Showering directions: May shower with protection  Wound cleansing:  Cleanse with normal saline or wound cleanser  Wound cleaning frequency:  with dressing changes  Wound product: Other Negative pressure wound therapy  Dressing change frequency:  Change dressing 2x per week  Enzymatic agent:  Not applicable      Compression Therapy:   Spandagrip    Negative Pressure Wound Therapy:  NPWT: KCI vac therapy, black form at 125 mmHg continuous. RN to change dressing 3x/week unless indicated below      Miscellaneous/Additional Orders:  Miscellaneous orders: Home health care nurse for wound care    Care Summary:  Care Summary: Discussed Plan of Care at beside with patient. Patient verbally acknowledges understanding of all instructions and all questions were answered.    Follow Up:  Return in about 1 week (around 5/29/2024) for Wound followup.    Additional Notes:      HH to see patient on Monday and Friday for dressing changes.   Please soak wounds with vashe for 10-15 minutes before redressing.     Apply Honey gel and non bordered foam to plantar foot wound.  Apply NPWT to amp site. Cover foam dressing on plantar foot with vac drape for good seal.   Apply spandagrip E to leg.       Additional Home Health DME: No

## (undated) NOTE — LETTER
Dear colleague,    Thank you very much for the opportunity to see your patient.  Below, please find information from my consult for your patient's recent visit.    I appreciate the chance to take care of your patient with you.  Please feel free to call me with any questions or concerns.    ASSESSMENT & PLAN:      ICD-10-CM    1. Polyneuropathy  G62.9 Vitamin B12 with Reflex to MMA     Vitamin B1 (Thiamine), Blood     Vitamin B6     Monoclonal Protein Study     OP REFERRAL TO [facility]     OP REFERRAL TO [facility]        Polyneuropathy likely due to long standing DM, but will check B12, SPEP given past abnormalities.  She is well aware of the guarded prognosis and the need to work on strict glucose control.  Advised she needs to work on DM control as to  improve long term prognosis, of which she is quite aware.  Recommended PT.    I advised that I would not recommend driving with her feet due to numbness at this time.  I advised that we should have OT driving eval after starting PT, and then consider hand controls if she does not pass the driving eval.      Regarding work, I advised that I can recommend intermittent FMLA for PT and medical appts, but accommodations for duties would need functional assessment through PT, as I do not make those type of recommendations.  She asked for work accommodations for needing medication and food allowed to be kept with her - I advised she would need to d/w PCP, as she is not asking for neuropathy related accommodations.    Return in about 3 months (around 4/23/2024).           Sincerely,        Itzel Guthrie MD

## (undated) NOTE — LETTER
To Whom It May Concern:    Robin Rabago is currently under my medical care and may not return to work at this time. Please excuse Regine Montes until Monday October 17th, 2022. If you require additional information please contact our office.     Sincerely,    Elayne Islas M.D  [place] [phone]

## (undated) NOTE — LETTER
12/6/2021  Stephanie Denney  [address] [facility] [place] [address]    We take each of our patient's health very seriously and the key to maintaining your health is an annual wellness physical.  Review of your medical records shows that it is time for

## (undated) NOTE — LETTER
Date & Time: 2/23/2021, 2:57 PM  Patient: Phu Costa  Encounter Provider(s):    ROSA Fernandez       To Whom It May Concern:    Leeanna Adrian was seen and treated in our department on 2/23/2021. She should not return to work until 2/28/21.     I

## (undated) NOTE — LETTER
Date: 5/15/2024  Patient name: Leonie Denney  YOB: 1980  Medical Record Number: [id number]  Primary Coverage: Payor: AETNA INS / Plan: HMO AETNA / Product Type: HMO /   Secondary Coverage:   Insurance ID: [id number]  Patient Address:[address] [place] [address]  Telephone Information:   Home Phone [phone]   Mobile [phone]         Encounter Date: 5/15/2024  Provider: Renee Belcher MD  Diagnosis:     ICD-10-CM   1. Right foot ulcer, with fat layer exposed ([facility])  L97.512   2. Diabetic foot ulcer with osteomyelitis (HCC)  E11.621    E11.69    L97.509    M86.9   3. Type 2 diabetes mellitus with hyperglycemia, with long-term current use of insulin ([facility])  E11.65    Z79.4   4. Hallux limitus of right foot  M20.5X1   5. Diabetic polyneuropathy associated with type 2 diabetes mellitus ([facility])  E11.42         Wound 05/08/24 #1 Right great toe amp site Toe (Comment which one) Right (Active)   Date First Assessed/Time First Assessed: 05/08/24 0908    Wound Number (Wound Clinic Only): #1 Right great toe amp site  Primary Wound Type: Old surgical  Location: Toe (Comment which one)  Wound Location Orientation: Right      Assessments 5/8/2024  9:11 AM 5/15/2024 10:42 AM   Wound Image        Drainage Amount Large --   Drainage Description Serosanguineous --   Treatments Compression --   Wound Length (cm) 7.5 cm 3.7 cm   Wound Width (cm) 1.2 cm 1.2 cm   Wound Surface Area (cm^2) 9 cm^2 4.44 cm^2   Wound Depth (cm) -- 2.6 cm   Wound Volume (cm^3) -- 11.544 cm^3   Wound Healing % -- -386   Margins Undefined edges --   Non-staged Wound Description Full thickness --   Wendy-wound Assessment Dry;Edema;Ecchymosis;Moist;Maceration --   Wound Bed Slough (%) 5 % --   Wound Bed Eschar (%) 95 % --   Wound Odor None --   Tunneling? No --   Undermining? No --   Sinus Tracts? No --       Inactive Orders   Date Order Priority Status Authorizing Provider   05/15/24 1059 Debridement Old surgical Right Toe  (Comment which one) Routine Completed Renee Foreman MD   05/08/24 0943 Debridement Old surgical Right Toe (Comment which one) Routine Completed Renee Foreman MD       Wound 05/08/24 #2 Right plantar foot Foot Right;Plantar (Active)   Date First Assessed/Time First Assessed: 05/08/24 0909    Wound Number (Wound Clinic Only): #2 Right plantar foot  Primary Wound Type: Old surgical  Location: Foot  Wound Location Orientation: Right;Plantar      Assessments 5/8/2024  9:13 AM 5/15/2024 10:44 AM   Wound Image       Drainage Amount Small --   Drainage Description Serosanguineous --   Treatments Compression --   Wound Length (cm) 2 cm 1.6 cm   Wound Width (cm) 0.9 cm 0.8 cm   Wound Surface Area (cm^2) 1.8 cm^2 1.28 cm^2   Wound Depth (cm) -- 1.1 cm   Wound Volume (cm^3) -- 1.408 cm^3   Wound Healing % -- -33   Margins Undefined edges --   Non-staged Wound Description Full thickness --   Wendy-wound Assessment Dry;Edema;Callous --   Wound Bed Epithelium (%) 50 % --   Wound Bed Eschar (%) 50 % --   Wound Odor None --   Tunneling? No --   Undermining? No --   Sinus Tracts? No --       Inactive Orders   Date Order Priority Status Authorizing Provider   05/15/24 1101 Debridement Old surgical Right;Plantar Foot Routine Completed Renee Foreman MD           Wound Information/Order:  Wound Number: All wounds  Product:Honey gel, gauze 4X4, ABD, Kerlix, saline  Dispense: 15 days  Dressing Frequency:Change dressing daily and/or PRN    Was a Debridement performed: Yes, Debridement type: subcutaneous    Compression Stockings ordered: No    Notes:     Additional wound: No

## (undated) NOTE — Clinical Note
I had the pleasure of seeing Sepideh Nina on 6/22/2022. Please see my attached note.     Sindy Patel MD FACS  [facility]

## (undated) NOTE — LETTER
Date: 6/5/2024  Patient name: Leonie Denney  YOB: 1980  Medical Record Number: [id number]  Primary Coverage: Payor: [employer] INS / Plan: HMO [employer] / Product Type: HMO /   Secondary Coverage:   Insurance ID: [id number]  Patient Address: [address] [place] [id number]  Telephone Information:   Home Phone [phone]   Mobile [phone]       Encounter Date: 6/5/2024  Provider: Renee Belcher MD  Diagnosis:     ICD-10-CM   1. Right foot ulcer, with fat layer exposed ([facility])  L97.512   2. Infected wound  T14.8XXA    L08.9   3. Diabetic foot ulcer with osteomyelitis ([facility])  E11.621    E11.69    L97.509    M86.9   4. Type 2 diabetes mellitus with hyperglycemia, with long-term current use of insulin ([facility])  E11.65    Z79.4   5. Hallux limitus of right foot  M20.5X1   6. Diabetic polyneuropathy associated with type 2 diabetes mellitus ([facility])  E11.42         Wound 05/08/24 #1 Right great toe amp site Toe (Comment which one) Right (Active)   Date First Assessed/Time First Assessed: 05/08/24 0908    Wound Number (Wound Clinic Only): #1 Right great toe amp site  Primary Wound Type: Old surgical  Location: Toe (Comment which one)  Wound Location Orientation: Right      Assessments 5/8/2024  9:11 AM 6/5/2024 11:14 AM   Wound Image         Drainage Amount Large Scant   Drainage Description Serosanguineous Serosanguineous   Treatments Compression Compression   Wound Length (cm) 7.5 cm 1.9 cm   Wound Width (cm) 1.2 cm 0.9 cm   Wound Surface Area (cm^2) 9 cm^2 1.71 cm^2   Wound Depth (cm) -- 1.4 cm   Wound Volume (cm^3) -- 2.394 cm^3   Wound Healing % -- -1   Margins Undefined edges Well-defined edges   Non-staged Wound Description Full thickness Full thickness   Wendy-wound Assessment Dry;Edema;Ecchymosis;Moist;Maceration Edema;Maceration;Moist   Wound Granulation Tissue -- Firm;Pink;Red   Wound Bed Granulation (%) -- 50 %   Wound Bed Slough (%) 5 % 50 %   Wound Bed Eschar (%) 95 % --   Wound Odor None  None   Tunneling? No --   Undermining? No --   Sinus Tracts? No --       Inactive Orders   Date Order Priority Status Authorizing Provider   06/05/24 1136 Debridement Old surgical Right Toe (Comment which one) Routine Completed Renee Foreman MD   05/29/24 0924 Debridement Old surgical Right Toe (Comment which one) Routine Completed Renee Foreman MD   05/22/24 1140 Debridement Old surgical Right Toe (Comment which one) Routine Completed Renee Foreman MD   05/15/24 1059 Debridement Old surgical Right Toe (Comment which one) Routine Completed Renee Foreman MD   05/08/24 0943 Debridement Old surgical Right Toe (Comment which one) Routine Completed Renee Foreman MD       Wound 05/08/24 #2 Right plantar foot Foot Right;Plantar (Active)   Date First Assessed/Time First Assessed: 05/08/24 0909    Wound Number (Wound Clinic Only): #2 Right plantar foot  Primary Wound Type: Old surgical  Location: Foot  Wound Location Orientation: Right;Plantar      Assessments 5/8/2024  9:13 AM 6/5/2024 11:15 AM   Wound Image        Drainage Amount Small Unable to assess   Drainage Description Serosanguineous --   Treatments Compression Compression   Wound Length (cm) 2 cm 0.5 cm   Wound Width (cm) 0.9 cm 0.1 cm   Wound Surface Area (cm^2) 1.8 cm^2 0.05 cm^2   Wound Depth (cm) -- 0.8 cm   Wound Volume (cm^3) -- 0.04 cm^3   Wound Healing % -- 96   Margins Undefined edges Well-defined edges   Non-staged Wound Description Full thickness Full thickness   Wendy-wound Assessment Dry;Edema;Callous Edema;Moist;Maceration   Wound Granulation Tissue -- Firm;Pale Grey;Pink   Wound Bed Granulation (%) -- 100 %   Wound Bed Epithelium (%) 50 % --   Wound Bed Eschar (%) 50 % --   Wound Odor None None   Tunneling? No --   Undermining? No --   Sinus Tracts? No --       Inactive Orders   Date Order Priority Status Authorizing Provider   05/15/24 1101 Debridement Old surgical Right;Plantar Foot Routine Completed Simi BRINK  MD Renee       Negative Pressure Wound Therapy Dorsal;Right (Active)   Placement Date/Time: 05/22/24 1157   Wound Location Orientation: Dorsal;Right      Assessments 5/22/2024 11:23 AM 5/29/2024 11:06 AM   Wound photographed/measured Yes Yes   Machine Status (On) Yes Yes   Site Assessment Clean;Dry;Intact Clean;Dry;Intact   Wendy-wound Assessment Clean;Dry;Intact Clean;Dry;Intact   Unit Type KCI KCI   Dressing Type Black foam Black foam   Number of Foam Pieces Used 1 1   Cycle Continuous Continuous   Target Pressure (mmHg) 125 125   Canister Changed Yes Yes       No associated orders.         Wound Number: All wounds    Wound Cleaning and Dressings:  Showering directions: May shower with protection  Wound cleansing:  Cleanse with normal saline or wound cleanser  Wound cleaning frequency:  with dressing changes  Wound product: Other -Gentamicin, folded xeroform, ABD pad  Dressing change frequency:  Change dressing twice daily  Enzymatic agent:  Not applicable        Compression Therapy:   Spandagrip    Miscellaneous/Additional Orders:  Miscellaneous orders: Home health care nurse for wound care    Care Summary:  Care Summary: Discussed Plan of Care at beside with patient. Patient verbally acknowledges understanding of all instructions and all questions were answered.    Follow Up:  Return in about 1 week (around 6/12/2024) for Wound followup.      Additional Notes:  HH to see pt three times per week for dressing changes. Wound vac on hold for one week. Apply gentamicin coated gauze to both wounds, cover with dry gauze and medipore tape.     Additional home health DME: No